# Patient Record
Sex: MALE | Race: WHITE | Employment: OTHER | URBAN - METROPOLITAN AREA
[De-identification: names, ages, dates, MRNs, and addresses within clinical notes are randomized per-mention and may not be internally consistent; named-entity substitution may affect disease eponyms.]

---

## 2019-01-21 ENCOUNTER — HOSPITAL ENCOUNTER (EMERGENCY)
Age: 69
Discharge: HOME | End: 2019-01-21
Payer: MEDICARE

## 2019-01-21 ENCOUNTER — APPOINTMENT (OUTPATIENT)
Dept: GENERAL RADIOLOGY | Age: 69
End: 2019-01-21
Payer: MEDICARE

## 2019-01-21 ENCOUNTER — APPOINTMENT (OUTPATIENT)
Dept: CT IMAGING | Age: 69
End: 2019-01-21
Payer: MEDICARE

## 2019-01-21 VITALS
RESPIRATION RATE: 18 BRPM | TEMPERATURE: 97.8 F | OXYGEN SATURATION: 95 % | BODY MASS INDEX: 40.73 KG/M2 | HEIGHT: 69 IN | SYSTOLIC BLOOD PRESSURE: 135 MMHG | DIASTOLIC BLOOD PRESSURE: 89 MMHG | WEIGHT: 275 LBS | HEART RATE: 90 BPM

## 2019-01-21 DIAGNOSIS — S60.221A CONTUSION OF RIGHT HAND, INITIAL ENCOUNTER: ICD-10-CM

## 2019-01-21 DIAGNOSIS — S50.12XA CONTUSION OF LEFT FOREARM, INITIAL ENCOUNTER: ICD-10-CM

## 2019-01-21 DIAGNOSIS — T07.XXXA MULTIPLE ABRASIONS: ICD-10-CM

## 2019-01-21 DIAGNOSIS — S50.11XA CONTUSION OF RIGHT FOREARM, INITIAL ENCOUNTER: ICD-10-CM

## 2019-01-21 DIAGNOSIS — S09.90XA CLOSED HEAD INJURY, INITIAL ENCOUNTER: Primary | ICD-10-CM

## 2019-01-21 DIAGNOSIS — S16.1XXA STRAIN OF NECK MUSCLE, INITIAL ENCOUNTER: ICD-10-CM

## 2019-01-21 PROCEDURE — 72125 CT NECK SPINE W/O DYE: CPT

## 2019-01-21 PROCEDURE — 6360000002 HC RX W HCPCS: Performed by: NURSE PRACTITIONER

## 2019-01-21 PROCEDURE — 6370000000 HC RX 637 (ALT 250 FOR IP): Performed by: NURSE PRACTITIONER

## 2019-01-21 PROCEDURE — 73090 X-RAY EXAM OF FOREARM: CPT

## 2019-01-21 PROCEDURE — 73130 X-RAY EXAM OF HAND: CPT

## 2019-01-21 PROCEDURE — 90471 IMMUNIZATION ADMIN: CPT | Performed by: NURSE PRACTITIONER

## 2019-01-21 PROCEDURE — 90715 TDAP VACCINE 7 YRS/> IM: CPT | Performed by: NURSE PRACTITIONER

## 2019-01-21 PROCEDURE — 70450 CT HEAD/BRAIN W/O DYE: CPT

## 2019-01-21 PROCEDURE — 99283 EMERGENCY DEPT VISIT LOW MDM: CPT

## 2019-01-21 RX ORDER — OXYCODONE HYDROCHLORIDE AND ACETAMINOPHEN 5; 325 MG/1; MG/1
1-2 TABLET ORAL EVERY 6 HOURS PRN
Qty: 12 TABLET | Refills: 0 | Status: SHIPPED | OUTPATIENT
Start: 2019-01-21 | End: 2019-01-24

## 2019-01-21 RX ORDER — OXYCODONE HYDROCHLORIDE AND ACETAMINOPHEN 5; 325 MG/1; MG/1
1 TABLET ORAL ONCE
Status: COMPLETED | OUTPATIENT
Start: 2019-01-21 | End: 2019-01-21

## 2019-01-21 RX ORDER — PIOGLITAZONEHYDROCHLORIDE 30 MG/1
30 TABLET ORAL DAILY
COMMUNITY
Start: 2018-11-11

## 2019-01-21 RX ORDER — METHOCARBAMOL 750 MG/1
750 TABLET, FILM COATED ORAL 3 TIMES DAILY
Qty: 21 TABLET | Refills: 0 | Status: SHIPPED | OUTPATIENT
Start: 2019-01-21 | End: 2019-01-28

## 2019-01-21 RX ADMIN — OXYCODONE AND ACETAMINOPHEN 1 TABLET: 5; 325 TABLET ORAL at 12:37

## 2019-01-21 RX ADMIN — TETANUS TOXOID, REDUCED DIPHTHERIA TOXOID AND ACELLULAR PERTUSSIS VACCINE, ADSORBED 0.5 ML: 5; 2.5; 8; 8; 2.5 SUSPENSION INTRAMUSCULAR at 13:48

## 2019-01-21 ASSESSMENT — PAIN SCALES - GENERAL
PAINLEVEL_OUTOF10: 6
PAINLEVEL_OUTOF10: 5
PAINLEVEL_OUTOF10: 5

## 2019-01-21 ASSESSMENT — PAIN SCALES - WONG BAKER: WONGBAKER_NUMERICALRESPONSE: 6

## 2019-01-21 ASSESSMENT — ENCOUNTER SYMPTOMS
PHOTOPHOBIA: 0
EYE PAIN: 0
COUGH: 0
VOMITING: 0
DIARRHEA: 0
ABDOMINAL DISTENTION: 0
NAUSEA: 0
BACK PAIN: 0
ABDOMINAL PAIN: 0
SHORTNESS OF BREATH: 0
ALLERGIC/IMMUNOLOGIC NEGATIVE: 1

## 2019-01-21 ASSESSMENT — PAIN DESCRIPTION - FREQUENCY: FREQUENCY: CONTINUOUS

## 2019-01-21 ASSESSMENT — PAIN DESCRIPTION - LOCATION: LOCATION: NECK;WRIST

## 2019-01-21 ASSESSMENT — PAIN DESCRIPTION - DESCRIPTORS: DESCRIPTORS: ACHING

## 2019-01-21 ASSESSMENT — PAIN DESCRIPTION - PAIN TYPE: TYPE: ACUTE PAIN

## 2019-01-21 ASSESSMENT — PAIN DESCRIPTION - ORIENTATION: ORIENTATION: RIGHT

## 2019-01-21 ASSESSMENT — PAIN DESCRIPTION - PROGRESSION: CLINICAL_PROGRESSION: GRADUALLY IMPROVING

## 2019-06-27 ENCOUNTER — ANESTHESIA EVENT (OUTPATIENT)
Dept: OPERATING ROOM | Age: 69
DRG: 661 | End: 2019-06-27
Payer: MEDICARE

## 2019-06-27 ENCOUNTER — HOSPITAL ENCOUNTER (INPATIENT)
Age: 69
LOS: 1 days | Discharge: HOME | DRG: 661 | End: 2019-06-28
Attending: EMERGENCY MEDICINE | Admitting: INTERNAL MEDICINE
Payer: MEDICARE

## 2019-06-27 ENCOUNTER — ANESTHESIA (OUTPATIENT)
Dept: OPERATING ROOM | Age: 69
DRG: 661 | End: 2019-06-27
Payer: MEDICARE

## 2019-06-27 ENCOUNTER — APPOINTMENT (OUTPATIENT)
Dept: CT IMAGING | Age: 69
DRG: 661 | End: 2019-06-27
Payer: MEDICARE

## 2019-06-27 ENCOUNTER — APPOINTMENT (OUTPATIENT)
Dept: GENERAL RADIOLOGY | Age: 69
DRG: 661 | End: 2019-06-27
Payer: MEDICARE

## 2019-06-27 VITALS
OXYGEN SATURATION: 96 % | SYSTOLIC BLOOD PRESSURE: 113 MMHG | RESPIRATION RATE: 3 BRPM | DIASTOLIC BLOOD PRESSURE: 72 MMHG

## 2019-06-27 DIAGNOSIS — N17.9 ACUTE RENAL FAILURE, UNSPECIFIED ACUTE RENAL FAILURE TYPE (HCC): ICD-10-CM

## 2019-06-27 DIAGNOSIS — N20.1 URETEROLITHIASIS: Primary | ICD-10-CM

## 2019-06-27 DIAGNOSIS — N13.30 HYDRONEPHROSIS, UNSPECIFIED HYDRONEPHROSIS TYPE: ICD-10-CM

## 2019-06-27 PROBLEM — N13.2 HYDRONEPHROSIS WITH RENAL AND URETERAL CALCULOUS OBSTRUCTION: Status: ACTIVE | Noted: 2019-06-27

## 2019-06-27 LAB
A/G RATIO: 1.3 (ref 1.1–2.2)
ALBUMIN SERPL-MCNC: 3.9 G/DL (ref 3.4–5)
ALP BLD-CCNC: 65 U/L (ref 40–129)
ALT SERPL-CCNC: 11 U/L (ref 10–40)
ANION GAP SERPL CALCULATED.3IONS-SCNC: 14 MMOL/L (ref 3–16)
AST SERPL-CCNC: 9 U/L (ref 15–37)
BACTERIA: ABNORMAL /HPF
BASOPHILS ABSOLUTE: 0.1 K/UL (ref 0–0.2)
BASOPHILS RELATIVE PERCENT: 0.6 %
BILIRUB SERPL-MCNC: 0.6 MG/DL (ref 0–1)
BILIRUBIN URINE: NEGATIVE
BLOOD, URINE: ABNORMAL
BUN BLDV-MCNC: 26 MG/DL (ref 7–20)
CALCIUM SERPL-MCNC: 9.2 MG/DL (ref 8.3–10.6)
CHLORIDE BLD-SCNC: 97 MMOL/L (ref 99–110)
CLARITY: CLEAR
CO2: 23 MMOL/L (ref 21–32)
COLOR: YELLOW
CREAT SERPL-MCNC: 1.8 MG/DL (ref 0.8–1.3)
EOSINOPHILS ABSOLUTE: 0.1 K/UL (ref 0–0.6)
EOSINOPHILS RELATIVE PERCENT: 0.7 %
EPITHELIAL CELLS, UA: ABNORMAL /HPF
GFR AFRICAN AMERICAN: 45
GFR NON-AFRICAN AMERICAN: 38
GLOBULIN: 2.9 G/DL
GLUCOSE BLD-MCNC: 176 MG/DL (ref 70–99)
GLUCOSE BLD-MCNC: 180 MG/DL (ref 70–99)
GLUCOSE BLD-MCNC: 215 MG/DL (ref 70–99)
GLUCOSE BLD-MCNC: 234 MG/DL (ref 70–99)
GLUCOSE URINE: NEGATIVE MG/DL
HCT VFR BLD CALC: 42.3 % (ref 40.5–52.5)
HEMOGLOBIN: 13.8 G/DL (ref 13.5–17.5)
KETONES, URINE: 15 MG/DL
LEUKOCYTE ESTERASE, URINE: NEGATIVE
LYMPHOCYTES ABSOLUTE: 1.5 K/UL (ref 1–5.1)
LYMPHOCYTES RELATIVE PERCENT: 11.9 %
MCH RBC QN AUTO: 27.4 PG (ref 26–34)
MCHC RBC AUTO-ENTMCNC: 32.6 G/DL (ref 31–36)
MCV RBC AUTO: 83.8 FL (ref 80–100)
MICROSCOPIC EXAMINATION: YES
MONOCYTES ABSOLUTE: 1.1 K/UL (ref 0–1.3)
MONOCYTES RELATIVE PERCENT: 8.8 %
NEUTROPHILS ABSOLUTE: 9.6 K/UL (ref 1.7–7.7)
NEUTROPHILS RELATIVE PERCENT: 78 %
NITRITE, URINE: NEGATIVE
PDW BLD-RTO: 14.3 % (ref 12.4–15.4)
PERFORMED ON: ABNORMAL
PH UA: 5.5 (ref 5–8)
PLATELET # BLD: 186 K/UL (ref 135–450)
PMV BLD AUTO: 8.5 FL (ref 5–10.5)
POTASSIUM SERPL-SCNC: 4.3 MMOL/L (ref 3.5–5.1)
PROTEIN UA: NEGATIVE MG/DL
RBC # BLD: 5.05 M/UL (ref 4.2–5.9)
RBC UA: ABNORMAL /HPF (ref 0–2)
SODIUM BLD-SCNC: 134 MMOL/L (ref 136–145)
SPECIFIC GRAVITY UA: 1.02 (ref 1–1.03)
TOTAL PROTEIN: 6.8 G/DL (ref 6.4–8.2)
URINE TYPE: ABNORMAL
UROBILINOGEN, URINE: 0.2 E.U./DL
WBC # BLD: 12.3 K/UL (ref 4–11)
WBC UA: ABNORMAL /HPF (ref 0–5)

## 2019-06-27 PROCEDURE — 0TF78ZZ FRAGMENTATION IN LEFT URETER, VIA NATURAL OR ARTIFICIAL OPENING ENDOSCOPIC: ICD-10-PCS | Performed by: UROLOGY

## 2019-06-27 PROCEDURE — 0TF48ZZ FRAGMENTATION IN LEFT KIDNEY PELVIS, VIA NATURAL OR ARTIFICIAL OPENING ENDOSCOPIC: ICD-10-PCS | Performed by: UROLOGY

## 2019-06-27 PROCEDURE — 7100000000 HC PACU RECOVERY - FIRST 15 MIN: Performed by: UROLOGY

## 2019-06-27 PROCEDURE — 99285 EMERGENCY DEPT VISIT HI MDM: CPT

## 2019-06-27 PROCEDURE — 6360000002 HC RX W HCPCS: Performed by: NURSE ANESTHETIST, CERTIFIED REGISTERED

## 2019-06-27 PROCEDURE — 2580000003 HC RX 258: Performed by: NURSE ANESTHETIST, CERTIFIED REGISTERED

## 2019-06-27 PROCEDURE — 81001 URINALYSIS AUTO W/SCOPE: CPT

## 2019-06-27 PROCEDURE — 2580000003 HC RX 258: Performed by: EMERGENCY MEDICINE

## 2019-06-27 PROCEDURE — 80053 COMPREHEN METABOLIC PANEL: CPT

## 2019-06-27 PROCEDURE — 2580000003 HC RX 258: Performed by: UROLOGY

## 2019-06-27 PROCEDURE — 0T778DZ DILATION OF LEFT URETER WITH INTRALUMINAL DEVICE, VIA NATURAL OR ARTIFICIAL OPENING ENDOSCOPIC: ICD-10-PCS | Performed by: UROLOGY

## 2019-06-27 PROCEDURE — 2500000003 HC RX 250 WO HCPCS: Performed by: NURSE ANESTHETIST, CERTIFIED REGISTERED

## 2019-06-27 PROCEDURE — 83036 HEMOGLOBIN GLYCOSYLATED A1C: CPT

## 2019-06-27 PROCEDURE — BT1F1ZZ FLUOROSCOPY OF LEFT KIDNEY, URETER AND BLADDER USING LOW OSMOLAR CONTRAST: ICD-10-PCS | Performed by: UROLOGY

## 2019-06-27 PROCEDURE — 3700000001 HC ADD 15 MINUTES (ANESTHESIA): Performed by: UROLOGY

## 2019-06-27 PROCEDURE — 6370000000 HC RX 637 (ALT 250 FOR IP): Performed by: UROLOGY

## 2019-06-27 PROCEDURE — C1758 CATHETER, URETERAL: HCPCS | Performed by: UROLOGY

## 2019-06-27 PROCEDURE — 96375 TX/PRO/DX INJ NEW DRUG ADDON: CPT

## 2019-06-27 PROCEDURE — 36415 COLL VENOUS BLD VENIPUNCTURE: CPT

## 2019-06-27 PROCEDURE — 6360000002 HC RX W HCPCS: Performed by: PHYSICIAN ASSISTANT

## 2019-06-27 PROCEDURE — 6360000002 HC RX W HCPCS: Performed by: EMERGENCY MEDICINE

## 2019-06-27 PROCEDURE — C1769 GUIDE WIRE: HCPCS | Performed by: UROLOGY

## 2019-06-27 PROCEDURE — 96361 HYDRATE IV INFUSION ADD-ON: CPT

## 2019-06-27 PROCEDURE — C2617 STENT, NON-COR, TEM W/O DEL: HCPCS | Performed by: UROLOGY

## 2019-06-27 PROCEDURE — 7100000001 HC PACU RECOVERY - ADDTL 15 MIN: Performed by: UROLOGY

## 2019-06-27 PROCEDURE — 6370000000 HC RX 637 (ALT 250 FOR IP): Performed by: INTERNAL MEDICINE

## 2019-06-27 PROCEDURE — 96374 THER/PROPH/DIAG INJ IV PUSH: CPT

## 2019-06-27 PROCEDURE — 2580000003 HC RX 258: Performed by: INTERNAL MEDICINE

## 2019-06-27 PROCEDURE — 6360000002 HC RX W HCPCS: Performed by: INTERNAL MEDICINE

## 2019-06-27 PROCEDURE — 2709999900 HC NON-CHARGEABLE SUPPLY: Performed by: UROLOGY

## 2019-06-27 PROCEDURE — 3209999900 FLUORO FOR SURGICAL PROCEDURES

## 2019-06-27 PROCEDURE — 3600000014 HC SURGERY LEVEL 4 ADDTL 15MIN: Performed by: UROLOGY

## 2019-06-27 PROCEDURE — 74018 RADEX ABDOMEN 1 VIEW: CPT

## 2019-06-27 PROCEDURE — 3600000004 HC SURGERY LEVEL 4 BASE: Performed by: UROLOGY

## 2019-06-27 PROCEDURE — 85025 COMPLETE CBC W/AUTO DIFF WBC: CPT

## 2019-06-27 PROCEDURE — 3700000000 HC ANESTHESIA ATTENDED CARE: Performed by: UROLOGY

## 2019-06-27 PROCEDURE — 1200000000 HC SEMI PRIVATE

## 2019-06-27 PROCEDURE — 6360000004 HC RX CONTRAST MEDICATION: Performed by: UROLOGY

## 2019-06-27 PROCEDURE — 74176 CT ABD & PELVIS W/O CONTRAST: CPT

## 2019-06-27 DEVICE — URETERAL STENT
Type: IMPLANTABLE DEVICE | Site: URETER | Status: NON-FUNCTIONAL
Brand: CONTOUR™
Removed: 2019-07-11

## 2019-06-27 RX ORDER — SODIUM CHLORIDE 0.9 % (FLUSH) 0.9 %
10 SYRINGE (ML) INJECTION EVERY 12 HOURS SCHEDULED
Status: DISCONTINUED | OUTPATIENT
Start: 2019-06-27 | End: 2019-06-27 | Stop reason: HOSPADM

## 2019-06-27 RX ORDER — PRAVASTATIN SODIUM 80 MG/1
80 TABLET ORAL DAILY
Status: DISCONTINUED | OUTPATIENT
Start: 2019-06-27 | End: 2019-06-28 | Stop reason: HOSPADM

## 2019-06-27 RX ORDER — PROPOFOL 10 MG/ML
INJECTION, EMULSION INTRAVENOUS PRN
Status: DISCONTINUED | OUTPATIENT
Start: 2019-06-27 | End: 2019-06-27 | Stop reason: SDUPTHER

## 2019-06-27 RX ORDER — MORPHINE SULFATE 2 MG/ML
2 INJECTION, SOLUTION INTRAMUSCULAR; INTRAVENOUS EVERY 5 MIN PRN
Status: DISCONTINUED | OUTPATIENT
Start: 2019-06-27 | End: 2019-06-27 | Stop reason: HOSPADM

## 2019-06-27 RX ORDER — MORPHINE SULFATE 4 MG/ML
4 INJECTION, SOLUTION INTRAMUSCULAR; INTRAVENOUS ONCE
Status: COMPLETED | OUTPATIENT
Start: 2019-06-27 | End: 2019-06-27

## 2019-06-27 RX ORDER — HYDRALAZINE HYDROCHLORIDE 20 MG/ML
5 INJECTION INTRAMUSCULAR; INTRAVENOUS EVERY 10 MIN PRN
Status: DISCONTINUED | OUTPATIENT
Start: 2019-06-27 | End: 2019-06-27 | Stop reason: HOSPADM

## 2019-06-27 RX ORDER — DEXTROSE MONOHYDRATE 50 MG/ML
100 INJECTION, SOLUTION INTRAVENOUS PRN
Status: DISCONTINUED | OUTPATIENT
Start: 2019-06-27 | End: 2019-06-28 | Stop reason: HOSPADM

## 2019-06-27 RX ORDER — OXYCODONE HYDROCHLORIDE AND ACETAMINOPHEN 5; 325 MG/1; MG/1
1 TABLET ORAL PRN
Status: DISCONTINUED | OUTPATIENT
Start: 2019-06-27 | End: 2019-06-27 | Stop reason: HOSPADM

## 2019-06-27 RX ORDER — 0.9 % SODIUM CHLORIDE 0.9 %
1000 INTRAVENOUS SOLUTION INTRAVENOUS ONCE
Status: COMPLETED | OUTPATIENT
Start: 2019-06-27 | End: 2019-06-27

## 2019-06-27 RX ORDER — OXYCODONE HYDROCHLORIDE AND ACETAMINOPHEN 5; 325 MG/1; MG/1
2 TABLET ORAL PRN
Status: DISCONTINUED | OUTPATIENT
Start: 2019-06-27 | End: 2019-06-27 | Stop reason: HOSPADM

## 2019-06-27 RX ORDER — MORPHINE SULFATE 2 MG/ML
2 INJECTION, SOLUTION INTRAMUSCULAR; INTRAVENOUS EVERY 4 HOURS PRN
Status: DISCONTINUED | OUTPATIENT
Start: 2019-06-27 | End: 2019-06-27

## 2019-06-27 RX ORDER — MORPHINE SULFATE 2 MG/ML
2 INJECTION, SOLUTION INTRAMUSCULAR; INTRAVENOUS
Status: DISCONTINUED | OUTPATIENT
Start: 2019-06-27 | End: 2019-06-28 | Stop reason: HOSPADM

## 2019-06-27 RX ORDER — PROMETHAZINE HYDROCHLORIDE 25 MG/ML
6.25 INJECTION, SOLUTION INTRAMUSCULAR; INTRAVENOUS
Status: DISCONTINUED | OUTPATIENT
Start: 2019-06-27 | End: 2019-06-27 | Stop reason: HOSPADM

## 2019-06-27 RX ORDER — DEXTROSE MONOHYDRATE 25 G/50ML
12.5 INJECTION, SOLUTION INTRAVENOUS PRN
Status: DISCONTINUED | OUTPATIENT
Start: 2019-06-27 | End: 2019-06-28 | Stop reason: HOSPADM

## 2019-06-27 RX ORDER — LABETALOL HYDROCHLORIDE 5 MG/ML
5 INJECTION, SOLUTION INTRAVENOUS EVERY 10 MIN PRN
Status: DISCONTINUED | OUTPATIENT
Start: 2019-06-27 | End: 2019-06-27 | Stop reason: HOSPADM

## 2019-06-27 RX ORDER — LIDOCAINE HYDROCHLORIDE 20 MG/ML
INJECTION, SOLUTION INFILTRATION; PERINEURAL PRN
Status: DISCONTINUED | OUTPATIENT
Start: 2019-06-27 | End: 2019-06-27 | Stop reason: SDUPTHER

## 2019-06-27 RX ORDER — ROCURONIUM BROMIDE 10 MG/ML
INJECTION, SOLUTION INTRAVENOUS PRN
Status: DISCONTINUED | OUTPATIENT
Start: 2019-06-27 | End: 2019-06-27 | Stop reason: SDUPTHER

## 2019-06-27 RX ORDER — MORPHINE SULFATE 2 MG/ML
1 INJECTION, SOLUTION INTRAMUSCULAR; INTRAVENOUS EVERY 5 MIN PRN
Status: DISCONTINUED | OUTPATIENT
Start: 2019-06-27 | End: 2019-06-27 | Stop reason: HOSPADM

## 2019-06-27 RX ORDER — CARVEDILOL 6.25 MG/1
6.25 TABLET ORAL 2 TIMES DAILY WITH MEALS
Status: DISCONTINUED | OUTPATIENT
Start: 2019-06-27 | End: 2019-06-28 | Stop reason: HOSPADM

## 2019-06-27 RX ORDER — MAGNESIUM HYDROXIDE 1200 MG/15ML
LIQUID ORAL PRN
Status: DISCONTINUED | OUTPATIENT
Start: 2019-06-27 | End: 2019-06-27 | Stop reason: HOSPADM

## 2019-06-27 RX ORDER — SODIUM CHLORIDE, SODIUM LACTATE, POTASSIUM CHLORIDE, CALCIUM CHLORIDE 600; 310; 30; 20 MG/100ML; MG/100ML; MG/100ML; MG/100ML
INJECTION, SOLUTION INTRAVENOUS CONTINUOUS PRN
Status: DISCONTINUED | OUTPATIENT
Start: 2019-06-27 | End: 2019-06-27 | Stop reason: SDUPTHER

## 2019-06-27 RX ORDER — NICOTINE POLACRILEX 4 MG
15 LOZENGE BUCCAL PRN
Status: DISCONTINUED | OUTPATIENT
Start: 2019-06-27 | End: 2019-06-28 | Stop reason: HOSPADM

## 2019-06-27 RX ORDER — DIPHENHYDRAMINE HYDROCHLORIDE 50 MG/ML
12.5 INJECTION INTRAMUSCULAR; INTRAVENOUS
Status: DISCONTINUED | OUTPATIENT
Start: 2019-06-27 | End: 2019-06-27 | Stop reason: HOSPADM

## 2019-06-27 RX ORDER — MEPERIDINE HYDROCHLORIDE 50 MG/ML
12.5 INJECTION INTRAMUSCULAR; INTRAVENOUS; SUBCUTANEOUS EVERY 5 MIN PRN
Status: DISCONTINUED | OUTPATIENT
Start: 2019-06-27 | End: 2019-06-27 | Stop reason: HOSPADM

## 2019-06-27 RX ORDER — SODIUM CHLORIDE 0.9 % (FLUSH) 0.9 %
10 SYRINGE (ML) INJECTION PRN
Status: DISCONTINUED | OUTPATIENT
Start: 2019-06-27 | End: 2019-06-28 | Stop reason: HOSPADM

## 2019-06-27 RX ORDER — ASPIRIN 81 MG/1
81 TABLET ORAL DAILY
Status: DISCONTINUED | OUTPATIENT
Start: 2019-06-27 | End: 2019-06-28 | Stop reason: HOSPADM

## 2019-06-27 RX ORDER — FENTANYL CITRATE 50 UG/ML
INJECTION, SOLUTION INTRAMUSCULAR; INTRAVENOUS PRN
Status: DISCONTINUED | OUTPATIENT
Start: 2019-06-27 | End: 2019-06-27 | Stop reason: SDUPTHER

## 2019-06-27 RX ORDER — SODIUM CHLORIDE 9 MG/ML
1000 INJECTION, SOLUTION INTRAVENOUS CONTINUOUS
Status: DISCONTINUED | OUTPATIENT
Start: 2019-06-27 | End: 2019-06-28 | Stop reason: HOSPADM

## 2019-06-27 RX ORDER — DEXAMETHASONE SODIUM PHOSPHATE 10 MG/ML
INJECTION INTRAMUSCULAR; INTRAVENOUS PRN
Status: DISCONTINUED | OUTPATIENT
Start: 2019-06-27 | End: 2019-06-27 | Stop reason: SDUPTHER

## 2019-06-27 RX ORDER — SODIUM CHLORIDE 0.9 % (FLUSH) 0.9 %
10 SYRINGE (ML) INJECTION PRN
Status: DISCONTINUED | OUTPATIENT
Start: 2019-06-27 | End: 2019-06-27 | Stop reason: HOSPADM

## 2019-06-27 RX ORDER — PIOGLITAZONEHYDROCHLORIDE 15 MG/1
30 TABLET ORAL DAILY
Status: DISCONTINUED | OUTPATIENT
Start: 2019-06-27 | End: 2019-06-28 | Stop reason: HOSPADM

## 2019-06-27 RX ORDER — ONDANSETRON 2 MG/ML
4 INJECTION INTRAMUSCULAR; INTRAVENOUS PRN
Status: DISCONTINUED | OUTPATIENT
Start: 2019-06-27 | End: 2019-06-27 | Stop reason: HOSPADM

## 2019-06-27 RX ORDER — ONDANSETRON 2 MG/ML
4 INJECTION INTRAMUSCULAR; INTRAVENOUS ONCE
Status: COMPLETED | OUTPATIENT
Start: 2019-06-27 | End: 2019-06-27

## 2019-06-27 RX ORDER — ONDANSETRON 2 MG/ML
INJECTION INTRAMUSCULAR; INTRAVENOUS PRN
Status: DISCONTINUED | OUTPATIENT
Start: 2019-06-27 | End: 2019-06-27 | Stop reason: SDUPTHER

## 2019-06-27 RX ORDER — MORPHINE SULFATE 2 MG/ML
INJECTION, SOLUTION INTRAMUSCULAR; INTRAVENOUS
Status: DISPENSED
Start: 2019-06-27 | End: 2019-06-27

## 2019-06-27 RX ORDER — SODIUM CHLORIDE 0.9 % (FLUSH) 0.9 %
10 SYRINGE (ML) INJECTION EVERY 12 HOURS SCHEDULED
Status: DISCONTINUED | OUTPATIENT
Start: 2019-06-27 | End: 2019-06-28 | Stop reason: HOSPADM

## 2019-06-27 RX ORDER — SODIUM CHLORIDE 9 MG/ML
INJECTION, SOLUTION INTRAVENOUS CONTINUOUS
Status: DISCONTINUED | OUTPATIENT
Start: 2019-06-27 | End: 2019-06-28 | Stop reason: HOSPADM

## 2019-06-27 RX ORDER — ONDANSETRON 2 MG/ML
4 INJECTION INTRAMUSCULAR; INTRAVENOUS EVERY 6 HOURS PRN
Status: DISCONTINUED | OUTPATIENT
Start: 2019-06-27 | End: 2019-06-28 | Stop reason: HOSPADM

## 2019-06-27 RX ADMIN — ONDANSETRON 4 MG: 2 INJECTION INTRAMUSCULAR; INTRAVENOUS at 07:40

## 2019-06-27 RX ADMIN — ROCURONIUM BROMIDE 20 MG: 10 SOLUTION INTRAVENOUS at 16:54

## 2019-06-27 RX ADMIN — PHENYLEPHRINE HYDROCHLORIDE 200 MCG: 10 INJECTION INTRAVENOUS at 16:48

## 2019-06-27 RX ADMIN — ONDANSETRON 4 MG: 2 INJECTION INTRAMUSCULAR; INTRAVENOUS at 16:54

## 2019-06-27 RX ADMIN — SODIUM CHLORIDE 1000 ML: 9 INJECTION, SOLUTION INTRAVENOUS at 07:40

## 2019-06-27 RX ADMIN — PRAVASTATIN SODIUM 80 MG: 80 TABLET ORAL at 20:02

## 2019-06-27 RX ADMIN — INSULIN LISPRO 1 UNITS: 100 INJECTION, SOLUTION INTRAVENOUS; SUBCUTANEOUS at 20:04

## 2019-06-27 RX ADMIN — CARVEDILOL 6.25 MG: 6.25 TABLET, FILM COATED ORAL at 20:03

## 2019-06-27 RX ADMIN — Medication 4 MG: at 07:40

## 2019-06-27 RX ADMIN — FENTANYL CITRATE 100 MCG: 50 INJECTION, SOLUTION INTRAMUSCULAR; INTRAVENOUS at 16:30

## 2019-06-27 RX ADMIN — PHENYLEPHRINE HYDROCHLORIDE 200 MCG: 10 INJECTION INTRAVENOUS at 16:45

## 2019-06-27 RX ADMIN — ROCURONIUM BROMIDE 10 MG: 10 SOLUTION INTRAVENOUS at 16:36

## 2019-06-27 RX ADMIN — SUGAMMADEX 100 MG: 100 INJECTION, SOLUTION INTRAVENOUS at 17:26

## 2019-06-27 RX ADMIN — FENTANYL CITRATE 50 MCG: 50 INJECTION, SOLUTION INTRAMUSCULAR; INTRAVENOUS at 16:54

## 2019-06-27 RX ADMIN — HYDROMORPHONE HYDROCHLORIDE 1 MG: 1 INJECTION, SOLUTION INTRAMUSCULAR; INTRAVENOUS; SUBCUTANEOUS at 08:55

## 2019-06-27 RX ADMIN — PROPOFOL 200 MG: 10 INJECTION, EMULSION INTRAVENOUS at 16:36

## 2019-06-27 RX ADMIN — DEXAMETHASONE SODIUM PHOSPHATE 10 MG: 10 INJECTION INTRAMUSCULAR; INTRAVENOUS at 16:54

## 2019-06-27 RX ADMIN — FENTANYL CITRATE 50 MCG: 50 INJECTION, SOLUTION INTRAMUSCULAR; INTRAVENOUS at 16:36

## 2019-06-27 RX ADMIN — PHENYLEPHRINE HYDROCHLORIDE 100 MCG: 10 INJECTION INTRAVENOUS at 16:41

## 2019-06-27 RX ADMIN — Medication 10 ML: at 20:04

## 2019-06-27 RX ADMIN — MORPHINE SULFATE 2 MG: 2 INJECTION, SOLUTION INTRAMUSCULAR; INTRAVENOUS at 13:40

## 2019-06-27 RX ADMIN — CARVEDILOL 6.25 MG: 6.25 TABLET, FILM COATED ORAL at 15:50

## 2019-06-27 RX ADMIN — ASPIRIN 81 MG: 81 TABLET ORAL at 20:04

## 2019-06-27 RX ADMIN — SODIUM CHLORIDE, POTASSIUM CHLORIDE, SODIUM LACTATE AND CALCIUM CHLORIDE: 600; 310; 30; 20 INJECTION, SOLUTION INTRAVENOUS at 16:52

## 2019-06-27 RX ADMIN — SODIUM CHLORIDE 1000 ML: 9 INJECTION, SOLUTION INTRAVENOUS at 08:55

## 2019-06-27 RX ADMIN — LIDOCAINE HYDROCHLORIDE 3 ML: 20 INJECTION, SOLUTION INFILTRATION; PERINEURAL at 16:36

## 2019-06-27 RX ADMIN — PIOGLITAZONE 30 MG: 15 TABLET ORAL at 20:03

## 2019-06-27 RX ADMIN — SODIUM CHLORIDE: 9 INJECTION, SOLUTION INTRAVENOUS at 15:59

## 2019-06-27 RX ADMIN — Medication 2 G: at 16:30

## 2019-06-27 RX ADMIN — MORPHINE SULFATE 2 MG: 2 INJECTION, SOLUTION INTRAMUSCULAR; INTRAVENOUS at 11:45

## 2019-06-27 ASSESSMENT — PAIN DESCRIPTION - LOCATION
LOCATION: ABDOMEN
LOCATION: OTHER (COMMENT)

## 2019-06-27 ASSESSMENT — PULMONARY FUNCTION TESTS
PIF_VALUE: 23
PIF_VALUE: 23
PIF_VALUE: 21
PIF_VALUE: 25
PIF_VALUE: 0
PIF_VALUE: 27
PIF_VALUE: 14
PIF_VALUE: 21
PIF_VALUE: 24
PIF_VALUE: 23
PIF_VALUE: 6
PIF_VALUE: 23
PIF_VALUE: 25
PIF_VALUE: 23
PIF_VALUE: 25
PIF_VALUE: 23
PIF_VALUE: 4
PIF_VALUE: 24
PIF_VALUE: 27
PIF_VALUE: 21
PIF_VALUE: 22
PIF_VALUE: 23
PIF_VALUE: 1
PIF_VALUE: 23
PIF_VALUE: 25
PIF_VALUE: 23
PIF_VALUE: 25
PIF_VALUE: 23
PIF_VALUE: 25
PIF_VALUE: 21
PIF_VALUE: 25
PIF_VALUE: 25
PIF_VALUE: 1
PIF_VALUE: 8
PIF_VALUE: 21
PIF_VALUE: 25
PIF_VALUE: 22
PIF_VALUE: 21
PIF_VALUE: 0
PIF_VALUE: 21
PIF_VALUE: 24
PIF_VALUE: 17
PIF_VALUE: 21
PIF_VALUE: 24
PIF_VALUE: 23
PIF_VALUE: 22
PIF_VALUE: 21
PIF_VALUE: 25
PIF_VALUE: 21
PIF_VALUE: 1
PIF_VALUE: 1
PIF_VALUE: 25
PIF_VALUE: 26

## 2019-06-27 ASSESSMENT — PAIN SCALES - GENERAL
PAINLEVEL_OUTOF10: 7
PAINLEVEL_OUTOF10: 3
PAINLEVEL_OUTOF10: 0
PAINLEVEL_OUTOF10: 0
PAINLEVEL_OUTOF10: 4
PAINLEVEL_OUTOF10: 0
PAINLEVEL_OUTOF10: 8
PAINLEVEL_OUTOF10: 5
PAINLEVEL_OUTOF10: 8

## 2019-06-27 ASSESSMENT — PAIN DESCRIPTION - FREQUENCY
FREQUENCY: INTERMITTENT
FREQUENCY: OTHER (COMMENT)

## 2019-06-27 ASSESSMENT — PAIN DESCRIPTION - PAIN TYPE
TYPE: SURGICAL PAIN
TYPE: ACUTE PAIN
TYPE: SURGICAL PAIN

## 2019-06-27 ASSESSMENT — PAIN DESCRIPTION - ONSET
ONSET: OTHER (COMMENT)
ONSET: ON-GOING

## 2019-06-27 ASSESSMENT — PAIN DESCRIPTION - ORIENTATION
ORIENTATION: LEFT
ORIENTATION: OTHER (COMMENT)

## 2019-06-27 ASSESSMENT — PAIN DESCRIPTION - DESCRIPTORS: DESCRIPTORS: STABBING

## 2019-06-27 ASSESSMENT — PAIN DESCRIPTION - PROGRESSION
CLINICAL_PROGRESSION: NOT CHANGED
CLINICAL_PROGRESSION: GRADUALLY WORSENING

## 2019-06-27 NOTE — ED PROVIDER NOTES
comfortably in full sentences. ABDOMEN: Soft. Diffuse tenderness  . No guarding or rebound. Normal bowel sounds. EXTREMITIES: No peripheral edema. MAEE. No acute deformities. SKIN: Warm and dry. No acute rashes. NEUROLOGICAL: Alert and oriented X 3. CN II-XII intact. No gross facial drooping. Strength 5/5, sensation intact. Normal coordination. No pronator drift. Gait normal.   PSYCHIATRIC: Normal mood and affect. LABS  I have reviewed all labs for this visit.    Results for orders placed or performed during the hospital encounter of 06/27/19   Urinalysis, reflex to microscopic   Result Value Ref Range    Color, UA Yellow Straw/Yellow    Clarity, UA Clear Clear    Glucose, Ur Negative Negative mg/dL    Bilirubin Urine Negative Negative    Ketones, Urine 15 (A) Negative mg/dL    Specific Gravity, UA 1.020 1.005 - 1.030    Blood, Urine SMALL (A) Negative    pH, UA 5.5 5.0 - 8.0    Protein, UA Negative Negative mg/dL    Urobilinogen, Urine 0.2 <2.0 E.U./dL    Nitrite, Urine Negative Negative    Leukocyte Esterase, Urine Negative Negative    Microscopic Examination YES     Urine Type Not Specified    CBC auto differential   Result Value Ref Range    WBC 12.3 (H) 4.0 - 11.0 K/uL    RBC 5.05 4.20 - 5.90 M/uL    Hemoglobin 13.8 13.5 - 17.5 g/dL    Hematocrit 42.3 40.5 - 52.5 %    MCV 83.8 80.0 - 100.0 fL    MCH 27.4 26.0 - 34.0 pg    MCHC 32.6 31.0 - 36.0 g/dL    RDW 14.3 12.4 - 15.4 %    Platelets 058 154 - 191 K/uL    MPV 8.5 5.0 - 10.5 fL    Neutrophils % 78.0 %    Lymphocytes % 11.9 %    Monocytes % 8.8 %    Eosinophils % 0.7 %    Basophils % 0.6 %    Neutrophils # 9.6 (H) 1.7 - 7.7 K/uL    Lymphocytes # 1.5 1.0 - 5.1 K/uL    Monocytes # 1.1 0.0 - 1.3 K/uL    Eosinophils # 0.1 0.0 - 0.6 K/uL    Basophils # 0.1 0.0 - 0.2 K/uL   Comprehensive metabolic panel   Result Value Ref Range    Sodium 134 (L) 136 - 145 mmol/L    Potassium 4.3 3.5 - 5.1 mmol/L    Chloride 97 (L) 99 - 110 mmol/L    CO2 23 21 - 32 mmol/L Anion Gap 14 3 - 16    Glucose 215 (H) 70 - 99 mg/dL    BUN 26 (H) 7 - 20 mg/dL    CREATININE 1.8 (H) 0.8 - 1.3 mg/dL    GFR Non- 38 (A) >60    GFR  45 (A) >60    Calcium 9.2 8.3 - 10.6 mg/dL    Total Protein 6.8 6.4 - 8.2 g/dL    Alb 3.9 3.4 - 5.0 g/dL    Albumin/Globulin Ratio 1.3 1.1 - 2.2    Total Bilirubin 0.6 0.0 - 1.0 mg/dL    Alkaline Phosphatase 65 40 - 129 U/L    ALT 11 10 - 40 U/L    AST 9 (L) 15 - 37 U/L    Globulin 2.9 g/dL   Microscopic Urinalysis   Result Value Ref Range    WBC, UA 3-5 0 - 5 /HPF    RBC, UA 0-2 0 - 2 /HPF    Epi Cells 0-2 /HPF    Bacteria, UA Rare (A) /HPF   POCT Glucose   Result Value Ref Range    POC Glucose 180 (H) 70 - 99 mg/dl    Performed on ACCU-CHEK        RADIOLOGY  X-RAYS:  I have reviewed radiologic plain film image(s). ALL OTHER NON-PLAIN FILM IMAGES SUCH AS CT, ULTRASOUND AND MRI HAVE BEEN READ BY THE RADIOLOGIST. CT ABDOMEN PELVIS WO CONTRAST Additional Contrast? None   Final Result   1. There is 7 mm obstructing calculus in the mid left ureter causing mild to   moderate hydronephrosis and proximal hydroureter. 2. Bilateral nonobstructing renal calculi up developed since 2007. The   largest are on the left measuring 11 mm and 7 mm. 3. A 1.7 cm left renal cyst.         XR ABDOMEN (KUB) (SINGLE AP VIEW)    (Results Pending)              Rechecks: Physical assessment performed. Moderate relief of pain post meds. Consult to urology and hospitalist placed. ED COURSE/MDM  Patient seen and evaluated. Old records reviewed. Labs and imaging reviewed and results discussed with patient. Patient was given multiple pain medications in the ED with good symptomatic relief. Patient was reassessed as noted above . Imaging is concerning for large nephrolithiasis with hydronephrosis. Labs are concerning for OBDULIA. Plan of care discussed with patient and family. Patient and family in agreement with plan.      Patient was given scripts for

## 2019-06-27 NOTE — ANESTHESIA PRE PROCEDURE
Department of Anesthesiology  Preprocedure Note       Name:  Miguel Ángel Rae   Age:  71 y.o.  :  1950                                          MRN:  [id number]         Date:  2019      Surgeon: Randall Moore):  Faheem Teague MD    Procedure: CYSTOSCOPY, LEFT URETEROSCOPY, LEFT STONE MANIPULATION, LASER, POSSIBLE LEFT STENT PLACEMENT (Left Bladder)    Medications prior to admission:   Prior to Admission medications    Medication Sig Start Date End Date Taking? Authorizing Provider   pioglitazone (ACTOS) 30 MG tablet Take 30 mg by mouth daily  18  Yes Historical Provider, MD   carvedilol (COREG) 6.25 MG tablet Take 1 tablet by mouth 2 times daily (with meals) 16 Yes Valentino Pizarro MD   pravastatin (PRAVACHOL) 80 MG tablet Take 80 mg by mouth daily   Yes Historical Provider, MD   losartan (COZAAR) 100 MG tablet Take 100 mg by mouth daily   Yes Historical Provider, MD   aspirin 81 MG tablet Take 81 mg by mouth daily   Yes Historical Provider, MD   sitagliptan-metformin (JANUMET)  MG per tablet Take 1 tablet by mouth 2 times daily (with meals).      Yes Historical Provider, MD   Bismuth Subsalicylate (PEPTO-BISMOL PO) Take 1 tablet by mouth as needed    Yes Historical Provider, MD       Current medications:    Current Facility-Administered Medications   Medication Dose Route Frequency Provider Last Rate Last Dose    0.9 % sodium chloride infusion  1,000 mL Intravenous Continuous Javier Cobian  mL/hr at 19 0855 1,000 mL at 19 0855    carvedilol (COREG) tablet 6.25 mg  6.25 mg Oral BID WC Javier Cobian MD   6.25 mg at 19 1550    pravastatin (PRAVACHOL) tablet 80 mg  80 mg Oral Daily Javier Cobian MD        pioglitazone (ACTOS) tablet 30 mg  30 mg Oral Daily Javier Cobian MD   Stopped at 19 1348    linagliptin (TRADJENTA) tablet 5 mg  5 mg Oral Daily Javier Cobian MD   Stopped at 19 1300    aspirin EC tablet 81 mg  81 mg Oral Daily Jayden K Neo Basilio MD   Stopped at 06/27/19 1230    0.9 % sodium chloride infusion   Intravenous Continuous Rin Colbert MD 75 mL/hr at 06/27/19 1130      sodium chloride flush 0.9 % injection 10 mL  10 mL Intravenous 2 times per day Rin Colbert MD        sodium chloride flush 0.9 % injection 10 mL  10 mL Intravenous PRN Rin Colbert MD        magnesium hydroxide (MILK OF MAGNESIA) 400 MG/5ML suspension 30 mL  30 mL Oral Daily PRN Rin Colbert MD        ondansetron [facility]) injection 4 mg  4 mg Intravenous Q6H PRN Rin Colbert MD       [facility] [START ON 6/28/2019] enoxaparin (LOVENOX) injection 30 mg  30 mg Subcutaneous Daily Rin Colbert MD        glucose (GLUTOSE) 40 % oral gel 15 g  15 g Oral PRN Rin Colbert MD        dextrose 50 % IV solution  12.5 g Intravenous PRN Rin Colbert MD        glucagon (rDNA) injection 1 mg  1 mg Intramuscular PRN Rin Colbert MD        dextrose 5 % solution  100 mL/hr Intravenous PRN Rin Colbert MD        insulin lispro (HUMALOG) injection vial 0-6 Units  0-6 Units Subcutaneous TID WC Rin Colbert MD        insulin lispro (HUMALOG) injection vial 0-3 Units  0-3 Units Subcutaneous Nightly iRn Colbert MD        morphine 2 MG/ML injection             sodium chloride flush 0.9 % injection 10 mL  10 mL Intravenous 2 times per day Mirella Obrien PA-C        sodium chloride flush 0.9 % injection 10 mL  10 mL Intravenous PRN Mirella Obrien PA-C        ceFAZolin (ANCEF) 2 g in sterile water 20 mL IV syringe  2 g Intravenous On Call to [address] [place] [address])CASSANDRA        morphine (PF) injection 2 mg  2 mg Intravenous Q2H PRN Rin Colbert MD   2 mg at 06/27/19 1340       Allergies:     Allergies   Allergen Reactions    Acyclovir And Related     Penicillins Other (See Comments)     Stomach distress    Lipitor Rash    Plavix [Clopidogrel Bisulfate] Rash    Victoza [Liraglutide] Rash       Problem List:    Patient Active Problem List   Diagnosis Code    CVA (cerebral vascular accident) ([facility] I63.9    Bell's palsy G51.0    HTN (hypertension) I10    CAD (coronary artery disease) I25.10    Hyperlipidemia E78.5    DM2 (diabetes mellitus, type 2) (HCC) E11.9    Chest pain R07.9    Obesity E66.9    Hydronephrosis with renal and ureteral calculous obstruction N13.2       Past Medical History:        Diagnosis Date    CAD (coronary artery disease) 46    [facility] 12/23/2011    A1c 15    Hyperlipidemia 1992    Hypertension     Kidney stones 1987       Past Surgical History:        Procedure Laterality Date    APPENDECTOMY  1970    CERVICAL FUSION  1995    COLONOSCOPY  1987&2002    WNL    CORONARY ANGIOPLASTY WITH STENT PLACEMENT  [facility] stents    CORONARY ANGIOPLASTY WITH STENT PLACEMENT  2008    HERNIA REPAIR  1987&1996    bilateral inguinal    UPPER GASTROINTESTINAL ENDOSCOPY  2002    errosive esophagitis    UPPER GASTROINTESTINAL ENDOSCOPY  08/28/2008    GERD       Social History:    Social History     Tobacco Use    Smoking status: Never Smoker    Smokeless tobacco: Never Used   Substance Use Topics    Alcohol use: Yes     Comment: rare                                Counseling given: Not Answered      Vital Signs (Current):   Vitals:    06/27/19 0833 06/27/19 1023 06/27/19 1118 06/27/19 1356   BP: (!) 145/73 129/83 (!) 164/89 (!) 142/84   Pulse: 99 101 102 99   Resp: 15 17 18 18   Temp: 98.1 °F (36.7 °C) 98.3 °F (36.8 °C) 98.8 °F (37.1 °C) 98.2 °F (36.8 °C)   TempSrc:  Oral Oral Oral   SpO2: 96% 95% 91% 96%   Weight:       Height:                                                  BP Readings from Last 3 Encounters:   06/27/19 (!) 142/84   01/21/19 135/89   05/06/16 120/85       NPO Status:                                                                                 BMI:   Wt Readings from Last 3 Encounters:   06/27/19 260 lb (117.9 kg)   01/21/19 275 lb (124.7 kg)   05/06/16 252 lb 12.8 oz (114.7 kg)     Body mass index is Prophylactic antiemetics administered. Anesthetic plan and risks discussed with patient. Plan discussed with CRNA.                   Louann Hannon MD   6/27/2019

## 2019-06-28 VITALS
HEIGHT: 69 IN | HEART RATE: 78 BPM | SYSTOLIC BLOOD PRESSURE: 116 MMHG | DIASTOLIC BLOOD PRESSURE: 89 MMHG | RESPIRATION RATE: 16 BRPM | BODY MASS INDEX: 38.51 KG/M2 | OXYGEN SATURATION: 93 % | TEMPERATURE: 97.8 F | WEIGHT: 260 LBS

## 2019-06-28 LAB
ANION GAP SERPL CALCULATED.3IONS-SCNC: 11 MMOL/L (ref 3–16)
BASOPHILS ABSOLUTE: 0.1 K/UL (ref 0–0.2)
BASOPHILS RELATIVE PERCENT: 0.7 %
BUN BLDV-MCNC: 23 MG/DL (ref 7–20)
CALCIUM SERPL-MCNC: 9.1 MG/DL (ref 8.3–10.6)
CHLORIDE BLD-SCNC: 99 MMOL/L (ref 99–110)
CO2: 27 MMOL/L (ref 21–32)
CREAT SERPL-MCNC: 1.5 MG/DL (ref 0.8–1.3)
EOSINOPHILS ABSOLUTE: 0 K/UL (ref 0–0.6)
EOSINOPHILS RELATIVE PERCENT: 0 %
ESTIMATED AVERAGE GLUCOSE: 197.3 MG/DL
GFR AFRICAN AMERICAN: 56
GFR NON-AFRICAN AMERICAN: 46
GLUCOSE BLD-MCNC: 174 MG/DL (ref 70–99)
GLUCOSE BLD-MCNC: 233 MG/DL (ref 70–99)
HBA1C MFR BLD: 8.5 %
HCT VFR BLD CALC: 40.9 % (ref 40.5–52.5)
HEMOGLOBIN: 13.4 G/DL (ref 13.5–17.5)
LYMPHOCYTES ABSOLUTE: 0.9 K/UL (ref 1–5.1)
LYMPHOCYTES RELATIVE PERCENT: 9.8 %
MCH RBC QN AUTO: 27.9 PG (ref 26–34)
MCHC RBC AUTO-ENTMCNC: 32.7 G/DL (ref 31–36)
MCV RBC AUTO: 85.3 FL (ref 80–100)
MONOCYTES ABSOLUTE: 0.8 K/UL (ref 0–1.3)
MONOCYTES RELATIVE PERCENT: 9 %
NEUTROPHILS ABSOLUTE: 7.3 K/UL (ref 1.7–7.7)
NEUTROPHILS RELATIVE PERCENT: 80.5 %
PDW BLD-RTO: 14.5 % (ref 12.4–15.4)
PERFORMED ON: ABNORMAL
PLATELET # BLD: 187 K/UL (ref 135–450)
PMV BLD AUTO: 8.5 FL (ref 5–10.5)
POTASSIUM REFLEX MAGNESIUM: 4.7 MMOL/L (ref 3.5–5.1)
RBC # BLD: 4.8 M/UL (ref 4.2–5.9)
SODIUM BLD-SCNC: 137 MMOL/L (ref 136–145)
WBC # BLD: 9.1 K/UL (ref 4–11)

## 2019-06-28 PROCEDURE — 85025 COMPLETE CBC W/AUTO DIFF WBC: CPT

## 2019-06-28 PROCEDURE — 6370000000 HC RX 637 (ALT 250 FOR IP): Performed by: UROLOGY

## 2019-06-28 PROCEDURE — 6360000002 HC RX W HCPCS: Performed by: UROLOGY

## 2019-06-28 PROCEDURE — 80048 BASIC METABOLIC PNL TOTAL CA: CPT

## 2019-06-28 RX ADMIN — CARVEDILOL 6.25 MG: 6.25 TABLET, FILM COATED ORAL at 07:43

## 2019-06-28 RX ADMIN — ENOXAPARIN SODIUM 30 MG: 30 INJECTION SUBCUTANEOUS at 07:44

## 2019-06-28 RX ADMIN — LINAGLIPTIN 5 MG: 5 TABLET, FILM COATED ORAL at 07:43

## 2019-06-28 RX ADMIN — INSULIN LISPRO 1 UNITS: 100 INJECTION, SOLUTION INTRAVENOUS; SUBCUTANEOUS at 07:47

## 2019-06-28 RX ADMIN — ASPIRIN 81 MG: 81 TABLET ORAL at 07:43

## 2019-06-29 NOTE — ANESTHESIA POSTPROCEDURE EVALUATION
Department of Anesthesiology  Postprocedure Note    Patient: Jn Malcolm  MRN: [id number]  YOB: 1950  Date of evaluation: 6/29/2019  Time:  12:43 PM     Procedure Summary     Date:  06/27/19 Room / Location:  [facility] OR 03 / [facility] OR    Anesthesia Start:  1630 Anesthesia Stop:  7102    Procedure:  CYSTOSCOPY, LEFT URETEROSCOPY, LEFT STONE MANIPULATION, LASER,  LEFT STENT PLACEMENT (Left Bladder) Diagnosis:       Obstruction of left ureter      (LEFT URETERAL OBSTRUCTION)    Surgeon:  Jaycob Pinzon MD Responsible Provider:  Gloria Rader MD    Anesthesia Type:  general ASA Status:  3          Anesthesia Type: general    Enoc Phase I: Enoc Score: 10    Enoc Phase II:      Last vitals: Reviewed and per EMR flowsheets.        Anesthesia Post Evaluation    Patient location during evaluation: PACU  Patient participation: complete - patient participated  Level of consciousness: awake and alert  Pain score: 0  Airway patency: patent  Nausea & Vomiting: no nausea and no vomiting  Complications: no  Cardiovascular status: blood pressure returned to baseline  Respiratory status: acceptable  Hydration status: stable

## 2019-07-09 RX ORDER — TAMSULOSIN HYDROCHLORIDE 0.4 MG/1
0.4 CAPSULE ORAL
COMMUNITY
Start: 2019-06-29

## 2019-07-09 RX ORDER — HYDROCODONE BITARTRATE AND ACETAMINOPHEN 5; 325 MG/1; MG/1
TABLET ORAL
Status: ON HOLD | COMMUNITY
Start: 2019-06-26 | End: 2020-09-24 | Stop reason: HOSPADM

## 2019-07-09 RX ORDER — NAPROXEN SODIUM 220 MG
220 TABLET ORAL
Status: ON HOLD | COMMUNITY
End: 2020-02-25 | Stop reason: HOSPADM

## 2019-07-10 ENCOUNTER — ANESTHESIA EVENT (OUTPATIENT)
Dept: OPERATING ROOM | Age: 69
End: 2019-07-10
Payer: MEDICARE

## 2019-07-10 NOTE — ANESTHESIA PRE PROCEDURE
Department of Anesthesiology  Preprocedure Note       Name:  Tavares Walker   Age:  71 y.o.  :  1950                                          MRN:  [id number]         Date:  2019      Surgeon: Jadiel Johnson MD    Procedure: Benedict Justice, LEFT STENT REMOVAL      HPI:  Tavares Walker is a 71 y.o. male with a history of CAD, diabetes mellitus, and recurrent kidney stones who presents to the ED on 2019 complaining of suspected kidney stone. Patient reports that he hadhad increasing left flank pain over the last 10 days. He subsequently underwent a left ureteroscopy and nephroscopy with a Holmium laser lithotripsy. Medications prior to admission:    tamsulosin (FLOMAX) 0.4 MG capsule Take 0.4 mg by mouth   naproxen sodium (ALEVE) 220 MG tablet Take 220 mg by mouth   HYDROcodone-acetaminophen (NORCO) 5-325 MG     pioglitazone (ACTOS) 30 MG tablet Take 30 mg by mouth daily    pravastatin (PRAVACHOL) 80 MG tablet Take 80 mg by mouth daily   losartan (COZAAR) 100 MG tablet Take 100 mg by mouth daily   aspirin 81 MG tablet Take 81 mg by mouth daily   sitagliptan-metformin (JANUMET)  MG Take 1 tablet by mouth 2 times daily (with meals).      Bismuth Subsalicylate (PEPTO-BISMOL PO) Take 1 tablet by mouth as needed      Allergies:     Acyclovir And Related     Penicillins Other (See Comments)     Stomach distress    Lipitor Rash    Plavix [Clopidogrel Bisulfate] Rash    Victoza [Liraglutide] Rash     Problem List:     CVA (cerebral vascular accident) ([facility] I63.9    Bell's palsy G51.0    HTN (hypertension) I10    CAD (coronary artery disease) I25.10    Hyperlipidemia E78.5    DM2 (diabetes mellitus, type 2) ([facility] E11.9    Chest pain R07.9    Obesity E66.9    Hydronephrosis with renal and ureteral calculous obstruction N13.2     Past Medical History:     CAD (coronary artery disease) 204    Diabetes mellitus ([facility] 2011    A1c 15    Hyperlipidemia 1992    Hypertension     Kidney

## 2019-07-11 ENCOUNTER — ANESTHESIA (OUTPATIENT)
Dept: OPERATING ROOM | Age: 69
End: 2019-07-11
Payer: MEDICARE

## 2019-07-11 ENCOUNTER — HOSPITAL ENCOUNTER (OUTPATIENT)
Age: 69
Setting detail: OUTPATIENT SURGERY
Discharge: HOME | End: 2019-07-11
Attending: UROLOGY | Admitting: UROLOGY
Payer: MEDICARE

## 2019-07-11 VITALS
OXYGEN SATURATION: 94 % | HEIGHT: 69 IN | SYSTOLIC BLOOD PRESSURE: 116 MMHG | WEIGHT: 274 LBS | RESPIRATION RATE: 12 BRPM | TEMPERATURE: 97.4 F | BODY MASS INDEX: 40.58 KG/M2 | DIASTOLIC BLOOD PRESSURE: 68 MMHG | HEART RATE: 82 BPM

## 2019-07-11 VITALS
OXYGEN SATURATION: 91 % | DIASTOLIC BLOOD PRESSURE: 95 MMHG | SYSTOLIC BLOOD PRESSURE: 143 MMHG | RESPIRATION RATE: 22 BRPM

## 2019-07-11 LAB
GLUCOSE BLD-MCNC: 158 MG/DL (ref 70–99)
PERFORMED ON: ABNORMAL

## 2019-07-11 PROCEDURE — 2500000003 HC RX 250 WO HCPCS: Performed by: NURSE ANESTHETIST, CERTIFIED REGISTERED

## 2019-07-11 PROCEDURE — 2709999900 HC NON-CHARGEABLE SUPPLY: Performed by: UROLOGY

## 2019-07-11 PROCEDURE — 3700000000 HC ANESTHESIA ATTENDED CARE: Performed by: UROLOGY

## 2019-07-11 PROCEDURE — 93005 ELECTROCARDIOGRAM TRACING: CPT | Performed by: ANESTHESIOLOGY

## 2019-07-11 PROCEDURE — 3600000004 HC SURGERY LEVEL 4 BASE: Performed by: UROLOGY

## 2019-07-11 PROCEDURE — 3600000014 HC SURGERY LEVEL 4 ADDTL 15MIN: Performed by: UROLOGY

## 2019-07-11 PROCEDURE — 3700000001 HC ADD 15 MINUTES (ANESTHESIA): Performed by: UROLOGY

## 2019-07-11 PROCEDURE — 6360000002 HC RX W HCPCS: Performed by: NURSE ANESTHETIST, CERTIFIED REGISTERED

## 2019-07-11 PROCEDURE — 7100000011 HC PHASE II RECOVERY - ADDTL 15 MIN: Performed by: UROLOGY

## 2019-07-11 PROCEDURE — 6360000002 HC RX W HCPCS: Performed by: UROLOGY

## 2019-07-11 PROCEDURE — 7100000010 HC PHASE II RECOVERY - FIRST 15 MIN: Performed by: UROLOGY

## 2019-07-11 PROCEDURE — 2580000003 HC RX 258: Performed by: UROLOGY

## 2019-07-11 PROCEDURE — 2580000003 HC RX 258: Performed by: ANESTHESIOLOGY

## 2019-07-11 RX ORDER — SODIUM CHLORIDE 0.9 % (FLUSH) 0.9 %
10 SYRINGE (ML) INJECTION EVERY 12 HOURS SCHEDULED
Status: DISCONTINUED | OUTPATIENT
Start: 2019-07-11 | End: 2019-07-11 | Stop reason: HOSPADM

## 2019-07-11 RX ORDER — LIDOCAINE HYDROCHLORIDE 20 MG/ML
INJECTION, SOLUTION INFILTRATION; PERINEURAL PRN
Status: DISCONTINUED | OUTPATIENT
Start: 2019-07-11 | End: 2019-07-11 | Stop reason: SDUPTHER

## 2019-07-11 RX ORDER — SODIUM CHLORIDE 0.9 % (FLUSH) 0.9 %
10 SYRINGE (ML) INJECTION PRN
Status: DISCONTINUED | OUTPATIENT
Start: 2019-07-11 | End: 2019-07-11 | Stop reason: HOSPADM

## 2019-07-11 RX ORDER — MAGNESIUM HYDROXIDE 1200 MG/15ML
LIQUID ORAL CONTINUOUS PRN
Status: COMPLETED | OUTPATIENT
Start: 2019-07-11 | End: 2019-07-11

## 2019-07-11 RX ORDER — OXYCODONE HYDROCHLORIDE AND ACETAMINOPHEN 5; 325 MG/1; MG/1
2 TABLET ORAL PRN
Status: DISCONTINUED | OUTPATIENT
Start: 2019-07-11 | End: 2019-07-11 | Stop reason: HOSPADM

## 2019-07-11 RX ORDER — LIDOCAINE HYDROCHLORIDE 10 MG/ML
0.3 INJECTION, SOLUTION EPIDURAL; INFILTRATION; INTRACAUDAL; PERINEURAL
Status: DISCONTINUED | OUTPATIENT
Start: 2019-07-11 | End: 2019-07-11 | Stop reason: HOSPADM

## 2019-07-11 RX ORDER — FENTANYL CITRATE 50 UG/ML
25 INJECTION, SOLUTION INTRAMUSCULAR; INTRAVENOUS EVERY 5 MIN PRN
Status: DISCONTINUED | OUTPATIENT
Start: 2019-07-11 | End: 2019-07-11 | Stop reason: HOSPADM

## 2019-07-11 RX ORDER — SODIUM CHLORIDE, SODIUM LACTATE, POTASSIUM CHLORIDE, CALCIUM CHLORIDE 600; 310; 30; 20 MG/100ML; MG/100ML; MG/100ML; MG/100ML
INJECTION, SOLUTION INTRAVENOUS CONTINUOUS
Status: DISCONTINUED | OUTPATIENT
Start: 2019-07-11 | End: 2019-07-11 | Stop reason: HOSPADM

## 2019-07-11 RX ORDER — OXYCODONE HYDROCHLORIDE AND ACETAMINOPHEN 5; 325 MG/1; MG/1
1 TABLET ORAL PRN
Status: DISCONTINUED | OUTPATIENT
Start: 2019-07-11 | End: 2019-07-11 | Stop reason: HOSPADM

## 2019-07-11 RX ORDER — PROPOFOL 10 MG/ML
INJECTION, EMULSION INTRAVENOUS PRN
Status: DISCONTINUED | OUTPATIENT
Start: 2019-07-11 | End: 2019-07-11 | Stop reason: SDUPTHER

## 2019-07-11 RX ORDER — MEPERIDINE HYDROCHLORIDE 50 MG/ML
12.5 INJECTION INTRAMUSCULAR; INTRAVENOUS; SUBCUTANEOUS EVERY 5 MIN PRN
Status: DISCONTINUED | OUTPATIENT
Start: 2019-07-11 | End: 2019-07-11 | Stop reason: HOSPADM

## 2019-07-11 RX ORDER — HYDRALAZINE HYDROCHLORIDE 20 MG/ML
5 INJECTION INTRAMUSCULAR; INTRAVENOUS EVERY 10 MIN PRN
Status: DISCONTINUED | OUTPATIENT
Start: 2019-07-11 | End: 2019-07-11 | Stop reason: HOSPADM

## 2019-07-11 RX ORDER — ONDANSETRON 2 MG/ML
4 INJECTION INTRAMUSCULAR; INTRAVENOUS
Status: DISCONTINUED | OUTPATIENT
Start: 2019-07-11 | End: 2019-07-11 | Stop reason: HOSPADM

## 2019-07-11 RX ORDER — PROMETHAZINE HYDROCHLORIDE 25 MG/ML
6.25 INJECTION, SOLUTION INTRAMUSCULAR; INTRAVENOUS
Status: DISCONTINUED | OUTPATIENT
Start: 2019-07-11 | End: 2019-07-11 | Stop reason: HOSPADM

## 2019-07-11 RX ADMIN — SODIUM CHLORIDE, POTASSIUM CHLORIDE, SODIUM LACTATE AND CALCIUM CHLORIDE: 600; 310; 30; 20 INJECTION, SOLUTION INTRAVENOUS at 13:08

## 2019-07-11 RX ADMIN — LIDOCAINE HYDROCHLORIDE 60 MG: 20 INJECTION, SOLUTION INFILTRATION; PERINEURAL at 13:12

## 2019-07-11 RX ADMIN — PROPOFOL 50 MG: 10 INJECTION, EMULSION INTRAVENOUS at 13:12

## 2019-07-11 RX ADMIN — PROPOFOL 50 MG: 10 INJECTION, EMULSION INTRAVENOUS at 13:18

## 2019-07-11 RX ADMIN — Medication 3 G: at 13:06

## 2019-07-11 ASSESSMENT — PAIN DESCRIPTION - FREQUENCY: FREQUENCY: CONTINUOUS

## 2019-07-11 ASSESSMENT — PULMONARY FUNCTION TESTS
PIF_VALUE: 1
PIF_VALUE: 1
PIF_VALUE: 2
PIF_VALUE: 1
PIF_VALUE: 2
PIF_VALUE: 1

## 2019-07-11 ASSESSMENT — PAIN SCALES - GENERAL
PAINLEVEL_OUTOF10: 1
PAINLEVEL_OUTOF10: 1

## 2019-07-11 ASSESSMENT — PAIN - FUNCTIONAL ASSESSMENT: PAIN_FUNCTIONAL_ASSESSMENT: 0-10

## 2019-07-11 ASSESSMENT — PAIN DESCRIPTION - PAIN TYPE: TYPE: SURGICAL PAIN

## 2019-07-11 ASSESSMENT — PAIN DESCRIPTION - DESCRIPTORS
DESCRIPTORS: ACHING
DESCRIPTORS: NAGGING;OTHER (COMMENT)

## 2019-07-11 ASSESSMENT — LIFESTYLE VARIABLES: SMOKING_STATUS: 0

## 2019-07-11 ASSESSMENT — PAIN DESCRIPTION - ONSET: ONSET: ON-GOING

## 2019-07-11 NOTE — PROGRESS NOTES
Patient discharge instructions given top atient and wife at bedside. Pt ambulated to restroom with steady gait. Urinated without difficulty.

## 2019-07-11 NOTE — PROGRESS NOTES
4 Eyes Skin Assessment     The patient is being assess for   Admission    I agree that 2 RN's have performed a thorough Head to Toe Skin Assessment on the patient. ALL assessment sites listed below have been assessed. Areas assessed by both nurses:   [x]   Head, Face, and Ears   [x]   Shoulders, Back, and Chest, Abdomen  [x]   Arms, Elbows, and Hands   [x]   Coccyx, Sacrum, and Ischium  [x]   Legs, Feet, and Heels        Scratches on lower legs from kitten. **SHARE this note so that the co-signing nurse is able to place an eSignature**    Co-signer eSignature: Electronically signed by Lynda Alicea RN on 7/11/19 at 12:56 PM    Does the Patient have Skin Breakdown?   No          Junaid Prevention initiated:  No   Wound Care Orders initiated:  No      [facility] nurse consulted for Pressure Injury (Stage 3,4, Unstageable, DTI, NWPT, Complex wounds)and New or Established Ostomies:  No      Primary Nurse eSignature: Electronically signed by Katherine Meredith RN on 7/11/19 at 12:58 PM

## 2019-07-11 NOTE — INTERVAL H&P NOTE
H&P Update    Patient's History and Physical from was reviewed. Patient examined. There has been no change. LEFT CYSTO  Stent pull.      Lisa Garrison

## 2019-07-12 LAB
EKG ATRIAL RATE: 77 BPM
EKG DIAGNOSIS: NORMAL
EKG P AXIS: 26 DEGREES
EKG P-R INTERVAL: 178 MS
EKG Q-T INTERVAL: 378 MS
EKG QRS DURATION: 82 MS
EKG QTC CALCULATION (BAZETT): 427 MS
EKG R AXIS: 12 DEGREES
EKG T AXIS: 22 DEGREES
EKG VENTRICULAR RATE: 77 BPM

## 2019-07-12 PROCEDURE — 93010 ELECTROCARDIOGRAM REPORT: CPT | Performed by: INTERNAL MEDICINE

## 2020-02-21 ENCOUNTER — HOSPITAL ENCOUNTER (INPATIENT)
Age: 70
LOS: 4 days | Discharge: HOME | DRG: 247 | End: 2020-02-25
Attending: EMERGENCY MEDICINE | Admitting: INTERNAL MEDICINE
Payer: MEDICARE

## 2020-02-21 ENCOUNTER — APPOINTMENT (OUTPATIENT)
Dept: GENERAL RADIOLOGY | Age: 70
DRG: 247 | End: 2020-02-21
Payer: MEDICARE

## 2020-02-21 LAB
A/G RATIO: 1.9 (ref 1.1–2.2)
ALBUMIN SERPL-MCNC: 4.2 G/DL (ref 3.4–5)
ALP BLD-CCNC: 80 U/L (ref 40–129)
ALT SERPL-CCNC: 18 U/L (ref 10–40)
AMYLASE: 94 U/L (ref 25–115)
ANION GAP SERPL CALCULATED.3IONS-SCNC: 13 MMOL/L (ref 3–16)
AST SERPL-CCNC: 13 U/L (ref 15–37)
BASOPHILS ABSOLUTE: 0.1 K/UL (ref 0–0.2)
BASOPHILS RELATIVE PERCENT: 1.5 %
BILIRUB SERPL-MCNC: <0.2 MG/DL (ref 0–1)
BUN BLDV-MCNC: 22 MG/DL (ref 7–20)
CALCIUM SERPL-MCNC: 8.9 MG/DL (ref 8.3–10.6)
CHLORIDE BLD-SCNC: 99 MMOL/L (ref 99–110)
CO2: 24 MMOL/L (ref 21–32)
CREAT SERPL-MCNC: 1.2 MG/DL (ref 0.8–1.3)
EOSINOPHILS ABSOLUTE: 0.2 K/UL (ref 0–0.6)
EOSINOPHILS RELATIVE PERCENT: 1.8 %
GFR AFRICAN AMERICAN: >60
GFR NON-AFRICAN AMERICAN: 60
GLOBULIN: 2.2 G/DL
GLUCOSE BLD-MCNC: 120 MG/DL (ref 70–99)
GLUCOSE BLD-MCNC: 181 MG/DL (ref 70–99)
HCT VFR BLD CALC: 44.3 % (ref 40.5–52.5)
HEMOGLOBIN: 14.4 G/DL (ref 13.5–17.5)
LIPASE: 52 U/L (ref 13–60)
LYMPHOCYTES ABSOLUTE: 1.6 K/UL (ref 1–5.1)
LYMPHOCYTES RELATIVE PERCENT: 19.7 %
MCH RBC QN AUTO: 27.9 PG (ref 26–34)
MCHC RBC AUTO-ENTMCNC: 32.5 G/DL (ref 31–36)
MCV RBC AUTO: 85.7 FL (ref 80–100)
MONOCYTES ABSOLUTE: 0.8 K/UL (ref 0–1.3)
MONOCYTES RELATIVE PERCENT: 10 %
NEUTROPHILS ABSOLUTE: 5.6 K/UL (ref 1.7–7.7)
NEUTROPHILS RELATIVE PERCENT: 67 %
PDW BLD-RTO: 14.6 % (ref 12.4–15.4)
PERFORMED ON: ABNORMAL
PLATELET # BLD: 202 K/UL (ref 135–450)
PMV BLD AUTO: 8.4 FL (ref 5–10.5)
POTASSIUM SERPL-SCNC: 4.4 MMOL/L (ref 3.5–5.1)
RBC # BLD: 5.17 M/UL (ref 4.2–5.9)
SODIUM BLD-SCNC: 136 MMOL/L (ref 136–145)
TOTAL PROTEIN: 6.4 G/DL (ref 6.4–8.2)
TROPONIN: 0.02 NG/ML
TROPONIN: 0.02 NG/ML
WBC # BLD: 8.3 K/UL (ref 4–11)

## 2020-02-21 PROCEDURE — 99285 EMERGENCY DEPT VISIT HI MDM: CPT

## 2020-02-21 PROCEDURE — 2580000003 HC RX 258: Performed by: NURSE PRACTITIONER

## 2020-02-21 PROCEDURE — 85025 COMPLETE CBC W/AUTO DIFF WBC: CPT

## 2020-02-21 PROCEDURE — 93005 ELECTROCARDIOGRAM TRACING: CPT | Performed by: EMERGENCY MEDICINE

## 2020-02-21 PROCEDURE — 6360000002 HC RX W HCPCS: Performed by: NURSE PRACTITIONER

## 2020-02-21 PROCEDURE — 80053 COMPREHEN METABOLIC PANEL: CPT

## 2020-02-21 PROCEDURE — 83690 ASSAY OF LIPASE: CPT

## 2020-02-21 PROCEDURE — 6370000000 HC RX 637 (ALT 250 FOR IP): Performed by: EMERGENCY MEDICINE

## 2020-02-21 PROCEDURE — 82150 ASSAY OF AMYLASE: CPT

## 2020-02-21 PROCEDURE — 6370000000 HC RX 637 (ALT 250 FOR IP): Performed by: NURSE PRACTITIONER

## 2020-02-21 PROCEDURE — 71046 X-RAY EXAM CHEST 2 VIEWS: CPT

## 2020-02-21 PROCEDURE — 36415 COLL VENOUS BLD VENIPUNCTURE: CPT

## 2020-02-21 PROCEDURE — 1200000000 HC SEMI PRIVATE

## 2020-02-21 PROCEDURE — 84484 ASSAY OF TROPONIN QUANT: CPT

## 2020-02-21 RX ORDER — ONDANSETRON 2 MG/ML
4 INJECTION INTRAMUSCULAR; INTRAVENOUS EVERY 6 HOURS PRN
Status: DISCONTINUED | OUTPATIENT
Start: 2020-02-21 | End: 2020-02-25 | Stop reason: HOSPADM

## 2020-02-21 RX ORDER — NICOTINE POLACRILEX 4 MG
15 LOZENGE BUCCAL PRN
Status: DISCONTINUED | OUTPATIENT
Start: 2020-02-21 | End: 2020-02-25 | Stop reason: HOSPADM

## 2020-02-21 RX ORDER — ASPIRIN 81 MG/1
81 TABLET ORAL DAILY
Status: DISCONTINUED | OUTPATIENT
Start: 2020-02-22 | End: 2020-02-25 | Stop reason: HOSPADM

## 2020-02-21 RX ORDER — DEXTROSE MONOHYDRATE 25 G/50ML
12.5 INJECTION, SOLUTION INTRAVENOUS PRN
Status: DISCONTINUED | OUTPATIENT
Start: 2020-02-21 | End: 2020-02-25 | Stop reason: HOSPADM

## 2020-02-21 RX ORDER — ACETAMINOPHEN 325 MG/1
650 TABLET ORAL EVERY 6 HOURS PRN
Status: DISCONTINUED | OUTPATIENT
Start: 2020-02-21 | End: 2020-02-25 | Stop reason: HOSPADM

## 2020-02-21 RX ORDER — DEXTROSE MONOHYDRATE 50 MG/ML
100 INJECTION, SOLUTION INTRAVENOUS PRN
Status: DISCONTINUED | OUTPATIENT
Start: 2020-02-21 | End: 2020-02-25 | Stop reason: HOSPADM

## 2020-02-21 RX ORDER — PROMETHAZINE HYDROCHLORIDE 25 MG/1
12.5 TABLET ORAL EVERY 6 HOURS PRN
Status: DISCONTINUED | OUTPATIENT
Start: 2020-02-21 | End: 2020-02-25 | Stop reason: HOSPADM

## 2020-02-21 RX ORDER — ASPIRIN 325 MG
325 TABLET ORAL ONCE
Status: COMPLETED | OUTPATIENT
Start: 2020-02-21 | End: 2020-02-21

## 2020-02-21 RX ORDER — ASPIRIN 81 MG/1
81 TABLET, CHEWABLE ORAL DAILY
Status: DISCONTINUED | OUTPATIENT
Start: 2020-02-22 | End: 2020-02-21 | Stop reason: SDUPTHER

## 2020-02-21 RX ORDER — PRAVASTATIN SODIUM 80 MG/1
80 TABLET ORAL DAILY
Status: DISCONTINUED | OUTPATIENT
Start: 2020-02-22 | End: 2020-02-25 | Stop reason: HOSPADM

## 2020-02-21 RX ORDER — SODIUM CHLORIDE 0.9 % (FLUSH) 0.9 %
10 SYRINGE (ML) INJECTION EVERY 12 HOURS SCHEDULED
Status: DISCONTINUED | OUTPATIENT
Start: 2020-02-21 | End: 2020-02-24 | Stop reason: SDUPTHER

## 2020-02-21 RX ORDER — GLIPIZIDE 10 MG/1
10 TABLET ORAL DAILY
COMMUNITY

## 2020-02-21 RX ORDER — TAMSULOSIN HYDROCHLORIDE 0.4 MG/1
0.4 CAPSULE ORAL DAILY
Status: DISCONTINUED | OUTPATIENT
Start: 2020-02-22 | End: 2020-02-25 | Stop reason: HOSPADM

## 2020-02-21 RX ORDER — LOSARTAN POTASSIUM 100 MG/1
100 TABLET ORAL DAILY
Status: DISCONTINUED | OUTPATIENT
Start: 2020-02-22 | End: 2020-02-25 | Stop reason: HOSPADM

## 2020-02-21 RX ORDER — ACETAMINOPHEN 650 MG/1
650 SUPPOSITORY RECTAL EVERY 6 HOURS PRN
Status: DISCONTINUED | OUTPATIENT
Start: 2020-02-21 | End: 2020-02-25 | Stop reason: HOSPADM

## 2020-02-21 RX ORDER — SODIUM CHLORIDE 0.9 % (FLUSH) 0.9 %
10 SYRINGE (ML) INJECTION PRN
Status: DISCONTINUED | OUTPATIENT
Start: 2020-02-21 | End: 2020-02-24 | Stop reason: SDUPTHER

## 2020-02-21 RX ORDER — POLYETHYLENE GLYCOL 3350 17 G/17G
17 POWDER, FOR SOLUTION ORAL DAILY PRN
Status: DISCONTINUED | OUTPATIENT
Start: 2020-02-21 | End: 2020-02-25 | Stop reason: HOSPADM

## 2020-02-21 RX ADMIN — LIDOCAINE HYDROCHLORIDE: 20 SOLUTION ORAL; TOPICAL at 18:39

## 2020-02-21 RX ADMIN — METOPROLOL TARTRATE 25 MG: 25 TABLET ORAL at 19:39

## 2020-02-21 RX ADMIN — NITROGLYCERIN 1 INCH: 20 OINTMENT TOPICAL at 20:04

## 2020-02-21 RX ADMIN — ACETAMINOPHEN 650 MG: 325 TABLET ORAL at 21:57

## 2020-02-21 RX ADMIN — ONDANSETRON 4 MG: 2 INJECTION INTRAMUSCULAR; INTRAVENOUS at 21:57

## 2020-02-21 RX ADMIN — Medication 10 ML: at 21:58

## 2020-02-21 RX ADMIN — ASPIRIN 325 MG ORAL TABLET 325 MG: 325 PILL ORAL at 19:39

## 2020-02-21 ASSESSMENT — PAIN SCALES - GENERAL
PAINLEVEL_OUTOF10: 0
PAINLEVEL_OUTOF10: 5

## 2020-02-21 NOTE — ED PROVIDER NOTES
[facility]  ED  EMERGENCY DEPARTMENT ENCOUNTER        Pt Name: Nelson Saleem  MRN: [id number]  [place] 1950  Date of evaluation: 2/21/2020  Provider: La Flowers MD  PCP: Bryant Turk MD    This patient was seen and evaluated by the attending physician La Flowers MD.      [address] [place] [address]       Chief Complaint   Patient presents with    Chest Pain     states had terrible burning sensation yesterday and took pepto bismol and it helped. then had another episode and pepto bismol helped. then at 5pm had steak with spicy sauce today and then shortly after had 15min of severe chest pain/burning. denies any currently. HISTORY OF PRESENT ILLNESS   (Location/Symptom, Timing/Onset, Context/Setting, Quality, Duration, Modifying Factors, Severity)  Note limiting factors. Nelson Saleem is a 71 y.o. male With history of esophageal reflux as well as coronary artery disease status post PCI and FARHAN x5, as well as diabetes coming in today with a chief complaint of epigastric pain and burning in the stomach up into his chest.  Says it feels like heartburn. That said he has never had anginal symptoms with his prior stents. At baseline he is on naproxen, pioglitazone, pravastatin losartan and metformin as well as sitagliptin. I do not see any beta blockade. Pain began after eating some spicy foods that he can recall; was \"benihana hot steak sauce\". Denies any palpitations or dyspnea. No nausea vomiting diarrhea. No pain to the back. Nursing Notes were all reviewed and agreed with or any disagreements were addressed  in the HPI. REVIEW OF SYSTEMS    (2-9 systems for level 4, 10 or more for level 5)     Review of Systems    Positives and Pertinent negatives as per HPI. Except as noted abovein the ROS, all other systems were reviewed and negative.        PAST MEDICAL HISTORY     Past Medical History:   Diagnosis Date    CAD (coronary artery disease) 46    Diabetes mellitus ([facility] [address] 12/23/2011    A1c 15    Hyperlipidemia 1992    Hypertension     Kidney stones 1987         SURGICAL HISTORY     Past Surgical History:   Procedure Laterality Date    APPENDECTOMY  1970    CERVICAL FUSION  1995    COLONOSCOPY  1987&2002    WNL    CORONARY ANGIOPLASTY WITH STENT PLACEMENT  2004    [facility] w/ stents    CORONARY ANGIOPLASTY WITH STENT PLACEMENT  2008    CYSTOSCOPY Left 6/27/2019    CYSTOSCOPY, LEFT URETEROSCOPY, LEFT STONE MANIPULATION, LASER,  LEFT STENT PLACEMENT performed by Shelby Sibley MD at [facility] / [address] [place] [address] Rd / Linh Martin Left 7/11/2019    CYSTOSCOPY, LEFT STENT REMOVAL  CPT CODE - 97487 performed by Shelby Sibley MD at [facility], COLON, DIAGNOSTIC      HERNIA REPAIR  1987&1996    bilateral inguinal    UPPER GASTROINTESTINAL ENDOSCOPY  2002    errosive esophagitis    UPPER GASTROINTESTINAL ENDOSCOPY  08/28/2008    GERD         CURRENTMEDICATIONS       Previous Medications    ASPIRIN 81 MG TABLET    Take 81 mg by mouth daily    BISMUTH SUBSALICYLATE (PEPTO-BISMOL PO)    Take 1 tablet by mouth as needed     HYDROCODONE-ACETAMINOPHEN (NORCO) 5-325 MG PER TABLET        LOSARTAN (COZAAR) 100 MG TABLET    Take 100 mg by mouth daily    NAPROXEN SODIUM (ALEVE) 220 MG TABLET    Take 220 mg by mouth    PIOGLITAZONE (ACTOS) 30 MG TABLET    Take 30 mg by mouth daily     PRAVASTATIN (PRAVACHOL) 80 MG TABLET    Take 80 mg by mouth daily    SITAGLIPTAN-METFORMIN (JANUMET)  MG PER TABLET    Take 1 tablet by mouth 2 times daily (with meals). TAMSULOSIN (FLOMAX) 0.4 MG CAPSULE    Take 0.4 mg by mouth         ALLERGIES     Penicillins;  Lipitor; Plavix [clopidogrel bisulfate]; and Victoza [liraglutide]    FAMILYHISTORY       Family History   Problem Relation Age of Onset    Breast Cancer Mother     Breast Cancer Sister 61    Heart Disease Brother           SOCIAL HISTORY       Social History     Socioeconomic History    DISCONTINUED MEDICATIONS:  Discontinued Medications    No medications on file              (Please note that portions ofthis note were completed with a voice recognition program.  Efforts were made to edit the dictations but occasionally words are mis-transcribed.)    Karthik Guajardo MD (electronically signed)           Karthik Guajardo MD  02/21/20 5336

## 2020-02-22 LAB
ANION GAP SERPL CALCULATED.3IONS-SCNC: 10 MMOL/L (ref 3–16)
BUN BLDV-MCNC: 23 MG/DL (ref 7–20)
CALCIUM SERPL-MCNC: 8.8 MG/DL (ref 8.3–10.6)
CHLORIDE BLD-SCNC: 103 MMOL/L (ref 99–110)
CHOLESTEROL, TOTAL: 176 MG/DL (ref 0–199)
CO2: 24 MMOL/L (ref 21–32)
CREAT SERPL-MCNC: 1 MG/DL (ref 0.8–1.3)
EKG ATRIAL RATE: 73 BPM
EKG ATRIAL RATE: 84 BPM
EKG DIAGNOSIS: NORMAL
EKG DIAGNOSIS: NORMAL
EKG P AXIS: 46 DEGREES
EKG P AXIS: 50 DEGREES
EKG P-R INTERVAL: 164 MS
EKG P-R INTERVAL: 168 MS
EKG Q-T INTERVAL: 366 MS
EKG Q-T INTERVAL: 396 MS
EKG QRS DURATION: 76 MS
EKG QRS DURATION: 86 MS
EKG QTC CALCULATION (BAZETT): 432 MS
EKG QTC CALCULATION (BAZETT): 436 MS
EKG R AXIS: 26 DEGREES
EKG R AXIS: 27 DEGREES
EKG T AXIS: 41 DEGREES
EKG T AXIS: 44 DEGREES
EKG VENTRICULAR RATE: 73 BPM
EKG VENTRICULAR RATE: 84 BPM
GFR AFRICAN AMERICAN: >60
GFR NON-AFRICAN AMERICAN: >60
GLUCOSE BLD-MCNC: 162 MG/DL (ref 70–99)
GLUCOSE BLD-MCNC: 174 MG/DL (ref 70–99)
GLUCOSE BLD-MCNC: 182 MG/DL (ref 70–99)
GLUCOSE BLD-MCNC: 205 MG/DL (ref 70–99)
GLUCOSE BLD-MCNC: 212 MG/DL (ref 70–99)
HCT VFR BLD CALC: 42 % (ref 40.5–52.5)
HDLC SERPL-MCNC: 38 MG/DL (ref 40–60)
HEMOGLOBIN: 13.7 G/DL (ref 13.5–17.5)
LDL CHOLESTEROL CALCULATED: 111 MG/DL
MCH RBC QN AUTO: 27.8 PG (ref 26–34)
MCHC RBC AUTO-ENTMCNC: 32.6 G/DL (ref 31–36)
MCV RBC AUTO: 85.3 FL (ref 80–100)
PDW BLD-RTO: 14.8 % (ref 12.4–15.4)
PERFORMED ON: ABNORMAL
PLATELET # BLD: 201 K/UL (ref 135–450)
PMV BLD AUTO: 8.4 FL (ref 5–10.5)
POTASSIUM REFLEX MAGNESIUM: 4.4 MMOL/L (ref 3.5–5.1)
RBC # BLD: 4.92 M/UL (ref 4.2–5.9)
SODIUM BLD-SCNC: 137 MMOL/L (ref 136–145)
TRIGL SERPL-MCNC: 135 MG/DL (ref 0–150)
TROPONIN: 0.02 NG/ML
TROPONIN: <0.01 NG/ML
VLDLC SERPL CALC-MCNC: 27 MG/DL
WBC # BLD: 8.9 K/UL (ref 4–11)

## 2020-02-22 PROCEDURE — 80061 LIPID PANEL: CPT

## 2020-02-22 PROCEDURE — 93005 ELECTROCARDIOGRAM TRACING: CPT | Performed by: INTERNAL MEDICINE

## 2020-02-22 PROCEDURE — 1200000000 HC SEMI PRIVATE

## 2020-02-22 PROCEDURE — 93010 ELECTROCARDIOGRAM REPORT: CPT | Performed by: INTERNAL MEDICINE

## 2020-02-22 PROCEDURE — 93308 TTE F-UP OR LMTD: CPT

## 2020-02-22 PROCEDURE — 93005 ELECTROCARDIOGRAM TRACING: CPT | Performed by: NURSE PRACTITIONER

## 2020-02-22 PROCEDURE — 6370000000 HC RX 637 (ALT 250 FOR IP): Performed by: INTERNAL MEDICINE

## 2020-02-22 PROCEDURE — 2580000003 HC RX 258: Performed by: NURSE PRACTITIONER

## 2020-02-22 PROCEDURE — 36415 COLL VENOUS BLD VENIPUNCTURE: CPT

## 2020-02-22 PROCEDURE — 99223 1ST HOSP IP/OBS HIGH 75: CPT | Performed by: INTERNAL MEDICINE

## 2020-02-22 PROCEDURE — 84484 ASSAY OF TROPONIN QUANT: CPT

## 2020-02-22 PROCEDURE — 85027 COMPLETE CBC AUTOMATED: CPT

## 2020-02-22 PROCEDURE — 80048 BASIC METABOLIC PNL TOTAL CA: CPT

## 2020-02-22 PROCEDURE — 6360000002 HC RX W HCPCS: Performed by: INTERNAL MEDICINE

## 2020-02-22 PROCEDURE — 6370000000 HC RX 637 (ALT 250 FOR IP): Performed by: NURSE PRACTITIONER

## 2020-02-22 RX ORDER — NITROGLYCERIN 0.4 MG/1
0.4 TABLET SUBLINGUAL EVERY 5 MIN PRN
Status: DISCONTINUED | OUTPATIENT
Start: 2020-02-22 | End: 2020-02-25 | Stop reason: HOSPADM

## 2020-02-22 RX ORDER — MORPHINE SULFATE 2 MG/ML
2 INJECTION, SOLUTION INTRAMUSCULAR; INTRAVENOUS
Status: DISCONTINUED | OUTPATIENT
Start: 2020-02-22 | End: 2020-02-25 | Stop reason: HOSPADM

## 2020-02-22 RX ORDER — PANTOPRAZOLE SODIUM 40 MG/1
40 TABLET, DELAYED RELEASE ORAL
Status: DISCONTINUED | OUTPATIENT
Start: 2020-02-22 | End: 2020-02-25 | Stop reason: HOSPADM

## 2020-02-22 RX ORDER — PANTOPRAZOLE SODIUM 40 MG/1
40 TABLET, DELAYED RELEASE ORAL ONCE
Status: COMPLETED | OUTPATIENT
Start: 2020-02-22 | End: 2020-02-22

## 2020-02-22 RX ADMIN — PANTOPRAZOLE SODIUM 40 MG: 40 TABLET, DELAYED RELEASE ORAL at 09:40

## 2020-02-22 RX ADMIN — PRAVASTATIN SODIUM 80 MG: 80 TABLET ORAL at 09:26

## 2020-02-22 RX ADMIN — PANTOPRAZOLE SODIUM 40 MG: 40 TABLET, DELAYED RELEASE ORAL at 21:04

## 2020-02-22 RX ADMIN — ENOXAPARIN SODIUM 135 MG: 150 INJECTION SUBCUTANEOUS at 20:26

## 2020-02-22 RX ADMIN — NITROGLYCERIN 1 INCH: 20 OINTMENT TOPICAL at 05:57

## 2020-02-22 RX ADMIN — LOSARTAN POTASSIUM 100 MG: 100 TABLET, FILM COATED ORAL at 09:25

## 2020-02-22 RX ADMIN — LIDOCAINE HYDROCHLORIDE: 20 SOLUTION ORAL; TOPICAL at 18:56

## 2020-02-22 RX ADMIN — ASPIRIN 81 MG: 81 TABLET, COATED ORAL at 09:26

## 2020-02-22 RX ADMIN — INSULIN LISPRO 4 UNITS: 100 INJECTION, SOLUTION INTRAVENOUS; SUBCUTANEOUS at 18:59

## 2020-02-22 RX ADMIN — NITROGLYCERIN 0.5 INCH: 20 OINTMENT TOPICAL at 16:51

## 2020-02-22 RX ADMIN — METOPROLOL TARTRATE 25 MG: 25 TABLET, FILM COATED ORAL at 20:25

## 2020-02-22 RX ADMIN — Medication 10 ML: at 09:27

## 2020-02-22 RX ADMIN — NITROGLYCERIN 0.4 MG: 0.4 TABLET, ORALLY DISINTEGRATING SUBLINGUAL at 18:56

## 2020-02-22 RX ADMIN — Medication 10 ML: at 20:32

## 2020-02-22 ASSESSMENT — PAIN SCALES - GENERAL
PAINLEVEL_OUTOF10: 0
PAINLEVEL_OUTOF10: 1
PAINLEVEL_OUTOF10: 0
PAINLEVEL_OUTOF10: 4

## 2020-02-22 ASSESSMENT — PAIN DESCRIPTION - LOCATION
LOCATION: CHEST

## 2020-02-22 ASSESSMENT — PAIN DESCRIPTION - PAIN TYPE
TYPE: ACUTE PAIN

## 2020-02-22 NOTE — H&P
Hospital Medicine History & Physical      PCP: Lopez Mclaughlin MD    Date of Admission: 2/21/2020    Date of Service: Pt seen/examined on 2/21/2020 and Admitted to Inpatient with expected LOS greater than two midnights due to medical therapy. Chief Complaint: Chest pain    History Of Present Illness:      71 y.o. male, with past medical history of hypertension, CAD, hyperlipidemia and diabetes, who presented to [facility] with chest pain. History obtained for the patient review of EMR. The patient stated he had a horrible burning sensation in the center of his chest yesterday and took some Pepto-Bismol that was effective. He stated he had another episode a couple hours later and the Pepto-Bismol helped again. The patient stated around 5 PM he had some steak with a spicy sauce and within 15 minutes he had severe chest pain/burning. He stated the pain did not radiate and was not associated with any other symptoms. The patient does have a history of GERD as well as coronary artery disease. The patient stated he has had 5 drug-eluting stents placed. He stated he follows with Dr. Jessi Roman from cardiology. In the emergency room the patient was noted to have an elevated troponin at 0.02. However, he did have a nonischemic EKG. The patient stated he was given a GI cocktail in the emergency room that has really helped him. Due to the patient's history of CAD and an elevated troponin he will be admitted for further evaluation. The patient denied any other associated symptoms as well as any aggravating and/or alleviating factors. At the time of this assessment, the patient was resting comfortably in bed. He currently denies any chest pain, back pain, abdominal pain, shortness of breath, numbness, tingling, N/V/C/D, fever and/or chills.      Past Medical History:          Diagnosis Date    CAD (coronary artery disease) 46    Diabetes mellitus ([facility] [address] 12/23/2011    A1c 15    Hyperlipidemia 1992    Hypertension     Kidney stones 1987     Past Surgical History:          Procedure Laterality Date    APPENDECTOMY  1970    CERVICAL FUSION  1995    COLONOSCOPY  1987&2002    WNL    CORONARY ANGIOPLASTY WITH STENT PLACEMENT  2004    [facility] w/ stents    CORONARY ANGIOPLASTY WITH STENT PLACEMENT  2008    CYSTOSCOPY Left 6/27/2019    CYSTOSCOPY, LEFT URETEROSCOPY, LEFT STONE MANIPULATION, LASER,  LEFT STENT PLACEMENT performed by Eldora Paget, MD at [facility] / STONE Left 7/11/2019    CYSTOSCOPY, LEFT STENT REMOVAL  CPT CODE - 74335 performed by Eldora Paget, MD at [facility] [place], DIAGNOSTIC      HERNIA REPAIR  1987&1996    bilateral inguinal    UPPER GASTROINTESTINAL ENDOSCOPY  2002    errosive esophagitis    UPPER GASTROINTESTINAL ENDOSCOPY  08/28/2008    GERD     Medications Prior to Admission:      Prior to Admission medications    Medication Sig Start Date End Date Taking? Authorizing Provider   tamsulosin (FLOMAX) 0.4 MG capsule Take 0.4 mg by mouth 6/29/19   Historical Provider, MD   naproxen sodium (ALEVE) 220 MG tablet Take 220 mg by mouth    Historical Provider, MD   HYDROcodone-acetaminophen ([facility]) 5-325 MG per tablet  6/26/19   Historical Provider, MD   pioglitazone (ACTOS) 30 MG tablet Take 30 mg by mouth daily  11/11/18   Historical Provider, MD   pravastatin (PRAVACHOL) 80 MG tablet Take 80 mg by mouth daily    Historical Provider, MD   losartan (COZAAR) 100 MG tablet Take 100 mg by mouth daily    Historical Provider, MD   aspirin 81 MG tablet Take 81 mg by mouth daily    Historical Provider, MD   sitagliptan-metformin (JANUMET)  MG per tablet Take 1 tablet by mouth 2 times daily (with meals). Historical Provider, MD   Bismuth Subsalicylate (PEPTO-BISMOL PO) Take 1 tablet by mouth as needed     Historical Provider, MD     Allergies:  Penicillins;  Lipitor; Plavix [clopidogrel bisulfate]; and Victoza [liraglutide]    Social History:      The patient currently lives at home    TOBACCO:   reports that he has never smoked. He has never used smokeless tobacco.  ETOH:   reports current alcohol use. E-Cigarettes Vaping or Juuling     Questions Responses    E-Cigarette Use Never User    Start Date     Does device contain nicotine? Quit Date     E-Cigarette Type         Family History:      Reviewed in detail. Positive as follows:        Problem Relation Age of Onset   [place] Breast Cancer Mother     Breast Cancer Sister 61    Heart Disease Brother      REVIEW OF SYSTEMS:   Pertinent positives as noted in the HPI. All other systems reviewed and negative. PHYSICAL EXAM PERFORMED:    BP (!) 138/93   Pulse 80   Temp 98.1 °F (36.7 °C) (Oral)   Resp 15   Ht 5' 9\" (1.753 m)   Wt 170 lb (77.1 kg)   SpO2 95%   BMI 25.10 kg/m²     General appearance: Pleasant, obese male in no apparent distress, appears stated age and cooperative. HEENT:Pupils equal, round, and reactive to light. Extra ocular muscles intact. Conjunctivae/corneas clear. Neck: Supple, with full range of motion. No jugular venous distention. Trachea midline. Respiratory:  Normal respiratory effort. Clear to auscultation, bilaterally without Rales/Wheezes/Rhonchi. Cardiovascular:  Regular rate and rhythm with normal S1/S2 without murmurs, rubs or gallops. Abdomen: Soft, obese round non-tender, non-distended with normal bowel sounds. Musculoskeletal:  No clubbing, cyanosis or edema bilaterally. Full range of motion without deformity. Skin: Skin color, texture, turgor normal.  No significant rashes or lesions. Neurologic:  Neurovascularly intact.  Cranial nerves: II-XII intact, grossly non-focal.  Psychiatric:  Alert and oriented, thought content appropriate, normal insight  Capillary Refill: Brisk,< 3 seconds   Peripheral Pulses: +2 palpable, equal bilaterally     Labs:     Recent Labs     02/21/20  1848   WBC 8.3   HGB 14.4   HCT 44.3        Recent Labs 42.8 kg/m². Complicating assessment and treatment. Placing patient at risk for multiple co-morbidities as well as early death and contributing to the patient's presentation. Counseled on weight loss    DVT Prophylaxis: Lovenox    Diet: No diet orders on file     Code Status: Prior    PT/OT Eval Status: No indication for need at this time    Dispo -1 to 2 days pending clinical improvement     Marques Yeboah, APRN - CNP    Thank you Radha Zamora MD for the opportunity to be involved in this patient's care.  If you have any questions or concerns please feel free to contact me at [phone].  ------------------------Dr. Claude Jacobs------------------------------------

## 2020-02-22 NOTE — PROGRESS NOTES
4 Eyes Skin Assessment     The patient is being assess for  Admission    I agree that 2 RN's have performed a thorough Head to Toe Skin Assessment on the patient. ALL assessment sites listed below have been assessed. Areas assessed by both nurses: Hope and   [x]   Head, Face, and Ears   [x]   Shoulders, Back, and Chest  [x]   Arms, Elbows, and Hands   [x]   Coccyx, Sacrum, and Ischum  [x]   Legs, Feet, and Heels        Does the Patient have Skin Breakdown?   No         Junaid Prevention initiated:  No   Wound Care Orders initiated:  No      [facility] nurse consulted for Pressure Injury (Stage 3,4, Unstageable, DTI, NWPT, and Complex wounds):  No      Nurse 1 eSignature: Electronically signed by Glen Stoner RN on 2/21/20 at 9:27 PM    **SHARE this note so that the co-signing nurse is able to place an eSignature**    Nurse 2 eSignature: Electronically signed by Henrik Monroy RN on 2/22/20 at 12:56 AM

## 2020-02-22 NOTE — PROGRESS NOTES
Full range of motion without deformity. Skin: Skin color, texture, turgor normal.  No rashes or lesions. Neurologic:  Neurovascularly intact without any focal sensory/motor deficits. Cranial nerves: II-XII intact, grossly non-focal.  Psychiatric: Alert and oriented, thought content appropriate, normal insight  Capillary Refill: Brisk,< 3 seconds   Peripheral Pulses: +2 palpable, equal bilaterally       Labs:   Recent Labs     02/21/20 1848 02/22/20  0822   WBC 8.3 8.9   HGB 14.4 13.7   HCT 44.3 42.0    201     Recent Labs     02/21/20 1848 02/22/20  0822    137   K 4.4 4.4   CL 99 103   CO2 24 24   BUN 22* 23*   CREATININE 1.2 1.0   CALCIUM 8.9 8.8     Recent Labs     02/21/20 1848   AST 13*   ALT 18   BILITOT <0.2   ALKPHOS 80     No results for input(s): INR in the last 72 hours. Recent Labs     02/21/20 1848 02/21/20  2149 02/22/20  0013   TROPONINI 0.02* 0.02* 0.02*       Urinalysis:      Lab Results   Component Value Date    NITRU Negative 06/27/2019    WBCUA 3-5 06/27/2019    BACTERIA Rare 06/27/2019    RBCUA 0-2 06/27/2019    BLOODU SMALL 06/27/2019    SPECGRAV 1.020 06/27/2019    GLUCOSEU Negative 06/27/2019       Radiology:  XR CHEST STANDARD (2 VW)   Final Result   No evidence of acute cardiopulmonary disease.                  Assessment/Plan:    Active Hospital Problems    Diagnosis    CAD (coronary artery disease) [I25.10]    DM2 (diabetes mellitus, type 2) (HCC) [E11.9]    HTN (hypertension) [I10]    Hyperlipidemia [E78.5]    Chest pain [R07.9]     Chest pain in setting of known CAD  -atypical  -serial troponin - initial elevated 0.02 on admission  -EKG w/o ischemic changes  -GI cocktail given in ED  -Aspirin given in ED  -Nitropaste placed on patient in ED  -Metoprolol given in ED  -echo in am  -cardiology consulted in ED - appreciate recommendations in advance  -Cardiology's input noted, plan for cardiac catheter on Monday.     Essential hypertension   -continue losartan     Hyperlipidemia  -continue pravastatin     DM2, uncontrolled  -  -hemglobin a1c 0.5 on 6/27/2018, will repeat in a.m.  -hold home medications  -mdssi  -poct ac/hs  -hypoglycemia protocol  -carb control diet     Obesity  With Body mass index is 01.3 kg/m². Complicating assessment and treatment. Placing patient at risk for multiple co-morbidities as well as early death and contributing to the patient's presentation.  Counseled on weight loss    DVT Prophylaxis: Lovenox  Diet: DIET CARDIAC; No Caffeine  Code Status: Full Code    PT/OT Eval Status: Ambulatory    Dispo -pending cardiac cath result    Teddy Caceres MD

## 2020-02-22 NOTE — PROGRESS NOTES
Patient admitted to room 219 from ED. Patient oriented to room, call light, bed rails, phone, lights and bathroom. Patient instructed about the schedule of the day including: vital sign frequency, lab draws, possible tests, frequency of MD and staff rounds, including RN/MD rounding together at bedside, daily weights, and I &O's. Patient instructed about prescribed diet, how to use 8MENU, and television. No bed alarm in place, patient aware of placement and reason. Yes Telemetry box in place, patient aware of placement and reason. Bed locked, in lowest position, side rails up 2/4, call light within reach. Will continue to monitor.

## 2020-02-22 NOTE — CONSULTS
Medications:   aspirin  81 mg Oral Daily    losartan  100 mg Oral Daily    pravastatin  80 mg Oral Daily    tamsulosin  0.4 mg Oral Daily    sodium chloride flush  10 mL Intravenous 2 times per day    enoxaparin  40 mg Subcutaneous Daily    nitroglycerin  1 inch Topical 4 times per day    insulin lispro  0-12 Units Subcutaneous TID WC    insulin lispro  0-6 Units Subcutaneous Nightly         Allergies:  Penicillins; Lipitor; Plavix [clopidogrel bisulfate]; and Victoza [liraglutide]     ? Medications and dosages reviewed. ROS:  ?Full ROS obtained and negative except as mentioned in HPI      Physical Examination:    Vitals:    02/22/20 0438   BP: 119/77   Pulse: 78   Resp: 16   Temp: 97.7 °F (36.5 °C)   SpO2: 95%        · GENERAL: Well developed, well nourished, No acute distress  · NEUROLOGICAL: Alert and oriented  · PSYCH: Calm affect  · SKIN: Warm and dry, No visible rash,   · EYES: Pupils equal and round, Sclera non-icteric,   · HENT:  External ears and nose unremarkable, mucus membranes moist  · MUSCULOSKELETAL: Normal cephalic, neck supple  · CAROTID: Normal upstroke, no bruits  · CARDIAC: JVP normal, Normal PMI, regular rate and rhythm, normal S1S2, no murmur, rub, or gallop  · RESPIRATORY: Normal respiratory effort, clear to auscultation bilaterally  · EXTREMITIES: No edema  · GASTROINTESTINAL: normal bowel sounds, soft, non-tender, No hepatomegaly     All testing and labs listed below were personally reviewed. CARDIAC CATH 5/2016  1. Selective coronary arteriography. 2.  Cardiac catheterization for coronary anatomy. 3.  Left heart catheterization. 4.  Left ventriculogram.  5.  Percutaneous coronary intervention/stent implantation x1 to proximal/mid  right coronary artery using a 3.0 x 34 Resolute drug-eluting stent. 6.  Arterial sheath placement right common femoral artery. INDICATIONS FOR PROCEDURE:  1.  Non-ST elevation myocardial infarction. 2.  Acute coronary syndrome.   3.

## 2020-02-22 NOTE — ED NOTES
[facility] @ [facility]38  RE: Admission Per Dr. Sai Mccoy called back @ 1958     Metyoselin Davenport  02/21/20 2005

## 2020-02-23 LAB
EKG ATRIAL RATE: 74 BPM
EKG ATRIAL RATE: 80 BPM
EKG DIAGNOSIS: NORMAL
EKG DIAGNOSIS: NORMAL
EKG P AXIS: 31 DEGREES
EKG P AXIS: 40 DEGREES
EKG P-R INTERVAL: 172 MS
EKG P-R INTERVAL: 172 MS
EKG Q-T INTERVAL: 368 MS
EKG Q-T INTERVAL: 386 MS
EKG QRS DURATION: 82 MS
EKG QRS DURATION: 86 MS
EKG QTC CALCULATION (BAZETT): 424 MS
EKG QTC CALCULATION (BAZETT): 428 MS
EKG R AXIS: 16 DEGREES
EKG R AXIS: 17 DEGREES
EKG T AXIS: -3 DEGREES
EKG T AXIS: 23 DEGREES
EKG VENTRICULAR RATE: 74 BPM
EKG VENTRICULAR RATE: 80 BPM
GLUCOSE BLD-MCNC: 139 MG/DL (ref 70–99)
GLUCOSE BLD-MCNC: 172 MG/DL (ref 70–99)
GLUCOSE BLD-MCNC: 193 MG/DL (ref 70–99)
GLUCOSE BLD-MCNC: 211 MG/DL (ref 70–99)
PERFORMED ON: ABNORMAL
TROPONIN: 0.02 NG/ML

## 2020-02-23 PROCEDURE — 93005 ELECTROCARDIOGRAM TRACING: CPT | Performed by: INTERNAL MEDICINE

## 2020-02-23 PROCEDURE — 99232 SBSQ HOSP IP/OBS MODERATE 35: CPT | Performed by: INTERNAL MEDICINE

## 2020-02-23 PROCEDURE — 36415 COLL VENOUS BLD VENIPUNCTURE: CPT

## 2020-02-23 PROCEDURE — 84484 ASSAY OF TROPONIN QUANT: CPT

## 2020-02-23 PROCEDURE — 1200000000 HC SEMI PRIVATE

## 2020-02-23 PROCEDURE — 6370000000 HC RX 637 (ALT 250 FOR IP): Performed by: INTERNAL MEDICINE

## 2020-02-23 PROCEDURE — 6360000002 HC RX W HCPCS: Performed by: INTERNAL MEDICINE

## 2020-02-23 PROCEDURE — 2580000003 HC RX 258: Performed by: NURSE PRACTITIONER

## 2020-02-23 PROCEDURE — 6370000000 HC RX 637 (ALT 250 FOR IP): Performed by: NURSE PRACTITIONER

## 2020-02-23 RX ORDER — SODIUM CHLORIDE 9 MG/ML
INJECTION, SOLUTION INTRAVENOUS CONTINUOUS
Status: DISCONTINUED | OUTPATIENT
Start: 2020-02-23 | End: 2020-02-23

## 2020-02-23 RX ORDER — SODIUM CHLORIDE 9 MG/ML
INJECTION, SOLUTION INTRAVENOUS CONTINUOUS
Status: DISPENSED | OUTPATIENT
Start: 2020-02-24 | End: 2020-02-24

## 2020-02-23 RX ADMIN — Medication 10 ML: at 08:42

## 2020-02-23 RX ADMIN — INSULIN LISPRO 2 UNITS: 100 INJECTION, SOLUTION INTRAVENOUS; SUBCUTANEOUS at 14:12

## 2020-02-23 RX ADMIN — METOPROLOL TARTRATE 25 MG: 25 TABLET, FILM COATED ORAL at 21:09

## 2020-02-23 RX ADMIN — INSULIN LISPRO 2 UNITS: 100 INJECTION, SOLUTION INTRAVENOUS; SUBCUTANEOUS at 08:34

## 2020-02-23 RX ADMIN — METOPROLOL TARTRATE 25 MG: 25 TABLET, FILM COATED ORAL at 08:34

## 2020-02-23 RX ADMIN — NITROGLYCERIN 0.5 INCH: 20 OINTMENT TOPICAL at 17:01

## 2020-02-23 RX ADMIN — ENOXAPARIN SODIUM 135 MG: 150 INJECTION SUBCUTANEOUS at 08:43

## 2020-02-23 RX ADMIN — NITROGLYCERIN 0.5 INCH: 20 OINTMENT TOPICAL at 21:09

## 2020-02-23 RX ADMIN — NITROGLYCERIN 0.5 INCH: 20 OINTMENT TOPICAL at 06:02

## 2020-02-23 RX ADMIN — Medication 10 ML: at 21:15

## 2020-02-23 RX ADMIN — PANTOPRAZOLE SODIUM 40 MG: 40 TABLET, DELAYED RELEASE ORAL at 06:02

## 2020-02-23 RX ADMIN — PRAVASTATIN SODIUM 80 MG: 80 TABLET ORAL at 08:34

## 2020-02-23 RX ADMIN — LOSARTAN POTASSIUM 100 MG: 100 TABLET, FILM COATED ORAL at 08:34

## 2020-02-23 RX ADMIN — ASPIRIN 81 MG: 81 TABLET, COATED ORAL at 08:34

## 2020-02-23 RX ADMIN — INSULIN LISPRO 2 UNITS: 100 INJECTION, SOLUTION INTRAVENOUS; SUBCUTANEOUS at 21:09

## 2020-02-23 ASSESSMENT — PAIN SCALES - GENERAL
PAINLEVEL_OUTOF10: 0

## 2020-02-23 NOTE — PROGRESS NOTES
Hospitalist Progress Note      PCP: Lobo Lezama MD    Date of Admission: 2/21/2020    Chief Complaint: Chest pain    Hospital Course: See H&P    Subjective:   Patient is up in bed, comfortable, not in distress. Denies any chest pain or shortness of breath. No new event overnight noted. Medications:  Reviewed    Infusion Medications    sodium chloride      dextrose       Scheduled Medications    enoxaparin  1 mg/kg Subcutaneous BID    pantoprazole  40 mg Oral QAM AC    nitroglycerin  0.5 inch Topical 3 times per day    metoprolol tartrate  25 mg Oral BID    aspirin  81 mg Oral Daily    losartan  100 mg Oral Daily    pravastatin  80 mg Oral Daily    tamsulosin  0.4 mg Oral Daily    sodium chloride flush  10 mL Intravenous 2 times per day    insulin lispro  0-12 Units Subcutaneous TID WC    insulin lispro  0-6 Units Subcutaneous Nightly     PRN Meds: nitroGLYCERIN, morphine, GI cocktail, sodium chloride flush, acetaminophen, acetaminophen, polyethylene glycol, promethazine, ondansetron, perflutren lipid microspheres, glucose, dextrose, glucagon (rDNA), dextrose      Intake/Output Summary (Last 24 hours) at 2/23/2020 1117  Last data filed at 2/23/2020 1015  Gross per 24 hour   Intake 790 ml   Output 2375 ml   Net -1585 ml       Physical Exam Performed:    BP (!) 163/96   Pulse 76   Temp 98.2 °F (36.8 °C) (Oral)   Resp 16   Ht 5' 9\" (1.753 m)   Wt 278 lb (126.1 kg)   SpO2 92%   BMI 41.05 kg/m²     General appearance: No apparent distress, appears stated age and cooperative. Morbidly obese. HEENT: Pupils equal, round, and reactive to light. Conjunctivae/corneas clear. Neck: Supple, with full range of motion. No jugular venous distention. Trachea midline. Respiratory:  Normal respiratory effort. Clear to auscultation, bilaterally without Rales/Wheezes/Rhonchi. Cardiovascular: Regular rate and rhythm with normal S1/S2 without murmurs, rubs or gallops.   Abdomen: Soft, non-tender, input noted, plan for cardiac catheter on Monday.     Essential hypertension   -continue losartan     Hyperlipidemia  -continue pravastatin     DM2, uncontrolled  -  -hemglobin a1c 0.5 on 6/27/2018, will repeat in a.m.  -hold home medications  -mdssi  -poct ac/hs  -hypoglycemia protocol  -carb control diet     Obesity  With Body mass index is 26.8 kg/m². Complicating assessment and treatment. Placing patient at risk for multiple co-morbidities as well as early death and contributing to the patient's presentation.  Counseled on weight loss    DVT Prophylaxis: Lovenox  Diet: DIET CARDIAC; Carb Control: 4 carb choices (60 gms)/meal; No Caffeine  Diet NPO Time Specified  Code Status: Full Code    PT/OT Eval Status: Ambulatory    Dispo -pending cardiac cath result    Teddy Caceres MD

## 2020-02-23 NOTE — PLAN OF CARE
Diabetes education provided today:    Diabetes pathoetiology, difference between type 1 and type 2 diabetes, and progressive nature of Type 2 DM. Different diabetic medications. Managing high and low sugar readings.       Problem: Metabolic:  Goal: Ability to maintain clinical measurements within normal limits will improve  Description  Ability to maintain clinical measurements within normal limits will improve  2/22/2020 2316 by Glen Stoner RN  Outcome: Ongoing

## 2020-02-24 ENCOUNTER — TELEPHONE (OUTPATIENT)
Dept: CARDIOLOGY CLINIC | Age: 70
End: 2020-02-24

## 2020-02-24 ENCOUNTER — APPOINTMENT (OUTPATIENT)
Dept: CARDIAC CATH/INVASIVE PROCEDURES | Age: 70
DRG: 247 | End: 2020-02-24
Payer: MEDICARE

## 2020-02-24 LAB
ANION GAP SERPL CALCULATED.3IONS-SCNC: 12 MMOL/L (ref 3–16)
BUN BLDV-MCNC: 24 MG/DL (ref 7–20)
CALCIUM SERPL-MCNC: 9.3 MG/DL (ref 8.3–10.6)
CHLORIDE BLD-SCNC: 100 MMOL/L (ref 99–110)
CO2: 24 MMOL/L (ref 21–32)
CREAT SERPL-MCNC: 1.2 MG/DL (ref 0.8–1.3)
EKG ATRIAL RATE: 70 BPM
EKG DIAGNOSIS: NORMAL
EKG P AXIS: 34 DEGREES
EKG P-R INTERVAL: 172 MS
EKG Q-T INTERVAL: 400 MS
EKG QRS DURATION: 90 MS
EKG QTC CALCULATION (BAZETT): 432 MS
EKG R AXIS: 11 DEGREES
EKG T AXIS: 19 DEGREES
EKG VENTRICULAR RATE: 70 BPM
GFR AFRICAN AMERICAN: >60
GFR NON-AFRICAN AMERICAN: 60
GLUCOSE BLD-MCNC: 164 MG/DL (ref 70–99)
GLUCOSE BLD-MCNC: 194 MG/DL (ref 70–99)
GLUCOSE BLD-MCNC: 217 MG/DL (ref 70–99)
HCT VFR BLD CALC: 45.5 % (ref 40.5–52.5)
HEMOGLOBIN: 15 G/DL (ref 13.5–17.5)
LEFT VENTRICULAR EJECTION FRACTION HIGH VALUE: 60 %
MCH RBC QN AUTO: 28.1 PG (ref 26–34)
MCHC RBC AUTO-ENTMCNC: 32.9 G/DL (ref 31–36)
MCV RBC AUTO: 85.5 FL (ref 80–100)
PDW BLD-RTO: 14.6 % (ref 12.4–15.4)
PERFORMED ON: ABNORMAL
PERFORMED ON: ABNORMAL
PLATELET # BLD: 211 K/UL (ref 135–450)
PLATELET # BLD: 214 K/UL (ref 135–450)
PMV BLD AUTO: 8.2 FL (ref 5–10.5)
POTASSIUM SERPL-SCNC: 4.3 MMOL/L (ref 3.5–5.1)
RBC # BLD: 5.32 M/UL (ref 4.2–5.9)
SODIUM BLD-SCNC: 136 MMOL/L (ref 136–145)
WBC # BLD: 9.3 K/UL (ref 4–11)

## 2020-02-24 PROCEDURE — 2060000000 HC ICU INTERMEDIATE R&B

## 2020-02-24 PROCEDURE — 6370000000 HC RX 637 (ALT 250 FOR IP)

## 2020-02-24 PROCEDURE — 6370000000 HC RX 637 (ALT 250 FOR IP): Performed by: INTERNAL MEDICINE

## 2020-02-24 PROCEDURE — C1769 GUIDE WIRE: HCPCS

## 2020-02-24 PROCEDURE — 93005 ELECTROCARDIOGRAM TRACING: CPT | Performed by: INTERNAL MEDICINE

## 2020-02-24 PROCEDURE — 027034Z DILATION OF CORONARY ARTERY, ONE ARTERY WITH DRUG-ELUTING INTRALUMINAL DEVICE, PERCUTANEOUS APPROACH: ICD-10-PCS | Performed by: INTERNAL MEDICINE

## 2020-02-24 PROCEDURE — 80048 BASIC METABOLIC PNL TOTAL CA: CPT

## 2020-02-24 PROCEDURE — C1725 CATH, TRANSLUMIN NON-LASER: HCPCS

## 2020-02-24 PROCEDURE — 02703ZZ DILATION OF CORONARY ARTERY, ONE ARTERY, PERCUTANEOUS APPROACH: ICD-10-PCS | Performed by: INTERNAL MEDICINE

## 2020-02-24 PROCEDURE — 99152 MOD SED SAME PHYS/QHP 5/>YRS: CPT | Performed by: INTERNAL MEDICINE

## 2020-02-24 PROCEDURE — B2111ZZ FLUOROSCOPY OF MULTIPLE CORONARY ARTERIES USING LOW OSMOLAR CONTRAST: ICD-10-PCS | Performed by: INTERNAL MEDICINE

## 2020-02-24 PROCEDURE — 93458 L HRT ARTERY/VENTRICLE ANGIO: CPT | Performed by: INTERNAL MEDICINE

## 2020-02-24 PROCEDURE — 2580000003 HC RX 258: Performed by: INTERNAL MEDICINE

## 2020-02-24 PROCEDURE — 6360000002 HC RX W HCPCS

## 2020-02-24 PROCEDURE — C1874 STENT, COATED/COV W/DEL SYS: HCPCS

## 2020-02-24 PROCEDURE — 36415 COLL VENOUS BLD VENIPUNCTURE: CPT

## 2020-02-24 PROCEDURE — C9600 PERC DRUG-EL COR STENT SING: HCPCS

## 2020-02-24 PROCEDURE — C1894 INTRO/SHEATH, NON-LASER: HCPCS

## 2020-02-24 PROCEDURE — 6360000004 HC RX CONTRAST MEDICATION

## 2020-02-24 PROCEDURE — 85049 AUTOMATED PLATELET COUNT: CPT

## 2020-02-24 PROCEDURE — 85347 COAGULATION TIME ACTIVATED: CPT

## 2020-02-24 PROCEDURE — 92920 PRQ TRLUML C ANGIOP 1ART&/BR: CPT

## 2020-02-24 PROCEDURE — 93010 ELECTROCARDIOGRAM REPORT: CPT | Performed by: INTERNAL MEDICINE

## 2020-02-24 PROCEDURE — 2709999900 HC NON-CHARGEABLE SUPPLY

## 2020-02-24 PROCEDURE — 6370000000 HC RX 637 (ALT 250 FOR IP): Performed by: NURSE PRACTITIONER

## 2020-02-24 PROCEDURE — 92928 PRQ TCAT PLMT NTRAC ST 1 LES: CPT | Performed by: INTERNAL MEDICINE

## 2020-02-24 PROCEDURE — C1887 CATHETER, GUIDING: HCPCS

## 2020-02-24 PROCEDURE — 93458 L HRT ARTERY/VENTRICLE ANGIO: CPT

## 2020-02-24 PROCEDURE — 92921 PR PRQ TRLUML CORONARY ANGIOPLASTY ADDL BRANCH: CPT | Performed by: INTERNAL MEDICINE

## 2020-02-24 PROCEDURE — 85027 COMPLETE CBC AUTOMATED: CPT

## 2020-02-24 PROCEDURE — B2151ZZ FLUOROSCOPY OF LEFT HEART USING LOW OSMOLAR CONTRAST: ICD-10-PCS | Performed by: INTERNAL MEDICINE

## 2020-02-24 PROCEDURE — 4A023N7 MEASUREMENT OF CARDIAC SAMPLING AND PRESSURE, LEFT HEART, PERCUTANEOUS APPROACH: ICD-10-PCS | Performed by: INTERNAL MEDICINE

## 2020-02-24 PROCEDURE — 2500000003 HC RX 250 WO HCPCS

## 2020-02-24 RX ORDER — MIDAZOLAM HYDROCHLORIDE 5 MG/ML
INJECTION INTRAMUSCULAR; INTRAVENOUS
Status: COMPLETED | OUTPATIENT
Start: 2020-02-24 | End: 2020-02-24

## 2020-02-24 RX ORDER — SODIUM CHLORIDE 0.9 % (FLUSH) 0.9 %
10 SYRINGE (ML) INJECTION EVERY 12 HOURS SCHEDULED
Status: DISCONTINUED | OUTPATIENT
Start: 2020-02-24 | End: 2020-02-25 | Stop reason: HOSPADM

## 2020-02-24 RX ORDER — FENTANYL CITRATE 50 UG/ML
INJECTION, SOLUTION INTRAMUSCULAR; INTRAVENOUS
Status: COMPLETED | OUTPATIENT
Start: 2020-02-24 | End: 2020-02-24

## 2020-02-24 RX ORDER — EPTIFIBATIDE 0.75 MG/ML
15 INJECTION, SOLUTION INTRAVENOUS CONTINUOUS
Status: DISCONTINUED | OUTPATIENT
Start: 2020-02-24 | End: 2020-02-24

## 2020-02-24 RX ORDER — HEPARIN SODIUM 1000 [USP'U]/ML
INJECTION, SOLUTION INTRAVENOUS; SUBCUTANEOUS
Status: COMPLETED | OUTPATIENT
Start: 2020-02-24 | End: 2020-02-24

## 2020-02-24 RX ORDER — METOPROLOL SUCCINATE 50 MG/1
50 TABLET, EXTENDED RELEASE ORAL DAILY
Status: DISCONTINUED | OUTPATIENT
Start: 2020-02-25 | End: 2020-02-25 | Stop reason: HOSPADM

## 2020-02-24 RX ORDER — EPTIFIBATIDE 0.75 MG/ML
15 INJECTION, SOLUTION INTRAVENOUS CONTINUOUS
Status: ACTIVE | OUTPATIENT
Start: 2020-02-24 | End: 2020-02-24

## 2020-02-24 RX ORDER — SODIUM CHLORIDE 0.9 % (FLUSH) 0.9 %
10 SYRINGE (ML) INJECTION PRN
Status: DISCONTINUED | OUTPATIENT
Start: 2020-02-24 | End: 2020-02-25 | Stop reason: HOSPADM

## 2020-02-24 RX ORDER — LISINOPRIL 10 MG/1
10 TABLET ORAL DAILY
Status: DISCONTINUED | OUTPATIENT
Start: 2020-02-24 | End: 2020-02-25 | Stop reason: HOSPADM

## 2020-02-24 RX ADMIN — FENTANYL CITRATE 25 MCG: 50 INJECTION, SOLUTION INTRAMUSCULAR; INTRAVENOUS at 09:39

## 2020-02-24 RX ADMIN — Medication 10 ML: at 21:22

## 2020-02-24 RX ADMIN — FENTANYL CITRATE 50 MCG: 50 INJECTION, SOLUTION INTRAMUSCULAR; INTRAVENOUS at 09:17

## 2020-02-24 RX ADMIN — MIDAZOLAM HYDROCHLORIDE 1 MG: 5 INJECTION INTRAMUSCULAR; INTRAVENOUS at 09:40

## 2020-02-24 RX ADMIN — SODIUM CHLORIDE: 9 INJECTION, SOLUTION INTRAVENOUS at 06:13

## 2020-02-24 RX ADMIN — MIDAZOLAM HYDROCHLORIDE 2 MG: 5 INJECTION INTRAMUSCULAR; INTRAVENOUS at 09:17

## 2020-02-24 RX ADMIN — LOSARTAN POTASSIUM 100 MG: 100 TABLET, FILM COATED ORAL at 08:29

## 2020-02-24 RX ADMIN — METOPROLOL TARTRATE 25 MG: 25 TABLET, FILM COATED ORAL at 21:21

## 2020-02-24 RX ADMIN — FENTANYL CITRATE 25 MCG: 50 INJECTION, SOLUTION INTRAMUSCULAR; INTRAVENOUS at 09:20

## 2020-02-24 RX ADMIN — HEPARIN SODIUM 2000 UNITS: 1000 INJECTION, SOLUTION INTRAVENOUS; SUBCUTANEOUS at 09:47

## 2020-02-24 RX ADMIN — INSULIN LISPRO 1 UNITS: 100 INJECTION, SOLUTION INTRAVENOUS; SUBCUTANEOUS at 21:21

## 2020-02-24 RX ADMIN — HEPARIN SODIUM 1000 UNITS: 1000 INJECTION, SOLUTION INTRAVENOUS; SUBCUTANEOUS at 09:55

## 2020-02-24 RX ADMIN — MIDAZOLAM HYDROCHLORIDE 1 MG: 5 INJECTION INTRAMUSCULAR; INTRAVENOUS at 09:20

## 2020-02-24 RX ADMIN — METOPROLOL TARTRATE 25 MG: 25 TABLET, FILM COATED ORAL at 08:29

## 2020-02-24 RX ADMIN — PRAVASTATIN SODIUM 80 MG: 80 TABLET ORAL at 08:29

## 2020-02-24 RX ADMIN — EPTIFIBATIDE 15 MG/HR: 0.75 INJECTION, SOLUTION INTRAVENOUS at 10:02

## 2020-02-24 RX ADMIN — TICAGRELOR 90 MG: 90 TABLET ORAL at 21:21

## 2020-02-24 RX ADMIN — EPTIFIBATIDE 15 MG/HR: 0.75 INJECTION, SOLUTION INTRAVENOUS at 09:44

## 2020-02-24 RX ADMIN — ASPIRIN 81 MG: 81 TABLET, COATED ORAL at 08:29

## 2020-02-24 NOTE — PRE SEDATION
Brief Pre-Op Note/Sedation Assessment      [facility]  1950  Cath Pool Rm/NONE      [id number]  9:15 AM    Planned Procedure: Cardiac Catheterization Procedure    Post Procedure Plan: Return to same level of care    Consent: I have discussed with the patient and/or the patient representative the indication, alternatives, and the possible risks and/or complications of the planned procedure and the anesthesia methods. The patient and/or patient representative appear to understand and agree to proceed. Chief Complaint: NSTEMI      Indications for Cath Procedure:  ACS > 24 hrs  Anginal Classification within 2 weeks:  CCS IV - Inability to perform any activity without angina or angina at rest, i.e., severe limitation  NYHA Heart Failure Class within 2 weeks: No symptoms  Is Cath Lab Visit Valve-related?: No  Surgical Risk: N/A  Functional Type: N/A    Anti- Anginal Meds within 2 weeks:   No    Stress or Imaging Studies Performed:  None     Vital Signs:  BP (!) 151/95   Pulse 78   Temp 98.3 °F (36.8 °C) (Oral)   Resp 18   Ht 5' 9\" (1.753 m)   Wt 276 lb 8 oz (125.4 kg)   SpO2 93%   BMI 40.83 kg/m²     Allergies:   Allergies   Allergen Reactions    Penicillins Other (See Comments)     Stomach distress    Lipitor Rash    Plavix [Clopidogrel Bisulfate] Rash    Victoza [Liraglutide] Rash       Past Medical History:  Past Medical History:   Diagnosis Date    CAD (coronary artery disease) 46    [facility] 12[facility]23[facility]2011    A1c 15    Hyperlipidemia 1992    Hypertension     Kidney stones 1987         Surgical History:  Past Surgical History:   Procedure Laterality Date    APPENDECTOMY  1970    CERVICAL FUSION  1995    COLONOSCOPY  1987&2002    WNL    CORONARY ANGIOPLASTY WITH STENT PLACEMENT  2004    PCTA w/ stents    CORONARY ANGIOPLASTY WITH STENT PLACEMENT  2008    CYSTOSCOPY Left 6/27/2019    CYSTOSCOPY, LEFT URETEROSCOPY, LEFT STONE MANIPULATION, LASER,  LEFT STENT PLACEMENT air    Sedation/Anesthesia Plan:  Guard the patient's safety and welfare. Minimize physical discomfort and pain. Minimize negative psychological responses to treatment by providing sedation and analgesia and maximize the potential amnesia. Patient to meet pre-procedure discharge plan.     Medication Planned:  midazolam intravenously    Patient is an appropriate candidate for plan of sedation: yes      Electronically signed by Markos Fan MD on 2/24/2020 at 9:15 AM

## 2020-02-24 NOTE — PROGRESS NOTES
4 Eyes Skin Assessment     The patient is being assess for   Admission    I agree that 2 RN's have performed a thorough Head to Toe Skin Assessment on the patient. ALL assessment sites listed below have been assessed. Areas assessed by both nurses:   [x]   Head, Face, and Ears   [x]   Shoulders, Back, and Chest, Abdomen  [x]   Arms, Elbows, and Hands   [x]   Coccyx, Sacrum, and Ischium  [x]   Legs, Feet, and Heels        SHARE this note so that the co-signing nurse is able to place an eSignature    Co-signer eSignature: Electronically signed by Holly Blackman RN on 2/24/20 at 6:50 PM    Does the Patient have Skin Breakdown?   No          Junaid Prevention initiated:  Yes   Wound Care Orders initiated:  No      WOC nurse consulted for Pressure Injury (Stage 3,4, Unstageable, DTI, NWPT, Complex wounds)and New or Established Ostomies:  No      Primary Nurse eSignature: Electronically signed by Joesph Tejeda RN on 2/24/20 at 6:47 PM

## 2020-02-24 NOTE — TELEPHONE ENCOUNTER
Patient is scheduled with Dr. Justa Lay for PCI RCA on 3/3/20 at [facility], arrival time of 6:30am to the Cath Lab. Please have patient arrive to the main entrance of [facility] at 6:15am and check in with the registration desk. Please call patient regarding medication instructions. Remind patient to be NPO after midnight (8 hours prior). Do not apply lotions/creams on skin the day of procedure.

## 2020-02-24 NOTE — PROGRESS NOTES
Patient NPO for cardiac cath this AM. IV fluids started as ordered. Consent signed and placed in chart. Patient reports no chest pain overnight. VSS.

## 2020-02-24 NOTE — POST SEDATION
Patient:  Vimal Held   :   1950    A pre-sedation re-evaluation was performed immediately prior to beginning of  the procedure. Procedure: cardiac cath  Medications: Procedural sedation with minimal conscious sedation  Complications: None  Estimated Blood Loss: none  Specimens: Were not obtained        Jero Medication and Procedural Reconciliation:  I agree that the documented medications and procedures performed are true. The medications were given under my order. The procedures were performed under my direct supervision.

## 2020-02-25 VITALS
BODY MASS INDEX: 40.95 KG/M2 | SYSTOLIC BLOOD PRESSURE: 108 MMHG | TEMPERATURE: 98.1 F | OXYGEN SATURATION: 91 % | HEART RATE: 76 BPM | DIASTOLIC BLOOD PRESSURE: 71 MMHG | WEIGHT: 276.5 LBS | RESPIRATION RATE: 20 BRPM | HEIGHT: 69 IN

## 2020-02-25 LAB
ANION GAP SERPL CALCULATED.3IONS-SCNC: 12 MMOL/L (ref 3–16)
BUN BLDV-MCNC: 24 MG/DL (ref 7–20)
CALCIUM SERPL-MCNC: 9.2 MG/DL (ref 8.3–10.6)
CHLORIDE BLD-SCNC: 98 MMOL/L (ref 99–110)
CO2: 23 MMOL/L (ref 21–32)
CREAT SERPL-MCNC: 1.2 MG/DL (ref 0.8–1.3)
GFR AFRICAN AMERICAN: >60
GFR NON-AFRICAN AMERICAN: 60
GLUCOSE BLD-MCNC: 174 MG/DL (ref 70–99)
GLUCOSE BLD-MCNC: 202 MG/DL (ref 70–99)
HCT VFR BLD CALC: 44.7 % (ref 40.5–52.5)
HEMOGLOBIN: 14.8 G/DL (ref 13.5–17.5)
MCH RBC QN AUTO: 28.2 PG (ref 26–34)
MCHC RBC AUTO-ENTMCNC: 33.1 G/DL (ref 31–36)
MCV RBC AUTO: 85.4 FL (ref 80–100)
PDW BLD-RTO: 14.4 % (ref 12.4–15.4)
PERFORMED ON: ABNORMAL
PLATELET # BLD: 199 K/UL (ref 135–450)
PLATELET # BLD: 200 K/UL (ref 135–450)
PMV BLD AUTO: 8.3 FL (ref 5–10.5)
POC ACT LR: 207 SEC
POC ACT LR: 208 SEC
POTASSIUM SERPL-SCNC: 4.1 MMOL/L (ref 3.5–5.1)
RBC # BLD: 5.23 M/UL (ref 4.2–5.9)
SODIUM BLD-SCNC: 133 MMOL/L (ref 136–145)
WBC # BLD: 8.7 K/UL (ref 4–11)

## 2020-02-25 PROCEDURE — 36415 COLL VENOUS BLD VENIPUNCTURE: CPT

## 2020-02-25 PROCEDURE — 99232 SBSQ HOSP IP/OBS MODERATE 35: CPT | Performed by: NURSE PRACTITIONER

## 2020-02-25 PROCEDURE — 80048 BASIC METABOLIC PNL TOTAL CA: CPT

## 2020-02-25 PROCEDURE — 6370000000 HC RX 637 (ALT 250 FOR IP): Performed by: INTERNAL MEDICINE

## 2020-02-25 PROCEDURE — 93005 ELECTROCARDIOGRAM TRACING: CPT | Performed by: INTERNAL MEDICINE

## 2020-02-25 PROCEDURE — 2580000003 HC RX 258: Performed by: INTERNAL MEDICINE

## 2020-02-25 PROCEDURE — 85027 COMPLETE CBC AUTOMATED: CPT

## 2020-02-25 RX ORDER — METOPROLOL SUCCINATE 50 MG/1
50 TABLET, EXTENDED RELEASE ORAL DAILY
Qty: 30 TABLET | Refills: 3 | Status: ON HOLD
Start: 2020-02-26 | End: 2020-03-04 | Stop reason: HOSPADM

## 2020-02-25 RX ORDER — CLOPIDOGREL 300 MG/1
300 TABLET, FILM COATED ORAL ONCE
Qty: 1 TABLET | Refills: 0 | Status: ON HOLD | OUTPATIENT
Start: 2020-02-25 | End: 2020-03-03 | Stop reason: SDUPTHER

## 2020-02-25 RX ORDER — CLOPIDOGREL BISULFATE 75 MG/1
75 TABLET ORAL DAILY
Status: DISCONTINUED | OUTPATIENT
Start: 2020-02-26 | End: 2020-02-25 | Stop reason: HOSPADM

## 2020-02-25 RX ORDER — CLOPIDOGREL BISULFATE 75 MG/1
75 TABLET ORAL DAILY
Qty: 30 TABLET | Refills: 3 | Status: ON HOLD
Start: 2020-02-26 | End: 2020-03-04 | Stop reason: HOSPADM

## 2020-02-25 RX ORDER — CLOPIDOGREL BISULFATE 75 MG/1
300 TABLET ORAL ONCE
Status: DISCONTINUED | OUTPATIENT
Start: 2020-02-25 | End: 2020-02-25 | Stop reason: HOSPADM

## 2020-02-25 RX ADMIN — PRAVASTATIN SODIUM 80 MG: 80 TABLET ORAL at 08:59

## 2020-02-25 RX ADMIN — ACETAMINOPHEN 650 MG: 325 TABLET ORAL at 04:31

## 2020-02-25 RX ADMIN — Medication 10 ML: at 09:00

## 2020-02-25 RX ADMIN — ASPIRIN 81 MG: 81 TABLET, COATED ORAL at 08:59

## 2020-02-25 RX ADMIN — PANTOPRAZOLE SODIUM 40 MG: 40 TABLET, DELAYED RELEASE ORAL at 05:47

## 2020-02-25 RX ADMIN — INSULIN LISPRO 2 UNITS: 100 INJECTION, SOLUTION INTRAVENOUS; SUBCUTANEOUS at 09:06

## 2020-02-25 RX ADMIN — LOSARTAN POTASSIUM 100 MG: 100 TABLET, FILM COATED ORAL at 09:00

## 2020-02-25 RX ADMIN — LISINOPRIL 10 MG: 10 TABLET ORAL at 09:00

## 2020-02-25 RX ADMIN — TICAGRELOR 90 MG: 90 TABLET ORAL at 08:59

## 2020-02-25 RX ADMIN — METOPROLOL SUCCINATE 50 MG: 50 TABLET, EXTENDED RELEASE ORAL at 08:59

## 2020-02-25 ASSESSMENT — PAIN SCALES - GENERAL
PAINLEVEL_OUTOF10: 0
PAINLEVEL_OUTOF10: 3
PAINLEVEL_OUTOF10: 0
PAINLEVEL_OUTOF10: 0

## 2020-02-25 ASSESSMENT — ENCOUNTER SYMPTOMS
SHORTNESS OF BREATH: 1
GASTROINTESTINAL NEGATIVE: 1

## 2020-02-25 NOTE — PROGRESS NOTES
Reviewed discharge instructions with patient and his wife, verbalized understanding. No additional questions at this time. IV removed with no complications. Telemetry monitor removed and returned, [facility] notified of discharge. Patient instructed to  new prescriptions from outpatient pharmacy. Belongings gathered and lockbox emptied. Patient refused to be escorted out via wheelchair.

## 2020-02-25 NOTE — PROGRESS NOTES
[address]  Cardiology  Progress Note    Admission date:  2020    Reason for follow up visit: CAD    HPI/CC: Nelson Saleem is a 71 y.o. male who presented to the ED 2020 for chest pain. Echo showed EF 55-60%. [facility] 2020 showed significant LAD in stent restenosis and OM3 disease, LAD treated with cutting balloon and OM3 treated with FARHAN. RCA disease to be staged outpatient procedure. No post procedure complications, rhythm overnight has been sinus. Subjective: No chest pain. He has dyspnea related to brilinta. He had this in the past when taking brilinta. Vitals:  Blood pressure 108/71, pulse 76, temperature 98.1 °F (36.7 °C), resp. rate 20, height 5' 9\" (1.753 m), weight 276 lb 8 oz (125.4 kg), SpO2 91 %.   Temp  Av.1 °F (36.7 °C)  Min: 98 °F (36.7 °C)  Max: 98.2 °F (36.8 °C)  Pulse  Av.2  Min: 68  Max: 86  BP  Min: 108/71  Max: 147/76  SpO2  Av.8 %  Min: 91 %  Max: 96 %    24 hour I/O    Intake/Output Summary (Last 24 hours) at 2020 1027  Last data filed at 2020 0902  Gross per 24 hour   Intake 720 ml   Output 750 ml   Net -30 ml     Current Facility-Administered Medications   Medication Dose Route Frequency Provider Last Rate Last Dose    [START ON 2020] clopidogrel (PLAVIX) tablet 75 mg  75 mg Oral Daily Dewain Cowevan, APRN - CNP        clopidogrel (PLAVIX) tablet 300 mg  300 mg Oral Once Dewain Oliverio, APRN - CNP        sodium chloride flush 0.9 % injection 10 mL  10 mL Intravenous 2 times per day Buck Mcdermott MD   10 mL at 20 0900    sodium chloride flush 0.9 % injection 10 mL  10 mL Intravenous PRN Buck Mcdermott MD        metoprolol succinate (TOPROL XL) extended release tablet 50 mg  50 mg Oral Daily Buck Mcdermott MD   50 mg at 20 0859    lisinopril (PRINIVIL;ZESTRIL) tablet 10 mg  10 mg Oral Daily Buck Mcdermott MD   10 mg at 20 0900    pantoprazole (PROTONIX) tablet 40 mg  40 mg Oral LIVIA Mireles MD Rufus   40 mg at 02/25/20 0547    nitroGLYCERIN (NITROSTAT) SL tablet 0.4 mg  0.4 mg Sublingual Q5 Min PRN Richardson Laura MD   0.4 mg at 02/22/20 1856    morphine (PF) injection 2 mg  2 mg Intravenous Q2H PRN Richardson Laura MD        aluminum & magnesium hydroxide-simethicone (MAALOX) 30 mL, lidocaine viscous hcl (XYLOCAINE) 5 mL (GI COCKTAIL)   Oral 4x Daily PRN Richardson Laura MD        aspirin EC tablet 81 mg  81 mg Oral Daily Richardson Laura MD   81 mg at 02/25/20 2448    losartan (COZAAR) tablet 100 mg  100 mg Oral Daily Richardson Laura MD   100 mg at 02/25/20 0900    pravastatin (PRAVACHOL) tablet 80 mg  80 mg Oral Daily Richardson Laura MD   80 mg at 02/25/20 0859    tamsulosin (FLOMAX) capsule 0.4 mg  0.4 mg Oral Daily Richardson Laura MD        acetaminophen (TYLENOL) tablet 650 mg  650 mg Oral Q6H PRN Richardson Laura MD   650 mg at 02/25/20 0431    acetaminophen (TYLENOL) suppository 650 mg  650 mg Rectal Q6H PRN Richardson Laura MD        polyethylene glycol [facility]) packet 17 g  17 g Oral Daily PRN Richardson Laura MD        promethazine (PHENERGAN) tablet 12.5 mg  12.5 mg Oral Q6H PRN Richardson Laura MD        [facility]ron [facility] injection 4 mg  4 mg Intravenous Q6H PRN Richardson Laura MD   4 mg at 02/21/20 2157    perflutren lipid microspheres (DEFINITY) injection 1.65 mg  1.5 mL Intravenous ONCE PRN Richardson Laura MD        insulin lispro (HUMALOG) injection vial 0-12 Units  0-12 Units Subcutaneous TID WC Richardson Laura MD   2 Units at 02/25/20 5459    insulin lispro (HUMALOG) injection vial 0-6 Units  0-6 Units Subcutaneous Nightly Richardson Laura MD   1 Units at 02/24/20 2121    glucose (GLUTOSE) 40 % oral gel 15 g  15 g Oral PRN Richardson Laura MD        dextrose 50 % IV solution  12.5 g Intravenous PRN Richardson Laura MD        glucagon (rDNA) injection 1 mg  1 mg Intramuscular PRN Richardson Laura, MD        dextrose 5 % solution  100 mL/hr Intravenous PRN Melania Crystal MD         Review of Systems   Constitutional: Negative. Respiratory: Positive for shortness of breath. Cardiovascular: Negative. Gastrointestinal: Negative. Neurological: Negative. Objective:     Telemetry monitor: SR 60s    Physical Exam:  Constitutional:  Comfortable and alert, NAD, appears stated age, obese  Eyes: PERRL, sclera nonicteric  Neck:  Supple, no masses, no thyroidmegaly, no JVD  Skin:  Warm and dry; no rash or lesions  Heart: Regular, normal apex, S1 and S2 normal, no M/G/R  Lungs:  Normal respiratory effort; clear; no wheezing/rhonchi/rales  Abdomen: soft, non tender, + bowel sounds  Extremities:  No edema or cyanosis; no clubbing  Neuro: alert and oriented, moves legs and arms equally, normal mood and affect  Right radial site soft, no hematoma, 2+ pulse    Data Reviewed:    Coronary angiogram 2/24/2020:  LM <20%  LAD 30% prox, 70% mid in stent              D2 50% prox  Cx 40-50%              D3 99% prox  RCA 80% mid in stent  LVEF 60%  PTCA LAD              3.0 x 15 angiosculpt cutting balloon to 3.5  PTCA OM3              2.75 x 15 FARHAN  DAPT, statin, BBlk  Home in am  PTCA RCA next week    Echo 2/22/2020:   Normal left ventricle systolic function with an estimated ejection fraction   of 55-60%. No regional wall motion abnormalities are seen. Normal left ventricular diastolic filling pressure.     Lab Reviewed:     Renal Profile:  Lab Results   Component Value Date    CREATININE 1.2 02/25/2020    BUN 24 02/25/2020     02/25/2020    K 4.1 02/25/2020    K 4.4 02/22/2020    CL 98 02/25/2020    CO2 23 02/25/2020     CBC:    Lab Results   Component Value Date    WBC 8.7 02/25/2020    RBC 5.23 02/25/2020    HGB 14.8 02/25/2020    HCT 44.7 02/25/2020    MCV 85.4 02/25/2020    RDW 14.4 02/25/2020     02/25/2020     BNP:  No results found for: PROBNP  Fasting Lipid Panel:    Lab Results

## 2020-02-25 NOTE — DISCHARGE SUMMARY
Hospital Medicine Discharge Summary    Patient ID: Jenna Bragg      Patient's PCP: Gwen Clemens MD    Admit Date: 2/21/2020     Discharge Date:   2/25/2020    Admitting Physician: Javy Simental MD     Discharge Physician: Javy Simental MD     Discharge Diagnoses: Active Hospital Problems    Diagnosis    [facility] [I21.4]    CAD (coronary artery disease) [I25.10]    DM2 (diabetes mellitus, type 2) ([facility] [E11.9]    HTN (hypertension) [I10]    Hyperlipidemia [E78.5]    Chest pain [R07.9]       The patient was seen and examined on day of discharge and this discharge summary is in conjunction with any daily progress note from day of discharge. Hospital Course:     Chest pain in setting of known CAD  -atypical  -serial troponin - initial elevated 0.02 on admission  -EKG w/o ischemic changes  -GI cocktail given in ED  -Aspirin given in ED  -Nitropaste placed on patient in ED  -Metoprolol given in ED  -echo in am  -cardiology consulted in ED - appreciate recommendations in advance  -Cardiology's input noted, plan for cardiac catheter on Monday. Status post cardiac cath done on 2/24/2020-Procedures: LHC, LVG, CA, PTCA, sedation     LM <20%  LAD 30% prox, 70% mid in stent              D2 50% prox  Cx 40-50%              D3 99% prox  RCA 80% mid in stent  LVEF 60%  PTCA LAD              3.0 x 15 angiosculpt cutting balloon to 3.5  PTCA OM3              2.75 x 15 FARHAN     DAPT, statin, BBlk  Home in am  PTCA RCA next week     Essential hypertension   -continue losartan     Hyperlipidemia  -continue pravastatin     DM2, uncontrolled  -NF 558  -hemglobin a1c 0.5 on 6/27/2018, will repeat in a.m.  -hold home medications  -mdssi  -poct ac/hs  -hypoglycemia protocol  -carb control diet     Obesity  With Body mass index is 42.8 kg/m². Complicating assessment and treatment.  Placing patient at risk for multiple co-morbidities as well as early death and contributing to the patient's presentation. Counseled on weight loss          Physical Exam Performed:     /71   Pulse 76   Temp 98.1 °F (36.7 °C)   Resp 20   Ht 5' 9\" (1.753 m)   Wt 276 lb 8 oz (125.4 kg)   SpO2 91%   BMI 40.83 kg/m²       General appearance:  No apparent distress, appears stated age and cooperative. HEENT:  Normal cephalic, atraumatic without obvious deformity. Pupils equal, round, and reactive to light. Extra ocular muscles intact. Conjunctivae/corneas clear. Neck: Supple, with full range of motion. No jugular venous distention. Trachea midline. Respiratory:  Normal respiratory effort. Clear to auscultation, bilaterally without Rales/Wheezes/Rhonchi. Cardiovascular:  Regular rate and rhythm with normal S1/S2 without murmurs, rubs or gallops. Abdomen: Soft, non-tender, non-distended with normal bowel sounds. Musculoskeletal:  No clubbing, cyanosis or edema bilaterally. Full range of motion without deformity. Skin: Skin color, texture, turgor normal.  No rashes or lesions. Neurologic:  Neurovascularly intact without any focal sensory/motor deficits. Cranial nerves: II-XII intact, grossly non-focal.  Psychiatric:  Alert and oriented, thought content appropriate, normal insight  Capillary Refill: Brisk,< 3 seconds   Peripheral Pulses: +2 palpable, equal bilaterally       Labs: For convenience and continuity at follow-up the following most recent labs are provided:      CBC:    Lab Results   Component Value Date    WBC 8.7 02/25/2020    HGB 14.8 02/25/2020    HCT 44.7 02/25/2020     02/25/2020       Renal:    Lab Results   Component Value Date     02/25/2020    K 4.1 02/25/2020    K 4.4 02/22/2020    CL 98 02/25/2020    CO2 23 02/25/2020    BUN 24 02/25/2020    CREATININE 1.2 02/25/2020    CALCIUM 9.2 02/25/2020    PHOS 2.7 05/05/2016         Significant Diagnostic Studies    Radiology:   XR CHEST STANDARD (2 VW)   Final Result   No evidence of acute cardiopulmonary disease. Consults:     IP CONSULT TO HOSPITALIST  IP CONSULT TO CARDIOLOGY  IP CONSULT TO CARDIAC REHAB    Disposition:  home     Condition at Discharge: Stable    Discharge Instructions/Follow-up:  Cardiology     Code Status:  Full Code     Activity: activity as tolerated    Diet: cardiac diet and diabetic diet      Discharge Medications:     Current Discharge Medication List           Details   metoprolol succinate (TOPROL XL) 50 MG extended release tablet Take 1 tablet by mouth daily  Qty: 30 tablet, Refills: 3      !! clopidogrel (PLAVIX) 75 MG tablet Take 1 tablet by mouth daily  Qty: 30 tablet, Refills: 3      !! clopidogrel (PLAVIX) 300 MG TABS Take 1 tablet by mouth once for 1 dose  Qty: 1 tablet, Refills: 0       !! - Potential duplicate medications found. Please discuss with provider. Details   glipiZIDE (GLUCOTROL) 5 MG tablet Take 5 mg by mouth 2 times daily (before meals)      tamsulosin (FLOMAX) 0.4 MG capsule Take 0.4 mg by mouth      HYDROcodone-acetaminophen (NORCO) 5-325 MG per tablet       pioglitazone (ACTOS) 30 MG tablet Take 30 mg by mouth daily       pravastatin (PRAVACHOL) 80 MG tablet Take 80 mg by mouth daily      losartan (COZAAR) 100 MG tablet Take 100 mg by mouth daily      aspirin 81 MG tablet Take 81 mg by mouth daily      sitagliptan-metformin (JANUMET)  MG per tablet Take 1 tablet by mouth 2 times daily (with meals). Bismuth Subsalicylate (PEPTO-BISMOL PO) Take 1 tablet by mouth as needed              Time Spent on discharge is more than 30 minutes in the examination, evaluation, counseling and review of medications and discharge plan. Signed:    Junior Kapoor MD   2/25/2020      Thank you Venkata Singer MD for the opportunity to be involved in this patient's care. If you have any questions or concerns please feel free to contact me at [phone].

## 2020-02-25 NOTE — PLAN OF CARE
Patient remains free from general pain and pain at right radial cath site.  Will continue to monitor

## 2020-02-25 NOTE — DISCHARGE INSTR - DIET
Diet    · Drink plenty of fluids to help your body flush out the dye. If you have kidney, heart, or liver disease and have to limit fluids, talk with your doctor before you increase the amount of fluids you drink.     · You can eat your normal diet. If your stomach is upset, try bland, low-fat foods like plain rice, broiled chicken, toast, and yogurt.

## 2020-02-26 LAB
EKG ATRIAL RATE: 73 BPM
EKG DIAGNOSIS: NORMAL
EKG P AXIS: 33 DEGREES
EKG P-R INTERVAL: 160 MS
EKG Q-T INTERVAL: 388 MS
EKG QRS DURATION: 82 MS
EKG QTC CALCULATION (BAZETT): 427 MS
EKG R AXIS: 9 DEGREES
EKG T AXIS: 20 DEGREES
EKG VENTRICULAR RATE: 73 BPM

## 2020-02-26 PROCEDURE — 93010 ELECTROCARDIOGRAM REPORT: CPT | Performed by: INTERNAL MEDICINE

## 2020-03-03 ENCOUNTER — HOSPITAL ENCOUNTER (OUTPATIENT)
Dept: CARDIAC CATH/INVASIVE PROCEDURES | Age: 70
Setting detail: OBSERVATION
Discharge: HOME | End: 2020-03-04
Attending: INTERNAL MEDICINE | Admitting: INTERNAL MEDICINE
Payer: MEDICARE

## 2020-03-03 PROBLEM — I25.10 CAD IN NATIVE ARTERY: Status: ACTIVE | Noted: 2020-03-03

## 2020-03-03 LAB
ANION GAP SERPL CALCULATED.3IONS-SCNC: 11 MMOL/L (ref 3–16)
BUN BLDV-MCNC: 24 MG/DL (ref 7–20)
CALCIUM SERPL-MCNC: 9.1 MG/DL (ref 8.3–10.6)
CHLORIDE BLD-SCNC: 101 MMOL/L (ref 99–110)
CHOLESTEROL, TOTAL: 161 MG/DL (ref 0–199)
CO2: 24 MMOL/L (ref 21–32)
CREAT SERPL-MCNC: 1.2 MG/DL (ref 0.8–1.3)
EKG ATRIAL RATE: 79 BPM
EKG DIAGNOSIS: NORMAL
EKG P AXIS: 23 DEGREES
EKG P-R INTERVAL: 168 MS
EKG Q-T INTERVAL: 372 MS
EKG QRS DURATION: 84 MS
EKG QTC CALCULATION (BAZETT): 426 MS
EKG R AXIS: 19 DEGREES
EKG T AXIS: 23 DEGREES
EKG VENTRICULAR RATE: 79 BPM
GFR AFRICAN AMERICAN: >60
GFR NON-AFRICAN AMERICAN: 60
GLUCOSE BLD-MCNC: 124 MG/DL (ref 70–99)
GLUCOSE BLD-MCNC: 156 MG/DL (ref 70–99)
GLUCOSE BLD-MCNC: 187 MG/DL (ref 70–99)
HCT VFR BLD CALC: 42.8 % (ref 40.5–52.5)
HDLC SERPL-MCNC: 31 MG/DL (ref 40–60)
HEMOGLOBIN: 14 G/DL (ref 13.5–17.5)
INR BLD: 1.05 (ref 0.86–1.14)
LDL CHOLESTEROL CALCULATED: 94 MG/DL
MCH RBC QN AUTO: 27.8 PG (ref 26–34)
MCHC RBC AUTO-ENTMCNC: 32.8 G/DL (ref 31–36)
MCV RBC AUTO: 84.9 FL (ref 80–100)
PDW BLD-RTO: 14.3 % (ref 12.4–15.4)
PERFORMED ON: ABNORMAL
PERFORMED ON: ABNORMAL
PLATELET # BLD: 215 K/UL (ref 135–450)
PMV BLD AUTO: 8.3 FL (ref 5–10.5)
POC ACT LR: 348 SEC
POTASSIUM SERPL-SCNC: 4.3 MMOL/L (ref 3.5–5.1)
PROTHROMBIN TIME: 12.2 SEC (ref 10–13.2)
RBC # BLD: 5.04 M/UL (ref 4.2–5.9)
SODIUM BLD-SCNC: 136 MMOL/L (ref 136–145)
TRIGL SERPL-MCNC: 179 MG/DL (ref 0–150)
VLDLC SERPL CALC-MCNC: 36 MG/DL
WBC # BLD: 7 K/UL (ref 4–11)

## 2020-03-03 PROCEDURE — 80061 LIPID PANEL: CPT

## 2020-03-03 PROCEDURE — C1769 GUIDE WIRE: HCPCS

## 2020-03-03 PROCEDURE — 99153 MOD SED SAME PHYS/QHP EA: CPT

## 2020-03-03 PROCEDURE — C1887 CATHETER, GUIDING: HCPCS

## 2020-03-03 PROCEDURE — 99152 MOD SED SAME PHYS/QHP 5/>YRS: CPT | Performed by: INTERNAL MEDICINE

## 2020-03-03 PROCEDURE — 99152 MOD SED SAME PHYS/QHP 5/>YRS: CPT

## 2020-03-03 PROCEDURE — G0378 HOSPITAL OBSERVATION PER HR: HCPCS

## 2020-03-03 PROCEDURE — 2709999900 HC NON-CHARGEABLE SUPPLY

## 2020-03-03 PROCEDURE — 6370000000 HC RX 637 (ALT 250 FOR IP): Performed by: INTERNAL MEDICINE

## 2020-03-03 PROCEDURE — 2060000000 HC ICU INTERMEDIATE R&B

## 2020-03-03 PROCEDURE — 6360000002 HC RX W HCPCS

## 2020-03-03 PROCEDURE — 80048 BASIC METABOLIC PNL TOTAL CA: CPT

## 2020-03-03 PROCEDURE — 85027 COMPLETE CBC AUTOMATED: CPT

## 2020-03-03 PROCEDURE — 92920 PRQ TRLUML C ANGIOP 1ART&/BR: CPT

## 2020-03-03 PROCEDURE — 2500000003 HC RX 250 WO HCPCS

## 2020-03-03 PROCEDURE — 85347 COAGULATION TIME ACTIVATED: CPT

## 2020-03-03 PROCEDURE — 85610 PROTHROMBIN TIME: CPT

## 2020-03-03 PROCEDURE — 93005 ELECTROCARDIOGRAM TRACING: CPT | Performed by: INTERNAL MEDICINE

## 2020-03-03 PROCEDURE — 2580000003 HC RX 258

## 2020-03-03 PROCEDURE — 6370000000 HC RX 637 (ALT 250 FOR IP)

## 2020-03-03 PROCEDURE — 83036 HEMOGLOBIN GLYCOSYLATED A1C: CPT

## 2020-03-03 PROCEDURE — C1725 CATH, TRANSLUMIN NON-LASER: HCPCS

## 2020-03-03 PROCEDURE — C1894 INTRO/SHEATH, NON-LASER: HCPCS

## 2020-03-03 PROCEDURE — 2580000003 HC RX 258: Performed by: INTERNAL MEDICINE

## 2020-03-03 PROCEDURE — 92920 PRQ TRLUML C ANGIOP 1ART&/BR: CPT | Performed by: INTERNAL MEDICINE

## 2020-03-03 RX ORDER — ACETAMINOPHEN 325 MG/1
650 TABLET ORAL EVERY 4 HOURS PRN
Status: DISCONTINUED | OUTPATIENT
Start: 2020-03-03 | End: 2020-03-04 | Stop reason: HOSPADM

## 2020-03-03 RX ORDER — FENTANYL CITRATE 50 UG/ML
INJECTION, SOLUTION INTRAMUSCULAR; INTRAVENOUS
Status: COMPLETED | OUTPATIENT
Start: 2020-03-03 | End: 2020-03-03

## 2020-03-03 RX ORDER — SODIUM CHLORIDE 9 MG/ML
INJECTION, SOLUTION INTRAVENOUS CONTINUOUS
Status: DISCONTINUED | OUTPATIENT
Start: 2020-03-03 | End: 2020-03-03

## 2020-03-03 RX ORDER — SODIUM CHLORIDE 0.9 % (FLUSH) 0.9 %
10 SYRINGE (ML) INJECTION PRN
Status: DISCONTINUED | OUTPATIENT
Start: 2020-03-03 | End: 2020-03-04 | Stop reason: HOSPADM

## 2020-03-03 RX ORDER — CLOPIDOGREL BISULFATE 75 MG/1
75 TABLET ORAL DAILY
Status: DISCONTINUED | OUTPATIENT
Start: 2020-03-04 | End: 2020-03-04

## 2020-03-03 RX ORDER — PIOGLITAZONEHYDROCHLORIDE 15 MG/1
30 TABLET ORAL DAILY
Status: DISCONTINUED | OUTPATIENT
Start: 2020-03-03 | End: 2020-03-04 | Stop reason: HOSPADM

## 2020-03-03 RX ORDER — NICOTINE POLACRILEX 4 MG
15 LOZENGE BUCCAL PRN
Status: DISCONTINUED | OUTPATIENT
Start: 2020-03-03 | End: 2020-03-04 | Stop reason: HOSPADM

## 2020-03-03 RX ORDER — ASPIRIN 81 MG/1
81 TABLET, CHEWABLE ORAL ONCE
Status: COMPLETED | OUTPATIENT
Start: 2020-03-03 | End: 2020-03-03

## 2020-03-03 RX ORDER — DEXTROSE MONOHYDRATE 25 G/50ML
12.5 INJECTION, SOLUTION INTRAVENOUS PRN
Status: DISCONTINUED | OUTPATIENT
Start: 2020-03-03 | End: 2020-03-04 | Stop reason: HOSPADM

## 2020-03-03 RX ORDER — PRAVASTATIN SODIUM 80 MG/1
80 TABLET ORAL DAILY
Status: DISCONTINUED | OUTPATIENT
Start: 2020-03-03 | End: 2020-03-04 | Stop reason: HOSPADM

## 2020-03-03 RX ORDER — GLIPIZIDE 5 MG/1
5 TABLET ORAL
Status: DISCONTINUED | OUTPATIENT
Start: 2020-03-03 | End: 2020-03-04 | Stop reason: HOSPADM

## 2020-03-03 RX ORDER — MIDAZOLAM HYDROCHLORIDE 5 MG/ML
INJECTION INTRAMUSCULAR; INTRAVENOUS
Status: COMPLETED | OUTPATIENT
Start: 2020-03-03 | End: 2020-03-03

## 2020-03-03 RX ORDER — LOSARTAN POTASSIUM 100 MG/1
100 TABLET ORAL DAILY
Status: DISCONTINUED | OUTPATIENT
Start: 2020-03-03 | End: 2020-03-04 | Stop reason: HOSPADM

## 2020-03-03 RX ORDER — DEXTROSE MONOHYDRATE 50 MG/ML
100 INJECTION, SOLUTION INTRAVENOUS PRN
Status: DISCONTINUED | OUTPATIENT
Start: 2020-03-03 | End: 2020-03-04 | Stop reason: HOSPADM

## 2020-03-03 RX ORDER — SODIUM CHLORIDE 0.9 % (FLUSH) 0.9 %
10 SYRINGE (ML) INJECTION EVERY 12 HOURS SCHEDULED
Status: DISCONTINUED | OUTPATIENT
Start: 2020-03-03 | End: 2020-03-04 | Stop reason: HOSPADM

## 2020-03-03 RX ORDER — ONDANSETRON 2 MG/ML
4 INJECTION INTRAMUSCULAR; INTRAVENOUS EVERY 6 HOURS PRN
Status: DISCONTINUED | OUTPATIENT
Start: 2020-03-03 | End: 2020-03-04 | Stop reason: HOSPADM

## 2020-03-03 RX ORDER — CLOPIDOGREL 300 MG/1
TABLET, FILM COATED ORAL
Status: COMPLETED | OUTPATIENT
Start: 2020-03-03 | End: 2020-03-03

## 2020-03-03 RX ORDER — CARVEDILOL 6.25 MG/1
6.25 TABLET ORAL 2 TIMES DAILY WITH MEALS
Status: DISCONTINUED | OUTPATIENT
Start: 2020-03-03 | End: 2020-03-04 | Stop reason: HOSPADM

## 2020-03-03 RX ORDER — SODIUM CHLORIDE 9 MG/ML
INJECTION, SOLUTION INTRAVENOUS CONTINUOUS
Status: DISPENSED | OUTPATIENT
Start: 2020-03-03 | End: 2020-03-04

## 2020-03-03 RX ORDER — TAMSULOSIN HYDROCHLORIDE 0.4 MG/1
0.4 CAPSULE ORAL DAILY
Status: DISCONTINUED | OUTPATIENT
Start: 2020-03-03 | End: 2020-03-04 | Stop reason: HOSPADM

## 2020-03-03 RX ORDER — HYDROCODONE BITARTRATE AND ACETAMINOPHEN 5; 325 MG/1; MG/1
1 TABLET ORAL EVERY 4 HOURS PRN
Status: DISCONTINUED | OUTPATIENT
Start: 2020-03-03 | End: 2020-03-04 | Stop reason: HOSPADM

## 2020-03-03 RX ADMIN — MIDAZOLAM HYDROCHLORIDE 4 MG: 5 INJECTION INTRAMUSCULAR; INTRAVENOUS at 09:06

## 2020-03-03 RX ADMIN — SODIUM CHLORIDE: 9 INJECTION, SOLUTION INTRAVENOUS at 07:26

## 2020-03-03 RX ADMIN — SODIUM CHLORIDE: 9 INJECTION, SOLUTION INTRAVENOUS at 15:51

## 2020-03-03 RX ADMIN — INSULIN LISPRO 1 UNITS: 100 INJECTION, SOLUTION INTRAVENOUS; SUBCUTANEOUS at 22:48

## 2020-03-03 RX ADMIN — CLOPIDOGREL 300 MG: 300 TABLET, FILM COATED ORAL at 09:30

## 2020-03-03 RX ADMIN — FENTANYL CITRATE 150 MCG: 50 INJECTION, SOLUTION INTRAMUSCULAR; INTRAVENOUS at 09:05

## 2020-03-03 RX ADMIN — CARVEDILOL 6.25 MG: 6.25 TABLET, FILM COATED ORAL at 16:36

## 2020-03-03 RX ADMIN — ASPIRIN 81 MG: 81 TABLET, CHEWABLE ORAL at 07:26

## 2020-03-03 RX ADMIN — PIOGLITAZONE 30 MG: 15 TABLET ORAL at 16:36

## 2020-03-03 NOTE — BRIEF OP NOTE
Procedures: Cor angio, PTCA, sedation    Selective tyler RCA for staged PCI    RCA mid in stent 80% to 40%   3.5 x 15 cutting balloon   4.0 x 20 NC balloon to 24 katiuska    Consider laser atherectomy +/- brachytherapy if recurrent angina/restenosis

## 2020-03-03 NOTE — H&P
[address]   Progress Note  Cardiology     CC:  Chest pain     HPI:     Episode of chest burning last pm after dinner. Resolved with milk and protonix. Repeat Troponin < 0.01 last pm, but EKG with some minor T wave changes inferiorly. He does not know if he had another episode. Nurse thinks he didn't. Troponin nor EKG back this am        Medications/Labs all Reviewed           Lab Results   Component Value Date     WBC 8.9 02/22/2020     HGB 13.7 02/22/2020     HCT 42.0 02/22/2020     MCV 85.3 02/22/2020      02/22/2020            Lab Results   Component Value Date     CREATININE 1.0 02/22/2020     BUN 23 (H) 02/22/2020      02/22/2020     K 4.4 02/22/2020      02/22/2020     CO2 24 02/22/2020            Lab Results   Component Value Date     INR 0.99 05/04/2016     PROTIME 11.3 05/04/2016         Physical Examination:    BP (!) 163/96   Pulse 76   Temp 98.2 °F (36.8 °C) (Oral)   Resp 16   Ht 5' 9\" (1.753 m)   Wt 278 lb (126.1 kg)   SpO2 92%   BMI 41.05 kg/m²       Respiratory:  · Resp Assessment: Normal respiratory effort  · Resp Auscultation: Clear to auscultation bilaterally   Cardiovascular:  · Auscultation: regular rate and rhythm, normal S1S2, no murmur, rub or gallop  · Palpation:  Nl PMI  · JVP:  normal  · Extremities: No Edema  Abdomen:  · Soft, non-tender  · Normal bowel sounds  Extremities:  ·  No Cyanosis or Clubbing  Neurological/Psychiatric:  · Oriented to time, place, and person  · Non-anxious  Skin:   · Warm and dry        Assessment:       Chest Pain;  Very difficult to tell if GI or cardiac. Cath planned for tomorrow as long as symptoms stable and enzymes/EKG OK. Continue treatment of GI and cardiac passivities. Hold lovenox this pm  F/u EKG and troponin     CAD:  PCI LAD and RCA 2016  Cath tomorrow     Hyperlipidemia:  On pravastatin 80.  \"allergic\" to lipitor     Risks, including but not limited to, death, MI, CVA, vascular complication, renal impairment, drug or contrast reaction discussed.  The pt understands, and wishes to proceed.     Returns for planned/staged PCI RCA

## 2020-03-03 NOTE — PROGRESS NOTES
Patient to room 437 from cath lab, TR band removed in cath lab, arm board on  Patient. Tele box # [id number], CMU able to visualize. Patient belongings placed in closet.

## 2020-03-04 VITALS
WEIGHT: 281 LBS | HEART RATE: 87 BPM | SYSTOLIC BLOOD PRESSURE: 114 MMHG | TEMPERATURE: 98.1 F | DIASTOLIC BLOOD PRESSURE: 75 MMHG | BODY MASS INDEX: 41.62 KG/M2 | OXYGEN SATURATION: 93 % | RESPIRATION RATE: 14 BRPM | HEIGHT: 69 IN

## 2020-03-04 LAB
ANION GAP SERPL CALCULATED.3IONS-SCNC: 11 MMOL/L (ref 3–16)
BUN BLDV-MCNC: 22 MG/DL (ref 7–20)
CALCIUM SERPL-MCNC: 8.9 MG/DL (ref 8.3–10.6)
CHLORIDE BLD-SCNC: 103 MMOL/L (ref 99–110)
CO2: 25 MMOL/L (ref 21–32)
CREAT SERPL-MCNC: 1.1 MG/DL (ref 0.8–1.3)
EKG ATRIAL RATE: 70 BPM
EKG ATRIAL RATE: 76 BPM
EKG ATRIAL RATE: 81 BPM
EKG DIAGNOSIS: NORMAL
EKG P AXIS: 29 DEGREES
EKG P AXIS: 30 DEGREES
EKG P AXIS: 47 DEGREES
EKG P-R INTERVAL: 164 MS
EKG P-R INTERVAL: 182 MS
EKG P-R INTERVAL: 184 MS
EKG Q-T INTERVAL: 370 MS
EKG Q-T INTERVAL: 380 MS
EKG Q-T INTERVAL: 400 MS
EKG QRS DURATION: 78 MS
EKG QRS DURATION: 84 MS
EKG QRS DURATION: 84 MS
EKG QTC CALCULATION (BAZETT): 416 MS
EKG QTC CALCULATION (BAZETT): 432 MS
EKG QTC CALCULATION (BAZETT): 441 MS
EKG R AXIS: 17 DEGREES
EKG R AXIS: 18 DEGREES
EKG R AXIS: 8 DEGREES
EKG T AXIS: 14 DEGREES
EKG T AXIS: 20 DEGREES
EKG T AXIS: 27 DEGREES
EKG VENTRICULAR RATE: 70 BPM
EKG VENTRICULAR RATE: 76 BPM
EKG VENTRICULAR RATE: 81 BPM
ESTIMATED AVERAGE GLUCOSE: 177.2 MG/DL
GFR AFRICAN AMERICAN: >60
GFR NON-AFRICAN AMERICAN: >60
GLUCOSE BLD-MCNC: 167 MG/DL (ref 70–99)
GLUCOSE BLD-MCNC: 190 MG/DL (ref 70–99)
GLUCOSE BLD-MCNC: 221 MG/DL (ref 70–99)
HBA1C MFR BLD: 7.8 %
PERFORMED ON: ABNORMAL
PERFORMED ON: ABNORMAL
POTASSIUM SERPL-SCNC: 4.1 MMOL/L (ref 3.5–5.1)
SODIUM BLD-SCNC: 139 MMOL/L (ref 136–145)

## 2020-03-04 PROCEDURE — 36415 COLL VENOUS BLD VENIPUNCTURE: CPT

## 2020-03-04 PROCEDURE — 80048 BASIC METABOLIC PNL TOTAL CA: CPT

## 2020-03-04 PROCEDURE — G0378 HOSPITAL OBSERVATION PER HR: HCPCS

## 2020-03-04 PROCEDURE — 6370000000 HC RX 637 (ALT 250 FOR IP): Performed by: NURSE PRACTITIONER

## 2020-03-04 PROCEDURE — 6370000000 HC RX 637 (ALT 250 FOR IP): Performed by: INTERNAL MEDICINE

## 2020-03-04 PROCEDURE — 99239 HOSP IP/OBS DSCHRG MGMT >30: CPT | Performed by: NURSE PRACTITIONER

## 2020-03-04 PROCEDURE — 2580000003 HC RX 258: Performed by: INTERNAL MEDICINE

## 2020-03-04 PROCEDURE — 93005 ELECTROCARDIOGRAM TRACING: CPT | Performed by: INTERNAL MEDICINE

## 2020-03-04 RX ORDER — DIAPER,BRIEF,INFANT-TODD,DISP
EACH MISCELLANEOUS
Qty: 1 TUBE | Refills: 0 | Status: SHIPPED | OUTPATIENT
Start: 2020-03-04 | End: 2020-03-11

## 2020-03-04 RX ORDER — CARVEDILOL 6.25 MG/1
6.25 TABLET ORAL 2 TIMES DAILY WITH MEALS
Qty: 60 TABLET | Refills: 3 | Status: ON HOLD
Start: 2020-03-04 | End: 2020-09-24 | Stop reason: HOSPADM

## 2020-03-04 RX ORDER — PRASUGREL 10 MG/1
10 TABLET, FILM COATED ORAL DAILY
Qty: 30 TABLET | Refills: 11 | Status: SHIPPED | OUTPATIENT
Start: 2020-03-04 | End: 2020-03-24 | Stop reason: SDUPTHER

## 2020-03-04 RX ORDER — DIPHENHYDRAMINE HCL 25 MG
25 TABLET ORAL EVERY 6 HOURS PRN
Qty: 120 TABLET | Refills: 0 | Status: SHIPPED | OUTPATIENT
Start: 2020-03-04 | End: 2020-04-03

## 2020-03-04 RX ORDER — PRASUGREL 10 MG/1
10 TABLET, FILM COATED ORAL DAILY
Status: DISCONTINUED | OUTPATIENT
Start: 2020-03-04 | End: 2020-03-04 | Stop reason: HOSPADM

## 2020-03-04 RX ORDER — ASPIRIN 81 MG/1
81 TABLET ORAL DAILY
Status: DISCONTINUED | OUTPATIENT
Start: 2020-03-04 | End: 2020-03-04 | Stop reason: HOSPADM

## 2020-03-04 RX ORDER — DIPHENHYDRAMINE HCL 25 MG
25 TABLET ORAL EVERY 6 HOURS PRN
Status: DISCONTINUED | OUTPATIENT
Start: 2020-03-04 | End: 2020-03-04 | Stop reason: HOSPADM

## 2020-03-04 RX ORDER — ICOSAPENT ETHYL 1000 MG/1
2 CAPSULE ORAL 2 TIMES DAILY
Qty: 120 CAPSULE | Refills: 3 | Status: SHIPPED | OUTPATIENT
Start: 2020-03-04 | End: 2020-03-24 | Stop reason: ALTCHOICE

## 2020-03-04 RX ORDER — DIPHENHYDRAMINE HCL 25 MG
25 TABLET ORAL EVERY 6 HOURS PRN
Status: DISCONTINUED | OUTPATIENT
Start: 2020-03-04 | End: 2020-03-04 | Stop reason: SDUPTHER

## 2020-03-04 RX ORDER — FENOPROFEN CALCIUM 200 MG
CAPSULE ORAL 2 TIMES DAILY
Status: DISCONTINUED | OUTPATIENT
Start: 2020-03-04 | End: 2020-03-04 | Stop reason: HOSPADM

## 2020-03-04 RX ADMIN — PIOGLITAZONE 30 MG: 15 TABLET ORAL at 09:05

## 2020-03-04 RX ADMIN — PRAVASTATIN SODIUM 80 MG: 80 TABLET ORAL at 09:05

## 2020-03-04 RX ADMIN — HYDROCORTISONE: 10 LOTION TOPICAL at 10:51

## 2020-03-04 RX ADMIN — ASPIRIN 81 MG: 81 TABLET, COATED ORAL at 10:45

## 2020-03-04 RX ADMIN — DIPHENHYDRAMINE HCL 25 MG: 25 TABLET ORAL at 10:50

## 2020-03-04 RX ADMIN — GLIPIZIDE 5 MG: 5 TABLET ORAL at 09:08

## 2020-03-04 RX ADMIN — LOSARTAN POTASSIUM 100 MG: 100 TABLET, FILM COATED ORAL at 09:05

## 2020-03-04 RX ADMIN — CARVEDILOL 6.25 MG: 6.25 TABLET, FILM COATED ORAL at 09:04

## 2020-03-04 RX ADMIN — Medication 10 ML: at 09:05

## 2020-03-04 RX ADMIN — PRASUGREL 10 MG: 10 TABLET, FILM COATED ORAL at 10:45

## 2020-03-04 NOTE — PROGRESS NOTES
Per night shift RN, abdominal rash noted last night. Pt is on Plavix and states they have an allergy to Plavix. Call placed to Cardiology. Karin saavedra. Will hold AM dose of Plavix and await Cardiology to round on pt this morning.

## 2020-03-04 NOTE — PROGRESS NOTES
Cardiac Rehab referral completed and patient provided with a brochure.  Given written information on diet, exercise, medications, risk factors, PCI site precautions, when to call the doctor, etc. Thank you for this referral.

## 2020-03-04 NOTE — DISCHARGE SUMMARY
[address]  Discharge Summary  Patient ID:  Abida Tovar  [id number] 71 y.o. 1950    Admit date: 3/3/2020    Discharge date:  3/4/20   Admitting Provider: Larissa Chandra MD     Discharge Provider: Kate Parr     Admission Diagnoses: CAD in native artery [I25.10]    Discharge Diagnoses:   Patient Active Problem List   Diagnosis    [facility]cere[facility]    Bell's palsy    HTN (hypertension)    CAD (coronary artery disease)    Hyperlipidemia    DM2 (diabetes mellitus, type 2) [facility]    Chest pain    Obesity    Hydronephrosis with renal and ureteral calculous obstruction    NSTEMI (non-ST elevated [facility] infarction) ([facility]    CAD in native artery        Discharged Condition: good  The patient was seen and examined on day of discharge and this discharge summary is in conjunction with any daily progress note from day of discharge. Hospital Course: Abida Tovar was admitted for staged procedure to in stent restenosis of the RCA. Recent hospitalization 2/21/2020 for chest pain. Echo showed EF 55-60%. [address] [place] [address] 2/24/2020 showed significant LAD in stent restenosis and OM3 disease, LAD treated with cutting balloon and OM3 treated with FARHAN. Questionable rash with Plavix more attributable to Lipitor. The Brilinta (dyspnea) was then changed to Plavix at discharge. He underwent cutting balloon and balloon angioplasty to RCA without complications. However, he developed rash overnight felt secondary to Plavix. He will be started on Effient. Also of note, LDL 94 despite pravastatin 80 mg. He had side effects with Lipitor and Crestor in past.  Will add Vascepa and recheck lipids in 6-8 weeks. Of note, if patient without recurrent angina, continue medical treatment. If he develops angina, check stress scan and evaluate for ischemia prior to repeating LHC (per [address]).        Consults:  IP CONSULT TO CARDIAC REHAB  Physical Exam:  BP (!) 145/83   Pulse 86   Temp 97.5 °F (36.4 °C) (Oral)   Resp 16   Ht 5' 9\" (1.753 m)   Wt 281 lb (127.5 kg)   SpO2 93%   BMI 41.50 kg/m²       Intake/Output Summary (Last 24 hours) at 3/4/2020 0857  Last data filed at 3/4/2020 1041  Gross per 24 hour   Intake 360 ml   Output 1325 ml   Net -965 ml     General:  Awake, alert, NAD  Skin:  Warm and dry, + hives to abdomen, flank/ sides, back and upper thighs  Neck:  JVD<8  Chest:  Clear to auscultation, no wheezes/rhonchi/rales  Cardiovascular:  RRR, normal S1S2, no m/g/r  Abdomen:  Soft, nontender, +bowel sounds  Extremities:  No BLE edema    Significant Diagnostic Studies:   CARDIAC CATH/ PCI 3/3/20:   Procedures: Cor angio, PTCA, sedation     Selective tyler RCA for staged PCI     RCA mid in stent 80% to 40%              3.5 x 15 cutting balloon              4.0 x 20 NC balloon to 24 katiuska     Consider laser atherectomy +/- brachytherapy if recurrent angina/restenosis    Coronary angiogram 2/24/2020:  LM <20%  LAD 30% prox, 70% mid in stent              D2 50% prox  Cx 40-50%              D3 99% prox  RCA 80% mid in stent  LVEF 60%  PTCA LAD              3.0 x 15 angiosculpt cutting balloon to 3.5  PTCA OM3              2.75 x 15 FARHAN  DAPT, statin, BBlk  Home in am  PTCA RCA next week     Echo 2/22/2020:   Normal left ventricle systolic function with an estimated ejection fraction   of 55-60%. No regional wall motion abnormalities are seen. Normal left ventricular diastolic filling pressure.     Echocardiography: n/a  Stress test: n/a  Labs:   Lab Results   Component Value Date    CREATININE 1.1 03/04/2020    BUN 22 (H) 03/04/2020     03/04/2020    K 4.1 03/04/2020     03/04/2020    CO2 25 03/04/2020      Lab Results   Component Value Date    WBC 7.0 03/03/2020    HGB 14.0 03/03/2020    HCT 42.8 03/03/2020    MCV 84.9 03/03/2020     03/03/2020      Lab Results   Component Value Date    INR 1.05 03/03/2020    PROTIME 12.2 03/03/2020    No results found for: BNP    Radiology: n/a    Treatments: analgesia: Fentanyl and Versed cardiac meds: carvedilol, losartan, pravastatin and anticoagulation: ASA and Plavix    Disposition: home    Patient Instructions:    Tanisha De León   Home Medication Instructions [facility]:[id number]    Printed on:03/04/20 1001   Medication Information                      aspirin 81 MG tablet  Take 81 mg by mouth daily             Bismuth Subsalicylate (PEPTO-BISMOL PO)  Take 1 tablet by mouth as needed              carvedilol (COREG) 6.25 MG tablet  Take 1 tablet by mouth 2 times daily (with meals)             diphenhydrAMINE (BENADRYL) 25 MG tablet  Take 1 tablet by mouth every 6 hours as needed for Itching             glipiZIDE (GLUCOTROL) 5 MG tablet  Take 5 mg by mouth 2 times daily (before meals)             HYDROcodone-acetaminophen (NORCO) 5-325 MG per tablet               hydrocortisone (ALA-PAUL) 1 % cream  Apply topically 2 times daily. Icosapent Ethyl (VASCEPA) 1 g CAPS capsule  Take 2 capsules by mouth 2 times daily             losartan (COZAAR) 100 MG tablet  Take 100 mg by mouth daily             pioglitazone (ACTOS) 30 MG tablet  Take 30 mg by mouth daily              prasugrel (EFFIENT) 10 MG TABS  Take 1 tablet by mouth daily             pravastatin (PRAVACHOL) 80 MG tablet  Take 80 mg by mouth daily             sitaGLIPtan-metformin (JANUMET)  MG per tablet  Take 1 tablet by mouth 2 times daily (with meals)             tamsulosin (FLOMAX) 0.4 MG capsule  Take 0.4 mg by mouth               Activity: no lifting or strenuous exercise for 1 week, no driving for today   Diet: cardiac diet and diabetic diet  Wound Care: keep wound clean and dry    Follow-up with Dr. Jenny Ventura in 2 weeks.     Signed:  Kate Parr, 3/4/2020, 8:57 AM

## 2020-03-04 NOTE — FLOWSHEET NOTE
03/03/20 2130   Assessment   Charting Type Shift assessment   Neurological   Neuro (WDL) WDL   Level of Consciousness 0   [place] Coma Scale   Eye Opening 4   Best Verbal Response 5   Best Motor Response 6   [place] Coma Scale Score 15   HEENT   HEENT (WDL) WDL   Respiratory   Respiratory (WDL) WDL   Cardiac   Cardiac (WDL) X  (post radial cath 3/3/2020)   Cardiac Regularity Regular   Heart Sounds S1, S2   Cardiac Rhythm NSR   Cardiac Monitor   Telemetry Monitor On Yes   Telemetry Audible Yes   Telemetry Alarms Set Yes   Telemetry Box Number 40   Gastrointestinal   Abdominal (WDL) X   RUQ Bowel Sounds Active   LUQ Bowel Sounds Active   RLQ Bowel Sounds Active   LLQ Bowel Sounds Active   Abdomen Inspection Soft;Rounded   Tenderness Soft   Peripheral Vascular   Peripheral Vascular (WDL) WDL   Edema None   RUE Neurovascular Assessment   Capillary Refill Less than/equal to 3 seconds   Color Appropriate for ethnicity   Temperature Warm   Sensation RUE Full sensation   R Radial Pulse +2   Puncture Site Assessment 1   Location Radial - right   Site Assessment No redness, drainage, swelling or hematoma   Dressing Applied Transparent occlusive dressing   Skin Color/Condition   Skin Color/Condition (WDL) WDL   Skin Integrity   Skin Integrity (WDL) WDL   Musculoskeletal   Musculoskeletal (WDL) WDL   Genitourinary   Genitourinary (WDL) WDL   Flank Tenderness No   Suprapubic Tenderness No   Dysuria No   Psychosocial   Psychosocial (WDL) WDL

## 2020-03-11 ENCOUNTER — TELEPHONE (OUTPATIENT)
Dept: CARDIOLOGY CLINIC | Age: 70
End: 2020-03-11

## 2020-03-23 NOTE — PROGRESS NOTES
[facility]   Cardiac Followup    Referring Provider:  Dara Culp MD     Chief Complaint   Patient presents with    Follow-Up from [facility]    Coronary Artery Disease    Hypertension    Hyperlipidemia        History of Present Illness:    Guillaume Castro is here for hospital follow up S/P left heart cath on 3/3/2020, staged procedure to in stent restonosis of the RCA. He was hospitalized in 2/2020 with chest pain. He underwent a left heart cath on 2/24/2020 which showed significant LAD in stent. He also had PCI of his RCA in 2016. Today he states he has been feeling well overall. He has been having leg swelling that started after taking Vascepa. He stopped the medication and is feeling better. He takes pravastatin with no issues. He had SOB with Brilinta, rash with Plavix. He seems to be tolerating Effeint. He does bleed easier if he scratches his skin. He has been under stress caring for his wife with cancer. He has not been watching his diet due to caring for her. Patient currently denies any weight gain, edema, palpitations, chest pain, shortness of breath, dizziness, and syncope. Past Medical History:   has a past medical history of CAD (coronary artery disease), Diabetes mellitus ([address] Hyperlipidemia, Hypertension, and Kidney stones. Surgical History:   has a past surgical history that includes cervical fusion (1995); Appendectomy (1970); hernia repair (4250&4405); Colonoscopy (1987&2002); Upper gastrointestinal endoscopy (2002); Coronary angioplasty with stent (2004); Coronary angioplasty with stent (2008); Upper gastrointestinal endoscopy (08/28/2008); Cystoscopy (Left, 6/27/2019); Endoscopy, colon, diagnostic; and CYSTOSCOPY INSERTION / REMOVAL STENT / STONE (Left, 7/11/2019). Social History:   reports that he has never smoked. He has never used smokeless tobacco. He reports current alcohol use. He reports that he does not use drugs.      Family History:  family history includes Breast Cancer in his mother; Breast Cancer (age of onset: 61) in his sister; Heart Disease in his brother. Home Medications:  Prior to Admission medications    Medication Sig Start Date End Date Taking? Authorizing Provider   sitaGLIPtan-metformin (JANUMET)  MG per tablet Take 1 tablet by mouth 2 times daily (with meals) 3/5/20  Yes CURTIS Green CNP   diphenhydrAMINE (BENADRYL) 25 MG tablet Take 1 tablet by mouth every 6 hours as needed for Itching 3/4/20 4/3/20 Yes CURTIS Cota CNP   carvedilol (COREG) 6.25 MG tablet Take 1 tablet by mouth 2 times daily (with meals) 3/4/20  Yes CURTIS Green CNP   prasugrel (EFFIENT) 10 MG TABS Take 1 tablet by mouth daily 3/4/20  Yes CURTIS Cota CNP   glipiZIDE (GLUCOTROL) 5 MG tablet Take 5 mg by mouth 2 times daily (before meals)   Yes Historical Provider, MD   tamsulosin (FLOMAX) 0.4 MG capsule Take 0.4 mg by mouth 6/29/19  Yes Historical Provider, MD   HYDROcodone-acetaminophen (Abhishek Bolls) 5-325 MG per tablet  6/26/19  Yes Historical Provider, MD   pioglitazone (ACTOS) 30 MG tablet Take 30 mg by mouth daily  11/11/18  Yes Historical Provider, MD   pravastatin (PRAVACHOL) 80 MG tablet Take 80 mg by mouth daily   Yes Historical Provider, MD   losartan (COZAAR) 100 MG tablet Take 100 mg by mouth daily   Yes Historical Provider, MD   aspirin 81 MG tablet Take 81 mg by mouth daily   Yes Historical Provider, MD   Bismuth Subsalicylate (PEPTO-BISMOL PO) Take 1 tablet by mouth as needed    Yes Historical Provider, MD   Icosapent Ethyl (VASCEPA) 1 g CAPS capsule Take 2 capsules by mouth 2 times daily  Patient not taking: Reported on 3/24/2020 3/4/20   CURTIS Green CNP        Allergies:  Brilinta [ticagrelor]; Penicillins; Lipitor; Plavix [clopidogrel bisulfate]; and Victoza [liraglutide]     Review of Systems:   · Constitutional: there has been no unanticipated weight loss.  There's been no change in energy normal gait balance and coordination  · No abnormalities of mood, affect, memory, mentation, or behavior are noted      Echocardiogram 5/4/16  Summary   Technically limited study   Left ventricular systolic function is normal .   Ejection fraction is visually estimated to be 60-65 %. Mild concentric left ventricular hypertrophy is present. Diastolic filling parameters suggests grade I diastolic dysfunction . The aortic valve appears sclerotic but opens well. Individual aortic valve leaflets are not clearly visualized. The tricuspid valve was not well visualized. IVC not seen    Limited echocardiogram 2/22/2020  Limited echo for LV function. Normal left ventricle systolic function with an estimated ejection fraction   of 55-60%. No regional wall motion abnormalities are seen. Normal left ventricular diastolic filling pressure. Left heart cath 2/24/2020  Procedures: LHC, LVG, CA, PTCA, sedation     LM <20%  LAD 30% prox, 70% mid in stent              D2 50% prox  Cx 40-50%              D3 99% prox  RCA 80% mid in stent  LVEF 60%  PTCA LAD              3.0 x 15 angiosculpt cutting balloon to 3.5  PTCA OM3              2.75 x 15 FARHAN     DAPT, statin, BBlk  Home in am  PTCA RCA next week      Left heart cath 3/3/2020  Procedures: Cor angio, PTCA, sedation     Selective tyler RCA for staged PCI     RCA mid in stent 80% to 40%              3.5 x 15 cutting balloon              4.0 x 20 NC balloon to 24 katiuska     Consider laser atherectomy +/- brachytherapy if recurrent angina/restenosis    Assessment:   Chest pain c/w angina - resolved post PCI  SOB - suspect mostly not cardiac  Coronary artery disease - stable post PCI  Hyperlipidemia - suboptimal. Intolerant to high dose and potency statins.  Intolerant to other antilipids   Hypertension - suboptimal      Plan:  Continue current medications   Check blood pressure at home weekly  Regular exercise and following a healthy diet encouraged   Call if recurrent angina  Follow up with me in 6 months     Scribe's attestation: This note was scribed in the presence of Dr. Katey Pantoja M.D. By Jack Her RN    The scribes documentation has been prepared under my direction and personally reviewed by me in its entirety. I confirm that the note above accurately reflects all work, treatment, procedures, and medical decision making performed by me. Dr. Katey Pantoja MD      Thank you for allowing me to participate in the care of this individual.      Fantasma Vallejo.  Abad Garcia M.D., [facility]

## 2020-03-24 ENCOUNTER — OFFICE VISIT (OUTPATIENT)
Dept: CARDIOLOGY CLINIC | Age: 70
End: 2020-03-24
Payer: MEDICARE

## 2020-03-24 VITALS
DIASTOLIC BLOOD PRESSURE: 68 MMHG | WEIGHT: 284 LBS | OXYGEN SATURATION: 95 % | BODY MASS INDEX: 42.06 KG/M2 | SYSTOLIC BLOOD PRESSURE: 122 MMHG | HEART RATE: 94 BPM | HEIGHT: 69 IN

## 2020-03-24 PROCEDURE — 99214 OFFICE O/P EST MOD 30 MIN: CPT | Performed by: INTERNAL MEDICINE

## 2020-03-24 RX ORDER — PRASUGREL 10 MG/1
10 TABLET, FILM COATED ORAL DAILY
Qty: 90 TABLET | Refills: 3 | Status: SHIPPED | OUTPATIENT
Start: 2020-03-24 | End: 2020-04-13 | Stop reason: SDUPTHER

## 2020-03-24 NOTE — LETTER
[facility]   Cardiac Followup    Referring Provider:  Lobo Lezama MD     Chief Complaint   Patient presents with    [facility] from [facility]    Coronary Artery Disease    Hypertension    Hyperlipidemia        History of Present Illness:    Jazmin Duron is here for hospital follow up S/P left heart cath on 3/3/2020, staged procedure to in stent restonosis of the RCA. He was hospitalized in 2/2020 with chest pain. He underwent a left heart cath on 2/24/2020 which showed significant LAD in stent. He also had PCI of his RCA in 2016. Today he states he has been feeling well overall. He has been having leg swelling that started after taking Vascepa. He stopped the medication and is feeling better. He takes pravastatin with no issues. He had SOB with Brilinta, rash with Plavix. He seems to be tolerating Effeint. He does bleed easier if he scratches his skin. He has been under stress caring for his wife with cancer. He has not been watching his diet due to caring for her. Patient currently denies any weight gain, edema, palpitations, chest pain, shortness of breath, dizziness, and syncope. Past Medical History:   has a past medical history of CAD (coronary artery disease), Diabetes mellitus ([address] Hyperlipidemia, Hypertension, and Kidney stones. Surgical History:   has a past surgical history that includes cervical fusion (1995); Appendectomy (1970); hernia repair (7832&0453); Colonoscopy (1987&2002); Upper gastrointestinal endoscopy (2002); Coronary angioplasty with stent (2004); Coronary angioplasty with stent (2008); Upper gastrointestinal endoscopy (08/28/2008); Cystoscopy (Left, 6/27/2019); Endoscopy, colon, diagnostic; and CYSTOSCOPY INSERTION / REMOVAL STENT / STONE (Left, 7/11/2019). Social History:   reports that he has never smoked. He has never used smokeless tobacco. He reports current alcohol use. He reports that he does not use drugs.      Family History: been no change in energy level, sleep pattern, or activity level. · Eyes: No visual changes or diplopia. No scleral icterus. · ENT: No Headaches, hearing loss or vertigo. No mouth sores or sore throat. · Cardiovascular: Reviewed in HPI  · Respiratory: No cough or wheezing, no sputum production. No hematemesis. · Gastrointestinal: No abdominal pain, appetite loss, blood in stools. No change in bowel or bladder habits. · Genitourinary: No dysuria, trouble voiding, or hematuria. · Musculoskeletal:  No gait disturbance, weakness or joint complaints. · Integumentary: No rash or pruritis. · Neurological: No headache, diplopia, change in muscle strength, numbness or tingling. No change in gait, balance, coordination, mood, affect, memory, mentation, behavior. · Psychiatric: No anxiety, no depression. · Endocrine: No malaise, fatigue or temperature intolerance. No excessive thirst, fluid intake, or urination. No tremor. · Hematologic/Lymphatic: No abnormal bruising or bleeding, blood clots or swollen lymph nodes. · Allergic/Immunologic: No nasal congestion or hives. Physical Examination:    Vitals:    03/24/20 0940   BP: 122/68   Pulse: 94   SpO2: 95%        Constitutional and General Appearance: NAD   Respiratory:  · Normal excursion and expansion without use of accessory muscles  · Resp Auscultation: Normal breath sounds without dullness  Cardiovascular:  · The apical impulses not displaced  · Heart tones are crisp and normal  · Cervical veins are not engorged  · The carotid upstroke is normal in amplitude and contour without delay or bruit  · Normal S1S2, No S3, No Murmur  · Peripheral pulses are symmetrical and full  · There is no clubbing, cyanosis of the extremities.   · No edema  · Femoral Arteries: 2+ and equal  · Pedal Pulses: 2+ and equal   Abdomen:  · No masses or tenderness  · Liver/Spleen: No Abnormalities Noted  Neurological/Psychiatric:  · Alert and oriented in all spheres Regular exercise and following a healthy diet encouraged   Call if recurrent angina  Follow up with me in 6 months     Scribe's attestation: This note was scribed in the presence of Dr. Piero Michel M.D. By Tad Mukherjee RN    The scribes documentation has been prepared under my direction and personally reviewed by me in its entirety. I confirm that the note above accurately reflects all work, treatment, procedures, and medical decision making performed by me. Dr. Piero Michel MD      Thank you for allowing me to participate in the care of this individual.      Senia Blevins.  Royal Kerry M.D., [facility]

## 2020-04-01 ENCOUNTER — TELEPHONE (OUTPATIENT)
Dept: CARDIOLOGY CLINIC | Age: 70
End: 2020-04-01

## 2020-04-01 NOTE — TELEPHONE ENCOUNTER
I tried to call patient back. Left a message. Looks like he has an allergy listed for Brilinta (SOB) and Plavix (rash). Now having GI issues on Effient. Patient just had PCI in 3/2020.

## 2020-04-13 RX ORDER — PRASUGREL 10 MG/1
10 TABLET, FILM COATED ORAL DAILY
Qty: 90 TABLET | Refills: 3 | Status: SHIPPED | OUTPATIENT
Start: 2020-04-13 | End: 2021-04-29

## 2020-04-26 ENCOUNTER — HOSPITAL ENCOUNTER (EMERGENCY)
Age: 70
Discharge: HOME | End: 2020-04-26
Attending: EMERGENCY MEDICINE
Payer: MEDICARE

## 2020-04-26 ENCOUNTER — APPOINTMENT (OUTPATIENT)
Dept: GENERAL RADIOLOGY | Age: 70
End: 2020-04-26
Payer: MEDICARE

## 2020-04-26 VITALS
HEART RATE: 76 BPM | RESPIRATION RATE: 20 BRPM | BODY MASS INDEX: 40.73 KG/M2 | WEIGHT: 275 LBS | HEIGHT: 69 IN | SYSTOLIC BLOOD PRESSURE: 128 MMHG | TEMPERATURE: 98.1 F | DIASTOLIC BLOOD PRESSURE: 78 MMHG | OXYGEN SATURATION: 97 %

## 2020-04-26 LAB
A/G RATIO: 1.5 (ref 1.1–2.2)
ALBUMIN SERPL-MCNC: 4 G/DL (ref 3.4–5)
ALP BLD-CCNC: 69 U/L (ref 40–129)
ALT SERPL-CCNC: 23 U/L (ref 10–40)
ANION GAP SERPL CALCULATED.3IONS-SCNC: 13 MMOL/L (ref 3–16)
AST SERPL-CCNC: 16 U/L (ref 15–37)
BASOPHILS ABSOLUTE: 0.1 K/UL (ref 0–0.2)
BASOPHILS RELATIVE PERCENT: 1.1 %
BILIRUB SERPL-MCNC: 0.4 MG/DL (ref 0–1)
BUN BLDV-MCNC: 22 MG/DL (ref 7–20)
CALCIUM SERPL-MCNC: 9.3 MG/DL (ref 8.3–10.6)
CHLORIDE BLD-SCNC: 103 MMOL/L (ref 99–110)
CO2: 22 MMOL/L (ref 21–32)
CREAT SERPL-MCNC: 1.2 MG/DL (ref 0.8–1.3)
EKG ATRIAL RATE: 85 BPM
EKG DIAGNOSIS: NORMAL
EKG P AXIS: 57 DEGREES
EKG P-R INTERVAL: 162 MS
EKG Q-T INTERVAL: 346 MS
EKG QRS DURATION: 78 MS
EKG QTC CALCULATION (BAZETT): 411 MS
EKG R AXIS: 25 DEGREES
EKG T AXIS: 20 DEGREES
EKG VENTRICULAR RATE: 85 BPM
EOSINOPHILS ABSOLUTE: 0.1 K/UL (ref 0–0.6)
EOSINOPHILS RELATIVE PERCENT: 2 %
GFR AFRICAN AMERICAN: >60
GFR NON-AFRICAN AMERICAN: 60
GLOBULIN: 2.6 G/DL
GLUCOSE BLD-MCNC: 210 MG/DL (ref 70–99)
HCT VFR BLD CALC: 42.6 % (ref 40.5–52.5)
HEMOGLOBIN: 14 G/DL (ref 13.5–17.5)
LYMPHOCYTES ABSOLUTE: 1.4 K/UL (ref 1–5.1)
LYMPHOCYTES RELATIVE PERCENT: 19.1 %
MCH RBC QN AUTO: 28.1 PG (ref 26–34)
MCHC RBC AUTO-ENTMCNC: 33 G/DL (ref 31–36)
MCV RBC AUTO: 85.1 FL (ref 80–100)
MONOCYTES ABSOLUTE: 0.8 K/UL (ref 0–1.3)
MONOCYTES RELATIVE PERCENT: 10.8 %
NEUTROPHILS ABSOLUTE: 5 K/UL (ref 1.7–7.7)
NEUTROPHILS RELATIVE PERCENT: 67 %
PDW BLD-RTO: 14.1 % (ref 12.4–15.4)
PLATELET # BLD: 200 K/UL (ref 135–450)
PMV BLD AUTO: 8.1 FL (ref 5–10.5)
POTASSIUM SERPL-SCNC: 4.3 MMOL/L (ref 3.5–5.1)
RBC # BLD: 5 M/UL (ref 4.2–5.9)
SODIUM BLD-SCNC: 138 MMOL/L (ref 136–145)
TOTAL PROTEIN: 6.6 G/DL (ref 6.4–8.2)
TROPONIN: <0.01 NG/ML
TROPONIN: <0.01 NG/ML
WBC # BLD: 7.5 K/UL (ref 4–11)

## 2020-04-26 PROCEDURE — 84484 ASSAY OF TROPONIN QUANT: CPT

## 2020-04-26 PROCEDURE — 80053 COMPREHEN METABOLIC PANEL: CPT

## 2020-04-26 PROCEDURE — 85025 COMPLETE CBC W/AUTO DIFF WBC: CPT

## 2020-04-26 PROCEDURE — 93010 ELECTROCARDIOGRAM REPORT: CPT | Performed by: INTERNAL MEDICINE

## 2020-04-26 PROCEDURE — 93005 ELECTROCARDIOGRAM TRACING: CPT | Performed by: EMERGENCY MEDICINE

## 2020-04-26 PROCEDURE — 99285 EMERGENCY DEPT VISIT HI MDM: CPT

## 2020-04-26 PROCEDURE — 71045 X-RAY EXAM CHEST 1 VIEW: CPT

## 2020-04-26 RX ORDER — CARVEDILOL 3.12 MG/1
3.12 TABLET ORAL 2 TIMES DAILY WITH MEALS
Status: ON HOLD | COMMUNITY
End: 2020-09-05 | Stop reason: HOSPADM

## 2020-04-26 ASSESSMENT — ENCOUNTER SYMPTOMS
COUGH: 0
BACK PAIN: 1
WHEEZING: 0
CHOKING: 0
NAUSEA: 1
ABDOMINAL PAIN: 0
CHEST TIGHTNESS: 1
DIARRHEA: 0
STRIDOR: 0
SHORTNESS OF BREATH: 1
CONSTIPATION: 0
COLOR CHANGE: 0
VOMITING: 0

## 2020-04-26 ASSESSMENT — PAIN DESCRIPTION - PAIN TYPE: TYPE: ACUTE PAIN

## 2020-04-26 ASSESSMENT — PAIN DESCRIPTION - LOCATION: LOCATION: CHEST

## 2020-04-26 ASSESSMENT — PAIN DESCRIPTION - DESCRIPTORS: DESCRIPTORS: PRESSURE

## 2020-04-26 ASSESSMENT — PAIN SCALES - GENERAL: PAINLEVEL_OUTOF10: 3

## 2020-04-26 NOTE — ED PROVIDER NOTES
not feel that additional CT scan of the chest to assess for blood clot or ultrasound of the gallbladder is necessary since he is asymptomatic and again story not suggestive of pulmonary embolism. However, he was told to return to the emergency department immediately if he develops return of chest pain that is causing significant shortness of breath, development of abdominal pain with vomiting or fever, or any other concerns, but otherwise follow-up with his primary doctor or GI over the next 3 to 5 days to see about outpatient ultrasound and for repeat assessment. He was well-appearing and in no acute distress at time of discharge and felt comfortable with this plan. Patient was ambulated by nurse prior to discharge around the department and denied feeling short of breath or developing any chest pain and was feeling well at this time. The patient tolerated their visit well. The patient and / or the family were informed of the results of any tests, a time was given to answer questions. FINAL IMPRESSION      1. Chest pain, unspecified type    2. Nausea    3. Dyspnea on exertion    4. Hyperglycemia    5.  Chronic kidney disease, unspecified CKD stage          DISPOSITION/PLAN   DISPOSITION  -discharged in improved, stable condition      PATIENT REFERRED TO:  [facility]  ED  [address] [facility] [address] [facility]  Go to   If symptoms worsen    Landy Huntley MD  [address] [facility]  [phone]    Schedule an appointment as soon as possible for a visit in 2 days      Milly Jeffries MD  [address] [place] [address]  [phone]    Call   As needed    Disla Baldomero,   [address] [address] [phone]    Call in 2 days  As needed      DISCHARGEMEDICATIONS:  Discharge Medication List as of 4/26/2020  9:24 AM          DISCONTINUED MEDICATIONS:  Discharge Medication List as of 4/26/2020  9:24 AM (Please note that portions of this note were completed with a voicerecognition program.  Efforts were made to edit the dictations but occasionally words are mis-transcribed.)    Mar Umaña MD (electronically signed)            Mar Umaña MD  04/26/20 5678

## 2020-04-27 ENCOUNTER — CARE COORDINATION (OUTPATIENT)
Dept: CARE COORDINATION | Age: 70
End: 2020-04-27

## 2020-04-27 NOTE — CARE COORDINATION
Patient contacted regarding recent visit for viral symptoms. This Lucho Tam contacted the patient by telephone to perform post discharge call. Verified name and  with patient as identifiers. Provided introduction to self, and reason for call due to viral symptoms of infection and/or exposure to COVID-19. Patient presented to emergency department/flu clinic with complaints of viral symptoms/exposure to COVID. Patient reports symptoms are the same. Due to no new or worsening symptoms the RN CTN/ACM was not notified for escalation. Discussed exposure protocols and quarantine with CDC Guidelines What To Do If You Are Sick    Patient was given an opportunity for questions and concerns. Stay home except to get medical care    Separate yourself from other people and animals in your home    Call ahead before visiting your doctor    Wear a facemask    Cover your coughs and sneezes    Clean your hands often    Avoid sharing personal household items    Clean all high-touch surfaces everyday    Monitor your symptoms  Seek prompt medical attention if your illness is worsening (e.g., difficulty breathing). Before seeking care, call your healthcare provider and tell them that you have, or are being evaluated for, COVID-19. Put on a facemask before you enter the facility. These steps will help the healthcare provider's office to keep other people in the office or waiting room from getting infected or exposed. Ask your healthcare provider to call the local or [facility] health department. Persons who are placed under If you have a medical emergency and need to call 911, notify the dispatch personnel that you have, or are being evaluated for COVID-19. If possible, put on a facemask before emergency medical services arrive. The patient agrees to contact the [facility] exposure line [phone], local health department [place] [facility]: ([phone]) and PCP office for questions related to their healthcare.  Author

## 2020-05-04 ENCOUNTER — TELEPHONE (OUTPATIENT)
Dept: CARDIOLOGY CLINIC | Age: 70
End: 2020-05-04

## 2020-09-04 ENCOUNTER — HOSPITAL ENCOUNTER (OUTPATIENT)
Age: 70
Setting detail: OBSERVATION
Discharge: HOME | End: 2020-09-05
Attending: EMERGENCY MEDICINE | Admitting: INTERNAL MEDICINE
Payer: MEDICARE

## 2020-09-04 ENCOUNTER — APPOINTMENT (OUTPATIENT)
Dept: GENERAL RADIOLOGY | Age: 70
End: 2020-09-04
Payer: MEDICARE

## 2020-09-04 LAB
A/G RATIO: 1.5 (ref 1.1–2.2)
ALBUMIN SERPL-MCNC: 4 G/DL (ref 3.4–5)
ALP BLD-CCNC: 60 U/L (ref 40–129)
ALT SERPL-CCNC: 18 U/L (ref 10–40)
ANION GAP SERPL CALCULATED.3IONS-SCNC: 11 MMOL/L (ref 3–16)
AST SERPL-CCNC: 19 U/L (ref 15–37)
BASOPHILS ABSOLUTE: 0.1 K/UL (ref 0–0.2)
BASOPHILS RELATIVE PERCENT: 1.1 %
BILIRUB SERPL-MCNC: 0.4 MG/DL (ref 0–1)
BUN BLDV-MCNC: 18 MG/DL (ref 7–20)
CALCIUM SERPL-MCNC: 9.4 MG/DL (ref 8.3–10.6)
CHLORIDE BLD-SCNC: 100 MMOL/L (ref 99–110)
CO2: 25 MMOL/L (ref 21–32)
CREAT SERPL-MCNC: 1.2 MG/DL (ref 0.8–1.3)
EOSINOPHILS ABSOLUTE: 0.1 K/UL (ref 0–0.6)
EOSINOPHILS RELATIVE PERCENT: 0.8 %
GFR AFRICAN AMERICAN: >60
GFR NON-AFRICAN AMERICAN: 60
GLOBULIN: 2.7 G/DL
GLUCOSE BLD-MCNC: 179 MG/DL (ref 70–99)
HCT VFR BLD CALC: 42.6 % (ref 40.5–52.5)
HEMOGLOBIN: 13.9 G/DL (ref 13.5–17.5)
LYMPHOCYTES ABSOLUTE: 1.6 K/UL (ref 1–5.1)
LYMPHOCYTES RELATIVE PERCENT: 19.9 %
MCH RBC QN AUTO: 28.1 PG (ref 26–34)
MCHC RBC AUTO-ENTMCNC: 32.7 G/DL (ref 31–36)
MCV RBC AUTO: 86 FL (ref 80–100)
MONOCYTES ABSOLUTE: 0.8 K/UL (ref 0–1.3)
MONOCYTES RELATIVE PERCENT: 9.8 %
NEUTROPHILS ABSOLUTE: 5.4 K/UL (ref 1.7–7.7)
NEUTROPHILS RELATIVE PERCENT: 68.4 %
PDW BLD-RTO: 14.8 % (ref 12.4–15.4)
PLATELET # BLD: 207 K/UL (ref 135–450)
PMV BLD AUTO: 8.1 FL (ref 5–10.5)
POTASSIUM REFLEX MAGNESIUM: 4.5 MMOL/L (ref 3.5–5.1)
RBC # BLD: 4.95 M/UL (ref 4.2–5.9)
SODIUM BLD-SCNC: 136 MMOL/L (ref 136–145)
TOTAL PROTEIN: 6.7 G/DL (ref 6.4–8.2)
TROPONIN: <0.01 NG/ML
WBC # BLD: 7.9 K/UL (ref 4–11)

## 2020-09-04 PROCEDURE — 93005 ELECTROCARDIOGRAM TRACING: CPT | Performed by: EMERGENCY MEDICINE

## 2020-09-04 PROCEDURE — 36415 COLL VENOUS BLD VENIPUNCTURE: CPT

## 2020-09-04 PROCEDURE — 85025 COMPLETE CBC W/AUTO DIFF WBC: CPT

## 2020-09-04 PROCEDURE — 83036 HEMOGLOBIN GLYCOSYLATED A1C: CPT

## 2020-09-04 PROCEDURE — 99285 EMERGENCY DEPT VISIT HI MDM: CPT

## 2020-09-04 PROCEDURE — 84484 ASSAY OF TROPONIN QUANT: CPT

## 2020-09-04 PROCEDURE — 71045 X-RAY EXAM CHEST 1 VIEW: CPT

## 2020-09-04 PROCEDURE — 6370000000 HC RX 637 (ALT 250 FOR IP): Performed by: EMERGENCY MEDICINE

## 2020-09-04 PROCEDURE — 80053 COMPREHEN METABOLIC PANEL: CPT

## 2020-09-04 PROCEDURE — 83735 ASSAY OF MAGNESIUM: CPT

## 2020-09-04 RX ORDER — ASPIRIN 81 MG/1
243 TABLET ORAL ONCE
Status: COMPLETED | OUTPATIENT
Start: 2020-09-04 | End: 2020-09-04

## 2020-09-04 RX ADMIN — ASPIRIN 243 MG: 81 TABLET, COATED ORAL at 22:43

## 2020-09-04 ASSESSMENT — ENCOUNTER SYMPTOMS
ABDOMINAL PAIN: 0
TROUBLE SWALLOWING: 0
BACK PAIN: 0
VOICE CHANGE: 0
STRIDOR: 0
PHOTOPHOBIA: 0
FACIAL SWELLING: 0
VOMITING: 0
NAUSEA: 0
COLOR CHANGE: 0
BLOOD IN STOOL: 0
SHORTNESS OF BREATH: 0
WHEEZING: 0

## 2020-09-04 ASSESSMENT — PAIN SCALES - GENERAL: PAINLEVEL_OUTOF10: 1

## 2020-09-04 ASSESSMENT — HEART SCORE: ECG: 0

## 2020-09-05 VITALS
HEIGHT: 69 IN | WEIGHT: 277.13 LBS | OXYGEN SATURATION: 97 % | HEART RATE: 78 BPM | TEMPERATURE: 98.8 F | RESPIRATION RATE: 18 BRPM | BODY MASS INDEX: 41.04 KG/M2 | DIASTOLIC BLOOD PRESSURE: 90 MMHG | SYSTOLIC BLOOD PRESSURE: 163 MMHG

## 2020-09-05 LAB
CHOLESTEROL, TOTAL: 209 MG/DL (ref 0–199)
EKG ATRIAL RATE: 79 BPM
EKG DIAGNOSIS: NORMAL
EKG P AXIS: 45 DEGREES
EKG P-R INTERVAL: 160 MS
EKG Q-T INTERVAL: 382 MS
EKG QRS DURATION: 92 MS
EKG QTC CALCULATION (BAZETT): 438 MS
EKG R AXIS: 12 DEGREES
EKG T AXIS: 6 DEGREES
EKG VENTRICULAR RATE: 79 BPM
GLUCOSE BLD-MCNC: 146 MG/DL (ref 70–99)
GLUCOSE BLD-MCNC: 148 MG/DL (ref 70–99)
GLUCOSE BLD-MCNC: 174 MG/DL (ref 70–99)
HDLC SERPL-MCNC: 39 MG/DL (ref 40–60)
LDL CHOLESTEROL CALCULATED: 133 MG/DL
MAGNESIUM: 1.9 MG/DL (ref 1.8–2.4)
PERFORMED ON: ABNORMAL
TRIGL SERPL-MCNC: 183 MG/DL (ref 0–150)
TROPONIN: <0.01 NG/ML
TSH REFLEX: 3.14 UIU/ML (ref 0.27–4.2)
VLDLC SERPL CALC-MCNC: 37 MG/DL

## 2020-09-05 PROCEDURE — G0378 HOSPITAL OBSERVATION PER HR: HCPCS

## 2020-09-05 PROCEDURE — 84443 ASSAY THYROID STIM HORMONE: CPT

## 2020-09-05 PROCEDURE — 84484 ASSAY OF TROPONIN QUANT: CPT

## 2020-09-05 PROCEDURE — 6360000002 HC RX W HCPCS: Performed by: INTERNAL MEDICINE

## 2020-09-05 PROCEDURE — 2580000003 HC RX 258: Performed by: INTERNAL MEDICINE

## 2020-09-05 PROCEDURE — 96376 TX/PRO/DX INJ SAME DRUG ADON: CPT

## 2020-09-05 PROCEDURE — 80061 LIPID PANEL: CPT

## 2020-09-05 PROCEDURE — 6370000000 HC RX 637 (ALT 250 FOR IP): Performed by: INTERNAL MEDICINE

## 2020-09-05 PROCEDURE — 93010 ELECTROCARDIOGRAM REPORT: CPT | Performed by: INTERNAL MEDICINE

## 2020-09-05 PROCEDURE — 96374 THER/PROPH/DIAG INJ IV PUSH: CPT

## 2020-09-05 RX ORDER — TAMSULOSIN HYDROCHLORIDE 0.4 MG/1
0.4 CAPSULE ORAL DAILY
Status: DISCONTINUED | OUTPATIENT
Start: 2020-09-05 | End: 2020-09-05 | Stop reason: HOSPADM

## 2020-09-05 RX ORDER — POLYETHYLENE GLYCOL 3350 17 G/17G
17 POWDER, FOR SOLUTION ORAL DAILY PRN
Status: DISCONTINUED | OUTPATIENT
Start: 2020-09-05 | End: 2020-09-05 | Stop reason: HOSPADM

## 2020-09-05 RX ORDER — ONDANSETRON 2 MG/ML
4 INJECTION INTRAMUSCULAR; INTRAVENOUS EVERY 6 HOURS PRN
Status: DISCONTINUED | OUTPATIENT
Start: 2020-09-05 | End: 2020-09-05 | Stop reason: HOSPADM

## 2020-09-05 RX ORDER — SODIUM CHLORIDE 0.9 % (FLUSH) 0.9 %
10 SYRINGE (ML) INJECTION PRN
Status: DISCONTINUED | OUTPATIENT
Start: 2020-09-05 | End: 2020-09-05 | Stop reason: HOSPADM

## 2020-09-05 RX ORDER — CARVEDILOL 6.25 MG/1
6.25 TABLET ORAL 2 TIMES DAILY WITH MEALS
Status: DISCONTINUED | OUTPATIENT
Start: 2020-09-05 | End: 2020-09-05 | Stop reason: HOSPADM

## 2020-09-05 RX ORDER — ACETAMINOPHEN 325 MG/1
650 TABLET ORAL EVERY 6 HOURS PRN
Status: DISCONTINUED | OUTPATIENT
Start: 2020-09-05 | End: 2020-09-05 | Stop reason: HOSPADM

## 2020-09-05 RX ORDER — ACETAMINOPHEN 650 MG/1
650 SUPPOSITORY RECTAL EVERY 6 HOURS PRN
Status: DISCONTINUED | OUTPATIENT
Start: 2020-09-05 | End: 2020-09-05 | Stop reason: HOSPADM

## 2020-09-05 RX ORDER — DEXTROSE MONOHYDRATE 50 MG/ML
100 INJECTION, SOLUTION INTRAVENOUS PRN
Status: DISCONTINUED | OUTPATIENT
Start: 2020-09-05 | End: 2020-09-05 | Stop reason: HOSPADM

## 2020-09-05 RX ORDER — NITROGLYCERIN 0.4 MG/1
0.4 TABLET SUBLINGUAL EVERY 5 MIN PRN
Status: DISCONTINUED | OUTPATIENT
Start: 2020-09-05 | End: 2020-09-05 | Stop reason: HOSPADM

## 2020-09-05 RX ORDER — SODIUM CHLORIDE 0.9 % (FLUSH) 0.9 %
10 SYRINGE (ML) INJECTION EVERY 12 HOURS SCHEDULED
Status: DISCONTINUED | OUTPATIENT
Start: 2020-09-05 | End: 2020-09-05 | Stop reason: HOSPADM

## 2020-09-05 RX ORDER — OMEPRAZOLE 20 MG/1
20 CAPSULE, DELAYED RELEASE ORAL
Qty: 30 CAPSULE | Refills: 0 | Status: ON HOLD
Start: 2020-09-05 | End: 2020-09-24 | Stop reason: HOSPADM

## 2020-09-05 RX ORDER — PRAVASTATIN SODIUM 80 MG/1
80 TABLET ORAL DAILY
Status: DISCONTINUED | OUTPATIENT
Start: 2020-09-05 | End: 2020-09-05 | Stop reason: HOSPADM

## 2020-09-05 RX ORDER — LOSARTAN POTASSIUM 100 MG/1
100 TABLET ORAL DAILY
Status: DISCONTINUED | OUTPATIENT
Start: 2020-09-05 | End: 2020-09-05 | Stop reason: HOSPADM

## 2020-09-05 RX ORDER — ASPIRIN 81 MG/1
81 TABLET ORAL DAILY
Status: DISCONTINUED | OUTPATIENT
Start: 2020-09-05 | End: 2020-09-05 | Stop reason: HOSPADM

## 2020-09-05 RX ORDER — MORPHINE SULFATE 2 MG/ML
2 INJECTION, SOLUTION INTRAMUSCULAR; INTRAVENOUS
Status: DISCONTINUED | OUTPATIENT
Start: 2020-09-05 | End: 2020-09-05 | Stop reason: HOSPADM

## 2020-09-05 RX ORDER — NICOTINE POLACRILEX 4 MG
15 LOZENGE BUCCAL PRN
Status: DISCONTINUED | OUTPATIENT
Start: 2020-09-05 | End: 2020-09-05 | Stop reason: HOSPADM

## 2020-09-05 RX ORDER — PROMETHAZINE HYDROCHLORIDE 25 MG/1
12.5 TABLET ORAL EVERY 6 HOURS PRN
Status: DISCONTINUED | OUTPATIENT
Start: 2020-09-05 | End: 2020-09-05 | Stop reason: HOSPADM

## 2020-09-05 RX ORDER — PANTOPRAZOLE SODIUM 40 MG/10ML
40 INJECTION, POWDER, LYOPHILIZED, FOR SOLUTION INTRAVENOUS DAILY
Status: DISCONTINUED | OUTPATIENT
Start: 2020-09-05 | End: 2020-09-05 | Stop reason: HOSPADM

## 2020-09-05 RX ORDER — PRASUGREL 10 MG/1
10 TABLET, FILM COATED ORAL DAILY
Status: DISCONTINUED | OUTPATIENT
Start: 2020-09-05 | End: 2020-09-05 | Stop reason: HOSPADM

## 2020-09-05 RX ORDER — DEXTROSE MONOHYDRATE 25 G/50ML
12.5 INJECTION, SOLUTION INTRAVENOUS PRN
Status: DISCONTINUED | OUTPATIENT
Start: 2020-09-05 | End: 2020-09-05 | Stop reason: HOSPADM

## 2020-09-05 RX ADMIN — Medication 10 ML: at 02:47

## 2020-09-05 RX ADMIN — PRAVASTATIN SODIUM 80 MG: 80 TABLET ORAL at 09:45

## 2020-09-05 RX ADMIN — PRASUGREL 10 MG: 10 TABLET, FILM COATED ORAL at 09:45

## 2020-09-05 RX ADMIN — Medication 10 ML: at 09:43

## 2020-09-05 RX ADMIN — ASPIRIN 81 MG: 81 TABLET ORAL at 09:45

## 2020-09-05 RX ADMIN — ONDANSETRON 4 MG: 2 INJECTION INTRAMUSCULAR; INTRAVENOUS at 02:47

## 2020-09-05 RX ADMIN — ONDANSETRON 4 MG: 2 INJECTION INTRAMUSCULAR; INTRAVENOUS at 09:43

## 2020-09-05 RX ADMIN — LOSARTAN POTASSIUM 100 MG: 100 TABLET, FILM COATED ORAL at 09:46

## 2020-09-05 ASSESSMENT — PAIN SCALES - GENERAL
PAINLEVEL_OUTOF10: 0

## 2020-09-05 NOTE — ED NOTES
[facility] @ 8747   Repage @ 4211   Repage@ 0000  Re: chest pain  Dr. Ezio Aleman @ [address]   09/05/20 6255

## 2020-09-05 NOTE — PLAN OF CARE
Problem: Falls - Risk of:  Goal: Will remain free from falls  Description: Will remain free from falls  Outcome: Ongoing  Note: Pt medium fall risk. Instructed to use call light before getting out of bed. Call light within reach. Bed in low position. Bed alarm on. Will continue to monitor.

## 2020-09-05 NOTE — PROGRESS NOTES
4 Eyes Skin Assessment     The patient is being assess for  Admission    I agree that 2 RN's have performed a thorough Head to Toe Skin Assessment on the patient. ALL assessment sites listed below have been assessed. Areas assessed by both nurses: Judie/Cecelia  [x]   Head, Face, and Ears   [x]   Shoulders, Back, and Chest  [x]   Arms, Elbows, and Hands   [x]   Coccyx, Sacrum, and Ischum  [x]   Legs, Feet, and Heels        Does the Patient have Skin Breakdown?   No         Junaid Prevention initiated:  No   Wound Care Orders initiated:  No      [facility] nurse consulted for Pressure Injury (Stage 3,4, Unstageable, DTI, NWPT, and Complex wounds):  No      Nurse 1 eSignature: Electronically signed by Marlin Castle RN on 9/5/20 at 3:05 AM EDT    **SHARE this note so that the co-signing nurse is able to place an eSignature**    Nurse 2 eSignature: Electronically signed by Gloria Celaya RN on 9/5/20 at 3:24 AM EDT

## 2020-09-05 NOTE — PROGRESS NOTES
Patient admitted to room 205 from ED. Patient oriented to room, call light, bed rails, phone, lights and bathroom. Patient instructed about the schedule of the day including: vital sign frequency, lab draws, possible tests, frequency of MD and staff rounds, including RN/MD rounding together at bedside, daily weights, and I &O's. Patient instructed about prescribed diet, how to use 8MENU, and television. Remote Telemetry box 9 in place, patient aware of placement and reason. Bed locked, in lowest position, side rails up 2/4, call light within reach. Will continue to monitor.

## 2020-09-05 NOTE — H&P
Hospital Medicine  History & Physical        Date of Admission: 9/4/2020   MRN: [id number]  PCP: Mayank Bird MD  Date of Service: 09/05/20       CC:    Chief Complaint   Patient presents with    Chest Pain     pt reports \"burning sensation\" across chest and in to axilla since last night        History of Present Illness:      Adal Jason is a 79 y.o. male with a PMHx listed below who presents to with c/o         PAST MEDICAL HX:  Past Medical History:   Diagnosis Date    CAD (coronary artery disease) 46    Diabetes mellitus ([address] 12/23/2011    A1c 15    Hyperlipidemia 1992    Hypertension     Kidney stones 1987       SURGICAL HISTORY:  Past Surgical History:   Procedure Laterality Date    APPENDECTOMY  1970    CERVICAL FUSION  1995    COLONOSCOPY  1987&2002    WNL    CORONARY ANGIOPLASTY WITH STENT PLACEMENT  2004    [facility] w/ stents    CORONARY ANGIOPLASTY WITH STENT PLACEMENT  2008    CYSTOSCOPY Left 6/27/2019    CYSTOSCOPY, LEFT URETEROSCOPY, LEFT STONE MANIPULATION, LASER,  LEFT STENT PLACEMENT performed by Lyssa Blue MD at [facility] / REMOVAL STENT / STONE Left 7/11/2019    CYSTOSCOPY, LEFT STENT REMOVAL  CPT CODE - 93647 performed by Lyssa Blue MD at [facility] [place], DIAGNOSTIC     [address] [facility]  1987&1996    bilateral inguinal    UPPER GASTROINTESTINAL ENDOSCOPY  2002    errosive esophagitis    UPPER GASTROINTESTINAL ENDOSCOPY  08/28/2008    GERD       FAMILY HISTORY:  Family History   Problem Relation Age of Onset   [de-identified] Breast Cancer Mother     Breast Cancer Sister 61    Heart Disease Brother        SOCIAL HISTORY:  Social History     Tobacco Use    Smoking status: Never Smoker    Smokeless tobacco: Never Used   Substance Use Topics    Alcohol use: Yes     Comment: rare    Drug use: Never        ALLERGIES:  Allergies   Allergen Reactions    Brilinta [Ticagrelor] Shortness Of Breath    Penicillins Other (See Comments) Stomach distress    Lipitor Rash    Plavix [Clopidogrel Bisulfate] Rash    Victoza [Liraglutide] Rash       MEDICATIONS:     Current Facility-Administered Medications:     morphine (PF) injection 2 mg, 2 mg, Intravenous, Q3H PRN, Gerardo Odom MD    Current Outpatient Medications:     carvedilol (COREG) 3.125 MG tablet, Take 3.125 mg by mouth 2 times daily (with meals), Disp: , Rfl:     prasugrel (EFFIENT) 10 MG TABS, Take 1 tablet by mouth daily, Disp: 90 tablet, Rfl: 3    sitaGLIPtan-metformin (JANUMET)  MG per tablet, Take 1 tablet by mouth 2 times daily (with meals), Disp: 60 tablet, Rfl: 3    carvedilol (COREG) 6.25 MG tablet, Take 1 tablet by mouth 2 times daily (with meals), Disp: 60 tablet, Rfl: 3    glipiZIDE (GLUCOTROL) 5 MG tablet, Take 5 mg by mouth 2 times daily (before meals), Disp: , Rfl:     tamsulosin (FLOMAX) 0.4 MG capsule, Take 0.4 mg by mouth, Disp: , Rfl:     HYDROcodone-acetaminophen (NORCO) 5-325 MG per tablet, , Disp: , Rfl:     pioglitazone (ACTOS) 30 MG tablet, Take 30 mg by mouth daily , Disp: , Rfl:     pravastatin (PRAVACHOL) 80 MG tablet, Take 80 mg by mouth daily, Disp: , Rfl:     losartan (COZAAR) 100 MG tablet, Take 100 mg by mouth daily, Disp: , Rfl:     aspirin 81 MG tablet, Take 81 mg by mouth daily, Disp: , Rfl:     Bismuth Subsalicylate (PEPTO-BISMOL PO), Take 1 tablet by mouth as needed , Disp: , Rfl:     ROS:    Review of Systems    PHYSICAL EXAM:   BP (!) 148/83   Pulse 77   Temp 98.6 °F (37 °C)   Resp 20   Ht 5' 9\" (1.753 m)   Wt 280 lb (127 kg)   SpO2 93%   BMI 41.35 kg/m²   Physical Exam    LABS / IMAGING:     Troponin   Date Value Ref Range Status   09/04/2020 <0.01 <0.01 ng/mL Final     Comment:     Methodology by Troponin T        XR CHEST PORTABLE   Final Result   No acute process.              ASSESSMENT:      Chest pain / ACS rule out      S      PLAN:  Labs/imaging:  - trend troponin  - check Mg, lipids, A1c, and TSH  - ECHO in the AM  Meds:  - loaded with ASA, continue ASA 81mg daily  - lovenox 1mg/kg BID  - morphine PRN for pain  - nitro SL PRN for pain  RN:  - keep O2 sats >94% at all times  - repeat EKG for any chest pain or troponin elevation  Consults:  - cardiology consulted          Dispo: Admit to Observation med/surg. Expected LOS less than 2 midnights.   Prognosis: Good  PPx:   SCDs   PT/OT:  Not indicated   Code status:  Prior      Rupal English MD  Hospitalist    1:28 AM

## 2020-09-05 NOTE — ED NOTES

## 2020-09-05 NOTE — ED PROVIDER NOTES
SEVEN [facility]  eMERGENCY dEPARTMENT eNCOUnter      Pt Name: Halley Browning  MRN: [id number]  [place] 1950  Date of evaluation: 9/4/2020  Provider: Ron Rodriguez MD    [address] [place] [address]       Chief Complaint   Patient presents with    Chest Pain     pt reports \"burning sensation\" across chest and in to axilla since last night          HISTORY OF PRESENT ILLNESS   (Location/Symptom, Timing/Onset, Context/Setting, Quality, Duration, Modifying Factors, Severity)  Note limiting factors. The history is provided by the patient. Chest Pain   Pain location:  Substernal area  Pain quality: burning    Radiates to: bilateral axilla. Pain severity:  Mild  Onset quality:  Sudden  Duration:  1 day  Timing:  Intermittent  Progression:  Resolved  Chronicity:  Recurrent  Context comment:  Hx of CAD  Relieved by: \"cracking neck\" helps chest pain. Worsened by:  Exertion (laying flat makes pain worse)  Associated symptoms: anorexia    Associated symptoms: no abdominal pain, no back pain, no dysphagia, no fever, no nausea, no palpitations, no shortness of breath, no syncope and no vomiting    Risk factors: coronary artery disease        Nursing Notes were reviewed. REVIEW OFSYSTEMS    (2-9 systems for level 4, 10 or more for level 5)     Review of Systems   Constitutional: Negative for appetite change, fever and unexpected weight change. HENT: Negative for facial swelling, trouble swallowing and voice change. Eyes: Negative for photophobia and visual disturbance. Respiratory: Negative for shortness of breath, wheezing and stridor. Cardiovascular: Positive for chest pain. Negative for palpitations and syncope. Gastrointestinal: Positive for anorexia. Negative for abdominal pain, blood in stool, nausea and vomiting. Genitourinary: Negative for difficulty urinating and dysuria. Musculoskeletal: Negative for back pain, gait problem and neck pain. Skin: Negative for color change and wound. Neurological: Negative for seizures, syncope and speech difficulty. Psychiatric/Behavioral: Negative for self-injury and suicidal ideas. Except as noted above the remainder of the review of systems was reviewed and negative.        PAST MEDICAL HISTORY     Past Medical History:   Diagnosis Date    CAD (coronary artery disease) 46    Diabetes mellitus ([facility] [address] 12/23/2011    A1c 15    Hyperlipidemia 1992    Hypertension     Kidney stones 1987         SURGICAL HISTORY       Past Surgical History:   Procedure Laterality Date    APPENDECTOMY  1970    CERVICAL FUSION  1995    COLONOSCOPY  1987&2002    WNL    CORONARY ANGIOPLASTY WITH STENT PLACEMENT  2004    [facility] w/ stents    CORONARY ANGIOPLASTY WITH STENT PLACEMENT  2008    CYSTOSCOPY Left 6/27/2019    CYSTOSCOPY, LEFT URETEROSCOPY, LEFT STONE MANIPULATION, LASER,  LEFT STENT PLACEMENT performed by Fauzia Diana MD at [address] / REMOVAL [address] / STONE Left 7/11/2019    CYSTOSCOPY, LEFT STENT REMOVAL  CPT CODE - 47799 performed by Fauzia Diana MD at [facility], COLON, DIAGNOSTIC     [address] [facility]  1987&1996    bilateral inguinal    UPPER GASTROINTESTINAL ENDOSCOPY  2002    errosive esophagitis    UPPER GASTROINTESTINAL ENDOSCOPY  08/28/2008    GERD         CURRENT MEDICATIONS       Current Discharge Medication List      CONTINUE these medications which have NOT CHANGED    Details   !! carvedilol (COREG) 3.125 MG tablet Take 3.125 mg by mouth 2 times daily (with meals)      prasugrel (EFFIENT) 10 MG TABS Take 1 tablet by mouth daily  Qty: 90 tablet, Refills: 3      sitaGLIPtan-metformin (JANUMET)  MG per tablet Take 1 tablet by mouth 2 times daily (with meals)  Qty: 60 tablet, Refills: 3      !! carvedilol (COREG) 6.25 MG tablet Take 1 tablet by mouth 2 times daily (with meals)  Qty: 60 tablet, Refills: 3      glipiZIDE (GLUCOTROL) 5 MG tablet Take 5 mg by mouth daily       tamsulosin (FLOMAX) 0.4 MG Social History Narrative    None         PHYSICAL EXAM    (up to 7 for level 4, 8 or more for level 5)     ED Triage Vitals [09/04/20 2113]   BP Temp Temp src Pulse Resp SpO2 Height Weight   (!) 162/91 98.6 °F (37 °C) -- 78 18 96 % 5' 9\" (1.753 m) 280 lb (127 kg)       Physical Exam  Vitals signs and nursing note reviewed. Constitutional:       General: He is not in acute distress. Appearance: He is well-developed. Comments: Pleasant and cooperative. Nontoxic-appearing. In no acute distress. HENT:      Head: Normocephalic and atraumatic. Right Ear: External ear normal.      Left Ear: External ear normal.   Eyes:      Conjunctiva/sclera: Conjunctivae normal.   Neck:      Musculoskeletal: Neck supple. Vascular: No JVD. Trachea: No tracheal deviation. Cardiovascular:      Rate and Rhythm: Normal rate. Pulmonary:      Effort: Pulmonary effort is normal. No respiratory distress. Breath sounds: Normal breath sounds. No wheezing. Abdominal:      General: There is no distension. Palpations: Abdomen is soft. Tenderness: There is no abdominal tenderness. There is no guarding or rebound. Musculoskeletal: Normal range of motion. General: No tenderness. Comments: No lower extremity swelling, tenderness, or palpable cord. Negative Edgar test bilaterally. Skin:     General: Skin is warm and dry. Neurological:      General: No focal deficit present. Mental Status: He is alert and oriented to person, place, and time. Cranial Nerves: No cranial nerve deficit. Comments: 5/5 strength in all 4 extremities. Normal gait. DIAGNOSTIC RESULTS     EKG:All EKG's are interpreted by the Emergency Department Physician who either signs or Co-signs this chart in the absence of a cardiologist.    The Ekg interpreted by me shows  normal sinus rhythm with a rate of 79  Axis is   Normal  QTc is  438ms  Intervals and Durations are unremarkable.       ST Segments: normal  No significant change from prior EKG dated 4/26/2020      RADIOLOGY:     Interpretation per the Radiologist below, if available at the time of this note:    XR CHEST PORTABLE   Final Result   No acute process. LABS:  Labs Reviewed   COMPREHENSIVE METABOLIC PANEL W/ REFLEX TO MG FOR LOW K - Abnormal; Notable for the following components:       Result Value    Glucose 179 (*)     GFR Non- 60 (*)     All other components within normal limits    Narrative:     Performed at:  [address], [facility]   Phone [phone]   CBC WITH AUTO DIFFERENTIAL    Narrative:     Performed at:  [address], [facility]   Phone [phone]   TROPONIN    Narrative:     Performed at:  [address], [facility] [employer]   Phone [phone]   MAGNESIUM    Narrative:     Performed at:  [address], [facility]   Phone [phone]   TROPONIN    Narrative:     Performed at:  [address], [facility]   Phone [phone]   TSH WITH REFLEX   LIPID PANEL       All otherlabs were within normal range or not returned as of this dictation.     EMERGENCY DEPARTMENT COURSE and DIFFERENTIAL DIAGNOSIS/MDM:   Vitals:    Vitals:    09/04/20 2211 09/04/20 2254 09/05/20 0033 09/05/20 0227   BP: (!) 145/85 (!) 158/90 (!) 148/83 137/74   Pulse: 85 84 77 73   Resp: 20 23 20 16   Temp:    98.4 °F (36.9 °C)   TempSrc:    Oral   SpO2: 95% 96% 93% 96%   Weight:       Height:             MDM   HEART SCORE:    History: +0 for low suspicion  EKG: +0 for normal EKG   Age: +4 for age >65 years  Risk factors (includes HLD, HTN, DM, tobacco use, obesity, and +FHx): +2 for known CAD or 3+ risk factors  Initial troponin: +0 for negative troponin    Heart score: 4. This falls under the following category: Score of 4-6, which indicates low/moderate risk for major adverse cardiac event and supports observation with repeated troponins and/or non-invasive testing        Patient with heart score 4. He is admitted for further work-up and treatment. Patient expresses understanding and agreement with this plan. CONSULTS:  IP CONSULT TO HOSPITALIST    PROCEDURES:  Unless otherwise noted below, none     Procedures    FINAL IMPRESSION      1. Chest pain, unspecified type          DISPOSITION/PLAN   DISPOSITION  Admit         (Please note that portions of this note were completed with a voice recognition program.  Efforts were made to edit the dictations but occasionally words aremis-transcribed. )    Judy Evangelista MD (electronically signed)  Attending Emergency Physician           Judy Evangelista MD  09/05/20 3007

## 2020-09-06 LAB
ESTIMATED AVERAGE GLUCOSE: 162.8 MG/DL
HBA1C MFR BLD: 7.3 %

## 2020-09-06 NOTE — DISCHARGE SUMMARY
Hospital Medicine Discharge Summary    Patient ID: Santhosh Carrion      Patient's PCP: Yarelis Dong MD    Admit Date: 9/4/2020     Discharge Date: 9/5/2020      Admitting Physician: Claudio Porras MD     Discharge Physician: Samantha Burgess MD     Discharge Diagnoses: Active Hospital Problems    Diagnosis    Chest pain [R07.9]       The patient was seen and examined on day of discharge and this discharge summary is in conjunction with any daily progress note from day of discharge. Hospital Course:   79 y.o. male who [facility]. Patient has been having a burning sensation across his chest that radiates to both his arms. This has happened three times in the last week. The symptoms generally only last minutes but the last episode happened while he was exerting himself. There was no associated diaphoresis and SOB. He did have severe nausea during the episodes that has persisted to some degree. He has known CAD and follows regularly with cardiology. He denies fevers, chills, cough, or diarrhea. Chest pain with known CAD  - EKG, trop negative  - outpatient stress testing and echo ordered due to holiday weekend  - continue home ASA, effient, coreg, pravastatin  - follow up with cardiology as an outpatient     GERD  - started on PPI  - possibly contributing to above chest pain     HTN  - well controlled  - continue home regimen     HLD  - continue home statin     DMII  - well controlled  - resume home regimen on discharge    Physical Exam Performed:     BP (!) 163/90   Pulse 78   Temp 98.8 °F (37.1 °C) (Oral)   Resp 18   Ht 5' 9\" (1.753 m)   Wt 277 lb 2 oz (125.7 kg)   SpO2 97%   BMI 40.92 kg/m²       General appearance:  No apparent distress, appears stated age and cooperative. HEENT:  Normal cephalic, atraumatic without obvious deformity. Pupils equal, round, and reactive to light. Extra ocular muscles intact. Conjunctivae/corneas clear.   Neck: Supple, with full range of motion. No jugular venous distention. Trachea midline. Respiratory:  Normal respiratory effort. Clear to auscultation, bilaterally without Rales/Wheezes/Rhonchi. Cardiovascular:  Regular rate and rhythm with normal S1/S2 without murmurs, rubs or gallops. Abdomen: Soft, non-tender, non-distended with normal bowel sounds. Musculoskeletal:  No clubbing, cyanosis or edema bilaterally. Full range of motion without deformity. Skin: Skin color, texture, turgor normal.  No rashes or lesions. Neurologic:  Neurovascularly intact without any focal sensory/motor deficits. Cranial nerves: II-XII intact, grossly non-focal.  Psychiatric:  Alert and oriented, thought content appropriate, normal insight  Capillary Refill: Brisk,< 3 seconds   Peripheral Pulses: +2 palpable, equal bilaterally     Labs: For convenience and continuity at follow-up the following most recent labs are provided:      CBC:    Lab Results   Component Value Date    WBC 7.9 09/04/2020    HGB 13.9 09/04/2020    HCT 42.6 09/04/2020     09/04/2020       Renal:    Lab Results   Component Value Date     09/04/2020    K 4.5 09/04/2020     09/04/2020    CO2 25 09/04/2020    BUN 18 09/04/2020    CREATININE 1.2 09/04/2020    CALCIUM 9.4 09/04/2020    PHOS 2.7 05/05/2016         Significant Diagnostic Studies    Radiology:   XR CHEST PORTABLE   Final Result   No acute process.          NM Cardiac Stress Test Nuclear Imaging    (Results Pending)   NM MYOCARDIAL SPECT REST EXERCISE OR RX    (Results Pending)          Consults:     IP CONSULT TO HOSPITALIST    Disposition:  home     Condition at Discharge: Stable    Discharge Instructions/Follow-up:  Follow up with PCP, cardiology within 1-2 weeks    Code Status:  Full code    Activity: activity as tolerated    Diet: cardiac diet      Discharge Medications:     Discharge Medication List as of 9/5/2020  5:59 PM           Details   omeprazole (PRILOSEC) 20 MG delayed release capsule Take 1 capsule by mouth every morning (before breakfast), Disp-30 capsule,R-0NO PRINT              Details   prasugrel (EFFIENT) 10 MG TABS Take 1 tablet by mouth daily, Disp-90 tablet, R-3Normal      sitaGLIPtan-metformin (JANUMET)  MG per tablet Take 1 tablet by mouth 2 times daily (with meals), Disp-60 tablet, R-3NO PRINT      carvedilol (COREG) 6.25 MG tablet Take 1 tablet by mouth 2 times daily (with meals), Disp-60 tablet, R-3Normal      glipiZIDE (GLUCOTROL) 5 MG tablet Take 5 mg by mouth daily Historical Med      tamsulosin (FLOMAX) 0.4 MG capsule Take 0.4 mg by mouthHistorical Med      HYDROcodone-acetaminophen (NORCO) 5-325 MG per tablet Historical Med      pioglitazone (ACTOS) 30 MG tablet Take 30 mg by mouth daily Historical Med      pravastatin (PRAVACHOL) 80 MG tablet Take 80 mg by mouth daily      losartan (COZAAR) 100 MG tablet Take 100 mg by mouth daily      aspirin 81 MG tablet Take 81 mg by mouth daily      Bismuth Subsalicylate (PEPTO-BISMOL PO) Take 1 tablet by mouth as needed Historical Med             Time Spent on discharge is more than 20 minutes in the examination, evaluation, counseling and review of medications and discharge plan. Signed:    Manuel Lazo MD   9/6/2020      Thank you Noemi Mckinney MD for the opportunity to be involved in this patient's care. If you have any questions or concerns please feel free to contact me at [phone].

## 2020-09-06 NOTE — H&P
Hospital Medicine History & Physical      PCP: Payton Gutierrez MD    Date of Admission: 9/4/2020    Date of Service: Pt seen/examined on 09/06/20 and Placed in Observation. Chief Complaint:  Chest pain      History Of Present Illness:   79 y.o. male who presented to [facility] with chest pain. Patient has been having a burning sensation across his chest that radiates to both his arms. This has happened three times in the last week. The symptoms generally only last minutes but the last episode happened while he was exerting himself. There was no associated diaphoresis and SOB. He did have severe nausea during the episodes that has persisted to some degree. He has known CAD and follows regularly with cardiology. He denies fevers, chills, cough, or diarrhea. Past Medical History:          Diagnosis Date    CAD (coronary artery disease) 46    Diabetes mellitus ([facility] [address] 12/23/2011    A1c 15    Hyperlipidemia 1992    Hypertension     Kidney stones 1987       Past Surgical History:          Procedure Laterality Date    APPENDECTOMY  1970    CERVICAL FUSION  1995    COLONOSCOPY  1987&2002    WNL    CORONARY ANGIOPLASTY WITH STENT PLACEMENT  2004    [facility] w/ stents    CORONARY ANGIOPLASTY WITH STENT PLACEMENT  2008    CYSTOSCOPY Left 6/27/2019    CYSTOSCOPY, LEFT URETEROSCOPY, LEFT STONE MANIPULATION, LASER,  LEFT STENT PLACEMENT performed by Paul Hirsch MD at [facility] / [address] [place] [address] Rd / STONE Left 7/11/2019    CYSTOSCOPY, LEFT STENT REMOVAL  CPT CODE - 87072 performed by Paul Hirsch MD at  [facility], COLON, DIAGNOSTIC      HERNIA REPAIR  1987&1996    bilateral inguinal    UPPER GASTROINTESTINAL ENDOSCOPY  2002    errosive esophagitis    UPPER GASTROINTESTINAL ENDOSCOPY  08/28/2008    GERD       Medications Prior to Admission:      Prior to Admission medications    Medication Sig Start Date End Date Taking?  Authorizing Provider   omeprazole ([address] [place] [address]) 20 MG delayed release capsule Take 1 capsule by mouth every morning (before breakfast) 9/5/20  Yes Manuel Lazo MD   prasugrel (EFFIENT) 10 MG TABS Take 1 tablet by mouth daily 4/13/20   Luiz Quintana MD   sitaGLIPtan-metformin (JANUMET)  MG per tablet Take 1 tablet by mouth 2 times daily (with meals) 3/5/20   CURTIS Zhu CNP   carvedilol (COREG) 6.25 MG tablet Take 1 tablet by mouth 2 times daily (with meals) 3/4/20   CURTIS Zhu CNP   glipiZIDE (GLUCOTROL) 5 MG tablet Take 5 mg by mouth daily     Historical Provider, MD   tamsulosin (FLOMAX) 0.4 MG capsule Take 0.4 mg by mouth 6/29/19   Historical Provider, MD   HYDROcodone-acetaminophen ([facility]) 5-325 MG per tablet  6/26/19   Historical Provider, MD   pioglitazone (ACTOS) 30 MG tablet Take 30 mg by mouth daily  11/11/18   Historical Provider, MD   pravastatin (PRAVACHOL) 80 MG tablet Take 80 mg by mouth daily    Historical Provider, MD   losartan (COZAAR) 100 MG tablet Take 100 mg by mouth daily    Historical Provider, MD   aspirin 81 MG tablet Take 81 mg by mouth daily    Historical Provider, MD   Bismuth Subsalicylate (PEPTO-BISMOL PO) Take 1 tablet by mouth as needed     Historical Provider, MD       Allergies:  Brilinta [ticagrelor]; Penicillins; Lipitor; Plavix [clopidogrel bisulfate]; and Victoza [liraglutide]    Social History:      The patient currently lives at home    TOBACCO:   reports that he has never smoked. He has never used smokeless tobacco.  ETOH:   reports current alcohol use. E-Cigarettes Vaping or Juuling     Questions Responses    Vaping Use Never User    Start Date     Does device contain nicotine? Quit Date     Vaping Type             Family History:      Reviewed in detail and negative for DM, CVA.  Positive as follows:        Problem Relation Age of Onset   Dameon Barrios Breast Cancer Mother     Breast Cancer Sister 61    Heart Disease Brother        REVIEW OF SYSTEMS:   Pertinent positives as noted in the HPI. All other systems reviewed and negative. PHYSICAL EXAM PERFORMED:    BP (!) 163/90   Pulse 78   Temp 98.8 °F (37.1 °C) (Oral)   Resp 18   Ht 5' 9\" (1.753 m)   Wt 277 lb 2 oz (125.7 kg)   SpO2 97%   BMI 40.92 kg/m²     General appearance:  No apparent distress, appears stated age and cooperative. HEENT:  Normal cephalic, atraumatic without obvious deformity. Pupils equal, round, and reactive to light. Extra ocular muscles intact. Conjunctivae/corneas clear. Neck: Supple, with full range of motion. No jugular venous distention. Trachea midline. Respiratory:  Normal respiratory effort. Clear to auscultation, bilaterally without Rales/Wheezes/Rhonchi. Cardiovascular:  Regular rate and rhythm with normal S1/S2 without murmurs, rubs or gallops. Abdomen: Soft, non-tender, non-distended with normal bowel sounds. Musculoskeletal:  No clubbing, cyanosis or edema bilaterally. Full range of motion without deformity. Skin: Skin color, texture, turgor normal.  No rashes or lesions. Neurologic:  Neurovascularly intact without any focal sensory/motor deficits. Cranial nerves: II-XII intact, grossly non-focal.  Psychiatric:  Alert and oriented, thought content appropriate, normal insight  Capillary Refill: Brisk,< 3 seconds   Peripheral Pulses: +2 palpable, equal bilaterally       Labs:     Recent Labs     09/04/20 2120   WBC 7.9   HGB 13.9   HCT 42.6        Recent Labs     09/04/20 2120      K 4.5      CO2 25   BUN 18   CREATININE 1.2   CALCIUM 9.4     Recent Labs     09/04/20 2120   AST 19   ALT 18   BILITOT 0.4   ALKPHOS 60     No results for input(s): INR in the last 72 hours.   Recent Labs     09/04/20 2120 09/05/20  0236   TROPONINI <0.01 <0.01       Urinalysis:      Lab Results   Component Value Date    NITRU Negative 06/27/2019    WBCUA 3-5 06/27/2019    BACTERIA Rare 06/27/2019    RBCUA 0-2 06/27/2019    BLOODU SMALL 06/27/2019    SPECGRAV 1.020 06/27/2019    GLUCOSEU Negative 06/27/2019       Radiology:     CXR: I have reviewed the CXR with the following interpretation: no acute disease  EKG:  I have reviewed the EKG with the following interpretation: NSR    XR CHEST PORTABLE   Final Result   No acute process. NM Cardiac Stress Test Nuclear Imaging    (Results Pending)   NM MYOCARDIAL SPECT REST EXERCISE OR RX    (Results Pending)       ASSESSMENT:    Active Hospital Problems    Diagnosis Date Noted    Chest pain [R07.9] 05/04/2016         PLAN:  Chest pain with known CAD  - EKG, trop negative  - Echo, stress testing ordered  - continue home ASA, effient, coreg, pravastatin  - follow up with cardiology as an outpatient    GERD  - started on PPI  - possibly contributing to above chest pain    HTN  - well controlled  - continue home regimen    HLD  - continue home statin    DMII  - well controlled  - holding home oral meds  - SSI ordered      DVT Prophylaxis: lovenox  Diet: Cardiac diet  Code Status: Full code    PT/OT Eval Status: not ordered    Dispo - pending stress testing       Manuel Lazo MD    Thank you Noemi Mckinney MD for the opportunity to be involved in this patient's care. If you have any questions or concerns please feel free to contact me at [phone].

## 2020-09-08 ENCOUNTER — OFFICE VISIT (OUTPATIENT)
Dept: CARDIOLOGY CLINIC | Age: 70
End: 2020-09-08
Payer: MEDICARE

## 2020-09-08 ENCOUNTER — TELEPHONE (OUTPATIENT)
Dept: CARDIOLOGY CLINIC | Age: 70
End: 2020-09-08

## 2020-09-08 VITALS
HEIGHT: 69 IN | DIASTOLIC BLOOD PRESSURE: 76 MMHG | OXYGEN SATURATION: 99 % | WEIGHT: 271 LBS | HEART RATE: 97 BPM | SYSTOLIC BLOOD PRESSURE: 128 MMHG | BODY MASS INDEX: 40.14 KG/M2

## 2020-09-08 PROCEDURE — 99214 OFFICE O/P EST MOD 30 MIN: CPT | Performed by: INTERNAL MEDICINE

## 2020-09-08 SDOH — HEALTH STABILITY: MENTAL HEALTH: HOW OFTEN DO YOU HAVE A DRINK CONTAINING ALCOHOL?: NEVER

## 2020-09-08 NOTE — TELEPHONE ENCOUNTER
Pt was seen today in office, but he forgot to mention that he has been having some bruising started about 2-wks ago, come and goes. He is asking if this is something that he should be concerned about? Pt is on Effient. Current Outpatient Medications   Medication Sig Dispense Refill    omeprazole (PRILOSEC) 20 MG delayed release capsule Take 1 capsule by mouth every morning (before breakfast) 30 capsule 0    prasugrel (EFFIENT) 10 MG TABS Take 1 tablet by mouth daily 90 tablet 3    sitaGLIPtan-metformin (JANUMET)  MG per tablet Take 1 tablet by mouth 2 times daily (with meals) 60 tablet 3    carvedilol (COREG) 6.25 MG tablet Take 1 tablet by mouth 2 times daily (with meals) (Patient taking differently: Take 3.125 mg by mouth 2 times daily (with meals) ) 60 tablet 3    glipiZIDE (GLUCOTROL) 5 MG tablet Take 5 mg by mouth daily       tamsulosin (FLOMAX) 0.4 MG capsule Take 0.4 mg by mouth      HYDROcodone-acetaminophen (NORCO) 5-325 MG per tablet       pioglitazone (ACTOS) 30 MG tablet Take 30 mg by mouth daily       pravastatin (PRAVACHOL) 80 MG tablet Take 80 mg by mouth daily      losartan (COZAAR) 100 MG tablet Take 100 mg by mouth daily      aspirin 81 MG tablet Take 81 mg by mouth daily      Bismuth Subsalicylate (PEPTO-BISMOL PO) Take 1 tablet by mouth as needed        No current facility-administered medications for this visit.

## 2020-09-08 NOTE — PATIENT INSTRUCTIONS
Plan:  Echocardiogram   Stress test- GXT Myoview   Start prior auth for Repatha   Continue current medications   Check blood pressure at home weekly  Regular exercise and following a healthy diet encouraged   Follow up with NP in 6 months     Your provider has ordered testing for further evaluation. An order/prescription has been included in your paper work.  To schedule outpatient testing, contact Central Scheduling by calling [employer] ([phone]).

## 2020-09-08 NOTE — LETTER
[facility]   Cardiac Followup    Referring Provider:  Veronica Mcdonald MD     Chief Complaint   Patient presents with    Follow-up    Chest Pain     burning feeling that went into left arm pit, happened twice.  Edema     right hand, has a knot.  Coronary Artery Disease        History of Present Illness:    Juaquin Kinsey is a 79 y.o. male who is here for follow up for coronary artery disease- S/P left heart cath on 3/3/2020, staged procedure to in stent restonosis of the RCA. He was hospitalized in 2/2020 with chest pain. He underwent a left heart cath on 2/24/2020 which showed significant LAD in stent. He also had PCI of his RCA in 2016. Brilinta changes to Plavix due to shortness of breath. He presented to the hospital on 9/4/2020 with complaints of chest pain. EKG and troponin negative. Today he states he started to have chest pain that feels like a burning in the center of his chest. Started one week ago and occurs in the evenings. He has had the pain after he eats and after walking up stairs. He had the pain last night and it occurred after eating. Pain radiates into his left armpit. Last up to 30 mins. Patient currently denies any weight gain, edema, palpitations, dizziness, and syncope. Past Medical History:   has a past medical history of CAD (coronary artery disease), Diabetes mellitus ([address] Hyperlipidemia, Hypertension, and Kidney stones. Surgical History:   has a past surgical history that includes cervical fusion (1995); Appendectomy (1970); hernia repair (5638&9725); Colonoscopy (1987&2002); Upper gastrointestinal endoscopy (2002); Coronary angioplasty with stent (2004); Coronary angioplasty with stent (2008); Upper gastrointestinal endoscopy (08/28/2008); Cystoscopy (Left, 6/27/2019); Endoscopy, colon, diagnostic; and CYSTOSCOPY INSERTION / REMOVAL STENT / STONE (Left, 7/11/2019).      Social History: reports that he has never smoked. He has never used smokeless tobacco. He reports that he does not drink alcohol or use drugs. Family History:  family history includes Breast Cancer in his mother; Breast Cancer (age of onset: 61) in his sister; Heart Disease in his brother. Home Medications:  Prior to Admission medications    Medication Sig Start Date End Date Taking? Authorizing Provider   omeprazole (PRILOSEC) 20 MG delayed release capsule Take 1 capsule by mouth every morning (before breakfast) 9/5/20  Yes Anne Foster MD   prasugrel (EFFIENT) 10 MG TABS Take 1 tablet by mouth daily 4/13/20  Yes Betina Reyna MD   sitaGLIPtan-metformin (JANUMET)  MG per tablet Take 1 tablet by mouth 2 times daily (with meals) 3/5/20  Yes CURTIS Brunner CNP   carvedilol (COREG) 6.25 MG tablet Take 1 tablet by mouth 2 times daily (with meals)  Patient taking differently: Take 3.125 mg by mouth 2 times daily (with meals)  3/4/20  Yes CURTIS Brunner CNP   glipiZIDE (GLUCOTROL) 5 MG tablet Take 5 mg by mouth daily    Yes Historical Provider, MD   tamsulosin (FLOMAX) 0.4 MG capsule Take 0.4 mg by mouth 6/29/19  Yes Historical Provider, MD   HYDROcodone-acetaminophen ([address]) 5-325 MG per tablet  6/26/19  Yes Historical Provider, MD   pioglitazone (ACTOS) 30 MG tablet Take 30 mg by mouth daily  11/11/18  Yes Historical Provider, MD   pravastatin (PRAVACHOL) 80 MG tablet Take 80 mg by mouth daily   Yes Historical Provider, MD   losartan (COZAAR) 100 MG tablet Take 100 mg by mouth daily   Yes Historical Provider, MD   aspirin 81 MG tablet Take 81 mg by mouth daily   Yes Historical Provider, MD   Bismuth Subsalicylate (PEPTO-BISMOL PO) Take 1 tablet by mouth as needed    Yes Historical Provider, MD        Allergies:  Brilinta [ticagrelor]; Penicillins;  Lipitor; Plavix [clopidogrel bisulfate]; and Victoza [liraglutide]     Review of Systems: · Constitutional: there has been no unanticipated weight loss. There's been no change in energy level, sleep pattern, or activity level. · Eyes: No visual changes or diplopia. No scleral icterus. · ENT: No Headaches, hearing loss or vertigo. No mouth sores or sore throat. · Cardiovascular: Reviewed in HPI  · Respiratory: No cough or wheezing, no sputum production. No hematemesis. · Gastrointestinal: No abdominal pain, appetite loss, blood in stools. No change in bowel or bladder habits. · Genitourinary: No dysuria, trouble voiding, or hematuria. · Musculoskeletal:  No gait disturbance, weakness or joint complaints. · Integumentary: No rash or pruritis. · Neurological: No headache, diplopia, change in muscle strength, numbness or tingling. No change in gait, balance, coordination, mood, affect, memory, mentation, behavior. · Psychiatric: No anxiety, no depression. · Endocrine: No malaise, fatigue or temperature intolerance. No excessive thirst, fluid intake, or urination. No tremor. · Hematologic/Lymphatic: No abnormal bruising or bleeding, blood clots or swollen lymph nodes. · Allergic/Immunologic: No nasal congestion or hives. Physical Examination:    Vitals:    09/08/20 1158   BP: 128/76   Pulse: 97   SpO2: 99%        Constitutional and General Appearance: NAD   Respiratory:  · Normal excursion and expansion without use of accessory muscles  · Resp Auscultation: Normal breath sounds without dullness  Cardiovascular:  · The apical impulses not displaced  · Heart tones are crisp and normal  · Cervical veins are not engorged  · The carotid upstroke is normal in amplitude and contour without delay or bruit  · Normal S1S2, No S3, No Murmur  · Peripheral pulses are symmetrical and full  · There is no clubbing, cyanosis of the extremities.   · No edema  · Femoral Arteries: 2+ and equal  · Pedal Pulses: 2+ and equal   Abdomen:  · No masses or tenderness  · Liver/Spleen: No Abnormalities Noted Neurological/Psychiatric:  · Alert and oriented in all spheres  · Moves all extremities well  · Exhibits normal gait balance and coordination  · No abnormalities of mood, affect, memory, mentation, or behavior are noted      Echocardiogram 5/4/16  Summary   Technically limited study   Left ventricular systolic function is normal .   Ejection fraction is visually estimated to be 60-65 %. Mild concentric left ventricular hypertrophy is present. Diastolic filling parameters suggests grade I diastolic dysfunction . The aortic valve appears sclerotic but opens well. Individual aortic valve leaflets are not clearly visualized. The tricuspid valve was not well visualized. IVC not seen    Limited echocardiogram 2/22/2020  Limited echo for LV function. Normal left ventricle systolic function with an estimated ejection fraction   of 55-60%. No regional wall motion abnormalities are seen. Normal left ventricular diastolic filling pressure. Left heart cath 2/24/2020  Procedures: LHC, LVG, CA, PTCA, sedation     LM <20%  LAD 30% prox, 70% mid in stent              D2 50% prox  Cx 40-50%              D3 99% prox  RCA 80% mid in stent  LVEF 60%  PTCA LAD              3.0 x 15 angiosculpt cutting balloon to 3.5  PTCA OM3              2.75 x 15 FARHAN     DAPT, statin, BBlk  Home in am  PTCA RCA next week      Left heart cath 3/3/2020  Procedures: Cor angio, PTCA, sedation     Selective tyler RCA for staged PCI     RCA mid in stent 80% to 40%              3.5 x 15 cutting balloon              4.0 x 20 NC balloon to 24 [address]     Consider laser atherectomy +/- brachytherapy if recurrent angina/restenosis    Assessment:   Chest pain - atypical  SOB - suspect mostly not cardiac  Coronary artery disease - possible progression  Hyperlipidemia - suboptimal. Intolerant to high dose and potency statins.  Intolerant to other antilipids   Hypertension - stable      Plan:  Echocardiogram   Stress test- GXT Myoview Start prior auth for Repatha   Continue current medications   Check blood pressure at home weekly  Regular exercise and following a healthy diet encouraged   Follow up with NP in 6 months     Scribe's attestation: This note was scribed in the presence of Dr. Mariya Clemens M.D. By Lesly Gonzalez RN    The scribes documentation has been prepared under my direction and personally reviewed by me in its entirety. I confirm that the note above accurately reflects all work, treatment, procedures, and medical decision making performed by me. Dr. Mariya Clemens MD      Thank you for allowing me to participate in the care of this individual.      Yumiko Rojas.  He Mills M.D., Emilee Post

## 2020-09-08 NOTE — PROGRESS NOTES
[facility]   Cardiac Followup    Referring Provider:  Ascencion Bagley MD     Chief Complaint   Patient presents with    Follow-up    Chest Pain     burning feeling that went into left arm pit, happened twice.  Edema     right hand, has a knot.  Coronary Artery Disease        History of Present Illness:    Dena Sanchez is a 79 y.o. male who is here for follow up for coronary artery disease- S/P left heart cath on 3/3/2020, staged procedure to in stent restonosis of the RCA. He was hospitalized in 2/2020 with chest pain. He underwent a left heart cath on 2/24/2020 which showed significant LAD in stent. He also had PCI of his RCA in 2016. Brilinta changes to Plavix due to shortness of breath. He presented to the hospital on 9/4/2020 with complaints of chest pain. EKG and troponin negative. Today he states he started to have chest pain that feels like a burning in the center of his chest. Started one week ago and occurs in the evenings. He has had the pain after he eats and after walking up stairs. He had the pain last night and it occurred after eating. Pain radiates into his left armpit. Last up to 30 mins. Patient currently denies any weight gain, edema, palpitations, dizziness, and syncope. Past Medical History:   has a past medical history of CAD (coronary artery disease), Diabetes mellitus ([address] Hyperlipidemia, Hypertension, and Kidney stones. Surgical History:   has a past surgical history that includes cervical fusion (1995); Appendectomy (1970); hernia repair (3687&3502); Colonoscopy (1987&2002); Upper gastrointestinal endoscopy (2002); Coronary angioplasty with stent (2004); Coronary angioplasty with stent (2008); Upper gastrointestinal endoscopy (08/28/2008); Cystoscopy (Left, 6/27/2019); Endoscopy, colon, diagnostic; and CYSTOSCOPY INSERTION / REMOVAL STENT / STONE (Left, 7/11/2019). Social History:   reports that he has never smoked.  He has never used smokeless tobacco. He reports that he does not drink alcohol or use drugs. Family History:  family history includes Breast Cancer in his mother; Breast Cancer (age of onset: 61) in his sister; Heart Disease in his brother. Home Medications:  Prior to Admission medications    Medication Sig Start Date End Date Taking? Authorizing Provider   omeprazole (PRILOSEC) 20 MG delayed release capsule Take 1 capsule by mouth every morning (before breakfast) 9/5/20  Yes Mario Alberto Gonzalez MD   prasugrel (EFFIENT) 10 MG TABS Take 1 tablet by mouth daily 4/13/20  Yes Mallory Ruiz MD   sitaGLIPtan-metformin (JANUMET)  MG per tablet Take 1 tablet by mouth 2 times daily (with meals) 3/5/20  Yes CURTIS Lopez CNP   carvedilol (COREG) 6.25 MG tablet Take 1 tablet by mouth 2 times daily (with meals)  Patient taking differently: Take 3.125 mg by mouth 2 times daily (with meals)  3/4/20  Yes CURTIS Lopez CNP   glipiZIDE (GLUCOTROL) 5 MG tablet Take 5 mg by mouth daily    Yes Historical Provider, MD   tamsulosin (FLOMAX) 0.4 MG capsule Take 0.4 mg by mouth 6/29/19  Yes Historical Provider, MD   HYDROcodone-acetaminophen ([address]) 5-325 MG per tablet  6/26/19  Yes Historical Provider, MD   pioglitazone (ACTOS) 30 MG tablet Take 30 mg by mouth daily  11/11/18  Yes Historical Provider, MD   pravastatin (PRAVACHOL) 80 MG tablet Take 80 mg by mouth daily   Yes Historical Provider, MD   losartan (COZAAR) 100 MG tablet Take 100 mg by mouth daily   Yes Historical Provider, MD   aspirin 81 MG tablet Take 81 mg by mouth daily   Yes Historical Provider, MD   Bismuth Subsalicylate (PEPTO-BISMOL PO) Take 1 tablet by mouth as needed    Yes Historical Provider, MD        Allergies:  Brilinta [ticagrelor]; Penicillins; Lipitor; Plavix [clopidogrel bisulfate]; and Victoza [liraglutide]     Review of Systems:   · Constitutional: there has been no unanticipated weight loss.  There's been no change in energy level, sleep pattern, or activity level. · Eyes: No visual changes or diplopia. No scleral icterus. · ENT: No Headaches, hearing loss or vertigo. No mouth sores or sore throat. · Cardiovascular: Reviewed in HPI  · Respiratory: No cough or wheezing, no sputum production. No hematemesis. · Gastrointestinal: No abdominal pain, appetite loss, blood in stools. No change in bowel or bladder habits. · Genitourinary: No dysuria, trouble voiding, or hematuria. · Musculoskeletal:  No gait disturbance, weakness or joint complaints. · Integumentary: No rash or pruritis. · Neurological: No headache, diplopia, change in muscle strength, numbness or tingling. No change in gait, balance, coordination, mood, affect, memory, mentation, behavior. · Psychiatric: No anxiety, no depression. · Endocrine: No malaise, fatigue or temperature intolerance. No excessive thirst, fluid intake, or urination. No tremor. · Hematologic/Lymphatic: No abnormal bruising or bleeding, blood clots or swollen lymph nodes. · Allergic/Immunologic: No nasal congestion or hives. Physical Examination:    Vitals:    09/08/20 1158   BP: 128/76   Pulse: 97   SpO2: 99%        Constitutional and General Appearance: NAD   Respiratory:  · Normal excursion and expansion without use of accessory muscles  · Resp Auscultation: Normal breath sounds without dullness  Cardiovascular:  · The apical impulses not displaced  · Heart tones are crisp and normal  · Cervical veins are not engorged  · The carotid upstroke is normal in amplitude and contour without delay or bruit  · Normal S1S2, No S3, No Murmur  · Peripheral pulses are symmetrical and full  · There is no clubbing, cyanosis of the extremities.   · No edema  · Femoral Arteries: 2+ and equal  · Pedal Pulses: 2+ and equal   Abdomen:  · No masses or tenderness  · Liver/Spleen: No Abnormalities Noted  Neurological/Psychiatric:  · Alert and oriented in all spheres  · Moves all extremities well  · Exhibits normal gait balance and coordination  · No abnormalities of mood, affect, memory, mentation, or behavior are noted      Echocardiogram 5/4/16  Summary   Technically limited study   Left ventricular systolic function is normal .   Ejection fraction is visually estimated to be 60-65 %. Mild concentric left ventricular hypertrophy is present. Diastolic filling parameters suggests grade I diastolic dysfunction . The aortic valve appears sclerotic but opens well. Individual aortic valve leaflets are not clearly visualized. The tricuspid valve was not well visualized. IVC not seen    Limited echocardiogram 2/22/2020  Limited echo for LV function. Normal left ventricle systolic function with an estimated ejection fraction   of 55-60%. No regional wall motion abnormalities are seen. Normal left ventricular diastolic filling pressure. Left heart cath 2/24/2020  Procedures: LHC, LVG, CA, PTCA, sedation     LM <20%  LAD 30% prox, 70% mid in stent              D2 50% prox  Cx 40-50%              D3 99% prox  RCA 80% mid in stent  LVEF 60%  PTCA LAD              3.0 x 15 angiosculpt cutting balloon to 3.5  PTCA OM3              2.75 x 15 FARHAN     DAPT, statin, BBlk  Home in am  PTCA RCA next week      Left heart cath 3/3/2020  Procedures: Cor angio, PTCA, sedation     Selective tyler RCA for staged PCI     RCA mid in stent 80% to 40%              3.5 x 15 cutting balloon              4.0 x 20 NC balloon to 24 [address]     Consider laser atherectomy +/- brachytherapy if recurrent angina/restenosis    Assessment:   Chest pain - atypical  SOB - suspect mostly not cardiac  Coronary artery disease - possible progression  Hyperlipidemia - suboptimal. Intolerant to high dose and potency statins.  Intolerant to other antilipids   Hypertension - stable      Plan:  Echocardiogram   Stress test- GXT Myoview   Start prior auth for Repatha   Continue current medications   Check blood pressure at home weekly  Regular exercise and following a healthy diet encouraged   Follow up with NP in 6 months     Scribe's attestation: This note was scribed in the presence of Dr. Eri Garay M.D. By Marianela Joy RN    The scribes documentation has been prepared under my direction and personally reviewed by me in its entirety. I confirm that the note above accurately reflects all work, treatment, procedures, and medical decision making performed by me. Dr. Eri Garay MD      Thank you for allowing me to participate in the care of this individual.      Carlos Fuentes.  Shane Darby M.D., Lesvia Sandoval

## 2020-09-13 ENCOUNTER — APPOINTMENT (OUTPATIENT)
Dept: GENERAL RADIOLOGY | Age: 70
DRG: 232 | End: 2020-09-13
Payer: MEDICARE

## 2020-09-13 ENCOUNTER — HOSPITAL ENCOUNTER (INPATIENT)
Age: 70
LOS: 13 days | Discharge: HOME | DRG: 232 | End: 2020-09-26
Attending: EMERGENCY MEDICINE | Admitting: INTERNAL MEDICINE
Payer: MEDICARE

## 2020-09-13 LAB
A/G RATIO: 1.7 (ref 1.1–2.2)
ALBUMIN SERPL-MCNC: 4.3 G/DL (ref 3.4–5)
ALP BLD-CCNC: 70 U/L (ref 40–129)
ALT SERPL-CCNC: 22 U/L (ref 10–40)
ANION GAP SERPL CALCULATED.3IONS-SCNC: 15 MMOL/L (ref 3–16)
APTT: 29.2 SEC (ref 24.2–36.2)
APTT: 47.2 SEC (ref 24.2–36.2)
AST SERPL-CCNC: 14 U/L (ref 15–37)
BASOPHILS ABSOLUTE: 0.1 K/UL (ref 0–0.2)
BASOPHILS RELATIVE PERCENT: 1.2 %
BILIRUB SERPL-MCNC: 0.6 MG/DL (ref 0–1)
BUN BLDV-MCNC: 21 MG/DL (ref 7–20)
CALCIUM SERPL-MCNC: 9.7 MG/DL (ref 8.3–10.6)
CHLORIDE BLD-SCNC: 102 MMOL/L (ref 99–110)
CO2: 20 MMOL/L (ref 21–32)
CREAT SERPL-MCNC: 1.3 MG/DL (ref 0.8–1.3)
D DIMER: <200 NG/ML DDU (ref 0–229)
EKG ATRIAL RATE: 87 BPM
EKG DIAGNOSIS: NORMAL
EKG P AXIS: 49 DEGREES
EKG P-R INTERVAL: 154 MS
EKG Q-T INTERVAL: 368 MS
EKG QRS DURATION: 110 MS
EKG QTC CALCULATION (BAZETT): 442 MS
EKG R AXIS: 33 DEGREES
EKG T AXIS: 12 DEGREES
EKG VENTRICULAR RATE: 87 BPM
EOSINOPHILS ABSOLUTE: 0.1 K/UL (ref 0–0.6)
EOSINOPHILS RELATIVE PERCENT: 1.1 %
GFR AFRICAN AMERICAN: >60
GFR NON-AFRICAN AMERICAN: 54
GLOBULIN: 2.5 G/DL
GLUCOSE BLD-MCNC: 122 MG/DL (ref 70–99)
GLUCOSE BLD-MCNC: 189 MG/DL (ref 70–99)
GLUCOSE BLD-MCNC: 350 MG/DL (ref 70–99)
HCT VFR BLD CALC: 44.6 % (ref 40.5–52.5)
HEMOGLOBIN: 14.8 G/DL (ref 13.5–17.5)
LIPASE: 52 U/L (ref 13–60)
LYMPHOCYTES ABSOLUTE: 1.6 K/UL (ref 1–5.1)
LYMPHOCYTES RELATIVE PERCENT: 20.6 %
MCH RBC QN AUTO: 28.2 PG (ref 26–34)
MCHC RBC AUTO-ENTMCNC: 33.1 G/DL (ref 31–36)
MCV RBC AUTO: 85.3 FL (ref 80–100)
MONOCYTES ABSOLUTE: 0.6 K/UL (ref 0–1.3)
MONOCYTES RELATIVE PERCENT: 8.2 %
NEUTROPHILS ABSOLUTE: 5.3 K/UL (ref 1.7–7.7)
NEUTROPHILS RELATIVE PERCENT: 68.9 %
PDW BLD-RTO: 14.4 % (ref 12.4–15.4)
PERFORMED ON: ABNORMAL
PERFORMED ON: ABNORMAL
PLATELET # BLD: 209 K/UL (ref 135–450)
PMV BLD AUTO: 8.6 FL (ref 5–10.5)
POTASSIUM SERPL-SCNC: 4.1 MMOL/L (ref 3.5–5.1)
PRO-BNP: 453 PG/ML (ref 0–124)
RBC # BLD: 5.23 M/UL (ref 4.2–5.9)
SODIUM BLD-SCNC: 137 MMOL/L (ref 136–145)
TOTAL PROTEIN: 6.8 G/DL (ref 6.4–8.2)
TROPONIN: 0.05 NG/ML
TROPONIN: 0.19 NG/ML
TROPONIN: 0.33 NG/ML
WBC # BLD: 7.7 K/UL (ref 4–11)

## 2020-09-13 PROCEDURE — 71045 X-RAY EXAM CHEST 1 VIEW: CPT

## 2020-09-13 PROCEDURE — 93005 ELECTROCARDIOGRAM TRACING: CPT | Performed by: EMERGENCY MEDICINE

## 2020-09-13 PROCEDURE — 6370000000 HC RX 637 (ALT 250 FOR IP): Performed by: EMERGENCY MEDICINE

## 2020-09-13 PROCEDURE — 2000000000 HC ICU R&B

## 2020-09-13 PROCEDURE — 2500000003 HC RX 250 WO HCPCS: Performed by: INTERNAL MEDICINE

## 2020-09-13 PROCEDURE — 85730 THROMBOPLASTIN TIME PARTIAL: CPT

## 2020-09-13 PROCEDURE — 85025 COMPLETE CBC W/AUTO DIFF WBC: CPT

## 2020-09-13 PROCEDURE — 99285 EMERGENCY DEPT VISIT HI MDM: CPT

## 2020-09-13 PROCEDURE — 6360000002 HC RX W HCPCS: Performed by: INTERNAL MEDICINE

## 2020-09-13 PROCEDURE — 36415 COLL VENOUS BLD VENIPUNCTURE: CPT

## 2020-09-13 PROCEDURE — 6370000000 HC RX 637 (ALT 250 FOR IP): Performed by: INTERNAL MEDICINE

## 2020-09-13 PROCEDURE — 99223 1ST HOSP IP/OBS HIGH 75: CPT | Performed by: INTERNAL MEDICINE

## 2020-09-13 PROCEDURE — 2500000003 HC RX 250 WO HCPCS: Performed by: EMERGENCY MEDICINE

## 2020-09-13 PROCEDURE — 2580000003 HC RX 258: Performed by: INTERNAL MEDICINE

## 2020-09-13 PROCEDURE — 83036 HEMOGLOBIN GLYCOSYLATED A1C: CPT

## 2020-09-13 PROCEDURE — 83880 ASSAY OF NATRIURETIC PEPTIDE: CPT

## 2020-09-13 PROCEDURE — 83690 ASSAY OF LIPASE: CPT

## 2020-09-13 PROCEDURE — 80053 COMPREHEN METABOLIC PANEL: CPT

## 2020-09-13 PROCEDURE — 84484 ASSAY OF TROPONIN QUANT: CPT

## 2020-09-13 PROCEDURE — 96374 THER/PROPH/DIAG INJ IV PUSH: CPT

## 2020-09-13 PROCEDURE — 85379 FIBRIN DEGRADATION QUANT: CPT

## 2020-09-13 PROCEDURE — 6360000002 HC RX W HCPCS: Performed by: EMERGENCY MEDICINE

## 2020-09-13 RX ORDER — ONDANSETRON 2 MG/ML
4 INJECTION INTRAMUSCULAR; INTRAVENOUS EVERY 6 HOURS PRN
Status: DISCONTINUED | OUTPATIENT
Start: 2020-09-13 | End: 2020-09-18

## 2020-09-13 RX ORDER — DEXTROSE MONOHYDRATE 25 G/50ML
12.5 INJECTION, SOLUTION INTRAVENOUS PRN
Status: CANCELLED | OUTPATIENT
Start: 2020-09-13

## 2020-09-13 RX ORDER — NITROGLYCERIN 20 MG/100ML
5 INJECTION INTRAVENOUS CONTINUOUS
Status: DISCONTINUED | OUTPATIENT
Start: 2020-09-13 | End: 2020-09-18

## 2020-09-13 RX ORDER — SODIUM CHLORIDE 0.9 % (FLUSH) 0.9 %
10 SYRINGE (ML) INJECTION EVERY 12 HOURS SCHEDULED
Status: DISCONTINUED | OUTPATIENT
Start: 2020-09-13 | End: 2020-09-15

## 2020-09-13 RX ORDER — HEPARIN SODIUM 1000 [USP'U]/ML
30 INJECTION, SOLUTION INTRAVENOUS; SUBCUTANEOUS PRN
Status: DISCONTINUED | OUTPATIENT
Start: 2020-09-13 | End: 2020-09-13

## 2020-09-13 RX ORDER — TAMSULOSIN HYDROCHLORIDE 0.4 MG/1
0.4 CAPSULE ORAL DAILY
Status: DISCONTINUED | OUTPATIENT
Start: 2020-09-13 | End: 2020-09-26 | Stop reason: HOSPADM

## 2020-09-13 RX ORDER — ACETAMINOPHEN 650 MG/1
650 SUPPOSITORY RECTAL EVERY 6 HOURS PRN
Status: DISCONTINUED | OUTPATIENT
Start: 2020-09-13 | End: 2020-09-18

## 2020-09-13 RX ORDER — PANTOPRAZOLE SODIUM 40 MG/1
40 TABLET, DELAYED RELEASE ORAL
Status: DISCONTINUED | OUTPATIENT
Start: 2020-09-13 | End: 2020-09-18

## 2020-09-13 RX ORDER — HEPARIN SODIUM 1000 [USP'U]/ML
4000 INJECTION, SOLUTION INTRAVENOUS; SUBCUTANEOUS PRN
Status: DISCONTINUED | OUTPATIENT
Start: 2020-09-13 | End: 2020-09-15

## 2020-09-13 RX ORDER — ASPIRIN 81 MG/1
81 TABLET, CHEWABLE ORAL DAILY
Status: DISCONTINUED | OUTPATIENT
Start: 2020-09-13 | End: 2020-09-13 | Stop reason: SDUPTHER

## 2020-09-13 RX ORDER — HEPARIN SODIUM 10000 [USP'U]/100ML
1000 INJECTION, SOLUTION INTRAVENOUS CONTINUOUS
Status: DISCONTINUED | OUTPATIENT
Start: 2020-09-13 | End: 2020-09-13

## 2020-09-13 RX ORDER — PROMETHAZINE HYDROCHLORIDE 25 MG/1
12.5 TABLET ORAL EVERY 6 HOURS PRN
Status: DISCONTINUED | OUTPATIENT
Start: 2020-09-13 | End: 2020-09-18

## 2020-09-13 RX ORDER — POLYETHYLENE GLYCOL 3350 17 G/17G
17 POWDER, FOR SOLUTION ORAL DAILY PRN
Status: DISCONTINUED | OUTPATIENT
Start: 2020-09-13 | End: 2020-09-18

## 2020-09-13 RX ORDER — HEPARIN SODIUM 1000 [USP'U]/ML
60 INJECTION, SOLUTION INTRAVENOUS; SUBCUTANEOUS PRN
Status: DISCONTINUED | OUTPATIENT
Start: 2020-09-13 | End: 2020-09-13

## 2020-09-13 RX ORDER — NICOTINE POLACRILEX 4 MG
15 LOZENGE BUCCAL PRN
Status: CANCELLED | OUTPATIENT
Start: 2020-09-13

## 2020-09-13 RX ORDER — GLIPIZIDE 5 MG/1
5 TABLET ORAL DAILY
Status: DISCONTINUED | OUTPATIENT
Start: 2020-09-13 | End: 2020-09-18

## 2020-09-13 RX ORDER — ACETAMINOPHEN 325 MG/1
650 TABLET ORAL EVERY 6 HOURS PRN
Status: DISCONTINUED | OUTPATIENT
Start: 2020-09-13 | End: 2020-09-18

## 2020-09-13 RX ORDER — ASPIRIN 81 MG/1
81 TABLET ORAL DAILY
Status: DISCONTINUED | OUTPATIENT
Start: 2020-09-13 | End: 2020-09-18

## 2020-09-13 RX ORDER — HEPARIN SODIUM 10000 [USP'U]/100ML
12 INJECTION, SOLUTION INTRAVENOUS CONTINUOUS
Status: DISCONTINUED | OUTPATIENT
Start: 2020-09-13 | End: 2020-09-13

## 2020-09-13 RX ORDER — HEPARIN SODIUM 1000 [USP'U]/ML
2000 INJECTION, SOLUTION INTRAVENOUS; SUBCUTANEOUS ONCE
Status: COMPLETED | OUTPATIENT
Start: 2020-09-13 | End: 2020-09-13

## 2020-09-13 RX ORDER — PRAVASTATIN SODIUM 40 MG
80 TABLET ORAL NIGHTLY
Status: DISCONTINUED | OUTPATIENT
Start: 2020-09-13 | End: 2020-09-18

## 2020-09-13 RX ORDER — HEPARIN SODIUM 10000 [USP'U]/100ML
11.8 INJECTION, SOLUTION INTRAVENOUS CONTINUOUS
Status: DISCONTINUED | OUTPATIENT
Start: 2020-09-13 | End: 2020-09-15

## 2020-09-13 RX ORDER — LOSARTAN POTASSIUM 100 MG/1
100 TABLET ORAL DAILY
Status: DISCONTINUED | OUTPATIENT
Start: 2020-09-13 | End: 2020-09-18

## 2020-09-13 RX ORDER — HEPARIN SODIUM 1000 [USP'U]/ML
60 INJECTION, SOLUTION INTRAVENOUS; SUBCUTANEOUS ONCE
Status: COMPLETED | OUTPATIENT
Start: 2020-09-13 | End: 2020-09-13

## 2020-09-13 RX ORDER — HEPARIN SODIUM 1000 [USP'U]/ML
60 INJECTION, SOLUTION INTRAVENOUS; SUBCUTANEOUS ONCE
Status: DISCONTINUED | OUTPATIENT
Start: 2020-09-13 | End: 2020-09-13

## 2020-09-13 RX ORDER — DEXTROSE MONOHYDRATE 50 MG/ML
100 INJECTION, SOLUTION INTRAVENOUS PRN
Status: CANCELLED | OUTPATIENT
Start: 2020-09-13

## 2020-09-13 RX ORDER — SODIUM CHLORIDE 0.9 % (FLUSH) 0.9 %
10 SYRINGE (ML) INJECTION PRN
Status: DISCONTINUED | OUTPATIENT
Start: 2020-09-13 | End: 2020-09-15

## 2020-09-13 RX ORDER — HEPARIN SODIUM 1000 [USP'U]/ML
2000 INJECTION, SOLUTION INTRAVENOUS; SUBCUTANEOUS PRN
Status: DISCONTINUED | OUTPATIENT
Start: 2020-09-13 | End: 2020-09-15

## 2020-09-13 RX ORDER — CARVEDILOL 3.12 MG/1
3.12 TABLET ORAL 2 TIMES DAILY WITH MEALS
Status: DISCONTINUED | OUTPATIENT
Start: 2020-09-13 | End: 2020-09-18

## 2020-09-13 RX ADMIN — FAMOTIDINE 20 MG: 10 INJECTION, SOLUTION INTRAVENOUS at 12:15

## 2020-09-13 RX ADMIN — CARVEDILOL 3.12 MG: 3.12 TABLET, FILM COATED ORAL at 15:57

## 2020-09-13 RX ADMIN — NITROGLYCERIN 0.5 INCH: 20 OINTMENT TOPICAL at 12:15

## 2020-09-13 RX ADMIN — INSULIN LISPRO 2 UNITS: 100 INJECTION, SOLUTION INTRAVENOUS; SUBCUTANEOUS at 16:16

## 2020-09-13 RX ADMIN — TAMSULOSIN HYDROCHLORIDE 0.4 MG: 0.4 CAPSULE ORAL at 16:16

## 2020-09-13 RX ADMIN — Medication 10 ML: at 21:08

## 2020-09-13 RX ADMIN — NITROGLYCERIN 30 MCG/MIN: 20 INJECTION INTRAVENOUS at 16:33

## 2020-09-13 RX ADMIN — NITROGLYCERIN 5 MCG/MIN: 20 INJECTION INTRAVENOUS at 14:06

## 2020-09-13 RX ADMIN — PANTOPRAZOLE SODIUM 40 MG: 40 TABLET, DELAYED RELEASE ORAL at 15:57

## 2020-09-13 RX ADMIN — LOSARTAN POTASSIUM 100 MG: 100 TABLET, FILM COATED ORAL at 18:47

## 2020-09-13 RX ADMIN — PRAVASTATIN SODIUM 80 MG: 40 TABLET ORAL at 21:08

## 2020-09-13 RX ADMIN — HEPARIN SODIUM 2000 UNITS: 1000 INJECTION INTRAVENOUS; SUBCUTANEOUS at 22:25

## 2020-09-13 RX ADMIN — HEPARIN SODIUM 7350 UNITS: 1000 INJECTION INTRAVENOUS; SUBCUTANEOUS at 14:27

## 2020-09-13 RX ADMIN — HEPARIN SODIUM 1000 UNITS/HR: 10000 INJECTION, SOLUTION INTRAVENOUS at 15:57

## 2020-09-13 RX ADMIN — ASPIRIN 81 MG: 81 TABLET ORAL at 16:06

## 2020-09-13 RX ADMIN — HEPARIN SODIUM 11.8 ML/HR: 10000 INJECTION, SOLUTION INTRAVENOUS at 22:25

## 2020-09-13 RX ADMIN — GLIPIZIDE 5 MG: 5 TABLET ORAL at 16:15

## 2020-09-13 RX ADMIN — LIDOCAINE HYDROCHLORIDE: 20 SOLUTION ORAL; TOPICAL at 12:16

## 2020-09-13 RX ADMIN — NITROGLYCERIN 40 MCG/MIN: 20 INJECTION INTRAVENOUS at 19:27

## 2020-09-13 ASSESSMENT — ENCOUNTER SYMPTOMS
SORE THROAT: 0
CHEST TIGHTNESS: 0
VOICE CHANGE: 0
ANAL BLEEDING: 0
PHOTOPHOBIA: 0
TROUBLE SWALLOWING: 0
VOMITING: 0
BLOOD IN STOOL: 0
EYE PAIN: 0
SINUS PRESSURE: 0
EYE REDNESS: 0
FACIAL SWELLING: 0
WHEEZING: 0
NAUSEA: 0
EYE DISCHARGE: 0
BACK PAIN: 0
RHINORRHEA: 0
SHORTNESS OF BREATH: 1
STRIDOR: 0
COUGH: 0
CONSTIPATION: 0
DIARRHEA: 0
EYE ITCHING: 0
ABDOMINAL PAIN: 0
ABDOMINAL DISTENTION: 0

## 2020-09-13 ASSESSMENT — PAIN DESCRIPTION - DESCRIPTORS
DESCRIPTORS: BURNING

## 2020-09-13 ASSESSMENT — PAIN DESCRIPTION - ORIENTATION
ORIENTATION: LEFT;RIGHT
ORIENTATION: RIGHT;LEFT

## 2020-09-13 ASSESSMENT — PAIN DESCRIPTION - LOCATION
LOCATION: OTHER (COMMENT)
LOCATION: CHEST
LOCATION: OTHER (COMMENT)

## 2020-09-13 ASSESSMENT — PAIN DESCRIPTION - FREQUENCY
FREQUENCY: INTERMITTENT
FREQUENCY: CONTINUOUS
FREQUENCY: CONTINUOUS

## 2020-09-13 ASSESSMENT — PAIN SCALES - GENERAL
PAINLEVEL_OUTOF10: 0
PAINLEVEL_OUTOF10: 1
PAINLEVEL_OUTOF10: 1
PAINLEVEL_OUTOF10: 0
PAINLEVEL_OUTOF10: 1
PAINLEVEL_OUTOF10: 1
PAINLEVEL_OUTOF10: 0
PAINLEVEL_OUTOF10: 2
PAINLEVEL_OUTOF10: 0

## 2020-09-13 ASSESSMENT — PAIN DESCRIPTION - ONSET
ONSET: GRADUAL

## 2020-09-13 ASSESSMENT — PAIN DESCRIPTION - PAIN TYPE
TYPE: ACUTE PAIN

## 2020-09-13 ASSESSMENT — PAIN DESCRIPTION - PROGRESSION
CLINICAL_PROGRESSION: GRADUALLY IMPROVING
CLINICAL_PROGRESSION: GRADUALLY WORSENING
CLINICAL_PROGRESSION: GRADUALLY WORSENING
CLINICAL_PROGRESSION: GRADUALLY IMPROVING
CLINICAL_PROGRESSION: GRADUALLY WORSENING

## 2020-09-13 NOTE — ED NOTES
Martinez [email or web address]  Re: admit.  Chest pain per [email or web address] 2014 [facility]  09/13/20 [id number]

## 2020-09-13 NOTE — CONSULTS
[facility]   Cardiac Evaluation      Patient: Josephine Garza  YOB: 1950         Chief Complaint   Patient presents with    Chest Pain     SATRTED AFTER EATING BREAKFAST AT 1000        Referring provider: Jaden Olson MD    History of Present Illness:   80 yo obese WM admitted through the ER after presenting with pain across his chest and in his armpits similar to what he experienced with unstable angina in February when his wife . At that time he was found to have 3vcad with LAD 70% instent, Lcx 50%, OM3 99% RCA 80% instent. PCI of the LAD nad OM3 was done  and of the RCA on 3/3/20. He saw Dr Luis Stanley last week and was doing well. He had similar pain yesterday which was burning in nature and took Tums with relief. It recurred today after a \"big\" breakfast but did not go away with tums or SL ntg and he is now on a ntg drip with relief. He is compliant with his meds. +DM on multiple meds including Actos. He states he is fairly active at home and has not had angina prior to the burning of yesterday. His EKG is normal but his first trop is recorded as 0.05. Glucose is 350. Past Medical History:   has a past medical history of CAD (coronary artery disease), Diabetes mellitus ([address] Hyperlipidemia, Hypertension, and Kidney stones. Surgical History:   has a past surgical history that includes cervical fusion (); Appendectomy (); hernia repair (&); Colonoscopy (&); Upper gastrointestinal endoscopy (); Coronary angioplasty with stent (); Coronary angioplasty with stent (); Upper gastrointestinal endoscopy (2008); Cystoscopy (Left, 2019); Endoscopy, colon, diagnostic; and CYSTOSCOPY INSERTION / REMOVAL STENT / STONE (Left, 2019).      Current Facility-Administered Medications   Medication Dose Route Frequency Provider Last Rate Last Dose    nitroGLYCERIN 50 mg in dextrose 5% 250 mL infusion  5 mcg/min Intravenous Continuous Pollo Arndt MD 1.5 mL/hr at 09/13/20 1406 5 mcg/min at 09/13/20 1406     Current Outpatient Medications   Medication Sig Dispense Refill    omeprazole (PRILOSEC) 20 MG delayed release capsule Take 1 capsule by mouth every morning (before breakfast) 30 capsule 0    prasugrel (EFFIENT) 10 MG TABS Take 1 tablet by mouth daily 90 tablet 3    sitaGLIPtan-metformin (JANUMET)  MG per tablet Take 1 tablet by mouth 2 times daily (with meals) 60 tablet 3    carvedilol (COREG) 6.25 MG tablet Take 1 tablet by mouth 2 times daily (with meals) (Patient taking differently: Take 3.125 mg by mouth 2 times daily (with meals) ) 60 tablet 3    glipiZIDE (GLUCOTROL) 5 MG tablet Take 5 mg by mouth daily       tamsulosin (FLOMAX) 0.4 MG capsule Take 0.4 mg by mouth      HYDROcodone-acetaminophen (NORCO) 5-325 MG per tablet       pioglitazone (ACTOS) 30 MG tablet Take 30 mg by mouth daily       pravastatin (PRAVACHOL) 80 MG tablet Take 80 mg by mouth daily      losartan (COZAAR) 100 MG tablet Take 100 mg by mouth daily      aspirin 81 MG tablet Take 81 mg by mouth daily      Bismuth Subsalicylate (PEPTO-BISMOL PO) Take 1 tablet by mouth as needed          Allergies:  Brilinta [ticagrelor]; Penicillins;  Lipitor; Plavix [clopidogrel bisulfate]; and Victoza [liraglutide]     Social History:  Social History     Socioeconomic History    Marital status:      Spouse name: Not on file    Number of children: Not on file    Years of education: Not on file    Highest education level: Not on file   Occupational History    retired   Social Needs    Financial resource strain: Not on file    Food insecurity     Worry: Not on file     Inability: Not on file   [employer] needs     Medical: Not on file     Non-medical: Not on file   Tobacco Use    Smoking status: Never Smoker    Smokeless tobacco: Never Used   Substance and Sexual Activity    Alcohol use: Never     Frequency: Never     Comment: rare    Drug use: Never    Sexual activity: Not on file   Lifestyle    Physical activity     Days per week: Not on file     Minutes per session: Not on file    Stress: Not on file   Relationships    Social connections     Talks on phone: Not on file     Gets together: Not on file     Attends Cheondoism service: Not on file     Active member of club or organization: Not on file     Attends meetings of clubs or organizations: Not on file     Relationship status: Not on file    Intimate partner violence     Fear of current or ex partner: Not on file     Emotionally abused: Not on file     Physically abused: Not on file     Forced sexual activity: Not on file   Other Topics Concern    Not on file   Social History Narrative    Not on file       Family History:   Family History   Problem Relation Age of Onset    Breast Cancer Mother     Breast Cancer Sister 61    Heart Disease Brother      Family history has been reviewed and not pertinent except as noted above. Review of Systems:   · Constitutional: there has been no unanticipated weight loss. No change in energy or activity level   · Eyes: No visual changes   · ENT: No Headaches, hearing loss or vertigo. No mouth sores or sore throat. · Cardiovascular: Reviewed in HPI  · Respiratory: No cough or wheezing, no sputum production. · Gastrointestinal: No abdominal pain, appetite loss, blood in stools. No change in bowel or bladder habits. · Genitourinary: No nocturia, dysuria, trouble voiding  · Musculoskeletal:  No gait disturbance, weakness or joint complaints. · Integumentary: No rash or pruritis. · Neurological: No headache, change in muscle strength, numbness or tingling. No change in gait, balance, coordination, mood, affect, memory, mentation, behavior. · Psychiatric: No anxiety or depression  · Endocrine: No malaise or fever  · Hematologic/Lymphatic: No abnormal bruising or bleeding, blood clots or swollen lymph nodes.   · Allergic/Immunologic: No nasal congestion or hives. Physical Examination:    Vitals:    09/13/20 1223 09/13/20 1323 09/13/20 1404 09/13/20 1425   BP: (!) 153/83 (!) 155/95 133/85 (!) 140/79   Pulse: 97 97 96 103   Resp: 18 20 18 20   Temp:    98.5 °F (36.9 °C)   TempSrc:    Oral   SpO2: 97% 95% 95% 96%   Weight:       Height:         Body mass index is 39.87 kg/m². Wt Readings from Last 3 Encounters:   09/13/20 270 lb (122.5 kg)   09/08/20 271 lb (122.9 kg)   09/05/20 277 lb 2 oz (125.7 kg)      BP Readings from Last 3 Encounters:   09/13/20 (!) 140/79   09/08/20 128/76   09/05/20 (!) 163/90        Physical Examination:    · CONSTITUTIONAL: Well developed, well nourished, obese  · EYES: PERRLA. No xanthelasma, sclera non icteric  · EARS,NOSE,MOUTH,THROAT:  Mucous membranes moist, normal hearing  · NECK: Supple, JVP normal, thyroid not enlarged. Carotids 2+ without bruits  · RESPIRATORY: Normal effort, no rales or rhonchi  · CARDIOVASCULAR: Normal PMI, regular rate and rhythm, no murmurs, rub or gallop. No edema. Radial pulses present and equal  · CHEST: No scar or masses  · ABDOMEN: Normal bowel sounds. No masses or tenderness. No bruit  · MUSCULOSKELETAL: No clubbing or cyanosis. Moves all extremities well. Normal gait  · SKIN:  Warm and dry. No rashes  · NEUROLOGIC: Cranial nerves intact. Alert and oriented  · PSYCHIATRIC: Calm affect. Appears to have normal judgement and insight        Assessment/Plan  1. Acute chest pain - possible unstable angina in a pt with known multivessel disease and elevated trop but with a normal EKG. Pain is a bit atypical with burning but similar to what he has had in the past   2. Diabetes with marked hyperglycemia - no A1cs on the chart. Actos may not be the best dm med for this pt with heart disease. Would recommend a GLP1 or a SGLT2 med with metformin. 3. Elevated troponin - will trend. He was to schedule a GXT but now will need repeat cath. 4. Elevated ldl of 133.  Not at goal with pravachol 80 mg.   Start lipitor. Plan; trend the trops. Cath in the am. DC actos. Check A1c. Consider changing diabetes meds to those with a benefit for CV disease pts. Thank you for allowing to me to participate in the care of Iman Ball.

## 2020-09-13 NOTE — ED PROVIDER NOTES
mg by mouth daily    Historical Provider, MD   Bismuth Subsalicylate (PEPTO-BISMOL PO) Take 1 tablet by mouth as needed     Historical Provider, MD       Allergies as of 09/13/2020 - Review Complete 09/13/2020   Allergen Reaction Noted    Brilinta [ticagrelor] Shortness Of Breath 03/04/2020    Penicillins Other (See Comments) 01/10/2012    Lipitor Rash 01/10/2012    Plavix [clopidogrel bisulfate] Rash 03/04/2020    Victoza [liraglutide] Rash 06/27/2019       Past Medical History:   Diagnosis Date    CAD (coronary artery disease) 46    Diabetes mellitus ([facility] [address] 12/23/2011    A1c 15    Hyperlipidemia 1992    Hypertension     Kidney stones 1987        Surgical History:   Past Surgical History:   Procedure Laterality Date    [facility]    COLONOSCOPY  1987&2002    WNL    CORONARY ANGIOPLASTY WITH STENT PLACEMENT  2004    [facility] w/ stents    CORONARY ANGIOPLASTY WITH STENT PLACEMENT  2008    CYSTOSCOPY Left 6/27/2019    CYSTOSCOPY, LEFT URETEROSCOPY, LEFT STONE MANIPULATION, LASER,  LEFT STENT PLACEMENT performed by Deja Graham MD at [address] / REMOVAL [facility] / [facility] Left 7/11/2019    CYSTOSCOPY, LEFT STENT REMOVAL  CPT CODE - 57122 performed by Deja Graham MD at [address] [facility], COLON, DIAGNOSTIC     [address]  1987&1996    bilateral inguinal    UPPER GASTROINTESTINAL ENDOSCOPY  2002    errosive esophagitis    UPPER GASTROINTESTINAL ENDOSCOPY  08/28/2008    GERD        Family History:    Family History   Problem Relation Age of Onset    Breast Cancer Mother     Breast Cancer Sister 61    Heart Disease Brother        Social History     Socioeconomic History    Marital status:      Spouse name: Not on file    Number of children: Not on file    Years of education: Not on file    Highest education level: Not on file   Occupational History    Not on file   Social Needs    Financial resource strain: Not on file   [employer] insecurity     Worry: Not on file     Inability: Not on file    Transportation needs     Medical: Not on file     Non-medical: Not on file   Tobacco Use    Smoking status: Never Smoker    Smokeless tobacco: Never Used   Substance and Sexual Activity    Alcohol use: Never     Frequency: Never     Comment: rare    Drug use: Never    Sexual activity: Not on file   Lifestyle    Physical activity     Days per week: Not on file     Minutes per session: Not on file    Stress: Not on file   Relationships    Social connections     Talks on phone: Not on file     Gets together: Not on file     Attends Restorationist service: Not on file     Active member of club or organization: Not on file     Attends meetings of clubs or organizations: Not on file     Relationship status: Not on file    Intimate partner violence     Fear of current or ex partner: Not on file     Emotionally abused: Not on file     Physically abused: Not on file     Forced sexual activity: Not on file   Other Topics Concern    Not on file   Social History Narrative    Not on file         Review of Systems   Constitutional: Negative for activity change, appetite change, chills, diaphoresis, fatigue and fever. HENT: Negative. Negative for congestion, dental problem, ear pain, facial swelling, rhinorrhea, sinus pressure, sneezing, sore throat, tinnitus, trouble swallowing and voice change. Eyes: Negative for photophobia, pain, discharge, redness, itching and visual disturbance. Respiratory: Positive for shortness of breath. Negative for cough, chest tightness, wheezing and stridor. Cardiovascular: Positive for chest pain. Negative for palpitations and leg swelling. Gastrointestinal: Negative for abdominal distention, abdominal pain, anal bleeding, blood in stool, constipation, diarrhea, nausea and vomiting. Genitourinary: Negative for difficulty urinating, discharge, dysuria, frequency, hematuria, testicular pain and urgency. Musculoskeletal: Negative for back pain, joint swelling, neck pain and neck stiffness. Skin: Negative for rash and wound. Neurological: Negative for dizziness, syncope, facial asymmetry, speech difficulty, weakness, numbness and headaches. Hematological: Does not bruise/bleed easily. Psychiatric/Behavioral: Negative for agitation, confusion, hallucinations, self-injury, sleep disturbance and suicidal ideas. The patient is not nervous/anxious. All other systems reviewed and are negative. Physical Exam  Vitals signs and nursing note reviewed. Constitutional:       General: He is not in acute distress. Appearance: He is well-developed. HENT:      Head: Normocephalic and atraumatic. Right Ear: External ear normal.      Left Ear: External ear normal.      Nose: Nose normal.      Mouth/Throat:      Pharynx: No oropharyngeal exudate. Eyes:      General: No scleral icterus. Right eye: No discharge. Left eye: No discharge. Conjunctiva/sclera: Conjunctivae normal.      Pupils: Pupils are equal, round, and reactive to light. Neck:      Musculoskeletal: Normal range of motion and neck supple. Vascular: No JVD. Trachea: No tracheal deviation. Cardiovascular:      Rate and Rhythm: Normal rate and regular rhythm. Heart sounds: Normal heart sounds. No murmur. No friction rub. No gallop. Pulmonary:      Effort: Pulmonary effort is normal. No respiratory distress. Breath sounds: Normal breath sounds. No wheezing or rales. Abdominal:      General: Bowel sounds are normal. There is no distension. Palpations: Abdomen is soft. There is no mass. Tenderness: There is no abdominal tenderness. There is no guarding or rebound. Musculoskeletal: Normal range of motion. General: No tenderness. Lymphadenopathy:      Cervical: No cervical adenopathy. Skin:     General: Skin is warm and dry. Coloration: Skin is not pale.       Findings: No erythema or rash. Neurological:      Mental Status: He is alert and oriented to person, place, and time. Cranial Nerves: No cranial nerve deficit. Motor: No abnormal muscle tone. Coordination: Coordination normal.      Deep Tendon Reflexes: Reflexes are normal and symmetric. Reflexes normal.   Psychiatric:         Behavior: Behavior normal.         Thought Content:  Thought content normal.         Judgment: Judgment normal.          Procedures     MDM   Results for orders placed or performed during the hospital encounter of 09/13/20   CBC Auto Differential   Result Value Ref Range    WBC 7.7 4.0 - 11.0 K/uL    RBC 5.23 4.20 - 5.90 M/uL    Hemoglobin 14.8 13.5 - 17.5 g/dL    Hematocrit 44.6 40.5 - 52.5 %    MCV 85.3 80.0 - 100.0 fL    MCH 28.2 26.0 - 34.0 pg    MCHC 33.1 31.0 - 36.0 g/dL    RDW 14.4 12.4 - 15.4 %    Platelets 625 536 - 133 K/uL    MPV 8.6 5.0 - 10.5 fL    Neutrophils % 68.9 %    Lymphocytes % 20.6 %    Monocytes % 8.2 %    Eosinophils % 1.1 %    Basophils % 1.2 %    Neutrophils Absolute 5.3 1.7 - 7.7 K/uL    Lymphocytes Absolute 1.6 1.0 - 5.1 K/uL    Monocytes Absolute 0.6 0.0 - 1.3 K/uL    Eosinophils Absolute 0.1 0.0 - 0.6 K/uL    Basophils Absolute 0.1 0.0 - 0.2 K/uL   Comprehensive Metabolic Panel   Result Value Ref Range    Sodium 137 136 - 145 mmol/L    Potassium 4.1 3.5 - 5.1 mmol/L    Chloride 102 99 - 110 mmol/L    CO2 20 (L) 21 - 32 mmol/L    Anion Gap 15 3 - 16    Glucose 350 (H) 70 - 99 mg/dL    BUN 21 (H) 7 - 20 mg/dL    CREATININE 1.3 0.8 - 1.3 mg/dL    GFR Non-African American 54 (A) >60    GFR African American >60 >60    Calcium 9.7 8.3 - 10.6 mg/dL    Total Protein 6.8 6.4 - 8.2 g/dL    Alb 4.3 3.4 - 5.0 g/dL    Albumin/Globulin Ratio 1.7 1.1 - 2.2    Total Bilirubin 0.6 0.0 - 1.0 mg/dL    Alkaline Phosphatase 70 40 - 129 U/L    ALT 22 10 - 40 U/L    AST 14 (L) 15 - 37 U/L    Globulin 2.5 g/dL   Lipase   Result Value Ref Range    Lipase 52.0 13.0 - 60.0 U/L   Troponin Result Value Ref Range    Troponin 0.05 (H) <0.01 ng/mL   Brain Natriuretic Peptide   Result Value Ref Range    Pro- (H) 0 - 124 pg/mL   EKG 12 Lead   Result Value Ref Range    Ventricular Rate 87 BPM    Atrial Rate 87 BPM    P-R Interval 154 ms    QRS Duration 110 ms    Q-T Interval 368 ms    QTc Calculation (Bazett) 442 ms    P Axis 49 degrees    R Axis 33 degrees    T Axis 12 degrees    Diagnosis       Normal sinus rhythmPossible Inferior infarct , age undeterminedAbnormal ECGWhen compared with ECG of 04-SEP-2020 21:10,No significant change was found       I spoke with Dr. Lee Mcgee, hospitalist. We thoroughly discussed the history, physical exam, laboratory and imaging studies, as well as, emergency department course. Based upon that discussion, we've decided to admit Antoine Franco for further observation and evaluation of Titus Hunt's chest pain. As I have deemed necessary from their history, physical, and studies, I have considered and evaluated Antoine Franco for the following diagnoses:  ACUTE CORONARY SYNDROME, PERICARDIAL TAMPONADE, PNEUMOTHORAX, PULMONARY EMBOLISM, and THORACIC DISSECTION. FINAL IMPRESSION  1. Acute chest pain    2. Shortness of breath    3. Elevated troponin        Vitals:  Blood pressure (!) 153/83, pulse 97, temperature 98 °F (36.7 °C), temperature source Oral, resp. rate 18, height 5' 9\" (1.753 m), weight 270 lb (122.5 kg), SpO2 97 %. Radiology  Xr Chest Portable    Result Date: 9/13/2020  No acute cardiopulmonary disease. EKG Interpretation. The Ekg interpreted by me in the absence of a cardiologist shows. normal sinus rhythm with a rate of 87  Axis is   Normal  QTc is  within an acceptable range  Intervals and Durations are unremarkable. No specific ST-T wave changes appreciated. No evidence of acute ischemia. No significant change from prior EKG dated 4 September 2020. Consultation with cardiologist, Dr. Anders Lemos.  He recommends admission to the

## 2020-09-13 NOTE — H&P
Hospital Medicine History & Physical      PCP: Bar Bullock MD    Date of Admission: 9/13/2020    Date of Service: Pt seen/examined on 9/13/2020 and Admitted to Inpatient with expected LOS greater than two midnights due to medical therapy. Chief Complaint:  Chest pain     History Of Present Illness:    79 y.o. male who presented to [facility] with chest pain which started around 9 am. Started as heart burn now burning sensation in the left arm pit and precordium. Better after starting Nitro. He is having similar symptom on and off for 2 weeks. H/o CAD with stents. Taking medications as prescribed. ER consulted Cardiology. Recommended Heparin and Nitro drip. Taking medication as prescribed. DM type 2 on several medications. Not Insulin. Past Medical History:          Diagnosis Date    CAD (coronary artery disease) 46    Diabetes mellitus ([facility] [address] 12/23/2011    A1c 15    Hyperlipidemia 1992    Hypertension     Kidney stones 1987       Past Surgical History:          Procedure Laterality Date    APPENDECTOMY  1970    CERVICAL FUSION  1995    COLONOSCOPY  1987&2002    WNL    CORONARY ANGIOPLASTY WITH STENT PLACEMENT  2004    [facility] w/ stents    CORONARY ANGIOPLASTY WITH STENT PLACEMENT  2008    CYSTOSCOPY Left 6/27/2019    CYSTOSCOPY, LEFT URETEROSCOPY, LEFT STONE MANIPULATION, LASER,  LEFT STENT PLACEMENT performed by Jenna Hughes MD at [address] [place] [address] / [address] [place] [address] Rd / STONE Left 7/11/2019    CYSTOSCOPY, LEFT STENT REMOVAL  CPT CODE - 99163 performed by Jenna Hughes MD at [address] [facility] [place], DIAGNOSTIC      HERNIA REPAIR  1987&1996    bilateral inguinal    UPPER GASTROINTESTINAL ENDOSCOPY  2002    errosive esophagitis    UPPER GASTROINTESTINAL ENDOSCOPY  08/28/2008    GERD       Medications Prior to Admission:      Prior to Admission medications    Medication Sig Start Date End Date Taking?  Authorizing Provider   omeprazole (PRILOSEC) 20 MG delayed release capsule Take 1 capsule by mouth every morning (before breakfast) 9/5/20   Nancy Shanks MD   prasugrel (EFFIENT) 10 MG TABS Take 1 tablet by mouth daily 4/13/20   Nae Jones MD   sitaGLIPtan-metformin (JANUMET)  MG per tablet Take 1 tablet by mouth 2 times daily (with meals) 3/5/20   CURTIS Ahmadi CNP   carvedilol (COREG) 6.25 MG tablet Take 1 tablet by mouth 2 times daily (with meals)  Patient taking differently: Take 3.125 mg by mouth 2 times daily (with meals)  3/4/20   CURTIS Ahmadi CNP   glipiZIDE (GLUCOTROL) 5 MG tablet Take 5 mg by mouth daily     Historical Provider, MD   tamsulosin (FLOMAX) 0.4 MG capsule Take 0.4 mg by mouth 6/29/19   Historical Provider, MD   HYDROcodone-acetaminophen ([facility]) 5-325 MG per tablet  6/26/19   Historical Provider, MD   pioglitazone (ACTOS) 30 MG tablet Take 30 mg by mouth daily  11/11/18   Historical Provider, MD   pravastatin (PRAVACHOL) 80 MG tablet Take 80 mg by mouth daily    Historical Provider, MD   losartan (COZAAR) 100 MG tablet Take 100 mg by mouth daily    Historical Provider, MD   aspirin 81 MG tablet Take 81 mg by mouth daily    Historical Provider, MD   Bismuth Subsalicylate (PEPTO-BISMOL PO) Take 1 tablet by mouth as needed     Historical Provider, MD       Allergies:  Brilinta [ticagrelor]; Penicillins; Lipitor; Plavix [clopidogrel bisulfate]; and Victoza [liraglutide]    Social History:      The patient currently lives at home himself    TOBACCO:   reports that he has never smoked. He has never used smokeless tobacco.  ETOH:   reports no history of alcohol use. E-Cigarettes Vaping or Juuling     Questions Responses    Vaping Use Never User    Start Date     Does device contain nicotine? Quit Date     Vaping Type             Family History:       Reviewed in detail and negative for DM, CAD, Cancer, CVA.  Positive as follows:        Problem Relation Age of Onset   Veronique Kovacs Breast Cancer Mother     Breast Cancer Sister 61    Heart Disease Brother        REVIEW OF SYSTEMS:   Pertinent positives as noted in the HPI. All other systems reviewed and negative. PHYSICAL EXAM PERFORMED:    BP (!) 155/95   Pulse 97   Temp 98 °F (36.7 °C) (Oral)   Resp 20   Ht 5' 9\" (1.753 m)   Wt 270 lb (122.5 kg)   SpO2 95%   BMI 39.87 kg/m²     General appearance:  No apparent distress, appears stated age and cooperative. Obese. Pleasant  HEENT:  Normal cephalic, atraumatic without obvious deformity. Pupils equal, round, and reactive to light. Extra ocular muscles intact. Conjunctivae/corneas clear. Neck: Supple, with full range of motion. No jugular venous distention. Trachea midline. Respiratory:  Normal respiratory effort. Clear to auscultation, bilaterally without Rales/Wheezes/Rhonchi. Cardiovascular:  Regular rate and rhythm with normal S1/S2 without murmurs, rubs or gallops. Abdomen: Soft, non-tender, non-distended with normal bowel sounds. Musculoskeletal:  No clubbing, cyanosis or edema bilaterally. Full range of motion without deformity. Skin: Skin color, texture, turgor normal.  No rashes or lesions. Neurologic:  Neurovascularly intact without any focal sensory/motor deficits. Cranial nerves: II-XII intact, grossly non-focal.  Psychiatric:  Alert and oriented, thought content appropriate, normal insight  Capillary Refill: Brisk,< 3 seconds   Peripheral Pulses: +2 palpable, equal bilaterally       Labs:     Recent Labs     09/13/20  1139   WBC 7.7   HGB 14.8   HCT 44.6        Recent Labs     09/13/20  1139      K 4.1      CO2 20*   BUN 21*   CREATININE 1.3   CALCIUM 9.7     Recent Labs     09/13/20  1139   AST 14*   ALT 22   BILITOT 0.6   ALKPHOS 70     No results for input(s): INR in the last 72 hours.   Recent Labs     09/13/20  1139   TROPONINI 0.05*       Urinalysis:      Lab Results   Component Value Date    NITRU Negative 06/27/2019    WBCUA 3-5 06/27/2019    BACTERIA Rare 06/27/2019    RBCUA 0-2 06/27/2019    BLOODU SMALL 06/27/2019    SPECGRAV 1.020 06/27/2019    GLUCOSEU Negative 06/27/2019       Radiology:     CXR: I have reviewed the CXR with the following interpretation: No pneumonia  EKG:  I have reviewed the EKG with the following interpretation: No acute ST-T changes    XR CHEST PORTABLE   Final Result   No acute cardiopulmonary disease. ASSESSMENT:    Active Hospital Problems    Diagnosis Date Noted    NSTEMI (non-ST elevated myocardial infarction) ([facility] [address] [I21.4]      1. NSTEMI - Continue Heparin and Nitrodrip. Aspirin, Statin and Coreg. Cardiology consulted from ER. Troponin - q3h X 3    2. CAD with h/o Stents. Seeing [facility] Cardiology. 3. DM type 2. Hold Metformin and Actos. Sliding Scale Insulin    4. HTN - on ARB continue. BP is high. On Nitrodrip. Monitor    5. Dyslipidemia - on Statin. Check lipids in am    6. GERD - On PPI. No anemia. DVT Prophylaxis: on Heparin  Diet: No diet orders on file  Code Status: Prior    PT/OT Eval Status: NA    Dispo - Per cardiology plans       Aleja Mendez MD    Thank you Tj Mason MD for the opportunity to be involved in this patient's care. If you have any questions or concerns please feel free to contact me at [phone].

## 2020-09-13 NOTE — PROGRESS NOTES
4 Eyes Skin Assessment     The patient is being assess for  Admission    I agree that 2 RN's have performed a thorough Head to Toe Skin Assessment on the patient. ALL assessment sites listed below have been assessed. Areas assessed by both nurses: Mingo Krabbe  [x]   Head, Face, and Ears   [x]   Shoulders, Back, and Chest  [x]   Arms, Elbows, and Hands   [x]   Coccyx, Sacrum, and Ischum  [x]   Legs, Feet, and Heels        Does the Patient have Skin Breakdown?   No         Junaid Prevention initiated:  No   Wound Care Orders initiated:  No      [facility] nurse consulted for Pressure Injury (Stage 3,4, Unstageable, DTI, NWPT, and Complex wounds):  NA      Primary Nurse eSignature: Electronically signed by Олег Daly RN on 9/13/20 at 7:53 PM EDT    **SHARE this note so that the co-signing nurse is able to place an eSignature**    Co-signer eSignature: Electronically signed by Adam Quintana RN on 9/13/20 at 9:52 PM EDT

## 2020-09-13 NOTE — ED NOTES
[facility] cardiology consult @1018  Re: Chest pain, existing pt of Dr.Hutchins [email or web address] returned [phone]  09/13/20 1044

## 2020-09-13 NOTE — PLAN OF CARE
Having some slight burning pain in bilateral axilla. Controlled by the increase of nitroglycerin IV infusion. Resting in bed watching the [employer] on TV.

## 2020-09-13 NOTE — PROGRESS NOTES
Admit to [address] Awake alert and oriented. Oriented to room, routines, how to access RN, meal ordering, when to call for assistance. Verbalized understanding. Admission assessment started.

## 2020-09-14 ENCOUNTER — APPOINTMENT (OUTPATIENT)
Dept: CARDIAC CATH/INVASIVE PROCEDURES | Age: 70
DRG: 232 | End: 2020-09-14
Payer: MEDICARE

## 2020-09-14 LAB
APTT: 51.8 SEC (ref 24.2–36.2)
APTT: 58.9 SEC (ref 24.2–36.2)
CHOLESTEROL, TOTAL: 150 MG/DL (ref 0–199)
ESTIMATED AVERAGE GLUCOSE: 159.9 MG/DL
GLUCOSE BLD-MCNC: 113 MG/DL (ref 70–99)
GLUCOSE BLD-MCNC: 142 MG/DL (ref 70–99)
GLUCOSE BLD-MCNC: 168 MG/DL (ref 70–99)
HBA1C MFR BLD: 7.2 %
HCT VFR BLD CALC: 39.8 % (ref 40.5–52.5)
HDLC SERPL-MCNC: 33 MG/DL (ref 40–60)
HEMOGLOBIN: 13.1 G/DL (ref 13.5–17.5)
LDL CHOLESTEROL CALCULATED: 79 MG/DL
LV EF: 40 %
LVEF MODALITY: NORMAL
MCH RBC QN AUTO: 28.5 PG (ref 26–34)
MCHC RBC AUTO-ENTMCNC: 33 G/DL (ref 31–36)
MCV RBC AUTO: 86.5 FL (ref 80–100)
PDW BLD-RTO: 14.4 % (ref 12.4–15.4)
PERFORMED ON: ABNORMAL
PLATELET # BLD: 164 K/UL (ref 135–450)
PMV BLD AUTO: 8.5 FL (ref 5–10.5)
RBC # BLD: 4.6 M/UL (ref 4.2–5.9)
TRIGL SERPL-MCNC: 188 MG/DL (ref 0–150)
VLDLC SERPL CALC-MCNC: 38 MG/DL
WBC # BLD: 9.3 K/UL (ref 4–11)

## 2020-09-14 PROCEDURE — B2111ZZ FLUOROSCOPY OF MULTIPLE CORONARY ARTERIES USING LOW OSMOLAR CONTRAST: ICD-10-PCS | Performed by: INTERNAL MEDICINE

## 2020-09-14 PROCEDURE — 85027 COMPLETE CBC AUTOMATED: CPT

## 2020-09-14 PROCEDURE — 36415 COLL VENOUS BLD VENIPUNCTURE: CPT

## 2020-09-14 PROCEDURE — 2720000010 HC SURG SUPPLY STERILE

## 2020-09-14 PROCEDURE — 80061 LIPID PANEL: CPT

## 2020-09-14 PROCEDURE — C1725 CATH, TRANSLUMIN NON-LASER: HCPCS

## 2020-09-14 PROCEDURE — 2580000003 HC RX 258

## 2020-09-14 PROCEDURE — C1753 CATH, INTRAVAS ULTRASOUND: HCPCS

## 2020-09-14 PROCEDURE — 85347 COAGULATION TIME ACTIVATED: CPT

## 2020-09-14 PROCEDURE — 6370000000 HC RX 637 (ALT 250 FOR IP): Performed by: INTERNAL MEDICINE

## 2020-09-14 PROCEDURE — 4A023N7 MEASUREMENT OF CARDIAC SAMPLING AND PRESSURE, LEFT HEART, PERCUTANEOUS APPROACH: ICD-10-PCS | Performed by: INTERNAL MEDICINE

## 2020-09-14 PROCEDURE — B2151ZZ FLUOROSCOPY OF LEFT HEART USING LOW OSMOLAR CONTRAST: ICD-10-PCS | Performed by: INTERNAL MEDICINE

## 2020-09-14 PROCEDURE — C1887 CATHETER, GUIDING: HCPCS

## 2020-09-14 PROCEDURE — 85730 THROMBOPLASTIN TIME PARTIAL: CPT

## 2020-09-14 PROCEDURE — 02C03ZZ EXTIRPATION OF MATTER FROM CORONARY ARTERY, ONE ARTERY, PERCUTANEOUS APPROACH: ICD-10-PCS | Performed by: INTERNAL MEDICINE

## 2020-09-14 PROCEDURE — C1769 GUIDE WIRE: HCPCS

## 2020-09-14 PROCEDURE — 6360000002 HC RX W HCPCS

## 2020-09-14 PROCEDURE — 6360000002 HC RX W HCPCS: Performed by: INTERNAL MEDICINE

## 2020-09-14 PROCEDURE — 92920 PRQ TRLUML C ANGIOP 1ART&/BR: CPT

## 2020-09-14 PROCEDURE — C1894 INTRO/SHEATH, NON-LASER: HCPCS

## 2020-09-14 PROCEDURE — 93005 ELECTROCARDIOGRAM TRACING: CPT | Performed by: INTERNAL MEDICINE

## 2020-09-14 PROCEDURE — 2060000000 HC ICU INTERMEDIATE R&B

## 2020-09-14 PROCEDURE — 92924 PRQ TRLUML C ATHRC 1 ART&/BR: CPT | Performed by: INTERNAL MEDICINE

## 2020-09-14 PROCEDURE — 2580000003 HC RX 258: Performed by: INTERNAL MEDICINE

## 2020-09-14 PROCEDURE — 92978 ENDOLUMINL IVUS OCT C 1ST: CPT | Performed by: INTERNAL MEDICINE

## 2020-09-14 PROCEDURE — 92978 ENDOLUMINL IVUS OCT C 1ST: CPT

## 2020-09-14 PROCEDURE — 2500000003 HC RX 250 WO HCPCS

## 2020-09-14 PROCEDURE — 93458 L HRT ARTERY/VENTRICLE ANGIO: CPT | Performed by: INTERNAL MEDICINE

## 2020-09-14 PROCEDURE — 93458 L HRT ARTERY/VENTRICLE ANGIO: CPT

## 2020-09-14 PROCEDURE — 6360000004 HC RX CONTRAST MEDICATION

## 2020-09-14 PROCEDURE — 2709999900 HC NON-CHARGEABLE SUPPLY

## 2020-09-14 RX ORDER — EPTIFIBATIDE 0.75 MG/ML
2 INJECTION, SOLUTION INTRAVENOUS CONTINUOUS
Status: DISPENSED | OUTPATIENT
Start: 2020-09-14 | End: 2020-09-15

## 2020-09-14 RX ORDER — FENTANYL CITRATE 50 UG/ML
INJECTION, SOLUTION INTRAMUSCULAR; INTRAVENOUS
Status: COMPLETED | OUTPATIENT
Start: 2020-09-14 | End: 2020-09-14

## 2020-09-14 RX ORDER — MIDAZOLAM HYDROCHLORIDE 5 MG/ML
INJECTION INTRAMUSCULAR; INTRAVENOUS
Status: COMPLETED | OUTPATIENT
Start: 2020-09-14 | End: 2020-09-14

## 2020-09-14 RX ORDER — ACETAMINOPHEN 325 MG/1
650 TABLET ORAL EVERY 4 HOURS PRN
Status: DISCONTINUED | OUTPATIENT
Start: 2020-09-14 | End: 2020-09-18

## 2020-09-14 RX ORDER — SODIUM CHLORIDE 0.9 % (FLUSH) 0.9 %
10 SYRINGE (ML) INJECTION PRN
Status: DISCONTINUED | OUTPATIENT
Start: 2020-09-14 | End: 2020-09-18

## 2020-09-14 RX ORDER — SODIUM CHLORIDE 0.9 % (FLUSH) 0.9 %
10 SYRINGE (ML) INJECTION EVERY 12 HOURS SCHEDULED
Status: DISCONTINUED | OUTPATIENT
Start: 2020-09-14 | End: 2020-09-18

## 2020-09-14 RX ADMIN — FENTANYL CITRATE 50 MCG: 50 INJECTION, SOLUTION INTRAMUSCULAR; INTRAVENOUS at 13:27

## 2020-09-14 RX ADMIN — MIDAZOLAM HYDROCHLORIDE 2 MG: 5 INJECTION INTRAMUSCULAR; INTRAVENOUS at 12:19

## 2020-09-14 RX ADMIN — CARVEDILOL 3.12 MG: 3.12 TABLET, FILM COATED ORAL at 16:44

## 2020-09-14 RX ADMIN — ONDANSETRON 4 MG: 2 INJECTION INTRAMUSCULAR; INTRAVENOUS at 08:11

## 2020-09-14 RX ADMIN — FENTANYL CITRATE 50 MCG: 50 INJECTION, SOLUTION INTRAMUSCULAR; INTRAVENOUS at 12:19

## 2020-09-14 RX ADMIN — EPTIFIBATIDE 2 MCG/KG/MIN: 0.75 INJECTION INTRAVENOUS at 19:08

## 2020-09-14 RX ADMIN — EPTIFIBATIDE 2 MCG/KG/MIN: 0.75 INJECTION INTRAVENOUS at 13:11

## 2020-09-14 RX ADMIN — ACETAMINOPHEN 650 MG: 325 TABLET ORAL at 09:42

## 2020-09-14 RX ADMIN — MIDAZOLAM HYDROCHLORIDE 1 MG: 5 INJECTION INTRAMUSCULAR; INTRAVENOUS at 13:28

## 2020-09-14 RX ADMIN — CARVEDILOL 3.12 MG: 3.12 TABLET, FILM COATED ORAL at 08:10

## 2020-09-14 RX ADMIN — INSULIN LISPRO 2 UNITS: 100 INJECTION, SOLUTION INTRAVENOUS; SUBCUTANEOUS at 17:06

## 2020-09-14 RX ADMIN — PANTOPRAZOLE SODIUM 40 MG: 40 TABLET, DELAYED RELEASE ORAL at 16:44

## 2020-09-14 RX ADMIN — Medication 10 ML: at 08:11

## 2020-09-14 RX ADMIN — FENTANYL CITRATE 25 MCG: 50 INJECTION, SOLUTION INTRAMUSCULAR; INTRAVENOUS at 13:11

## 2020-09-14 RX ADMIN — FENTANYL CITRATE 25 MCG: 50 INJECTION, SOLUTION INTRAMUSCULAR; INTRAVENOUS at 13:19

## 2020-09-14 RX ADMIN — ASPIRIN 81 MG: 81 TABLET ORAL at 08:10

## 2020-09-14 RX ADMIN — TAMSULOSIN HYDROCHLORIDE 0.4 MG: 0.4 CAPSULE ORAL at 08:10

## 2020-09-14 RX ADMIN — ACETAMINOPHEN 650 MG: 325 TABLET ORAL at 02:27

## 2020-09-14 RX ADMIN — LOSARTAN POTASSIUM 100 MG: 100 TABLET, FILM COATED ORAL at 08:11

## 2020-09-14 RX ADMIN — PRAVASTATIN SODIUM 80 MG: 40 TABLET ORAL at 20:08

## 2020-09-14 ASSESSMENT — PAIN DESCRIPTION - LOCATION: LOCATION: HEAD

## 2020-09-14 ASSESSMENT — PAIN DESCRIPTION - DESCRIPTORS: DESCRIPTORS: ACHING;CONSTANT

## 2020-09-14 ASSESSMENT — PAIN DESCRIPTION - FREQUENCY: FREQUENCY: CONTINUOUS

## 2020-09-14 ASSESSMENT — PAIN SCALES - GENERAL
PAINLEVEL_OUTOF10: 0
PAINLEVEL_OUTOF10: 2
PAINLEVEL_OUTOF10: 2

## 2020-09-14 ASSESSMENT — PAIN DESCRIPTION - ORIENTATION: ORIENTATION: RIGHT;LEFT;ANTERIOR

## 2020-09-14 ASSESSMENT — PAIN DESCRIPTION - PAIN TYPE: TYPE: ACUTE PAIN

## 2020-09-14 NOTE — FLOWSHEET NOTE
Bedside report given to Tristen Sanderson RN, all questions answered. Pt transferred to  with all belongings including cell phone, clothes and shoes.

## 2020-09-14 NOTE — PROGRESS NOTES
Brief Pre-Op Note/Sedation Assessment      Santhosh Carrion  1950  Cath Pool Rm/NONE      [id number]  11:53 AM    Planned Procedure: Cardiac Catheterization Procedure    Post Procedure Plan: Return to same level of care    Consent: I have discussed with the patient and/or the patient representative the indication, alternatives, and the possible risks and/or complications of the planned procedure and the anesthesia methods. The patient and/or patient representative appear to understand and agree to proceed. DISCUSSION OF CARDIAC CATHETERIZATION PROCEDURES: The procedures, indications, risks and alternatives have been discussed with the patient and, as appropriate, with the patient's guardian . Risks discussed included, but are not limited to, bleeding, development of blood clots/emboli, damage to blood vessels, renal failure, malignant cardiac arrhythmias, stroke, heart attack, emergent coronary bypass surgery, death, dye allergy. The patient (and guardian as appropriate) expressed understanding of the aforementioned and wished to proceed. Chief Complaint: NSTEMI      Indications for Cath Procedure:  ACS > 24 hrs  Anginal Classification within 2 weeks:  CCS IV - Inability to perform any activity without angina or angina at rest, i.e., severe limitation  NYHA Heart Failure Class within 2 weeks: No symptoms  Is Cath Lab Visit Valve-related?: No  Surgical Risk: N/A  Functional Type: N/A    Anti- Anginal Meds within 2 weeks:   Yes: Beta Blockers and Aspirin    Stress or Imaging Studies Performed (within 6 months):  None     Vital Signs:  /78   Pulse 84   Temp 98 °F (36.7 °C) (Oral)   Resp 16   Ht 5' 9\" (1.753 m)   Wt 270 lb (122.5 kg)   SpO2 95%   BMI 39.87 kg/m²         Allergies:   Allergies   Allergen Reactions    Brilinta [Ticagrelor] Shortness Of Breath    Penicillins Other (See Comments)     Stomach distress    Lipitor Rash    Plavix [Clopidogrel Bisulfate] Rash    Victoza [Liraglutide] Rash       Past Medical History:  Past Medical History:   Diagnosis Date    CAD (coronary artery disease) 46    Diabetes mellitus ([facility] [address] 12/23/2011    A1c 15    Hyperlipidemia 1992    Hypertension     Kidney stones 1987         Surgical History:  Past Surgical History:   Procedure Laterality Date    APPENDECTOMY  1970    CERVICAL FUSION  1995    COLONOSCOPY  1987&2002    WNL    CORONARY ANGIOPLAST[facility] WITH STENT PLACEMENT  [facility]    [facility] stents    CORONARY ANGIOPLASTY WITH STENT PLACEMENT  2008    CYSTOSCOPY Left 6/27/2019    CYSTOSCOPY, LEFT URETEROSCOPY, LEFT STONE MANIPULATION, LASER,  LEFT STENT PLACEMENT performed by Garth Hartman MD at [facility] / REMOVAL [address] / STONE Left 7/11/2019    CYSTOSCOPY, LEFT STENT REMOVAL  CPT CODE - 32998 performed by Garth Hartman MD at [facility], COLON, DIAGNOSTIC     [address] [facility]  1987&1996    bilateral inguinal    UPPER GASTROINTESTINAL ENDOSCOPY  2002    errosive esophagitis    UPPER GASTROINTESTINAL ENDOSCOPY  08/28/2008    GERD         Medications:  Current Facility-Administered Medications   Medication Dose Route Frequency Provider Last Rate Last Dose    nitroGLYCERIN 50 mg in dextrose 5% 250 mL infusion  5 mcg/min Intravenous Continuous Ric Hubbard MD 10.5 mL/hr at 09/14/20 0230 35 mcg/min at 09/14/20 0230    heparin (porcine) injection 2,000 Units  2,000 Units Intravenous PRN Ric Hubbard MD        aspirin EC tablet 81 mg  81 mg Oral Daily Ric Hubbard MD   81 mg at 09/14/20 0810    carvedilol (COREG) tablet 3.125 mg  3.125 mg Oral BID  Ric Hubbard MD   3.125 mg at 09/14/20 0810    glipiZIDE (GLUCOTROL) tablet 5 mg  5 mg Oral Daily Ric Hubbard MD   Stopped at 09/14/20 0809    losartan (COZAAR) tablet 100 mg  100 mg Oral Daily Ric Hubbard MD   100 mg at 09/14/20 0811    pantoprazole (PROTONIX) tablet 40 mg  40 mg Oral BID AC [address]. Π[address]λη[address] MD Mack   40 mg at 09/13/20 1557    pravastatin (PRAVACHOL) tablet 80 mg  80 mg Oral Nightly Ric Doe MD   80 mg at 09/13/20 2108    tamsulosin (FLOMAX) capsule 0.4 mg  0.4 mg Oral Daily Ric Doe MD   0.4 mg at 09/14/20 0810    sodium chloride flush 0.9 % injection 10 mL  10 mL Intravenous 2 times per day [address] MD Mack   10 mL at 09/14/20 0811    sodium chloride flush 0.9 % injection 10 mL  10 mL Intravenous PRN Ric Doe MD        acetaminophen (TYLENOL) tablet 650 mg  650 mg Oral Q6H PRN Ric Doe MD   650 mg at 09/14/20 7425    Or    acetaminophen (TYLENOL) suppository 650 mg  650 mg Rectal Q6H PRN Ric Doe MD        polyethylene glycol [facility]) packet 17 g  17 g Oral Daily PRN Ric Doe MD        promethazine (PHENERGAN) tablet 12.5 mg  12.5 mg Oral Q6H PRN Ric Doe MD        Or    ondansetron [facility]) injection 4 mg  4 mg Intravenous Q6H PRN Ric Doe MD   4 mg at 09/14/20 0811    insulin lispro (HUMALOG) injection vial 0-12 Units  0-12 Units Subcutaneous TID WC Rci Doe MD   Stopped at 09/14/20 0809    insulin lispro (HUMALOG) injection vial 0-6 Units  0-6 Units Subcutaneous Nightly Ric Doe MD        heparin (porcine) injection 4,000 Units  4,000 Units Intravenous PRN Vanessa Figueredo MD        heparin 25,000 unit in sodium chloride 0.45% 250 mL infusion  11.8 mL/hr Intravenous Continuous Vanessa Figueredo MD 11.8 mL/hr at 09/13/20 2225 11.8 mL/hr at 09/13/20 2225           Pre-Sedation:    Pre-Sedation Documentation and Exam:  I have assessed the patient and agree with the H&P present on the chart.     Prior History of Anesthesia Complications:   none    Modified Mallampati:  III (soft palate, base of uvula visible)    ASA Classification:  Class 3 - A patient with severe systemic disease that limits activity but is not incapacitating      Enoc Scale: Activity:  2 - Able to move 4 extremities voluntarily on command  Respiration:  2 - Able to breathe deeply and cough freely  Circulation:  2 - BP+/- 20mmHg of normal  Consciousness:  2 - Fully awake  Oxygen Saturation (color):  2 - Able to maintain oxygen saturation >92% on room air    Sedation/Anesthesia Plan:  Guard the patient's safety and welfare. Minimize physical discomfort and pain. Minimize negative psychological responses to treatment by providing sedation and analgesia and maximize the potential amnesia. Patient to meet pre-procedure discharge plan.     Medication Planned:  midazolam intravenously and fentanyl intravenously    Patient is an appropriate candidate for plan of sedation: yes      Electronically signed by Vijaya Fagan MD on 9/14/2020 at 11:53 AM

## 2020-09-14 NOTE — PLAN OF CARE
Problem: Falls - Risk of:  Goal: Will remain free from falls  Description: Will remain free from falls  9/14/2020 1030 by Violette Darling RN  Outcome: Ongoing  9/13/2020 2151 by Madeline Hernandez RN  Outcome: Ongoing  Goal: Absence of physical injury  Description: Absence of physical injury  9/14/2020 1030 by Violette Darling RN  Outcome: Ongoing  9/13/2020 2151 by Madeline Hernandez RN  Outcome: Ongoing     Problem: Pain:  Goal: Pain level will decrease  Description: Pain level will decrease  Outcome: Ongoing  Goal: Control of acute pain  Description: Control of acute pain  9/14/2020 1030 by Violette Darling RN  Outcome: Ongoing  9/13/2020 2151 by Madeline Hernandez RN  Outcome: Ongoing  Goal: Control of chronic pain  Description: Control of chronic pain  Outcome: Ongoing     Problem: Discharge Planning:  Goal: Participates in care planning  Description: Participates in care planning  Outcome: Ongoing  Goal: Discharged to appropriate level of care  Description: Discharged to appropriate level of care  Outcome: Ongoing     Problem: Pain:  Goal: Pain level will decrease  Description: Pain level will decrease  Outcome: Ongoing  Goal: Recognizes and communicates pain  Description: Recognizes and communicates pain  Outcome: Ongoing  Goal: Control of acute pain  Description: Control of acute pain  Outcome: Ongoing  Goal: Control of chronic pain  Description: Control of chronic pain  Outcome: Ongoing     Problem: Serum Glucose Level - Abnormal:  Goal: Ability to maintain appropriate glucose levels will improve to within specified parameters  Description: Ability to maintain appropriate glucose levels will improve to within specified parameters  9/14/2020 1030 by Violette Darling RN  Outcome: Ongoing  9/13/2020 2151 by Madeline Hernandez RN  Outcome: Ongoing

## 2020-09-14 NOTE — FLOWSHEET NOTE
4 Eyes Skin Assessment     The patient is being assess for   Transfer to New Unit    I agree that 2 RN's have performed a thorough Head to Toe Skin Assessment on the patient. ALL assessment sites listed below have been assessed. Areas assessed by both nurses:   [x]   Head, Face, and Ears   [x]   Shoulders, Back, and Chest, Abdomen  [x]   Arms, Elbows, and Hands   [x]   Coccyx, Sacrum, and Ischium  [x]   Legs, Feet, and Heels    **SHARE this note so that the co-signing nurse is able to place an eSignature**    Co-signer eSignature: Electronically signed by Marc Alicea RN on 9/14/20 at 6:11 AM EDT    Does the Patient have Skin Breakdown?   No          Junaid Prevention initiated:  Yes   Wound Care Orders initiated:  NA      [facility] nurse consulted for Pressure Injury (Stage 3,4, Unstageable, DTI, NWPT, Complex wounds)and New or Established Ostomies:  NA      Primary Nurse eSignature: Electronically signed by Tiffanie Childers RN on 9/14/20 at 5:23 AM EDT

## 2020-09-14 NOTE — PROCEDURES
CARDIAC CATHETERIZATION REPORT     Procedure Date:  2020  Patient Name: Meeta Merritt  MRN: [id number] : 1950      INDICATION     Non-STEMI    PROCEDURES PERFORMED     Left heart catheterization  LVgram  Coronary angiogam  Coronary cath  Atherectomy of RCA  IVUS of RCA            PROCEDURE DESCRIPTION   Risks/benefits/alternatives/outcomes were discussed with patient and/or family and informed consent was obtained. Using the [facility] scale, the patient's right radial artery was found to be a level B. Patient was prepped draped in the usual sterile fashion. Local anaesthetic was applied over puncture site. Using a front wall technique, a 4/5 Moroccan Terumo sheath was inserted into right radial artery. Verapamil, nitroglycerin were administered through the sheath. Heparin was administered. Diagnostic 5 Moroccan pigtail, ultra, Hunter catheters were used for diagnostic angiograms. Pigtail was used for LV gram, ultra was taken and was used to cannulate the RCA but left main could not be cannulated with this and ultimately was cannulated with a Hunter catheter. Attention turned towards coronary intervention as noted below. At the conclusion of the procedure, a TR band was placed over the puncture site and hemostasis was obtained. There were no immediate complications. I supervised sedation with versed 3 mg/fentanyl 150 Mcg during the procedure. 300 cc contrast was utilized. <20cc EBL. FINDINGS         LVGRAM    LVEDP  18   GRADIENT ACROSS AORTIC VALVE  none   LV FUNCTION EF 40 %   WALL MOTION  base to mid inferior hypokinesis   MITRAL REGURGITATION  mild       CORONARY ARTERIES    LM Less than 10% [id number] stenosis         LAD Proximal-mid stents are noted with 50% in-stent restenosis. Distal vessel has 50 to 60% stenosis. There are well-developed left-to-right collaterals. D1 is a small to medium sized vessel with ostial/proximal/mid 60 to 70% stenosis.        LCX  Large vessel, proximal and mid 90% stenosis. The distal vessel into OM 2 is stented and the stent is widely patent with less than 10% stenosis. RI Tortuous, small vessel,Less than 10% [id number] stenosis         RCA Dominant, proximal 10 to 20% stenosis, the proximal and mid vessel are extensively stented and there is a mid-distal 100% occlusion with well-developed left to right collaterals. PERCUTANEOUS INTERVENTION DESCRIPTION     Heparin was used for anticoagulation, Integrilin was added during the procedure. The initial 4/5 Moroccan sheath was upsized to aTerumo slender 6/7 sheath for intervention. A 7 Moroccan AL 0.75 guiding catheter was taken and was used to intubate the RCA. A choice floppy wire was then used initially to cross the lesion and angioplasty was then performed with a 3 mm balloon. Additional guide support was felt to be needed and a run-through body wire was placed into the RCA and the choice floppy wire was removed. Then a 7 [place] guide cell extension catheter was advanced into the RCA. Additional angioplasty was performed with 3 and 3.5 mm noncompliant balloons. Cutting Balloon atherectomy was then performed of the distal RCA as well as the proximal and mid RCA with 3 and 3.5 mm cutting balloons. 4 mm noncompliant balloon was used as well. COLE was performed which showed good stent expansion. Minimal luminal diameter was felt to be between 3.5 and 4 mm in the proximal and mid segments and 3 mm in the distal segments. Remaining disease and PDA was felt to be best treated medically and procedure was felt to be completed and it was felt stenting should be deferred as patient has had several stenting procedures already and IVUS did reveal multiple layers of stents. There was 0% residual stenosis. There was MOISÉS 3 flow before and after PCI.            CONCLUSIONS:     Successful atherectomy of RCA  We will consider staged PCI of circumflex  We will also consult with CT

## 2020-09-14 NOTE — PLAN OF CARE
Problem: Falls - Risk of:  Goal: Will remain free from falls  Description: Will remain free from falls  Outcome: Ongoing     Problem: Falls - Risk of:  Goal: Absence of physical injury  Description: Absence of physical injury  Outcome: Ongoing     Problem: Pain:  Goal: Control of acute pain  Description: Control of acute pain  Outcome: Ongoing     Problem: Serum Glucose Level - Abnormal:  Goal: Ability to maintain appropriate glucose levels will improve to within specified parameters  Description: Ability to maintain appropriate glucose levels will improve to within specified parameters  Outcome: Ongoing

## 2020-09-15 ENCOUNTER — APPOINTMENT (OUTPATIENT)
Dept: VASCULAR LAB | Age: 70
DRG: 232 | End: 2020-09-15
Payer: MEDICARE

## 2020-09-15 LAB
ANION GAP SERPL CALCULATED.3IONS-SCNC: 11 MMOL/L (ref 3–16)
APTT: 29.1 SEC (ref 24.2–36.2)
BUN BLDV-MCNC: 19 MG/DL (ref 7–20)
CALCIUM SERPL-MCNC: 9.3 MG/DL (ref 8.3–10.6)
CHLORIDE BLD-SCNC: 104 MMOL/L (ref 99–110)
CO2: 23 MMOL/L (ref 21–32)
CREAT SERPL-MCNC: 1.1 MG/DL (ref 0.8–1.3)
EKG ATRIAL RATE: 80 BPM
EKG ATRIAL RATE: 85 BPM
EKG DIAGNOSIS: NORMAL
EKG DIAGNOSIS: NORMAL
EKG P AXIS: 43 DEGREES
EKG P AXIS: 55 DEGREES
EKG P-R INTERVAL: 150 MS
EKG P-R INTERVAL: 152 MS
EKG Q-T INTERVAL: 380 MS
EKG Q-T INTERVAL: 386 MS
EKG QRS DURATION: 90 MS
EKG QRS DURATION: 92 MS
EKG QTC CALCULATION (BAZETT): 445 MS
EKG QTC CALCULATION (BAZETT): 452 MS
EKG R AXIS: 17 DEGREES
EKG R AXIS: 8 DEGREES
EKG T AXIS: 12 DEGREES
EKG T AXIS: 25 DEGREES
EKG VENTRICULAR RATE: 80 BPM
EKG VENTRICULAR RATE: 85 BPM
GFR AFRICAN AMERICAN: >60
GFR NON-AFRICAN AMERICAN: >60
GLUCOSE BLD-MCNC: 117 MG/DL (ref 70–99)
GLUCOSE BLD-MCNC: 152 MG/DL (ref 70–99)
GLUCOSE BLD-MCNC: 170 MG/DL (ref 70–99)
GLUCOSE BLD-MCNC: 204 MG/DL (ref 70–99)
HCT VFR BLD CALC: 40.1 % (ref 40.5–52.5)
HEMOGLOBIN: 13.3 G/DL (ref 13.5–17.5)
MCH RBC QN AUTO: 28.3 PG (ref 26–34)
MCHC RBC AUTO-ENTMCNC: 33.3 G/DL (ref 31–36)
MCV RBC AUTO: 85 FL (ref 80–100)
PDW BLD-RTO: 14.8 % (ref 12.4–15.4)
PERFORMED ON: ABNORMAL
PLATELET # BLD: 180 K/UL (ref 135–450)
PMV BLD AUTO: 8.5 FL (ref 5–10.5)
POC ACT LR: 143 SEC
POC ACT LR: 231 SEC
POC ACT LR: 276 SEC
POC ACT LR: >400 SEC
POC ACT LR: >400 SEC
POTASSIUM SERPL-SCNC: 4 MMOL/L (ref 3.5–5.1)
RBC # BLD: 4.72 M/UL (ref 4.2–5.9)
SODIUM BLD-SCNC: 138 MMOL/L (ref 136–145)
WBC # BLD: 7.1 K/UL (ref 4–11)

## 2020-09-15 PROCEDURE — 93005 ELECTROCARDIOGRAM TRACING: CPT | Performed by: INTERNAL MEDICINE

## 2020-09-15 PROCEDURE — 2060000000 HC ICU INTERMEDIATE R&B

## 2020-09-15 PROCEDURE — 93971 EXTREMITY STUDY: CPT

## 2020-09-15 PROCEDURE — C9460 INJECTION, CANGRELOR: HCPCS | Performed by: NURSE PRACTITIONER

## 2020-09-15 PROCEDURE — 93010 ELECTROCARDIOGRAM REPORT: CPT | Performed by: INTERNAL MEDICINE

## 2020-09-15 PROCEDURE — 93880 EXTRACRANIAL BILAT STUDY: CPT

## 2020-09-15 PROCEDURE — 80048 BASIC METABOLIC PNL TOTAL CA: CPT

## 2020-09-15 PROCEDURE — 85730 THROMBOPLASTIN TIME PARTIAL: CPT

## 2020-09-15 PROCEDURE — 6370000000 HC RX 637 (ALT 250 FOR IP): Performed by: INTERNAL MEDICINE

## 2020-09-15 PROCEDURE — 2580000003 HC RX 258: Performed by: NURSE PRACTITIONER

## 2020-09-15 PROCEDURE — 99232 SBSQ HOSP IP/OBS MODERATE 35: CPT | Performed by: NURSE PRACTITIONER

## 2020-09-15 PROCEDURE — 36415 COLL VENOUS BLD VENIPUNCTURE: CPT

## 2020-09-15 PROCEDURE — 81001 URINALYSIS AUTO W/SCOPE: CPT

## 2020-09-15 PROCEDURE — 6360000002 HC RX W HCPCS: Performed by: NURSE PRACTITIONER

## 2020-09-15 PROCEDURE — 85027 COMPLETE CBC AUTOMATED: CPT

## 2020-09-15 RX ORDER — EPTIFIBATIDE 0.75 MG/ML
2 INJECTION, SOLUTION INTRAVENOUS CONTINUOUS
Status: DISCONTINUED | OUTPATIENT
Start: 2020-09-15 | End: 2020-09-18

## 2020-09-15 RX ADMIN — INSULIN LISPRO 4 UNITS: 100 INJECTION, SOLUTION INTRAVENOUS; SUBCUTANEOUS at 18:04

## 2020-09-15 RX ADMIN — CARVEDILOL 3.12 MG: 3.12 TABLET, FILM COATED ORAL at 18:02

## 2020-09-15 RX ADMIN — TAMSULOSIN HYDROCHLORIDE 0.4 MG: 0.4 CAPSULE ORAL at 09:42

## 2020-09-15 RX ADMIN — PRAVASTATIN SODIUM 80 MG: 40 TABLET ORAL at 21:20

## 2020-09-15 RX ADMIN — CANGRELOR 0.75 MCG/KG/MIN: 50 INJECTION, POWDER, LYOPHILIZED, FOR SOLUTION INTRAVENOUS at 10:28

## 2020-09-15 RX ADMIN — PANTOPRAZOLE SODIUM 40 MG: 40 TABLET, DELAYED RELEASE ORAL at 05:13

## 2020-09-15 RX ADMIN — GLIPIZIDE 5 MG: 5 TABLET ORAL at 09:43

## 2020-09-15 RX ADMIN — INSULIN LISPRO 2 UNITS: 100 INJECTION, SOLUTION INTRAVENOUS; SUBCUTANEOUS at 11:39

## 2020-09-15 RX ADMIN — EPTIFIBATIDE 2 MCG/KG/MIN: 0.75 INJECTION INTRAVENOUS at 21:20

## 2020-09-15 RX ADMIN — EPTIFIBATIDE 2 MCG/KG/MIN: 0.75 INJECTION INTRAVENOUS at 16:04

## 2020-09-15 RX ADMIN — LOSARTAN POTASSIUM 100 MG: 100 TABLET, FILM COATED ORAL at 09:43

## 2020-09-15 RX ADMIN — CARVEDILOL 3.12 MG: 3.12 TABLET, FILM COATED ORAL at 09:43

## 2020-09-15 RX ADMIN — EPTIFIBATIDE 2 MCG/KG/MIN: 0.75 INJECTION INTRAVENOUS at 11:34

## 2020-09-15 RX ADMIN — ASPIRIN 81 MG: 81 TABLET ORAL at 09:43

## 2020-09-15 NOTE — PROGRESS NOTES
P[address] 81   Daily Progress Note    Admit Date:  9/13/2020  HPI:    Chief Complaint   Patient presents with    Chest Pain     SATRTED AFTER EATING BREAKFAST AT 1000    Presented with chest pain not relieved with antacids or sl nitro. Troponin elevated. ECG without ischemic changes. Hx of CAD with PCI to LAD, OM3 and RCA (ISR) earlier this year as well as HTN, HLD, DM. [facility] with recurrent disease in RCA treated with atherectomy, noting additional disease in LCx. Consulted CT surgery for recurrent MV CAD with in-stent stenosis. CABG now planned for Friday, 9/18/20. Subjective:  Mr. Mauricio Maldonado lying in bed talking on the phone. No chest pain today. Very talkative. Wife passed in April. He developed chest pain/ tightness at that time, was seen in ED but since troponin's negative discharged to spend last moments with wife. Presented to ED 2 weeks ago, again troponin's negative, discharged to follow up in office. He saw Dr. Roney Patton who ordered echo and stress test.      Objective:   /85   Pulse 111   Temp 97.4 °F (36.3 °C) (Axillary)   Resp 16   Ht 5' 9\" (1.753 m)   Wt 270 lb (122.5 kg)   SpO2 95%   BMI 39.87 kg/m²       Intake/Output Summary (Last 24 hours) at 9/15/2020 1623  Last data filed at 9/15/2020 0837  Gross per 24 hour   Intake 480 ml   Output 1950 ml   Net -1470 ml     Wt Readings from Last 3 Encounters:   09/13/20 270 lb (122.5 kg)   09/08/20 271 lb (122.9 kg)   09/05/20 277 lb 2 oz (125.7 kg)         ASSESSMENT:   1. NSTEMI - + troponin's, no further chest pain. 2. CAD - multivessel disease with recurrent stenosis, s/p PCI/ atherectomy to RCA, on ASA, statin, BB, IV Integrelin (developed shortness of breath with cangrelor)  3. HTN - overall stable  4. HLD - on pravastatin, historically has not been tolerant of high  Dose/ potency statins, PA was started for PCSK9i (prior )  5. DM      PLAN:  1.  Plan for CABG X4 on Friday, 9/18/20, pre-op testing underway  2. Echo   3. Will follow     CURTIS Andrade CNP, 9/15/2020, 4:23 PM  [employer]   [phone]       Telemetry: SR 70-90's  NYHA: III    Physical Exam:  General:  Awake, alert, NAD  Skin:  Warm and dry  Neck:  JVP 8  Chest:  Clear to auscultation   Cardiovascular:  RRR, normal S1S2, no m/g/r  Abdomen:  Soft, nontender, +bowel sounds  Extremities:  1+ non-pitting edema  right radial site without ooze, bruise or hematoma, dressing C,D,I, 2+ pulse      Medications:    sodium chloride flush  10 mL Intravenous 2 times per day    aspirin  81 mg Oral Daily    carvedilol  3.125 mg Oral BID WC    glipiZIDE  5 mg Oral Daily    losartan  100 mg Oral Daily    pantoprazole  40 mg Oral BID AC    pravastatin  80 mg Oral Nightly    tamsulosin  0.4 mg Oral Daily    insulin lispro  0-12 Units Subcutaneous TID WC    insulin lispro  0-6 Units Subcutaneous Nightly      eptifibatide 2 mcg/kg/min (09/15/20 1604)    nitroGLYCERIN 35 mcg/min (09/14/20 0230)       Lab Data: Lab results independently reviewed by myself 9/15/20   CBC:   Recent Labs     09/13/20  1139 09/14/20  0412 09/15/20  0544   WBC 7.7 9.3 7.1   HGB 14.8 13.1* 13.3*    164 180     BMP:    Recent Labs     09/13/20  1139 09/15/20  0544    138   K 4.1 4.0   CO2 20* 23   BUN 21* 19   CREATININE 1.3 1.1     INR:  No results for input(s): INR in the last 72 hours.   BNP:    Recent Labs     09/13/20  1139   PROBNP 453*     Cardiac Enzymes:   Recent Labs     09/13/20  1139 09/13/20  1554 09/13/20  1817   TROPONINI 0.05* 0.19* 0.33*     Lipids:   Lab Results   Component Value Date    TRIG 188 09/14/2020    TRIG 183 09/05/2020    HDL 33 09/14/2020    HDL 39 09/05/2020    HDL 35 09/26/2011    HDL 34 03/23/2011    LDLCALC 79 09/14/2020    LDLCALC 133 09/05/2020       Cardiac Imaging:   CARDIAC CATH 9/14/20:   FINDINGS     LVGRAM   LVEDP  18   GRADIENT ACROSS AORTIC VALVE  none   LV FUNCTION EF 40 %   WALL MOTION  base to mid inferior hypokinesis   MITRAL REGURGITATION  mild       CORONARY ARTERIES   LM Less than 10% lvvfrywo-ihl-iwpupp stenosis            LAD Proximal-mid stents are noted with 50% in-stent restenosis. Distal vessel has 50 to 60% stenosis. There are well-developed left-to-right collaterals.     D1 is a small to medium sized vessel with ostial/proximal/mid 60 to 70% stenosis.       LCX  Large vessel, proximal and mid 90% stenosis. The distal vessel into OM 2 is stented and the stent is widely patent with less than 10% stenosis.       RI Tortuous, small vessel,Less than 10% bvqgpauu-whd-iqphgq stenosis            RCA Dominant, proximal 10 to 20% stenosis, the proximal and mid vessel are extensively stented and there is a mid-distal 100% occlusion with well-developed left to right collaterals.       PERCUTANEOUS INTERVENTION DESCRIPTION   Heparin was used for anticoagulation, Integrilin was added during the procedure. The initial 4/5 Lao sheath was upsized to aTerumo slender 6/7 sheath for intervention. A 7 Lao AL 0.75 guiding catheter was taken and was used to intubate the RCA. A choice floppy wire was then used initially to cross the lesion and angioplasty was then performed with a 3 mm balloon. Additional guide support was felt to be needed and a run-through body wire was placed into the RCA and the choice floppy wire was removed. Then a 7 [place] guide cell extension catheter was advanced into the RCA. Additional angioplasty was performed with 3 and 3.5 mm noncompliant balloons. Cutting Balloon atherectomy was then performed of the distal RCA as well as the proximal and mid RCA with 3 and 3.5 mm cutting balloons. 4 mm noncompliant balloon was used as well. COLE was performed which showed good stent expansion. Minimal luminal diameter was felt to be between 3.5 and 4 mm in the proximal and mid segments and 3 mm in the distal segments.   Remaining disease and PDA was felt to be best treated medically and procedure was felt to be completed and it was felt stenting should be deferred as patient has had several stenting procedures already and IVUS did reveal multiple layers of stents. There was 0% residual stenosis. There was MOISÉS 3 flow before and after PCI.        CONCLUSIONS:   Successful atherectomy of RCA  We will consider staged PCI of circumflex  We will also consult with CT surgery as patient has had several PCI procedures and continues to have refractory CAD with ASHD progression.          CARDIAC CATH/ PCI 3/3/20:   Procedures: Cor angio, PTCA, sedation     Selective tyler RCA for staged PCI     RCA mid in stent 80% to 40%              3.5 x 15 cutting balloon              4.0 x 20 NC balloon to 24 katiuska     Consider laser atherectomy +/- brachytherapy if recurrent angina/restenosis     Coronary angiogram 2/24/2020:  LM <20%  LAD 30% prox, 70% mid in stent              D2 50% prox  Cx 40-50%              D3 99% prox  RCA 80% mid in stent  LVEF 60%  PTCA LAD              3.0 x 15 angiosculpt cutting balloon to 3.5  PTCA OM3              2.75 x 15 FARHAN  DAPT, statin, BBlk  Home in am  PTCA RCA next week     Echo 2/22/2020:   Normal left ventricle systolic function with an estimated ejection fraction   of 55-60%. No regional wall motion abnormalities are seen. Normal left ventricular diastolic filling pressure.

## 2020-09-15 NOTE — CONSULTS
Department of Cardiovascular & Thoracic Surgery  History and Physical          DIAGNOSIS: Multivessel CAD/NSTEMI    CHIEF COMPLAINT:    Chief Complaint   Patient presents with    Chest Pain     SATRTED AFTER EATING BREAKFAST AT 1000       History Obtained From:  patient, electronic medical record    HISTORY OF PRESENT ILLNESS:      The patient is a 79 y.o. male with significant past medical history of CAD (2 stents placed in 02/2020, with 3rd one placed in March), T2DM, HTN, and HLD who presented to [facility] ED on 9/13 with c/o chest pain. He describes it as a burning sensation that radiates to bilateral armpits. He reports this pain as similar to the CP he had months ago when his wife succumbed to pancreatic cancer and that he He had multiple episodes of it since that time. Cardiology was consulted and performed a cardiac catheterization that revealed multivessel CAD not amenable to PCI. We have been consulted for surgical revascularization.       Past Medical History:        Diagnosis Date    CAD (coronary artery disease) 46    Diabetes mellitus ([facility] [address] 12/23/2011    A1c 15    Hyperlipidemia 1992    Hypertension     Kidney stones 1987       Past Surgical History:        Procedure Laterality Date    APPENDECTOMY  1970    CERVICAL FUSION  1995    COLONOSCOPY  1987&2002    WNL    CORONARY ANGIOPLASTY WITH STENT PLACEMENT  2004    [facility] w/ stents    CORONARY ANGIOPLASTY WITH STENT PLACEMENT  2008    CYSTOSCOPY Left 6/27/2019    CYSTOSCOPY, LEFT URETEROSCOPY, LEFT STONE MANIPULATION, LASER,  LEFT STENT PLACEMENT performed by Yesi Castellon MD at [address] / [address] / STONE Left 7/11/2019    CYSTOSCOPY, LEFT STENT REMOVAL  CPT CODE - 26048 performed by Yesi Castellon MD at [facility], COLON, DIAGNOSTIC     [address]  1987&1996    bilateral inguinal    UPPER GASTROINTESTINAL ENDOSCOPY  2002    errosive esophagitis    UPPER GASTROINTESTINAL ENDOSCOPY  08/28/2008 arthritis, gout. Respiratory:  No SOB, emphysema, asthma. Integumentary:  No dermatitis, itching, rash. Neurological:  No stroke, TIAs, seizures. Psychiatric:  No depression, anxiety. Endocrine: No thyroid issues. +diabetes  Hematologic:  No bleeding, easy bruising. Immunologic:  No known cancer, steroid therapies. PHYSICAL EXAM:    VITALS:  /72   Pulse 80   Temp 98 °F (36.7 °C) (Oral)   Resp 16   Ht 5' 9\" (1.753 m)   Wt 270 lb (122.5 kg)   SpO2 96%   BMI 39.87 kg/m²     Constitutional:   Well developed and nourished male. No acute distress. +severe obesity. Eyes:  lids and lashes normal, pupils equal, round and reactive to light, extra ocular muscles intact, sclera clear, conjunctiva normal    Head/ENT:   normal teeth, gums, & palate. Moist mucus membranes. No cyanosis or pallor. Neck:  supple, symmetrical, trachea midline, no lymphadenopathy, no jugular venous distension, no carotid bruits and MASSES:  no masses. No thyromegaly. Lungs:  no increased work of breathing, good air exchange, no retractions and clear to auscultation, no crackles or wheezing. No tactile fremitus. Cardiovascular:  regular rate and rhythm, S1, S2 normal, no murmur, click, rub or gallop. Apical impulse in 5th intercostal space. Pulses:  Right dorsalis pedis 1, Left dorsalis pedis 1, Right posterior tibial 1, Left Posterior tibial 1, Right radial 2, and Left radial 2. Abdomen:  hypoactive bowel sounds, non-tender, aorta KATELYN 2/2 body habitus. No hepatosplenomegaly or masses. Musculoskeletal:  Back is straight and non-tender, full ROM of upper and lower extremities. No kyphosis or scoliosis. Extremities:  Warm, pink, no clubbing, cyanosis, petechiae, ischemia, or deformities. no peripheral edema.     Skin: no rashes, no ecchymoses, no petechiae, no nodules, no jaundice    Neurological/Psychiatric: oriented, normal mood, CN II-XII intact    DATA:  EK/15/20 Normal sinus rhythm Results   Component Value Date    TROPONINI 0.33 09/13/2020    TROPONINI 0.19 09/13/2020    TROPONINI 0.05 09/13/2020     CXR: 9/13/20 no acute cardiopulmonary disease     ECHO: pending     [facility]: 9/14/20 with Dr. Rashi Lee  LVEDP  18   GRADIENT ACROSS AORTIC VALVE  none   LV FUNCTION EF 40 %   WALL MOTION  base to mid inferior hypokinesis   MITRAL REGURGITATION  mild         CORONARY ARTERIES  LM Less than 10% logyaoqp-asx-xdmtql stenosis            LAD Proximal-mid stents are noted with 50% in-stent restenosis. Distal vessel has 50 to 60% stenosis. There are well-developed left-to-right collaterals.     D1 is a small to medium sized vessel with ostial/proximal/mid 60 to 70% stenosis.       LCX  Large vessel, proximal and mid 90% stenosis. The distal vessel into OM 2 is stented and the stent is widely patent with less than 10% stenosis.       RI Tortuous, small vessel,Less than 10% wxacgvbm-dry-zuymia stenosis            RCA Dominant, proximal 10 to 20% stenosis, the proximal and mid vessel are extensively stented and there is a mid-distal 100% occlusion with well-developed left to right collaterals.        [id number] Score for Atrial Fibrillation Stroke Risk   Risk   Factors  Component Value   C CHF No 0   H HTN Yes 1   A2 Age >= 76 No,  (69 y.o.) 0   D DM Yes 1   S2 Prior Stroke/TIA No 0   V Vascular Disease Yes 1   A Age 74-69 Yes,  (69 y.o.) 1   Sc Sex male 0    [id number]  Score  4   Score last updated 9/15/20 4:90 PM EDT    Click here for a link to the UpToDate guideline \"Atrial Fibrillation: Anticoagulation therapy to prevent embolization    Disclaimer: Risk Score calculation is dependent on accuracy of patient problem list and past encounter diagnosis.       ASSESSMENT AND PLAN:  STS Cardiac Surgery Risk profile: CABG (prior to echo results)    Mortality:  1.13%  Renal Failure:  1.50%  Permanent Stroke:  0.88%  Prolonged Ventilation:  6.33%  Deep Sternal Infection: 0.27%  Reoperation:  1.92%  Morbidity and Mortality:  9.66%  Short LOS:  48.16%  Long LOS:  2.93%    Pt is a 79 severely obese male with multivessel CAD not amenable to PCI. Continue with pre-op work up and risk stratification. His last dose of Effient was on 9/5. Cangrelor was started but he c/o SOB. It was changed back to Integrilin and pt appears to be tolerating it well. Will plan for OR on Friday with Dr. Ventura Leyden. Carotid duplex. Vein mapping. PFTs. Echo. Urine. Aj Galdamez CNP   9/15/2020  6:45 AM  Note reviewed, events of last 24 hours reviewed along with vital signs and I/Os and patient examined. Assessment and plans discussed and are as outlined above.      Eric Osman MD, FACS, [facility], [place], [place]

## 2020-09-15 NOTE — CONSULTS
Pharmacy to order Cangrelor per Ms. Fox/RENNY    - last dose of Effient  on 9/5/20  - Integrelin stopped ~1 am this morning   - PLT= 180  - Wt: 122kg  - will start cangrelor at 0.75mcg/kg/min until OR on 9/18am.  Perla Amato/Barbara. 9/15/20 9:12 AM EDT

## 2020-09-15 NOTE — CARE COORDINATION
CASE MANAGEMENT INITIAL ASSESSMENT      Reviewed chart and completed assessment with: patient   Explained Case Management role/services. Primary contact information: Bernardo Garrido Decision Maker: sister   Who do you trust or have selected to make healthcare decisions for you? Name:   Marzena Schmid   Phone  Number: [phone]  Can this person be reached and be able to respond quickly, such as within a few minutes or hours? yes    Admit date/status: 9/13/20 Inpatient   Diagnosis: NSTEMI   Is this a Readmission?: yes    Insurance: [employer]   Precert required for SNF - Y        3 night stay required -  N    Living arrangements, Adls, care needs, prior to admission: lives home alone, wife just recently passed away     Transportation: patient     Durable Medical Equipment at home: none      Services in the home and/or outpatient, prior to admission: none    Dialysis Facility (if applicable)   · Name:  · Address:  · Dialysis Schedule:  · Phone:  · Fax:    PT/OT recs: none    Hospital Exemption Notification (HEN): not initiated     Barriers to discharge: none    Plan/comments: Patient lives home alone and is independent at home. He is scheduled for CABG on Friday and states his sister is going to come stay with him for about a week after he gets home. His wife recently passed away and he is still dealing with a great amount of grief. He refused a referral to spiritual services for prayer. He states he has a good support system between his sister and friends. Informed him that CM will continue to follow during hospital stay and plan discharge accordingly. He verbalized understanding.      ECOC on chart for MD signature

## 2020-09-15 NOTE — PROGRESS NOTES
Hospitalist Progress Note      PCP: Shelby Perez MD    Date of Admission: 9/13/2020    Chief Complaint: Chest pain      Subjective: Intermittent episodes of mild chest pressure. Overall much improved. Denies dyspnea. Otherwise no new acute complaints. Medications:  Reviewed    Infusion Medications    eptifibatide 2 mcg/kg/min (09/15/20 1604)    nitroGLYCERIN 35 mcg/min (09/14/20 0230)     Scheduled Medications    sodium chloride flush  10 mL Intravenous 2 times per day    aspirin  81 mg Oral Daily    carvedilol  3.125 mg Oral BID WC    glipiZIDE  5 mg Oral Daily    losartan  100 mg Oral Daily    pantoprazole  40 mg Oral BID AC    pravastatin  80 mg Oral Nightly    tamsulosin  0.4 mg Oral Daily    insulin lispro  0-12 Units Subcutaneous TID WC    insulin lispro  0-6 Units Subcutaneous Nightly     PRN Meds: perflutren lipid microspheres, sodium chloride flush, acetaminophen, acetaminophen **OR** acetaminophen, polyethylene glycol, promethazine **OR** ondansetron      Intake/Output Summary (Last 24 hours) at 9/15/2020 1609  Last data filed at 9/15/2020 0837  Gross per 24 hour   Intake 480 ml   Output 1950 ml   Net -1470 ml       Exam:    /85   Pulse 111   Temp 97.4 °F (36.3 °C) (Axillary)   Resp 16   Ht 5' 9\" (1.753 m)   Wt 270 lb (122.5 kg)   SpO2 95%   BMI 39.87 kg/m²     Gen/overall appearance: Not in acute distress. Alert. Head: Normocephalic, atraumatic  Eyes: EOMI, no scleral icterus  CVS: regular rate and rhythm, Normal S1S2  Pulm: Clear to auscultation bilaterally. No crackles/wheezes  Gastrointestinal: Soft, nontender, obese, no guarding or rebound  Extremities: No edema.  No erythema or warmth  Neuro: No gross focal deficits noted  Skin: Warm, dry    Labs:   Recent Labs     09/13/20  1139 09/14/20  0412 09/15/20  0544   WBC 7.7 9.3 7.1   HGB 14.8 13.1* 13.3*   HCT 44.6 39.8* 40.1*    164 180     Recent Labs     09/13/20  1139 09/15/20  0544    138   K 4.1 4.0    104   CO2 20* 23   BUN 21* 19   CREATININE 1.3 1.1   CALCIUM 9.7 9.3     Recent Labs     09/13/20  1139   AST 14*   ALT 22   BILITOT 0.6   ALKPHOS 70     No results for input(s): INR in the last 72 hours. Recent Labs     09/13/20  1139 09/13/20  1554 09/13/20  1817   TROPONINI 0.05* 0.19* 0.33*       Assessment/Plan:    Active Hospital Problems    Diagnosis Date Noted    NSTEMI (non-ST elevated myocardial infarction) ([facility] [address] [I21.4]        NSTEMI s/p arthrectomy to RCA. Cath remarkable for MV disease  CAD with h/o multiple PCIs in the past  ASA and statin  CTS eval for possible CABG  PRN nitro     DM type 2   Hold home Metformin and Actos.    Sliding Scale Insulin for now  Hgb a1c 7.2  Titrate off actos on d/c     PMH of htn, hld, GERD, morbid obesity      Diet: DIET CARDIAC; No Caffeine  Code Status: Full Code    Dispo - Ernesto Romberg, MD

## 2020-09-15 NOTE — PLAN OF CARE
Problem: Falls - Risk of:  Goal: Will remain free from falls  Description: Will remain free from falls  Outcome: Ongoing     Problem: Discharge Planning:  Goal: Discharged to appropriate level of care  Description: Discharged to appropriate level of care  Outcome: Ongoing     Problem: Serum Glucose Level - Abnormal:  Goal: Ability to maintain appropriate glucose levels will improve to within specified parameters  Description: Ability to maintain appropriate glucose levels will improve to within specified parameters  Outcome: Ongoing

## 2020-09-16 ENCOUNTER — ANESTHESIA EVENT (OUTPATIENT)
Dept: OPERATING ROOM | Age: 70
DRG: 232 | End: 2020-09-16
Payer: MEDICARE

## 2020-09-16 LAB
BACTERIA: ABNORMAL /HPF
BILIRUBIN URINE: NEGATIVE
BLOOD, URINE: ABNORMAL
CLARITY: CLEAR
COLOR: YELLOW
EPITHELIAL CELLS, UA: ABNORMAL /HPF (ref 0–5)
GLUCOSE BLD-MCNC: 170 MG/DL (ref 70–99)
GLUCOSE BLD-MCNC: 174 MG/DL (ref 70–99)
GLUCOSE BLD-MCNC: 179 MG/DL (ref 70–99)
GLUCOSE BLD-MCNC: 202 MG/DL (ref 70–99)
GLUCOSE URINE: NEGATIVE MG/DL
KETONES, URINE: NEGATIVE MG/DL
LEUKOCYTE ESTERASE, URINE: NEGATIVE
MICROSCOPIC EXAMINATION: YES
NITRITE, URINE: NEGATIVE
OCCULT BLOOD SCREENING: NORMAL
PERFORMED ON: ABNORMAL
PH UA: 6.5 (ref 5–8)
PROTEIN UA: NEGATIVE MG/DL
RBC UA: ABNORMAL /HPF (ref 0–4)
SPECIFIC GRAVITY UA: 1.01 (ref 1–1.03)
URINE REFLEX TO CULTURE: ABNORMAL
URINE TYPE: ABNORMAL
UROBILINOGEN, URINE: 0.2 E.U./DL
WBC UA: ABNORMAL /HPF (ref 0–5)

## 2020-09-16 PROCEDURE — 2580000003 HC RX 258: Performed by: INTERNAL MEDICINE

## 2020-09-16 PROCEDURE — 6370000000 HC RX 637 (ALT 250 FOR IP): Performed by: INTERNAL MEDICINE

## 2020-09-16 PROCEDURE — 94060 EVALUATION OF WHEEZING: CPT

## 2020-09-16 PROCEDURE — 6370000000 HC RX 637 (ALT 250 FOR IP): Performed by: THORACIC SURGERY (CARDIOTHORACIC VASCULAR SURGERY)

## 2020-09-16 PROCEDURE — 6370000000 HC RX 637 (ALT 250 FOR IP): Performed by: NURSE PRACTITIONER

## 2020-09-16 PROCEDURE — 94760 N-INVAS EAR/PLS OXIMETRY 1: CPT

## 2020-09-16 PROCEDURE — 94726 PLETHYSMOGRAPHY LUNG VOLUMES: CPT

## 2020-09-16 PROCEDURE — 94729 DIFFUSING CAPACITY: CPT

## 2020-09-16 PROCEDURE — 99232 SBSQ HOSP IP/OBS MODERATE 35: CPT | Performed by: NURSE PRACTITIONER

## 2020-09-16 PROCEDURE — 2060000000 HC ICU INTERMEDIATE R&B

## 2020-09-16 PROCEDURE — 6360000002 HC RX W HCPCS: Performed by: INTERNAL MEDICINE

## 2020-09-16 PROCEDURE — G0328 FECAL BLOOD SCRN IMMUNOASSAY: HCPCS

## 2020-09-16 PROCEDURE — 6360000002 HC RX W HCPCS: Performed by: NURSE PRACTITIONER

## 2020-09-16 RX ORDER — FLUTICASONE PROPIONATE 50 MCG
1 SPRAY, SUSPENSION (ML) NASAL DAILY
Status: DISCONTINUED | OUTPATIENT
Start: 2020-09-16 | End: 2020-09-26 | Stop reason: HOSPADM

## 2020-09-16 RX ORDER — ALBUTEROL SULFATE 90 UG/1
4 AEROSOL, METERED RESPIRATORY (INHALATION) ONCE
Status: COMPLETED | OUTPATIENT
Start: 2020-09-16 | End: 2020-09-16

## 2020-09-16 RX ORDER — CHOLECALCIFEROL (VITAMIN D3) 125 MCG
5 CAPSULE ORAL NIGHTLY PRN
Status: DISCONTINUED | OUTPATIENT
Start: 2020-09-16 | End: 2020-09-26 | Stop reason: HOSPADM

## 2020-09-16 RX ORDER — DEXTROSE MONOHYDRATE 25 G/50ML
12.5 INJECTION, SOLUTION INTRAVENOUS PRN
Status: DISCONTINUED | OUTPATIENT
Start: 2020-09-16 | End: 2020-09-18

## 2020-09-16 RX ORDER — ALPRAZOLAM 0.25 MG/1
0.25 TABLET ORAL 3 TIMES DAILY PRN
Status: DISCONTINUED | OUTPATIENT
Start: 2020-09-16 | End: 2020-09-18

## 2020-09-16 RX ORDER — NICOTINE POLACRILEX 4 MG
15 LOZENGE BUCCAL PRN
Status: DISCONTINUED | OUTPATIENT
Start: 2020-09-16 | End: 2020-09-18

## 2020-09-16 RX ORDER — DEXTROSE MONOHYDRATE 50 MG/ML
100 INJECTION, SOLUTION INTRAVENOUS PRN
Status: DISCONTINUED | OUTPATIENT
Start: 2020-09-16 | End: 2020-09-18

## 2020-09-16 RX ADMIN — GLIPIZIDE 5 MG: 5 TABLET ORAL at 08:22

## 2020-09-16 RX ADMIN — PRAVASTATIN SODIUM 80 MG: 40 TABLET ORAL at 20:09

## 2020-09-16 RX ADMIN — ACETAMINOPHEN 650 MG: 325 TABLET ORAL at 05:55

## 2020-09-16 RX ADMIN — PANTOPRAZOLE SODIUM 40 MG: 40 TABLET, DELAYED RELEASE ORAL at 05:54

## 2020-09-16 RX ADMIN — Medication 4 PUFF: at 09:11

## 2020-09-16 RX ADMIN — Medication 10 ML: at 05:19

## 2020-09-16 RX ADMIN — CARVEDILOL 3.12 MG: 3.12 TABLET, FILM COATED ORAL at 17:39

## 2020-09-16 RX ADMIN — LOSARTAN POTASSIUM 100 MG: 100 TABLET, FILM COATED ORAL at 08:22

## 2020-09-16 RX ADMIN — ASPIRIN 81 MG: 81 TABLET ORAL at 08:22

## 2020-09-16 RX ADMIN — ONDANSETRON 4 MG: 2 INJECTION INTRAMUSCULAR; INTRAVENOUS at 05:19

## 2020-09-16 RX ADMIN — ALPRAZOLAM 0.25 MG: 0.25 TABLET ORAL at 20:09

## 2020-09-16 RX ADMIN — INSULIN LISPRO 4 UNITS: 100 INJECTION, SOLUTION INTRAVENOUS; SUBCUTANEOUS at 11:51

## 2020-09-16 RX ADMIN — EPTIFIBATIDE 2 MCG/KG/MIN: 0.75 INJECTION INTRAVENOUS at 13:57

## 2020-09-16 RX ADMIN — EPTIFIBATIDE 2 MCG/KG/MIN: 0.75 INJECTION INTRAVENOUS at 19:59

## 2020-09-16 RX ADMIN — EPTIFIBATIDE 2 MCG/KG/MIN: 0.75 INJECTION INTRAVENOUS at 02:56

## 2020-09-16 RX ADMIN — CARVEDILOL 3.12 MG: 3.12 TABLET, FILM COATED ORAL at 08:22

## 2020-09-16 RX ADMIN — PANTOPRAZOLE SODIUM 40 MG: 40 TABLET, DELAYED RELEASE ORAL at 14:37

## 2020-09-16 RX ADMIN — MELATONIN TAB 5 MG 5 MG: 5 TAB at 20:34

## 2020-09-16 RX ADMIN — EPTIFIBATIDE 2 MCG/KG/MIN: 0.75 INJECTION INTRAVENOUS at 08:22

## 2020-09-16 RX ADMIN — INSULIN LISPRO 1 UNITS: 100 INJECTION, SOLUTION INTRAVENOUS; SUBCUTANEOUS at 20:09

## 2020-09-16 RX ADMIN — TAMSULOSIN HYDROCHLORIDE 0.4 MG: 0.4 CAPSULE ORAL at 08:22

## 2020-09-16 RX ADMIN — FLUTICASONE PROPIONATE 1 SPRAY: 50 SPRAY, METERED NASAL at 14:37

## 2020-09-16 ASSESSMENT — PAIN SCALES - GENERAL
PAINLEVEL_OUTOF10: 0
PAINLEVEL_OUTOF10: 4

## 2020-09-16 NOTE — FLOWSHEET NOTE
09/15/20 2116   Assessment   Charting Type Shift assessment   Neurological   Neuro (WDL) WDL   Level of Consciousness 0   Orientation Level Oriented X4;Oriented to place;Oriented to time;Oriented to situation;Oriented to person   HEENT   HEENT (WDL) WDL   Respiratory   Respiratory (WDL) WDL   Cardiac   Cardiac (WDL) WDL   Cardiac Regularity Regular   Heart Sounds S1, S2   Cardiac Rhythm NSR   Rhythm Interpretation   Pulse 84   Cardiac Monitor   Telemetry Monitor On Yes   Telemetry Audible Yes   Telemetry Alarms Set Yes   Gastrointestinal   Abdominal (WDL) WDL   RUQ Bowel Sounds Active   LUQ Bowel Sounds Active   RLQ Bowel Sounds Active   LLQ Bowel Sounds Active   Peripheral Vascular   Peripheral Vascular (WDL) WDL   RUE Neurovascular Assessment   R Radial Pulse +2   Puncture Site Assessment 1   Location Radial - right   Site Assessment No redness, drainage, swelling or hematoma   Dressing Applied Transparent occlusive dressing   Skin Color/Condition   Skin Color/Condition (WDL) WDL   Skin Integrity   Skin Integrity (WDL) X   Skin Integrity Bruising   Location Scattered   Musculoskeletal   Musculoskeletal (WDL) WDL   Genitourinary   Genitourinary (WDL) WDL   Anus/Rectum   Anus/Rectum (WDL) WDL   Psychosocial   Psychosocial (WDL) WDL

## 2020-09-16 NOTE — PROGRESS NOTES
CVTS Thoracic Progress Note:          CC: multivessel CAD    Subj: awake, sitting up in bed, somewhat tearful - says it's his depression. Obj:    Blood pressure 125/81, pulse 86, temperature 97.6 °F (36.4 °C), temperature source Oral, resp. rate 20, height 5' 9\" (1.753 m), weight 266 lb 6.4 oz (120.8 kg), SpO2 96 %. Lungs CTAB   Abdomen rotund, soft, non-tender   -525-750= 1775 ml in 24 hrs; Cr 1.1    Diagnostics:   Recent Labs     09/13/20  1139 09/14/20  0412 09/15/20  0544   WBC 7.7 9.3 7.1   HGB 14.8 13.1* 13.3*   HCT 44.6 39.8* 40.1*    164 180                                                                  Recent Labs     09/13/20  1139 09/15/20  0544    138   K 4.1 4.0    104   CO2 20* 23   BUN 21* 19   CREATININE 1.3 1.1   GLUCOSE 350* 152*          No results for input(s): MG in the last 72 hours. Recent Labs     09/13/20  1139 09/13/20  1554 09/13/20  1817   TROPONINI 0.05* 0.19* 0.33*       CXR: 9/13/20 no acute cardiopulmonary disease    Urine: 9/15/20 negative    PFT's: 9/16/20   Results for Manuel Gutiérrez (MRN [id number]) as of 9/16/2020 11:48   Ref. Range 9/16/2020 00:00   DLCO %Pred Latest Units: % 97   FEV1 %Pred-Post Latest Units: % 97   FEV1 %Pred-Pre Latest Units: % 97   FEV1/FVC-Post Latest Units: % 84   FEV1/FVC-Pre Latest Units: % 80   FVC %Pred-Post Latest Units: L 85   FVC %Pred-Pre Latest Units: % 90   TLC %Pred Latest Units: % 66      Carotid dopplers: 9/15/20   Tech Comments    Right    The right internal carotid artery reveals a <50% diameter reducing stenosis    based on velocity criteria. The right vertebral artery demonstrates normal antegrade flow. The right subclavian artery is visualized and demonstrates multiphasic flow. Left    The left internal carotid artery reveals a <50% diameter reducing stenosis    based on velocity criteria. The internal carotid artery was tortuous in nature.     The left vertebral artery demonstrates normal

## 2020-09-16 NOTE — PROGRESS NOTES
Patient has not slept tonight and is extremely anxious about up coming CABG. Patient c/o nausea PRN Zofran given at this time. Will continue to monitor.

## 2020-09-16 NOTE — PROGRESS NOTES
anger, anxiety  4. Will follow peripherally until surgery    Shyann Shcwartz, APRN - CNP, 9/16/2020, 1:18 PM  [employer]   [phone]       Telemetry: SR   NYHA: III    Physical Exam:  General:  Awake, alert, NAD  Skin:  Warm and dry  Neck:  JVP 8  Chest:  Clear to auscultation   Cardiovascular:  RRR, normal S1S2, no m/g/r  Abdomen:  Soft, nontender, +bowel sounds  Extremities:  1+ non-pitting edema  right radial site without ooze, bruise or hematoma, dressing C,D,I, 2+ pulse      Medications:    fluticasone  1 spray Each Nostril Daily    sodium chloride flush  10 mL Intravenous 2 times per day    aspirin  81 mg Oral Daily    carvedilol  3.125 mg Oral BID WC    glipiZIDE  5 mg Oral Daily    losartan  100 mg Oral Daily    pantoprazole  40 mg Oral BID AC    pravastatin  80 mg Oral Nightly    tamsulosin  0.4 mg Oral Daily    insulin lispro  0-12 Units Subcutaneous TID WC    insulin lispro  0-6 Units Subcutaneous Nightly      dextrose      eptifibatide 2 mcg/kg/min (09/16/20 0822)    nitroGLYCERIN 35 mcg/min (09/14/20 0230)       Lab Data: Lab results independently reviewed by myself 9/15/20   CBC:   Recent Labs     09/14/20  0412 09/15/20  0544   WBC 9.3 7.1   HGB 13.1* 13.3*    180     BMP:    Recent Labs     09/15/20  0544      K 4.0   CO2 23   BUN 19   CREATININE 1.1     INR:  No results for input(s): INR in the last 72 hours. BNP:    No results for input(s): PROBNP in the last 72 hours.   Cardiac Enzymes:   Recent Labs     09/13/20  1554 09/13/20  1817   TROPONINI 0.19* 0.33*     Lipids:   Lab Results   Component Value Date    TRIG 188 09/14/2020    TRIG 183 09/05/2020    HDL 33 09/14/2020    HDL 39 09/05/2020    HDL 35 09/26/2011    HDL 34 03/23/2011    LDLCALC 79 09/14/2020    LDLCALC 133 09/05/2020       Cardiac Imaging:   CARDIAC CATH 9/14/20:   FINDINGS     LVGRAM   LVEDP  18   GRADIENT ACROSS AORTIC VALVE  none   LV FUNCTION EF 40 %   WALL MOTION  base to mid and procedure was felt to be completed and it was felt stenting should be deferred as patient has had several stenting procedures already and IVUS did reveal multiple layers of stents. There was 0% residual stenosis. There was MOISÉS 3 flow before and after PCI.        CONCLUSIONS:   Successful atherectomy of RCA  We will consider staged PCI of circumflex  We will also consult with CT surgery as patient has had several PCI procedures and continues to have refractory CAD with ASHD progression.          CARDIAC CATH/ PCI 3/3/20:   Procedures: Cor angio, PTCA, sedation     Selective tyler RCA for staged PCI     RCA mid in stent 80% to 40%              3.5 x 15 cutting balloon              4.0 x 20 NC balloon to 24 katiuska     Consider laser atherectomy +/- brachytherapy if recurrent angina/restenosis     Coronary angiogram 2/24/2020:  LM <20%  LAD 30% prox, 70% mid in stent              D2 50% prox  Cx 40-50%              D3 99% prox  RCA 80% mid in stent  LVEF 60%  PTCA LAD              3.0 x 15 angiosculpt cutting balloon to 3.5  PTCA OM3              2.75 x 15 FARHAN  DAPT, statin, BBlk  Home in am  PTCA RCA next week     Echo 2/22/2020:   Normal left ventricle systolic function with an estimated ejection fraction   of 55-60%. No regional wall motion abnormalities are seen. Normal left ventricular diastolic filling pressure.

## 2020-09-16 NOTE — PROGRESS NOTES
Hospitalist Progress Note      PCP: Tj Mason MD    Date of Admission: 9/13/2020    Chief Complaint: Chest pain      Subjective:   Chest pressure improved. Denies dyspnea. Otherwise no new acute complaints  Planned for CABG friday    Medications:  Reviewed    Infusion Medications    dextrose      eptifibatide 2 mcg/kg/min (09/16/20 1357)    nitroGLYCERIN 35 mcg/min (09/14/20 0230)     Scheduled Medications    fluticasone  1 spray Each Nostril Daily    sodium chloride flush  10 mL Intravenous 2 times per day    aspirin  81 mg Oral Daily    carvedilol  3.125 mg Oral BID WC    glipiZIDE  5 mg Oral Daily    losartan  100 mg Oral Daily    pantoprazole  40 mg Oral BID AC    pravastatin  80 mg Oral Nightly    tamsulosin  0.4 mg Oral Daily    insulin lispro  0-12 Units Subcutaneous TID WC    insulin lispro  0-6 Units Subcutaneous Nightly     PRN Meds: glucose, dextrose, glucagon (rDNA), dextrose, melatonin, ALPRAZolam, perflutren lipid microspheres, sodium chloride flush, acetaminophen, acetaminophen **OR** acetaminophen, polyethylene glycol, promethazine **OR** ondansetron      Intake/Output Summary (Last 24 hours) at 9/16/2020 1609  Last data filed at 9/16/2020 1359  Gross per 24 hour   Intake 494.68 ml   Output 1875 ml   Net -1380.32 ml       Exam:    /70   Pulse 98   Temp 97.6 °F (36.4 °C) (Oral)   Resp 18   Ht 5' 9\" (1.753 m)   Wt 266 lb 6.4 oz (120.8 kg)   SpO2 92%   BMI 39.34 kg/m²     Gen/overall appearance: Not in acute distress. Alert. Head: Normocephalic, atraumatic  Eyes: EOMI, no scleral icterus  CVS: regular rate and rhythm, Normal S1S2  Pulm: Clear to auscultation bilaterally. No crackles/wheezes  Gastrointestinal: Soft, nontender, obese, no guarding or rebound  Extremities: No edema.  No erythema or warmth  Neuro: No gross focal deficits noted  Skin: Warm, dry    Labs:   Recent Labs     09/14/20  0412 09/15/20  0544   WBC 9.3 7.1   HGB 13.1* 13.3*   HCT 39.8* 40.1*  180     Recent Labs     09/15/20  0544      K 4.0      CO2 23   BUN 19   CREATININE 1.1   CALCIUM 9.3     No results for input(s): AST, ALT, BILIDIR, BILITOT, ALKPHOS in the last 72 hours. No results for input(s): INR in the last 72 hours. Recent Labs     09/13/20  1817   TROPONINI 0.33*       Assessment/Plan:    Active Hospital Problems    Diagnosis Date Noted    Anxiety and depression [F41.9, F32.9]     NSTEMI (non-ST elevated myocardial infarction) ([facility] [address] [I21.4]     Acute chest pain [R07.9] 05/04/2016       NSTEMI s/p arthrectomy to RCA. Cath remarkable for MV disease  CAD with h/o multiple PCIs in the past  ASA and statin  On integrilin  PRN nitro  CTS following; planned for CABG Friday     DM type 2   Hold home Metformin and Actos.    Sliding Scale Insulin for now  Hgb a1c 7.2  Titrate off actos on d/c     PMH of htn, hld, GERD, morbid obesity      Diet: DIET CARDIAC; No Caffeine  Code Status: Full Code    Dispo - Ailyn Grayson MD

## 2020-09-17 LAB
ABO/RH: NORMAL
ANION GAP SERPL CALCULATED.3IONS-SCNC: 11 MMOL/L (ref 3–16)
ANTIBODY SCREEN: NORMAL
BUN BLDV-MCNC: 26 MG/DL (ref 7–20)
CALCIUM SERPL-MCNC: 9.6 MG/DL (ref 8.3–10.6)
CHLORIDE BLD-SCNC: 102 MMOL/L (ref 99–110)
CO2: 24 MMOL/L (ref 21–32)
CREAT SERPL-MCNC: 1.4 MG/DL (ref 0.8–1.3)
GFR AFRICAN AMERICAN: >60
GFR NON-AFRICAN AMERICAN: 50
GLUCOSE BLD-MCNC: 174 MG/DL (ref 70–99)
GLUCOSE BLD-MCNC: 175 MG/DL (ref 70–99)
GLUCOSE BLD-MCNC: 190 MG/DL (ref 70–99)
GLUCOSE BLD-MCNC: 202 MG/DL (ref 70–99)
GLUCOSE BLD-MCNC: 248 MG/DL (ref 70–99)
HCT VFR BLD CALC: 40.4 % (ref 40.5–52.5)
HEMOGLOBIN: 13.4 G/DL (ref 13.5–17.5)
LV EF: 58 %
LVEF MODALITY: NORMAL
MCH RBC QN AUTO: 28.3 PG (ref 26–34)
MCHC RBC AUTO-ENTMCNC: 33.1 G/DL (ref 31–36)
MCV RBC AUTO: 85.6 FL (ref 80–100)
PDW BLD-RTO: 14.6 % (ref 12.4–15.4)
PERFORMED ON: ABNORMAL
PLATELET # BLD: 179 K/UL (ref 135–450)
PMV BLD AUTO: 8.8 FL (ref 5–10.5)
POTASSIUM SERPL-SCNC: 4.2 MMOL/L (ref 3.5–5.1)
RBC # BLD: 4.72 M/UL (ref 4.2–5.9)
SODIUM BLD-SCNC: 137 MMOL/L (ref 136–145)
WBC # BLD: 8.1 K/UL (ref 4–11)

## 2020-09-17 PROCEDURE — 86900 BLOOD TYPING SEROLOGIC ABO: CPT

## 2020-09-17 PROCEDURE — 86923 COMPATIBILITY TEST ELECTRIC: CPT

## 2020-09-17 PROCEDURE — 86901 BLOOD TYPING SEROLOGIC RH(D): CPT

## 2020-09-17 PROCEDURE — C8929 TTE W OR WO FOL WCON,DOPPLER: HCPCS

## 2020-09-17 PROCEDURE — 85027 COMPLETE CBC AUTOMATED: CPT

## 2020-09-17 PROCEDURE — 86850 RBC ANTIBODY SCREEN: CPT

## 2020-09-17 PROCEDURE — 6360000002 HC RX W HCPCS: Performed by: NURSE PRACTITIONER

## 2020-09-17 PROCEDURE — 2060000000 HC ICU INTERMEDIATE R&B

## 2020-09-17 PROCEDURE — P9012 CRYOPRECIPITATE EACH UNIT: HCPCS

## 2020-09-17 PROCEDURE — P9016 RBC LEUKOCYTES REDUCED: HCPCS

## 2020-09-17 PROCEDURE — P9017 PLASMA 1 DONOR FRZ W/IN 8 HR: HCPCS

## 2020-09-17 PROCEDURE — 6360000004 HC RX CONTRAST MEDICATION: Performed by: NURSE PRACTITIONER

## 2020-09-17 PROCEDURE — 99232 SBSQ HOSP IP/OBS MODERATE 35: CPT | Performed by: NURSE PRACTITIONER

## 2020-09-17 PROCEDURE — 80048 BASIC METABOLIC PNL TOTAL CA: CPT

## 2020-09-17 PROCEDURE — P9035 PLATELET PHERES LEUKOREDUCED: HCPCS

## 2020-09-17 PROCEDURE — 6370000000 HC RX 637 (ALT 250 FOR IP): Performed by: NURSE PRACTITIONER

## 2020-09-17 PROCEDURE — 6370000000 HC RX 637 (ALT 250 FOR IP): Performed by: INTERNAL MEDICINE

## 2020-09-17 RX ORDER — ASPIRIN 81 MG/1
81 TABLET ORAL ONCE
Status: COMPLETED | OUTPATIENT
Start: 2020-09-18 | End: 2020-09-18

## 2020-09-17 RX ORDER — MIDAZOLAM HYDROCHLORIDE 1 MG/ML
2 INJECTION INTRAMUSCULAR; INTRAVENOUS ONCE
Status: COMPLETED | OUTPATIENT
Start: 2020-09-18 | End: 2020-09-18

## 2020-09-17 RX ORDER — CARVEDILOL 3.12 MG/1
3.12 TABLET ORAL ONCE
Status: COMPLETED | OUTPATIENT
Start: 2020-09-18 | End: 2020-09-18

## 2020-09-17 RX ORDER — CHLORHEXIDINE GLUCONATE 4 G/100ML
SOLUTION TOPICAL SEE ADMIN INSTRUCTIONS
Status: DISCONTINUED | OUTPATIENT
Start: 2020-09-17 | End: 2020-09-18

## 2020-09-17 RX ORDER — CHLORHEXIDINE GLUCONATE 0.12 MG/ML
15 RINSE ORAL ONCE
Status: COMPLETED | OUTPATIENT
Start: 2020-09-18 | End: 2020-09-18

## 2020-09-17 RX ORDER — SODIUM CHLORIDE 9 MG/ML
INJECTION, SOLUTION INTRAVENOUS CONTINUOUS
Status: DISCONTINUED | OUTPATIENT
Start: 2020-09-18 | End: 2020-09-18

## 2020-09-17 RX ORDER — BISACODYL 10 MG
10 SUPPOSITORY, RECTAL RECTAL ONCE
Status: COMPLETED | OUTPATIENT
Start: 2020-09-17 | End: 2020-09-17

## 2020-09-17 RX ORDER — CHLORHEXIDINE GLUCONATE 0.12 MG/ML
15 RINSE ORAL ONCE
Status: DISCONTINUED | OUTPATIENT
Start: 2020-09-18 | End: 2020-09-17

## 2020-09-17 RX ADMIN — CARVEDILOL 3.12 MG: 3.12 TABLET, FILM COATED ORAL at 16:45

## 2020-09-17 RX ADMIN — EPTIFIBATIDE 2 MCG/KG/MIN: 0.75 INJECTION INTRAVENOUS at 17:59

## 2020-09-17 RX ADMIN — INSULIN LISPRO 2 UNITS: 100 INJECTION, SOLUTION INTRAVENOUS; SUBCUTANEOUS at 20:51

## 2020-09-17 RX ADMIN — LOSARTAN POTASSIUM 100 MG: 100 TABLET, FILM COATED ORAL at 07:55

## 2020-09-17 RX ADMIN — CARVEDILOL 3.12 MG: 3.12 TABLET, FILM COATED ORAL at 07:55

## 2020-09-17 RX ADMIN — EPTIFIBATIDE 2 MCG/KG/MIN: 0.75 INJECTION INTRAVENOUS at 01:50

## 2020-09-17 RX ADMIN — INSULIN LISPRO 4 UNITS: 100 INJECTION, SOLUTION INTRAVENOUS; SUBCUTANEOUS at 17:59

## 2020-09-17 RX ADMIN — PERFLUTREN 1.65 MG: 6.52 INJECTION, SUSPENSION INTRAVENOUS at 06:54

## 2020-09-17 RX ADMIN — ANTISEPTIC SURGICAL SCRUB: 0.04 SOLUTION TOPICAL at 20:50

## 2020-09-17 RX ADMIN — PANTOPRAZOLE SODIUM 40 MG: 40 TABLET, DELAYED RELEASE ORAL at 05:16

## 2020-09-17 RX ADMIN — INSULIN LISPRO 2 UNITS: 100 INJECTION, SOLUTION INTRAVENOUS; SUBCUTANEOUS at 12:27

## 2020-09-17 RX ADMIN — INSULIN LISPRO 2 UNITS: 100 INJECTION, SOLUTION INTRAVENOUS; SUBCUTANEOUS at 07:56

## 2020-09-17 RX ADMIN — TAMSULOSIN HYDROCHLORIDE 0.4 MG: 0.4 CAPSULE ORAL at 07:55

## 2020-09-17 RX ADMIN — ASPIRIN 81 MG: 81 TABLET ORAL at 07:56

## 2020-09-17 RX ADMIN — BISACODYL 10 MG: 10 SUPPOSITORY RECTAL at 20:49

## 2020-09-17 RX ADMIN — PANTOPRAZOLE SODIUM 40 MG: 40 TABLET, DELAYED RELEASE ORAL at 16:45

## 2020-09-17 RX ADMIN — MUPIROCIN: 20 OINTMENT TOPICAL at 20:50

## 2020-09-17 RX ADMIN — EPTIFIBATIDE 2 MCG/KG/MIN: 0.75 INJECTION INTRAVENOUS at 12:27

## 2020-09-17 RX ADMIN — GLIPIZIDE 5 MG: 5 TABLET ORAL at 07:55

## 2020-09-17 RX ADMIN — PRAVASTATIN SODIUM 80 MG: 40 TABLET ORAL at 20:49

## 2020-09-17 RX ADMIN — EPTIFIBATIDE 2 MCG/KG/MIN: 0.75 INJECTION INTRAVENOUS at 07:21

## 2020-09-17 ASSESSMENT — PAIN SCALES - GENERAL
PAINLEVEL_OUTOF10: 0

## 2020-09-17 NOTE — PLAN OF CARE
Problem: Falls - Risk of:  Goal: Will remain free from falls  Description: Will remain free from falls  Outcome: Ongoing     Problem: Serum Glucose Level - Abnormal:  Goal: Ability to maintain appropriate glucose levels will improve to within specified parameters  Description: Ability to maintain appropriate glucose levels will improve to within specified parameters  Outcome: Ongoing

## 2020-09-17 NOTE — PROGRESS NOTES
4.2    102   CO2 23 24   BUN 19 26*   CREATININE 1.1 1.4*   CALCIUM 9.3 9.6     No results for input(s): AST, ALT, BILIDIR, BILITOT, ALKPHOS in the last 72 hours. No results for input(s): INR in the last 72 hours. No results for input(s): Adamaris Sox in the last 72 hours. Assessment/Plan:    Active Hospital Problems    Diagnosis Date Noted    Dyslipidemia [E78.5]     Anxiety and depression [F41.9, F32.9]     NSTEMI (non-ST elevated myocardial infarction) ([facility] [address] [I21.4]     Acute chest pain [R07.9] 05/04/2016       NSTEMI s/p arthrectomy to RCA. Cath remarkable for MV disease  CAD with h/o multiple PCIs in the past  ASA and statin  On integrilin  PRN nitro  CTS following; planned for CABG Friday     DM type 2   Hold home Metformin and Actos.    Sliding Scale Insulin for now  Hgb a1c 7.2  Titrate off actos on d/c     PMH of htn, hld, GERD, morbid obesity      Diet: DIET CARDIAC; No Caffeine  Code Status: Full Code    Dispo - Chris Alexis MD

## 2020-09-17 NOTE — FLOWSHEET NOTE
09/17/20 1515   Encounter Summary   Services provided to: Patient   Referral/Consult From: Nurse   Support System Family members   Continue Visiting   (9/17: family visiting at time of  visit, f/u)   Volunteer Visit Yes   Routine   Type Initial   Assessment   (Check in, follow up)

## 2020-09-17 NOTE — PROGRESS NOTES
CVTS Thoracic Progress Note:          CC: multivessel CAD    Subj: awake, sitting up in bed, somewhat tearful and anxious about surgery tomorrow. Obj:    Blood pressure 106/74, pulse 72, temperature 97.5 °F (36.4 °C), temperature source Oral, resp. rate 18, height 5' 9\" (1.753 m), weight 266 lb 6.4 oz (120.8 kg), SpO2 95 %. Lungs CTAB   Abdomen rotund, soft, non-tender   UOP satisfactory in 24 hrs; Cr 1.4    Diagnostics:   Recent Labs     09/15/20  0544 09/17/20  0539   WBC 7.1 8.1   HGB 13.3* 13.4*   HCT 40.1* 40.4*    179                                                                  Recent Labs     09/15/20  0544 09/17/20  0539    137   K 4.0 4.2    102   CO2 23 24   BUN 19 26*   CREATININE 1.1 1.4*   GLUCOSE 152* 190*          No results for input(s): MG in the last 72 hours. No results for input(s): TROPONINI in the last 72 hours. CXR: 9/13/20 no acute cardiopulmonary disease    Urine: 9/15/20 negative    PFT's: 9/16/20   Results for Chris Dykes (MRN [id number]) as of 9/16/2020 11:48   Ref. Range 9/16/2020 00:00   DLCO %Pred Latest Units: % 97   FEV1 %Pred-Post Latest Units: % 97   FEV1 %Pred-Pre Latest Units: % 97   FEV1/FVC-Post Latest Units: % 84   FEV1/FVC-Pre Latest Units: % 80   FVC %Pred-Post Latest Units: L 85   FVC %Pred-Pre Latest Units: % 90   TLC %Pred Latest Units: % 66      Carotid dopplers: 9/15/20   Tech Comments    Right    The right internal carotid artery reveals a <50% diameter reducing stenosis    based on velocity criteria. The right vertebral artery demonstrates normal antegrade flow. The right subclavian artery is visualized and demonstrates multiphasic flow. Left    The left internal carotid artery reveals a <50% diameter reducing stenosis    based on velocity criteria. The internal carotid artery was tortuous in nature. The left vertebral artery demonstrates normal antegrade flow.     The left subclavian artery is visualized and demonstrates multiphasic flow. There are no prior studies for comparison. Vein mappin/15/20   Tech Comments    Right    There is no evidence of superficial venous thrombosis involving the greater    saphenous vein. Please refer to diameter measurements. Left    There is no evidence of superficial venous thrombosis involving the greater    saphenous vein. 20 ECHO: Summary   Normal left ventricular systolic function with ejection fraction of 55-60%. No regional wall motion abnormalites are seen. Mild concentric left ventricular hypertrophy. Grade I diastolic dysfunction with normal filling pressure. Compared to previous study from 2020 no changes noted in left   ventricular function. No significant valvular heart disease. Assess/Plan:   Care per primary team and cardiology     Multivessel CAD:   Continue with pre-op testing  Planning for CABG     Difficulty sleeping:  Adding PRN melatonin. Pt appears very anxious - will discuss adding a low dose anxiolytic with team.     Sinus congestion: pt reports \"stuffiness\"   Ordering Flonase     CABG surgery tomorrow am.  Surgery has been recommended and discussed with the patient. I have discussed the risks, benefits and alternatives with patient, including risks of infection, bleeding, MI, stroke, arrhythmias and an operative mortality risk of 1-2 % as outlined in the STS Cardiac Surgery Risk profile above. I also discussed the the alternative of not doing surgery and the consequences of that action. Questions have been answered and the patient is willing to proceed. I also had a discussion regarding secondary risk modification with him, addressing the need for weight loss through dietary modification and exercise, and will continue to have those conversations with him post op.      Esperanza Del Cid MD  [facility]  2020  10:04 AM

## 2020-09-17 NOTE — PROGRESS NOTES
Exam:  General:  Awake, alert, NAD  Skin:  Warm and dry  Neck:  JVP 8  Chest:  Clear to auscultation   Cardiovascular:  RRR, normal S1S2, no m/g/r  Abdomen:  Soft, nontender, +bowel sounds  Extremities:  Trace BLE edema      Medications:    fluticasone  1 spray Each Nostril Daily    sodium chloride flush  10 mL Intravenous 2 times per day    aspirin  81 mg Oral Daily    carvedilol  3.125 mg Oral BID WC    glipiZIDE  5 mg Oral Daily    losartan  100 mg Oral Daily    pantoprazole  40 mg Oral BID AC    pravastatin  80 mg Oral Nightly    tamsulosin  0.4 mg Oral Daily    insulin lispro  0-12 Units Subcutaneous TID WC    insulin lispro  0-6 Units Subcutaneous Nightly      dextrose      eptifibatide 2 mcg/kg/min (09/17/20 0721)    nitroGLYCERIN 35 mcg/min (09/14/20 0230)       Lab Data: Lab results independently reviewed by myself 9/15/20   CBC:   Recent Labs     09/15/20  0544 09/17/20  0539   WBC 7.1 8.1   HGB 13.3* 13.4*    179     BMP:    Recent Labs     09/15/20  0544 09/17/20  0539    137   K 4.0 4.2   CO2 23 24   BUN 19 26*   CREATININE 1.1 1.4*     INR:  No results for input(s): INR in the last 72 hours. BNP:    No results for input(s): PROBNP in the last 72 hours. Cardiac Enzymes:   No results for input(s): TROPONINI in the last 72 hours. Lipids:   Lab Results   Component Value Date    TRIG 188 09/14/2020    TRIG 183 09/05/2020    HDL 33 09/14/2020    HDL 39 09/05/2020    HDL 35 09/26/2011    HDL 34 03/23/2011    LDLCALC 79 09/14/2020    LDLCALC 133 09/05/2020       Cardiac Imaging:   CARDIAC CATH 9/14/20:   FINDINGS     LVGRAM   LVEDP  18   GRADIENT ACROSS AORTIC VALVE  none   LV FUNCTION EF 40 %   WALL MOTION  base to mid inferior hypokinesis   MITRAL REGURGITATION  mild       CORONARY ARTERIES   LM Less than 10% [id number] stenosis            LAD Proximal-mid stents are noted with 50% in-stent restenosis. Distal vessel has 50 to 60% stenosis.   There are well-developed left-to-right collaterals.     D1 is a small to medium sized vessel with ostial/proximal/mid 60 to 70% stenosis.       LCX  Large vessel, proximal and mid 90% stenosis. The distal vessel into OM 2 is stented and the stent is widely patent with less than 10% stenosis.       RI Tortuous, small vessel,Less than 10% [id number] stenosis            RCA Dominant, proximal 10 to 20% stenosis, the proximal and mid vessel are extensively stented and there is a mid-distal 100% occlusion with well-developed left to right collaterals.       PERCUTANEOUS INTERVENTION DESCRIPTION   Heparin was used for anticoagulation, Integrilin was added during the procedure. The initial 4/5 Taiwanese sheath was upsized to aTerumo slender 6/7 sheath for intervention. A 7 [place]ese AL 0.75 guiding catheter was taken and was used to intubate the RCA. A choice floppy wire was then used initially to cross the lesion and angioplasty was then performed with a 3 mm balloon. Additional guide support was felt to be needed and a run-through body wire was placed into the RCA and the choice floppy wire was removed. Then a 7 [place] guide cell extension catheter was advanced into the RCA. Additional angioplasty was performed with 3 and 3.5 mm noncompliant balloons. Cutting Balloon atherectomy was then performed of the distal RCA as well as the proximal and mid RCA with 3 and 3.5 mm cutting balloons. 4 mm noncompliant balloon was used as well. COLE was performed which showed good stent expansion. Minimal luminal diameter was felt to be between 3.5 and 4 mm in the proximal and mid segments and 3 mm in the distal segments. Remaining disease and PDA was felt to be best treated medically and procedure was felt to be completed and it was felt stenting should be deferred as patient has had several stenting procedures already and IVUS did reveal multiple layers of stents. There was 0% residual stenosis. There was MOISÉS 3 flow before and after PCI.

## 2020-09-17 NOTE — FLOWSHEET NOTE
09/16/20 2002   Assessment   Charting Type Shift assessment   Neurological   Neuro (WDL) WDL   Level of Consciousness 0   Orientation Level Oriented X4;Oriented to place;Oriented to time;Oriented to situation;Oriented to person   Cognition Appropriate judgement   Language Clear; Appropriate for developmental age   [de-identified] Coma Scale   Eye Opening 4   Best Verbal Response 5   Best Motor Response 6   Lynn Coma Scale Score 15   HEENT   HEENT (WDL) WDL   Respiratory   Respiratory (WDL) WDL   Cardiac   Cardiac (WDL) WDL   Cardiac Regularity Regular   Heart Sounds S1, S2   Cardiac Rhythm NSR   Rhythm Interpretation   Pulse 95   Cardiac Monitor   Telemetry Monitor On Yes   Telemetry Audible Yes   Telemetry Alarms Set Yes   Gastrointestinal   Abdominal (WDL) X   RUQ Bowel Sounds Active   LUQ Bowel Sounds Active   RLQ Bowel Sounds Active   LLQ Bowel Sounds Active   Abdomen Inspection Rounded   Peripheral Vascular   Peripheral Vascular (WDL) X   RLE Edema Trace   LLE Edema Trace   RUE Neurovascular Assessment   R Radial Pulse +2   Puncture Site Assessment 1   Location Radial - right   Skin Color/Condition   Skin Color/Condition (WDL) WDL   Skin Integrity   Skin Integrity (WDL) X   Skin Integrity Bruising   Location scattered   Musculoskeletal   Musculoskeletal (WDL) WDL   Genitourinary   Genitourinary (WDL) WDL   Anus/Rectum   Anus/Rectum (WDL) WDL   Psychosocial   Psychosocial (WDL) WDL

## 2020-09-17 NOTE — ANESTHESIA PRE PROCEDURE
Department of Anesthesiology  Preprocedure Note       Name:  Yony Vargas   Age:  79 y.o.  :  1950                                          MRN:  [id number]         Date:  2020      Surgeon: Yordy Ramey):  Reji Redding MD    Procedure: Procedure(s):  CORONARY ARTERY BYPASS GRAFTING X4, INTERNAL MAMMARY ARTERY, SAPHENOUS VEIN GRAFT, ON PUMP    Medications prior to admission:   Prior to Admission medications    Medication Sig Start Date End Date Taking?  Authorizing Provider   omeprazole (PRILOSEC) 20 MG delayed release capsule Take 1 capsule by mouth every morning (before breakfast) 20   Monique Anthony MD   prasugrel (EFFIENT) 10 MG TABS Take 1 tablet by mouth daily 20   Virginia Dial MD   sitaGLIPtan-metformin (JANUMET)  MG per tablet Take 1 tablet by mouth 2 times daily (with meals) 3/5/20   CURTIS Clark - CNP   carvedilol (COREG) 6.25 MG tablet Take 1 tablet by mouth 2 times daily (with meals)  Patient taking differently: Take 3.125 mg by mouth 2 times daily (with meals)  3/4/20   CURTIS Clark - CNP   glipiZIDE (GLUCOTROL) 5 MG tablet Take 5 mg by mouth daily     Historical Provider, MD   tamsulosin (FLOMAX) 0.4 MG capsule Take 0.4 mg by mouth 19   Historical Provider, MD   HYDROcodone-acetaminophen (Patria Rad) 5-325 MG per tablet  19   Historical Provider, MD   pioglitazone (ACTOS) 30 MG tablet Take 30 mg by mouth daily  18   Historical Provider, MD   pravastatin (PRAVACHOL) 80 MG tablet Take 80 mg by mouth daily    Historical Provider, MD   losartan (COZAAR) 100 MG tablet Take 100 mg by mouth daily    Historical Provider, MD   aspirin 81 MG tablet Take 81 mg by mouth daily    Historical Provider, MD   Bismuth Subsalicylate (PEPTO-BISMOL PO) Take 1 tablet by mouth as needed     Historical Provider, MD       Current medications:    Current Facility-Administered Medications   Medication Dose Route Frequency Provider Last Rate Last Dose    glucose (GLUTOSE) 40 % oral gel 15 g  15 g Oral PRN Idalia Collins MD        dextrose 50 % IV solution  12.5 g Intravenous PRN Idalia Collins MD        glucagon (rDNA) injection 1 mg  1 mg Intramuscular PRN Idalia Collins MD        dextrose 5 % solution  100 mL/hr Intravenous PRN Idalia Collins MD        melatonin tablet 5 mg  5 mg Oral Nightly PRN Kenia Zacarias APRN - CNP   5 mg at 09/16/20 2034    fluticasone (FLONASE) 50 MCG/ACT nasal spray 1 spray  1 spray Each Nostril Daily Alfgeoffreye Rell APRN - CNP   1 spray at 09/16/20 1437    ALPRAZolam (XANAX) tablet 0.25 mg  0.25 mg Oral TID PRN Sharlene Glover MD   0.25 mg at 09/16/20 2009    eptifibatide (INTEGRILIN) 0.75 mg/mL infusion  2 mcg/kg/min Intravenous Continuous CURTIS Lou - CNP 19.6 mL/hr at 09/16/20 1959 2 mcg/kg/min at 09/16/20 1959    perflutren lipid microspheres (DEFINITY) injection 1.65 mg  1.5 mL Intravenous ONCE PRN Kenia Zacarias APRN - CNP        sodium chloride flush 0.9 % injection 10 mL  10 mL Intravenous 2 times per day Shawnee Riddle MD        sodium chloride flush 0.9 % injection 10 mL  10 mL Intravenous PRN Shawnee Riddle MD   10 mL at 09/16/20 0519    acetaminophen (TYLENOL) tablet 650 mg  650 mg Oral Q4H PRN Shawnee Riddle MD        nitroGLYCERIN 50 mg in dextrose 5% 250 mL infusion  5 mcg/min Intravenous Continuous Ric Noel MD 10.5 mL/hr at 09/14/20 0230 35 mcg/min at 09/14/20 0230    aspirin EC tablet 81 mg  81 mg Oral Daily Ric Noel MD   81 mg at 09/16/20 7995    carvedilol (COREG) tablet 3.125 mg  3.125 mg Oral BID WC Ric Noel MD   3.125 mg at 09/16/20 1739    glipiZIDE (GLUCOTROL) tablet 5 mg  5 mg Oral Daily Ric Noel MD   5 mg at 09/16/20 3727    losartan (COZAAR) tablet 100 mg  100 mg Oral Daily Ric Noel MD   100 mg at 09/16/20 4904    pantoprazole (PROTONIX) tablet 40 mg  40 mg Oral BID AC Ric Noel MD 40 mg at 09/16/20 1437    pravastatin (PRAVACHOL) tablet 80 mg  80 mg Oral Nightly Ric Noel MD   80 mg at 09/16/20 2009    tamsulosin (FLOMAX) capsule 0.4 mg  0.4 mg Oral Daily Ric Noel MD   0.4 mg at 09/16/20 8265    acetaminophen (TYLENOL) tablet 650 mg  650 mg Oral Q6H PRN Ric Noel MD   650 mg at 09/16/20 0555    Or    acetaminophen (TYLENOL) suppository 650 mg  650 mg Rectal Q6H PRN Ric Noel MD        polyethylene glycol [facility]) packet 17 g  17 g Oral Daily PRN Ric Noel MD        promethazine (PHENERGAN) tablet 12.5 mg  12.5 mg Oral Q6H PRN Ric Noel MD        Or    ondansetron [facility]) injection 4 mg  4 mg Intravenous Q6H PRN Ric Noel MD   4 mg at 09/16/20 0519    insulin lispro (HUMALOG) injection vial 0-12 Units  0-12 Units Subcutaneous TID WC Ric Noel MD   4 Units at 09/16/20 1151    insulin lispro (HUMALOG) injection vial 0-6 Units  0-6 Units Subcutaneous Nightly Ric Noel MD   1 Units at 09/16/20 2009       Allergies:     Allergies   Allergen Reactions    Brilinta [Ticagrelor] Shortness Of Breath    Penicillins Other (See Comments)     Stomach distress    Lipitor Rash    Plavix [Clopidogrel Bisulfate] Rash    Victoza [Liraglutide] Rash       Problem List:    Patient Active Problem List   Diagnosis Code    CVA (cerebral vascular accident) ([facility] [address] I63.9    Bell's palsy G51.0    HTN (hypertension) I10    CAD (coronary artery disease) I25.10    Hyperlipidemia E78.5    DM2 (diabetes mellitus, type 2) ([facility] [address] E11.9    Acute chest pain R07.9    Obesity E66.9    Hydronephrosis with renal and ureteral calculous obstruction N13.2    NSTEMI (non-ST elevated myocardial infarction) ([facility] [address] I21.4    CAD in native artery I25.10    Anxiety and depression F41.9, F32.9       Past Medical History:        Diagnosis Date    CAD (coronary artery disease) 204    Diabetes mellitus ([facility] [address] 12/23/2011    A1c 15    Hyperlipidemia 1992    Hypertension     Kidney stones 1987       Past Surgical History:        Procedure Laterality Date    APPENDECTOMY  1970    CERVICAL FUSION  1995    COLONOSCOPY  1987&2002    WNL    [facility]    CORONARY ANGIOPLASTY WITH STENT PLACEMENT  2008    CYSTOSCOPY Left 6/27/2019    CYSTOSCOPY, LEFT URETEROSCOPY, LEFT STONE MANIPULATION, LASER,  LEFT STENT PLACEMENT performed by Jt Mcdonald MD at [facility] / [address] [place] [address] Rd / Krystin Vallejo Left 7/11/2019    CYSTOSCOPY, LEFT STENT REMOVAL  CPT CODE - 29626 performed by Jt Mcdonald MD at [facility], [place], DIAGNOSTIC      HERNIA REPAIR  1987&1996    bilateral inguinal    UPPER GASTROINTESTINAL ENDOSCOPY  2002    errosive esophagitis    UPPER GASTROINTESTINAL ENDOSCOPY  08/28/2008    GERD       Social History:    Social History     Tobacco Use    Smoking status: Never Smoker    Smokeless tobacco: Never Used   Substance Use Topics    Alcohol use: Never     Frequency: Never     Comment: rare                                Counseling given: Not Answered      Vital Signs (Current):   Vitals:    09/16/20 1027 09/16/20 1151 09/16/20 1626 09/16/20 2002   BP:  103/70 111/77    Pulse:  98 105 95   Resp:  18 18    Temp:  97.6 °F (36.4 °C) 98.5 °F (36.9 °C)    TempSrc:  Oral Oral    SpO2: 96% 92% 94%    Weight:       Height:                                                  BP Readings from Last 3 Encounters:   09/16/20 111/77   09/08/20 128/76   09/05/20 (!) 163/90       NPO Status:                                                                                 BMI:   Wt Readings from Last 3 Encounters:   09/16/20 266 lb 6.4 oz (120.8 kg)   09/08/20 271 lb (122.9 kg)   09/05/20 277 lb 2 oz (125.7 kg)     Body mass index is 39.34 kg/m².     CBC:   Lab Results   Component Value Date    WBC 7.1 09/15/2020    RBC 4.72 09/15/2020 HGB 13.3 09/15/2020    HCT 40.1 09/15/2020    MCV 85.0 09/15/2020    RDW 14.8 09/15/2020     09/15/2020       CMP:   Lab Results   Component Value Date     09/15/2020    K 4.0 09/15/2020    K 4.5 09/04/2020     09/15/2020    CO2 23 09/15/2020    BUN 19 09/15/2020    CREATININE 1.1 09/15/2020    GFRAA >60 09/15/2020    AGRATIO 1.7 09/13/2020    LABGLOM >60 09/15/2020    GLUCOSE 152 09/15/2020    PROT 6.8 09/13/2020    PROT 6.8 09/26/2011    CALCIUM 9.3 09/15/2020    BILITOT 0.6 09/13/2020    ALKPHOS 70 09/13/2020    AST 14 09/13/2020    ALT 22 09/13/2020       POC Tests:   Recent Labs     09/16/20  1938   POCGLU 170*       Coags:   Lab Results   Component Value Date    PROTIME 12.2 03/03/2020    INR 1.05 03/03/2020    APTT 29.1 09/15/2020       HCG (If Applicable): No results found for: PREGTESTUR, PREGSERUM, HCG, HCGQUANT     ABGs: No results found for: PHART, PO2ART, FYR8GZO, TNS5KFV, BEART, G1XYGKTV     Type & Screen (If Applicable):  No results found for: LABABO, LABRH    Drug/Infectious Status (If Applicable):  No results found for: HIV, HEPCAB    COVID-19 Screening (If Applicable): No results found for: COVID19      Anesthesia Evaluation    Airway: Mallampati: III  TM distance: <3 FB   Neck ROM: limited  Mouth opening: > = 3 FB Dental:          Pulmonary:                              Cardiovascular:    (+) hypertension:, past MI:, CAD: obstructive, CABG/stent:, hyperlipidemia                  Neuro/Psych:   (+) CVA:, neuromuscular disease:, psychiatric history:            GI/Hepatic/Renal:   (+) renal disease: CRI,           Endo/Other:    (+) DiabetesType II DM, poorly controlled, , .                 Abdominal:           Vascular:                                      Anesthesia Plan      general     ASA 4     Lorretta Gails  PRE-ANESTHESIA EVALUATION FORM       Name:  Wilberto Arora                                         Age:  79 y.o.   MRN:  [id number]           I discussed intravenous with inhalational endotracheal anesthesia with pulmonary artery catheter, radial ( or other artery if required) catheter, possible transesophageal echocardiography and post-operative ventilation including risks and alternatives with the patient . The patient has no further questions. Michael Gregory MD  September 16, 2020  11:02 PM)  Induction: inhalational and intravenous. arterial line, BIS, central line, PA catheter and DIONI    Anesthetic plan and risks discussed with patient.                       Michael Gregory MD   9/16/2020

## 2020-09-18 ENCOUNTER — ANESTHESIA (OUTPATIENT)
Dept: OPERATING ROOM | Age: 70
DRG: 232 | End: 2020-09-18
Payer: MEDICARE

## 2020-09-18 ENCOUNTER — APPOINTMENT (OUTPATIENT)
Dept: GENERAL RADIOLOGY | Age: 70
DRG: 232 | End: 2020-09-18
Payer: MEDICARE

## 2020-09-18 VITALS
SYSTOLIC BLOOD PRESSURE: 114 MMHG | OXYGEN SATURATION: 100 % | DIASTOLIC BLOOD PRESSURE: 79 MMHG | RESPIRATION RATE: 12 BRPM | TEMPERATURE: 99.1 F

## 2020-09-18 LAB
ACTIVATED CLOTTING TIME: 124 SEC (ref 99–130)
ACTIVATED CLOTTING TIME: 375 SEC (ref 99–130)
ACTIVATED CLOTTING TIME: 441 SEC (ref 99–130)
ACTIVATED CLOTTING TIME: 513 SEC (ref 99–130)
ACTIVATED CLOTTING TIME: 521 SEC (ref 99–130)
ACTIVATED CLOTTING TIME: 540 SEC (ref 99–130)
ACTIVATED CLOTTING TIME: 557 SEC (ref 99–130)
ACTIVATED CLOTTING TIME: 602 SEC (ref 99–130)
ACTIVATED CLOTTING TIME: 95 SEC (ref 99–130)
ALBUMIN SERPL-MCNC: 3.5 G/DL (ref 3.4–5)
ALP BLD-CCNC: 65 U/L (ref 40–129)
ALT SERPL-CCNC: 37 U/L (ref 10–40)
ANION GAP SERPL CALCULATED.3IONS-SCNC: 10 MMOL/L (ref 3–16)
ANION GAP SERPL CALCULATED.3IONS-SCNC: 12 MMOL/L (ref 3–16)
APTT: 29.2 SEC (ref 24.2–36.2)
AST SERPL-CCNC: 21 U/L (ref 15–37)
BASE EXCESS ARTERIAL: -1 (ref -3–3)
BASE EXCESS ARTERIAL: -2 (ref -3–3)
BASE EXCESS ARTERIAL: -3 (ref -3–3)
BASE EXCESS ARTERIAL: -3 (ref -3–3)
BASE EXCESS ARTERIAL: -4 (ref -3–3)
BASE EXCESS ARTERIAL: -5 (ref -3–3)
BASE EXCESS ARTERIAL: -5 (ref -3–3)
BASE EXCESS ARTERIAL: 0 (ref -3–3)
BASE EXCESS ARTERIAL: 0 (ref -3–3)
BASE EXCESS ARTERIAL: 1 (ref -3–3)
BASE EXCESS ARTERIAL: 2 (ref -3–3)
BASE EXCESS ARTERIAL: 4 (ref -3–3)
BASOPHILS ABSOLUTE: 0.1 K/UL (ref 0–0.2)
BASOPHILS RELATIVE PERCENT: 0.7 %
BILIRUB SERPL-MCNC: 1 MG/DL (ref 0–1)
BILIRUBIN DIRECT: 0.3 MG/DL (ref 0–0.3)
BILIRUBIN, INDIRECT: 0.7 MG/DL (ref 0–1)
BLOOD BANK DISPENSE STATUS: NORMAL
BLOOD BANK PRODUCT CODE: NORMAL
BPU ID: NORMAL
BUN BLDV-MCNC: 24 MG/DL (ref 7–20)
BUN BLDV-MCNC: 31 MG/DL (ref 7–20)
CALCIUM IONIZED: 1.02 MMOL/L (ref 1.12–1.32)
CALCIUM IONIZED: 1.04 MMOL/L (ref 1.12–1.32)
CALCIUM IONIZED: 1.04 MMOL/L (ref 1.12–1.32)
CALCIUM IONIZED: 1.05 MMOL/L (ref 1.12–1.32)
CALCIUM IONIZED: 1.06 MMOL/L (ref 1.12–1.32)
CALCIUM IONIZED: 1.15 MMOL/L (ref 1.12–1.32)
CALCIUM IONIZED: 1.16 MMOL/L (ref 1.12–1.32)
CALCIUM IONIZED: 1.17 MMOL/L (ref 1.12–1.32)
CALCIUM IONIZED: 1.18 MMOL/L (ref 1.12–1.32)
CALCIUM IONIZED: 1.19 MMOL/L (ref 1.12–1.32)
CALCIUM IONIZED: 1.22 MMOL/L (ref 1.12–1.32)
CALCIUM IONIZED: 1.24 MMOL/L (ref 1.12–1.32)
CALCIUM IONIZED: 1.29 MMOL/L (ref 1.12–1.32)
CALCIUM SERPL-MCNC: 7.7 MG/DL (ref 8.3–10.6)
CALCIUM SERPL-MCNC: 8.9 MG/DL (ref 8.3–10.6)
CHLORIDE BLD-SCNC: 100 MMOL/L (ref 99–110)
CHLORIDE BLD-SCNC: 103 MMOL/L (ref 99–110)
CHOLESTEROL, TOTAL: 132 MG/DL (ref 0–199)
CO2: 23 MMOL/L (ref 21–32)
CO2: 23 MMOL/L (ref 21–32)
CREAT SERPL-MCNC: 1.3 MG/DL (ref 0.8–1.3)
CREAT SERPL-MCNC: 1.5 MG/DL (ref 0.8–1.3)
DESCRIPTION BLOOD BANK: NORMAL
EOSINOPHILS ABSOLUTE: 0.1 K/UL (ref 0–0.6)
EOSINOPHILS RELATIVE PERCENT: 1.3 %
ESTIMATED AVERAGE GLUCOSE: 157.1 MG/DL
GFR AFRICAN AMERICAN: 56
GFR AFRICAN AMERICAN: >60
GFR NON-AFRICAN AMERICAN: 46
GFR NON-AFRICAN AMERICAN: 54
GLUCOSE BLD-MCNC: 172 MG/DL (ref 70–99)
GLUCOSE BLD-MCNC: 174 MG/DL (ref 70–99)
GLUCOSE BLD-MCNC: 181 MG/DL (ref 70–99)
GLUCOSE BLD-MCNC: 183 MG/DL (ref 70–99)
GLUCOSE BLD-MCNC: 190 MG/DL (ref 70–99)
GLUCOSE BLD-MCNC: 191 MG/DL (ref 70–99)
GLUCOSE BLD-MCNC: 194 MG/DL (ref 70–99)
GLUCOSE BLD-MCNC: 211 MG/DL (ref 70–99)
GLUCOSE BLD-MCNC: 214 MG/DL (ref 70–99)
GLUCOSE BLD-MCNC: 216 MG/DL (ref 70–99)
GLUCOSE BLD-MCNC: 221 MG/DL (ref 70–99)
GLUCOSE BLD-MCNC: 228 MG/DL (ref 70–99)
GLUCOSE BLD-MCNC: 229 MG/DL (ref 70–99)
GLUCOSE BLD-MCNC: 233 MG/DL (ref 70–99)
GLUCOSE BLD-MCNC: 236 MG/DL (ref 70–99)
GLUCOSE BLD-MCNC: 256 MG/DL (ref 70–99)
GLUCOSE BLD-MCNC: 263 MG/DL (ref 70–99)
GLUCOSE BLD-MCNC: 270 MG/DL (ref 70–99)
HBA1C MFR BLD: 7.1 %
HCO3 ARTERIAL: 20.2 MMOL/L (ref 21–29)
HCO3 ARTERIAL: 21.2 MMOL/L (ref 21–29)
HCO3 ARTERIAL: 21.3 MMOL/L (ref 21–29)
HCO3 ARTERIAL: 21.9 MMOL/L (ref 21–29)
HCO3 ARTERIAL: 22.6 MMOL/L (ref 21–29)
HCO3 ARTERIAL: 22.6 MMOL/L (ref 21–29)
HCO3 ARTERIAL: 22.8 MMOL/L (ref 21–29)
HCO3 ARTERIAL: 23 MMOL/L (ref 21–29)
HCO3 ARTERIAL: 23.4 MMOL/L (ref 21–29)
HCO3 ARTERIAL: 23.5 MMOL/L (ref 21–29)
HCO3 ARTERIAL: 23.5 MMOL/L (ref 21–29)
HCO3 ARTERIAL: 24.4 MMOL/L (ref 21–29)
HCO3 ARTERIAL: 24.5 MMOL/L (ref 21–29)
HCO3 ARTERIAL: 25.1 MMOL/L (ref 21–29)
HCO3 ARTERIAL: 26.9 MMOL/L (ref 21–29)
HCO3 ARTERIAL: 28.4 MMOL/L (ref 21–29)
HCT VFR BLD CALC: 22.3 % (ref 40.5–52.5)
HCT VFR BLD CALC: 27.6 % (ref 40.5–52.5)
HCT VFR BLD CALC: 41.3 % (ref 40.5–52.5)
HDLC SERPL-MCNC: 32 MG/DL (ref 40–60)
HEMOGLOBIN: 13.1 GM/DL (ref 13.5–17.5)
HEMOGLOBIN: 13.3 G/DL (ref 13.5–17.5)
HEMOGLOBIN: 6.5 GM/DL (ref 13.5–17.5)
HEMOGLOBIN: 6.6 GM/DL (ref 13.5–17.5)
HEMOGLOBIN: 7 GM/DL (ref 13.5–17.5)
HEMOGLOBIN: 7.2 GM/DL (ref 13.5–17.5)
HEMOGLOBIN: 7.3 G/DL (ref 13.5–17.5)
HEMOGLOBIN: 7.4 GM/DL (ref 13.5–17.5)
HEMOGLOBIN: 7.5 GM/DL (ref 13.5–17.5)
HEMOGLOBIN: 7.6 GM/DL (ref 13.5–17.5)
HEMOGLOBIN: 8.5 GM/DL (ref 13.5–17.5)
HEMOGLOBIN: 9 GM/DL (ref 13.5–17.5)
HEMOGLOBIN: 9.1 GM/DL (ref 13.5–17.5)
HEMOGLOBIN: 9.2 G/DL (ref 13.5–17.5)
HEMOGLOBIN: 9.3 GM/DL (ref 13.5–17.5)
HEMOGLOBIN: 9.5 GM/DL (ref 13.5–17.5)
INR BLD: 1.09 (ref 0.86–1.14)
INR BLD: 1.38 (ref 0.86–1.14)
LACTATE: 1.05 MMOL/L (ref 0.4–2)
LACTATE: 2.15 MMOL/L (ref 0.4–2)
LACTATE: 2.28 MMOL/L (ref 0.4–2)
LACTATE: 2.95 MMOL/L (ref 0.4–2)
LACTATE: 2.98 MMOL/L (ref 0.4–2)
LACTATE: 3.23 MMOL/L (ref 0.4–2)
LACTATE: 3.65 MMOL/L (ref 0.4–2)
LDL CHOLESTEROL CALCULATED: 69 MG/DL
LYMPHOCYTES ABSOLUTE: 1.7 K/UL (ref 1–5.1)
LYMPHOCYTES RELATIVE PERCENT: 17.2 %
MAGNESIUM: 3.3 MG/DL (ref 1.8–2.4)
MCH RBC QN AUTO: 27.9 PG (ref 26–34)
MCH RBC QN AUTO: 28.5 PG (ref 26–34)
MCHC RBC AUTO-ENTMCNC: 32.3 G/DL (ref 31–36)
MCHC RBC AUTO-ENTMCNC: 32.9 G/DL (ref 31–36)
MCV RBC AUTO: 86.4 FL (ref 80–100)
MCV RBC AUTO: 86.6 FL (ref 80–100)
MONOCYTES ABSOLUTE: 1.2 K/UL (ref 0–1.3)
MONOCYTES RELATIVE PERCENT: 11.9 %
NEUTROPHILS ABSOLUTE: 6.7 K/UL (ref 1.7–7.7)
NEUTROPHILS RELATIVE PERCENT: 68.9 %
O2 SAT, ARTERIAL: 100 % (ref 93–100)
O2 SAT, ARTERIAL: 92 % (ref 93–100)
O2 SAT, ARTERIAL: 93 % (ref 93–100)
O2 SAT, ARTERIAL: 94 % (ref 93–100)
O2 SAT, ARTERIAL: 94 % (ref 93–100)
O2 SAT, ARTERIAL: 95 % (ref 93–100)
O2 SAT, ARTERIAL: 97 % (ref 93–100)
O2 SAT, ARTERIAL: 99 % (ref 93–100)
O2 SAT, ARTERIAL: 99 % (ref 93–100)
PCO2 ARTERIAL: 35.1 MM HG (ref 35–45)
PCO2 ARTERIAL: 36.5 MM HG (ref 35–45)
PCO2 ARTERIAL: 37 MM HG (ref 35–45)
PCO2 ARTERIAL: 37.3 MM HG (ref 35–45)
PCO2 ARTERIAL: 37.5 MM HG (ref 35–45)
PCO2 ARTERIAL: 37.6 MM HG (ref 35–45)
PCO2 ARTERIAL: 38.4 MM HG (ref 35–45)
PCO2 ARTERIAL: 39.4 MM HG (ref 35–45)
PCO2 ARTERIAL: 40.6 MM HG (ref 35–45)
PCO2 ARTERIAL: 40.6 MM HG (ref 35–45)
PCO2 ARTERIAL: 41.1 MM HG (ref 35–45)
PCO2 ARTERIAL: 42 MM HG (ref 35–45)
PCO2 ARTERIAL: 42.2 MM HG (ref 35–45)
PCO2 ARTERIAL: 43.3 MM HG (ref 35–45)
PCO2 ARTERIAL: 47.9 MM HG (ref 35–45)
PCO2 ARTERIAL: 59.4 MM HG (ref 35–45)
PDW BLD-RTO: 14.2 % (ref 12.4–15.4)
PDW BLD-RTO: 14.3 % (ref 12.4–15.4)
PERFORMED ON: ABNORMAL
PH ARTERIAL: 7.19 (ref 7.35–7.45)
PH ARTERIAL: 7.34 (ref 7.35–7.45)
PH ARTERIAL: 7.35 (ref 7.35–7.45)
PH ARTERIAL: 7.36 (ref 7.35–7.45)
PH ARTERIAL: 7.36 (ref 7.35–7.45)
PH ARTERIAL: 7.37 (ref 7.35–7.45)
PH ARTERIAL: 7.38 (ref 7.35–7.45)
PH ARTERIAL: 7.39 (ref 7.35–7.45)
PH ARTERIAL: 7.39 (ref 7.35–7.45)
PH ARTERIAL: 7.42 (ref 7.35–7.45)
PH ARTERIAL: 7.45 (ref 7.35–7.45)
PH ARTERIAL: 7.45 (ref 7.35–7.45)
PLATELET # BLD: 159 K/UL (ref 135–450)
PLATELET # BLD: 169 K/UL (ref 135–450)
PMV BLD AUTO: 7.8 FL (ref 5–10.5)
PMV BLD AUTO: 8.5 FL (ref 5–10.5)
PO2 ARTERIAL: 115.8 MM HG (ref 75–108)
PO2 ARTERIAL: 135 MM HG (ref 75–108)
PO2 ARTERIAL: 258.9 MM HG (ref 75–108)
PO2 ARTERIAL: 324.5 MM HG (ref 75–108)
PO2 ARTERIAL: 326.8 MM HG (ref 75–108)
PO2 ARTERIAL: 483.5 MM HG (ref 75–108)
PO2 ARTERIAL: 495.2 MM HG (ref 75–108)
PO2 ARTERIAL: 556 MM HG (ref 75–108)
PO2 ARTERIAL: 68.2 MM HG (ref 75–108)
PO2 ARTERIAL: 70.4 MM HG (ref 75–108)
PO2 ARTERIAL: 72.8 MM HG (ref 75–108)
PO2 ARTERIAL: 74.6 MM HG (ref 75–108)
PO2 ARTERIAL: 76.5 MM HG (ref 75–108)
PO2 ARTERIAL: 93.7 MM HG (ref 75–108)
PO2 ARTERIAL: 95.9 MM HG (ref 75–108)
PO2 ARTERIAL: 97.2 MM HG (ref 75–108)
POC HEMATOCRIT: 19 % (ref 40.5–52.5)
POC HEMATOCRIT: 20 % (ref 40.5–52.5)
POC HEMATOCRIT: 20 % (ref 40.5–52.5)
POC HEMATOCRIT: 21 % (ref 40.5–52.5)
POC HEMATOCRIT: 22 % (ref 40.5–52.5)
POC HEMATOCRIT: 25 % (ref 40.5–52.5)
POC HEMATOCRIT: 26 % (ref 40.5–52.5)
POC HEMATOCRIT: 27 % (ref 40.5–52.5)
POC HEMATOCRIT: 27 % (ref 40.5–52.5)
POC HEMATOCRIT: 28 % (ref 40.5–52.5)
POC HEMATOCRIT: 39 % (ref 40.5–52.5)
POC POTASSIUM: 3.7 MMOL/L (ref 3.5–5.1)
POC POTASSIUM: 3.9 MMOL/L (ref 3.5–5.1)
POC POTASSIUM: 4 MMOL/L (ref 3.5–5.1)
POC POTASSIUM: 4.1 MMOL/L (ref 3.5–5.1)
POC POTASSIUM: 4.1 MMOL/L (ref 3.5–5.1)
POC POTASSIUM: 4.2 MMOL/L (ref 3.5–5.1)
POC POTASSIUM: 4.3 MMOL/L (ref 3.5–5.1)
POC POTASSIUM: 5.5 MMOL/L (ref 3.5–5.1)
POC POTASSIUM: 6.1 MMOL/L (ref 3.5–5.1)
POC POTASSIUM: 6.2 MMOL/L (ref 3.5–5.1)
POC POTASSIUM: 6.4 MMOL/L (ref 3.5–5.1)
POC POTASSIUM: 6.5 MMOL/L (ref 3.5–5.1)
POC POTASSIUM: 6.5 MMOL/L (ref 3.5–5.1)
POC SAMPLE TYPE: ABNORMAL
POC SODIUM: 128 MMOL/L (ref 136–145)
POC SODIUM: 131 MMOL/L (ref 136–145)
POC SODIUM: 132 MMOL/L (ref 136–145)
POC SODIUM: 133 MMOL/L (ref 136–145)
POC SODIUM: 134 MMOL/L (ref 136–145)
POC SODIUM: 136 MMOL/L (ref 136–145)
POC SODIUM: 138 MMOL/L (ref 136–145)
POC SODIUM: 140 MMOL/L (ref 136–145)
POC SODIUM: 141 MMOL/L (ref 136–145)
POC SODIUM: 141 MMOL/L (ref 136–145)
POC SODIUM: 142 MMOL/L (ref 136–145)
POTASSIUM SERPL-SCNC: 3.6 MMOL/L (ref 3.5–5.1)
POTASSIUM SERPL-SCNC: 4.5 MMOL/L (ref 3.5–5.1)
PROTHROMBIN TIME: 12.6 SEC (ref 10–13.2)
PROTHROMBIN TIME: 16.1 SEC (ref 10–13.2)
RBC # BLD: 2.57 M/UL (ref 4.2–5.9)
RBC # BLD: 4.78 M/UL (ref 4.2–5.9)
SODIUM BLD-SCNC: 135 MMOL/L (ref 136–145)
SODIUM BLD-SCNC: 136 MMOL/L (ref 136–145)
TCO2 ARTERIAL: 21 MMOL/L
TCO2 ARTERIAL: 22 MMOL/L
TCO2 ARTERIAL: 23 MMOL/L
TCO2 ARTERIAL: 23 MMOL/L
TCO2 ARTERIAL: 24 MMOL/L
TCO2 ARTERIAL: 25 MMOL/L
TCO2 ARTERIAL: 26 MMOL/L
TCO2 ARTERIAL: 28 MMOL/L
TCO2 ARTERIAL: 30 MMOL/L
TOTAL PROTEIN: 6 G/DL (ref 6.4–8.2)
TRIGL SERPL-MCNC: 154 MG/DL (ref 0–150)
VLDLC SERPL CALC-MCNC: 31 MG/DL
WBC # BLD: 15.6 K/UL (ref 4–11)
WBC # BLD: 9.7 K/UL (ref 4–11)

## 2020-09-18 PROCEDURE — 6370000000 HC RX 637 (ALT 250 FOR IP): Performed by: ANESTHESIOLOGY

## 2020-09-18 PROCEDURE — 82947 ASSAY GLUCOSE BLOOD QUANT: CPT

## 2020-09-18 PROCEDURE — 6360000002 HC RX W HCPCS: Performed by: ANESTHESIOLOGY

## 2020-09-18 PROCEDURE — 5A1221Z PERFORMANCE OF CARDIAC OUTPUT, CONTINUOUS: ICD-10-PCS | Performed by: THORACIC SURGERY (CARDIOTHORACIC VASCULAR SURGERY)

## 2020-09-18 PROCEDURE — 3700000000 HC ANESTHESIA ATTENDED CARE: Performed by: THORACIC SURGERY (CARDIOTHORACIC VASCULAR SURGERY)

## 2020-09-18 PROCEDURE — 33533 CABG ARTERIAL SINGLE: CPT | Performed by: THORACIC SURGERY (CARDIOTHORACIC VASCULAR SURGERY)

## 2020-09-18 PROCEDURE — 33508 ENDOSCOPIC VEIN HARVEST: CPT | Performed by: THORACIC SURGERY (CARDIOTHORACIC VASCULAR SURGERY)

## 2020-09-18 PROCEDURE — 2780000010 HC IMPLANT OTHER: Performed by: THORACIC SURGERY (CARDIOTHORACIC VASCULAR SURGERY)

## 2020-09-18 PROCEDURE — 85730 THROMBOPLASTIN TIME PARTIAL: CPT

## 2020-09-18 PROCEDURE — 6360000002 HC RX W HCPCS: Performed by: NURSE PRACTITIONER

## 2020-09-18 PROCEDURE — 82803 BLOOD GASES ANY COMBINATION: CPT

## 2020-09-18 PROCEDURE — 6360000002 HC RX W HCPCS: Performed by: THORACIC SURGERY (CARDIOTHORACIC VASCULAR SURGERY)

## 2020-09-18 PROCEDURE — 84295 ASSAY OF SERUM SODIUM: CPT

## 2020-09-18 PROCEDURE — 7100000011 HC PHASE II RECOVERY - ADDTL 15 MIN

## 2020-09-18 PROCEDURE — 80048 BASIC METABOLIC PNL TOTAL CA: CPT

## 2020-09-18 PROCEDURE — 021209W BYPASS CORONARY ARTERY, THREE ARTERIES FROM AORTA WITH AUTOLOGOUS VENOUS TISSUE, OPEN APPROACH: ICD-10-PCS | Performed by: THORACIC SURGERY (CARDIOTHORACIC VASCULAR SURGERY)

## 2020-09-18 PROCEDURE — 6370000000 HC RX 637 (ALT 250 FOR IP): Performed by: INTERNAL MEDICINE

## 2020-09-18 PROCEDURE — C1786 PMKR, SINGLE, RATE-RESP: HCPCS | Performed by: THORACIC SURGERY (CARDIOTHORACIC VASCULAR SURGERY)

## 2020-09-18 PROCEDURE — 4A133B3 MONITORING OF ARTERIAL PRESSURE, PULMONARY, PERCUTANEOUS APPROACH: ICD-10-PCS | Performed by: THORACIC SURGERY (CARDIOTHORACIC VASCULAR SURGERY)

## 2020-09-18 PROCEDURE — 71045 X-RAY EXAM CHEST 1 VIEW: CPT

## 2020-09-18 PROCEDURE — 85610 PROTHROMBIN TIME: CPT

## 2020-09-18 PROCEDURE — 2580000003 HC RX 258: Performed by: NURSE PRACTITIONER

## 2020-09-18 PROCEDURE — 06BP3ZZ EXCISION OF RIGHT SAPHENOUS VEIN, PERCUTANEOUS APPROACH: ICD-10-PCS | Performed by: THORACIC SURGERY (CARDIOTHORACIC VASCULAR SURGERY)

## 2020-09-18 PROCEDURE — B24BZZ4 ULTRASONOGRAPHY OF HEART WITH AORTA, TRANSESOPHAGEAL: ICD-10-PCS | Performed by: THORACIC SURGERY (CARDIOTHORACIC VASCULAR SURGERY)

## 2020-09-18 PROCEDURE — 2500000003 HC RX 250 WO HCPCS: Performed by: THORACIC SURGERY (CARDIOTHORACIC VASCULAR SURGERY)

## 2020-09-18 PROCEDURE — P9045 ALBUMIN (HUMAN), 5%, 250 ML: HCPCS | Performed by: THORACIC SURGERY (CARDIOTHORACIC VASCULAR SURGERY)

## 2020-09-18 PROCEDURE — 2580000003 HC RX 258: Performed by: ANESTHESIOLOGY

## 2020-09-18 PROCEDURE — 85027 COMPLETE CBC AUTOMATED: CPT

## 2020-09-18 PROCEDURE — 83605 ASSAY OF LACTIC ACID: CPT

## 2020-09-18 PROCEDURE — 6370000000 HC RX 637 (ALT 250 FOR IP): Performed by: NURSE PRACTITIONER

## 2020-09-18 PROCEDURE — 02100Z9 BYPASS CORONARY ARTERY, ONE ARTERY FROM LEFT INTERNAL MAMMARY, OPEN APPROACH: ICD-10-PCS | Performed by: THORACIC SURGERY (CARDIOTHORACIC VASCULAR SURGERY)

## 2020-09-18 PROCEDURE — 85025 COMPLETE CBC W/AUTO DIFF WBC: CPT

## 2020-09-18 PROCEDURE — C9290 INJ, BUPIVACAINE LIPOSOME: HCPCS | Performed by: THORACIC SURGERY (CARDIOTHORACIC VASCULAR SURGERY)

## 2020-09-18 PROCEDURE — 3700000001 HC ADD 15 MINUTES (ANESTHESIA): Performed by: THORACIC SURGERY (CARDIOTHORACIC VASCULAR SURGERY)

## 2020-09-18 PROCEDURE — 2500000003 HC RX 250 WO HCPCS: Performed by: ANESTHESIOLOGY

## 2020-09-18 PROCEDURE — 2100000000 HC CCU R&B

## 2020-09-18 PROCEDURE — 2720000010 HC SURG SUPPLY STERILE: Performed by: THORACIC SURGERY (CARDIOTHORACIC VASCULAR SURGERY)

## 2020-09-18 PROCEDURE — 3600000018 HC SURGERY OHS ADDTL 15MIN: Performed by: THORACIC SURGERY (CARDIOTHORACIC VASCULAR SURGERY)

## 2020-09-18 PROCEDURE — 2709999900 HC NON-CHARGEABLE SUPPLY: Performed by: THORACIC SURGERY (CARDIOTHORACIC VASCULAR SURGERY)

## 2020-09-18 PROCEDURE — 85347 COAGULATION TIME ACTIVATED: CPT

## 2020-09-18 PROCEDURE — 80076 HEPATIC FUNCTION PANEL: CPT

## 2020-09-18 PROCEDURE — 85014 HEMATOCRIT: CPT

## 2020-09-18 PROCEDURE — 4A1239Z MONITORING OF CARDIAC OUTPUT, PERCUTANEOUS APPROACH: ICD-10-PCS | Performed by: THORACIC SURGERY (CARDIOTHORACIC VASCULAR SURGERY)

## 2020-09-18 PROCEDURE — 2580000003 HC RX 258: Performed by: THORACIC SURGERY (CARDIOTHORACIC VASCULAR SURGERY)

## 2020-09-18 PROCEDURE — 3600000008 HC SURGERY OHS BASE: Performed by: THORACIC SURGERY (CARDIOTHORACIC VASCULAR SURGERY)

## 2020-09-18 PROCEDURE — 84132 ASSAY OF SERUM POTASSIUM: CPT

## 2020-09-18 PROCEDURE — 7100000010 HC PHASE II RECOVERY - FIRST 15 MIN

## 2020-09-18 PROCEDURE — 94750 HC PULMONARY COMPLIANCE STUDY: CPT

## 2020-09-18 PROCEDURE — 82330 ASSAY OF CALCIUM: CPT

## 2020-09-18 PROCEDURE — 83036 HEMOGLOBIN GLYCOSYLATED A1C: CPT

## 2020-09-18 PROCEDURE — 6370000000 HC RX 637 (ALT 250 FOR IP): Performed by: THORACIC SURGERY (CARDIOTHORACIC VASCULAR SURGERY)

## 2020-09-18 PROCEDURE — 80061 LIPID PANEL: CPT

## 2020-09-18 PROCEDURE — 85018 HEMOGLOBIN: CPT

## 2020-09-18 PROCEDURE — 36430 TRANSFUSION BLD/BLD COMPNT: CPT

## 2020-09-18 PROCEDURE — 2700000000 HC OXYGEN THERAPY PER DAY

## 2020-09-18 PROCEDURE — 94002 VENT MGMT INPAT INIT DAY: CPT

## 2020-09-18 PROCEDURE — 83735 ASSAY OF MAGNESIUM: CPT

## 2020-09-18 PROCEDURE — C1713 ANCHOR/SCREW BN/BN,TIS/BN: HCPCS | Performed by: THORACIC SURGERY (CARDIOTHORACIC VASCULAR SURGERY)

## 2020-09-18 PROCEDURE — 33519 CABG ARTERY-VEIN THREE: CPT | Performed by: THORACIC SURGERY (CARDIOTHORACIC VASCULAR SURGERY)

## 2020-09-18 DEVICE — IMPLANTABLE DEVICE: Type: IMPLANTABLE DEVICE | Site: STERNUM | Status: FUNCTIONAL

## 2020-09-18 DEVICE — PLATE BNE 8 H X SHP STERNALOCK BLU PRI CLSR SYS: Type: IMPLANTABLE DEVICE | Site: STERNUM | Status: FUNCTIONAL

## 2020-09-18 DEVICE — PLATE BNE 100DEG 4 H L SHP STERNALOCK BLU PRI CLSR SYS: Type: IMPLANTABLE DEVICE | Site: STERNUM | Status: FUNCTIONAL

## 2020-09-18 DEVICE — PLATE BNE 8 H JL SHP STERNALOCK BLU PRI CLSR SYS: Type: IMPLANTABLE DEVICE | Site: STERNUM | Status: FUNCTIONAL

## 2020-09-18 RX ORDER — NOREPINEPHRINE BITARTRATE 1 MG/ML
INJECTION, SOLUTION INTRAVENOUS PRN
Status: DISCONTINUED | OUTPATIENT
Start: 2020-09-18 | End: 2020-09-18 | Stop reason: SDUPTHER

## 2020-09-18 RX ORDER — HEPARIN SODIUM 1000 [USP'U]/ML
INJECTION, SOLUTION INTRAVENOUS; SUBCUTANEOUS PRN
Status: DISCONTINUED | OUTPATIENT
Start: 2020-09-18 | End: 2020-09-18 | Stop reason: SDUPTHER

## 2020-09-18 RX ORDER — ASPIRIN 81 MG/1
81 TABLET ORAL DAILY
Status: DISCONTINUED | OUTPATIENT
Start: 2020-09-19 | End: 2020-09-26 | Stop reason: HOSPADM

## 2020-09-18 RX ORDER — FENTANYL CITRATE 0.05 MG/ML
INJECTION, SOLUTION INTRAMUSCULAR; INTRAVENOUS PRN
Status: DISCONTINUED | OUTPATIENT
Start: 2020-09-18 | End: 2020-09-18 | Stop reason: SDUPTHER

## 2020-09-18 RX ORDER — NITROGLYCERIN 40 MG/1
1 PATCH TRANSDERMAL DAILY
Status: DISPENSED | OUTPATIENT
Start: 2020-09-19 | End: 2020-09-22

## 2020-09-18 RX ORDER — DEXTROSE MONOHYDRATE 50 MG/ML
100 INJECTION, SOLUTION INTRAVENOUS PRN
Status: DISCONTINUED | OUTPATIENT
Start: 2020-09-18 | End: 2020-09-26 | Stop reason: HOSPADM

## 2020-09-18 RX ORDER — SODIUM CHLORIDE, SODIUM LACTATE, POTASSIUM CHLORIDE, CALCIUM CHLORIDE 600; 310; 30; 20 MG/100ML; MG/100ML; MG/100ML; MG/100ML
INJECTION, SOLUTION INTRAVENOUS CONTINUOUS PRN
Status: DISCONTINUED | OUTPATIENT
Start: 2020-09-18 | End: 2020-09-18 | Stop reason: SDUPTHER

## 2020-09-18 RX ORDER — PROTAMINE SULFATE 10 MG/ML
50 INJECTION, SOLUTION INTRAVENOUS
Status: ACTIVE | OUTPATIENT
Start: 2020-09-18 | End: 2020-09-18

## 2020-09-18 RX ORDER — DIGOXIN 125 MCG
125 TABLET ORAL DAILY
Status: DISCONTINUED | OUTPATIENT
Start: 2020-09-19 | End: 2020-09-21

## 2020-09-18 RX ORDER — SODIUM CHLORIDE 9 MG/ML
INJECTION, SOLUTION INTRAVENOUS CONTINUOUS PRN
Status: DISCONTINUED | OUTPATIENT
Start: 2020-09-18 | End: 2020-09-18 | Stop reason: SDUPTHER

## 2020-09-18 RX ORDER — CISATRACURIUM BESYLATE 2 MG/ML
INJECTION, SOLUTION INTRAVENOUS PRN
Status: DISCONTINUED | OUTPATIENT
Start: 2020-09-18 | End: 2020-09-18 | Stop reason: SDUPTHER

## 2020-09-18 RX ORDER — ACETAMINOPHEN 650 MG/1
SUPPOSITORY RECTAL PRN
Status: DISCONTINUED | OUTPATIENT
Start: 2020-09-18 | End: 2020-09-18 | Stop reason: ALTCHOICE

## 2020-09-18 RX ORDER — KETOROLAC TROMETHAMINE 30 MG/ML
15 INJECTION, SOLUTION INTRAMUSCULAR; INTRAVENOUS EVERY 6 HOURS
Status: COMPLETED | OUTPATIENT
Start: 2020-09-18 | End: 2020-09-20

## 2020-09-18 RX ORDER — CHLORHEXIDINE GLUCONATE 0.12 MG/ML
15 RINSE ORAL 2 TIMES DAILY
Status: DISCONTINUED | OUTPATIENT
Start: 2020-09-18 | End: 2020-09-26 | Stop reason: HOSPADM

## 2020-09-18 RX ORDER — MILRINONE LACTATE 0.2 MG/ML
INJECTION, SOLUTION INTRAVENOUS CONTINUOUS PRN
Status: DISCONTINUED | OUTPATIENT
Start: 2020-09-18 | End: 2020-09-18 | Stop reason: SDUPTHER

## 2020-09-18 RX ORDER — MIDAZOLAM HYDROCHLORIDE 1 MG/ML
INJECTION INTRAMUSCULAR; INTRAVENOUS PRN
Status: DISCONTINUED | OUTPATIENT
Start: 2020-09-18 | End: 2020-09-18 | Stop reason: SDUPTHER

## 2020-09-18 RX ORDER — SODIUM CHLORIDE 0.9 % (FLUSH) 0.9 %
10 SYRINGE (ML) INJECTION PRN
Status: DISCONTINUED | OUTPATIENT
Start: 2020-09-18 | End: 2020-09-26 | Stop reason: HOSPADM

## 2020-09-18 RX ORDER — EPHEDRINE SULFATE 50 MG/ML
INJECTION INTRAVENOUS PRN
Status: DISCONTINUED | OUTPATIENT
Start: 2020-09-18 | End: 2020-09-18 | Stop reason: SDUPTHER

## 2020-09-18 RX ORDER — DEXMEDETOMIDINE HYDROCHLORIDE 4 UG/ML
0.2 INJECTION, SOLUTION INTRAVENOUS
Status: DISCONTINUED | OUTPATIENT
Start: 2020-09-18 | End: 2020-09-20

## 2020-09-18 RX ORDER — FUROSEMIDE 10 MG/ML
40 INJECTION INTRAMUSCULAR; INTRAVENOUS
Status: ACTIVE | OUTPATIENT
Start: 2020-09-18 | End: 2020-09-18

## 2020-09-18 RX ORDER — AMINOCAPROIC ACID 250 MG/ML
INJECTION, SOLUTION INTRAVENOUS PRN
Status: DISCONTINUED | OUTPATIENT
Start: 2020-09-18 | End: 2020-09-18 | Stop reason: SDUPTHER

## 2020-09-18 RX ORDER — SODIUM CHLORIDE 0.9 % (FLUSH) 0.9 %
10 SYRINGE (ML) INJECTION EVERY 12 HOURS SCHEDULED
Status: DISCONTINUED | OUTPATIENT
Start: 2020-09-18 | End: 2020-09-26 | Stop reason: HOSPADM

## 2020-09-18 RX ORDER — NITROGLYCERIN 20 MG/100ML
10 INJECTION INTRAVENOUS CONTINUOUS PRN
Status: DISCONTINUED | OUTPATIENT
Start: 2020-09-18 | End: 2020-09-20

## 2020-09-18 RX ORDER — SODIUM CHLORIDE, SODIUM LACTATE, POTASSIUM CHLORIDE, AND CALCIUM CHLORIDE .6; .31; .03; .02 G/100ML; G/100ML; G/100ML; G/100ML
250 INJECTION, SOLUTION INTRAVENOUS CONTINUOUS PRN
Status: DISCONTINUED | OUTPATIENT
Start: 2020-09-18 | End: 2020-09-20

## 2020-09-18 RX ORDER — ACETAMINOPHEN 325 MG/1
650 TABLET ORAL EVERY 6 HOURS
Status: COMPLETED | OUTPATIENT
Start: 2020-09-19 | End: 2020-09-21

## 2020-09-18 RX ORDER — LOSARTAN POTASSIUM 25 MG/1
25 TABLET ORAL DAILY
Status: DISCONTINUED | OUTPATIENT
Start: 2020-09-19 | End: 2020-09-26 | Stop reason: HOSPADM

## 2020-09-18 RX ORDER — ALBUMIN, HUMAN INJ 5% 5 %
25 SOLUTION INTRAVENOUS PRN
Status: DISCONTINUED | OUTPATIENT
Start: 2020-09-18 | End: 2020-09-23

## 2020-09-18 RX ORDER — FUROSEMIDE 10 MG/ML
40 INJECTION INTRAMUSCULAR; INTRAVENOUS 2 TIMES DAILY
Status: DISCONTINUED | OUTPATIENT
Start: 2020-09-19 | End: 2020-09-23

## 2020-09-18 RX ORDER — FAMOTIDINE 20 MG/1
20 TABLET, FILM COATED ORAL 2 TIMES DAILY
Status: DISCONTINUED | OUTPATIENT
Start: 2020-09-19 | End: 2020-09-26 | Stop reason: HOSPADM

## 2020-09-18 RX ORDER — FONDAPARINUX SODIUM 2.5 MG/.5ML
2.5 INJECTION SUBCUTANEOUS DAILY
Status: DISCONTINUED | OUTPATIENT
Start: 2020-09-19 | End: 2020-09-21

## 2020-09-18 RX ORDER — BISACODYL 10 MG
10 SUPPOSITORY, RECTAL RECTAL DAILY PRN
Status: DISCONTINUED | OUTPATIENT
Start: 2020-09-18 | End: 2020-09-26 | Stop reason: HOSPADM

## 2020-09-18 RX ORDER — DEXTROSE MONOHYDRATE 25 G/50ML
12.5 INJECTION, SOLUTION INTRAVENOUS PRN
Status: DISCONTINUED | OUTPATIENT
Start: 2020-09-18 | End: 2020-09-26 | Stop reason: HOSPADM

## 2020-09-18 RX ORDER — ASPIRIN 300 MG/1
300 SUPPOSITORY RECTAL ONCE
Status: DISCONTINUED | OUTPATIENT
Start: 2020-09-18 | End: 2020-09-20

## 2020-09-18 RX ORDER — MIDAZOLAM HYDROCHLORIDE 1 MG/ML
1 INJECTION INTRAMUSCULAR; INTRAVENOUS
Status: DISCONTINUED | OUTPATIENT
Start: 2020-09-18 | End: 2020-09-23

## 2020-09-18 RX ORDER — POTASSIUM CHLORIDE 29.8 MG/ML
20 INJECTION INTRAVENOUS PRN
Status: DISCONTINUED | OUTPATIENT
Start: 2020-09-18 | End: 2020-09-26 | Stop reason: HOSPADM

## 2020-09-18 RX ORDER — MORPHINE SULFATE 2 MG/ML
2 INJECTION, SOLUTION INTRAMUSCULAR; INTRAVENOUS
Status: DISCONTINUED | OUTPATIENT
Start: 2020-09-18 | End: 2020-09-26 | Stop reason: HOSPADM

## 2020-09-18 RX ORDER — POLYETHYLENE GLYCOL 3350 17 G/17G
17 POWDER, FOR SOLUTION ORAL DAILY
Status: DISCONTINUED | OUTPATIENT
Start: 2020-09-19 | End: 2020-09-26 | Stop reason: HOSPADM

## 2020-09-18 RX ORDER — OXYCODONE HYDROCHLORIDE 5 MG/1
5 TABLET ORAL EVERY 4 HOURS PRN
Status: DISCONTINUED | OUTPATIENT
Start: 2020-09-18 | End: 2020-09-26 | Stop reason: HOSPADM

## 2020-09-18 RX ORDER — PROTAMINE SULFATE 10 MG/ML
INJECTION, SOLUTION INTRAVENOUS PRN
Status: DISCONTINUED | OUTPATIENT
Start: 2020-09-18 | End: 2020-09-18 | Stop reason: SDUPTHER

## 2020-09-18 RX ORDER — DOBUTAMINE HYDROCHLORIDE 200 MG/100ML
2 INJECTION INTRAVENOUS CONTINUOUS PRN
Status: DISCONTINUED | OUTPATIENT
Start: 2020-09-18 | End: 2020-09-20

## 2020-09-18 RX ORDER — CALCIUM GLUCONATE 20 MG/ML
1 INJECTION, SOLUTION INTRAVENOUS ONCE
Status: DISCONTINUED | OUTPATIENT
Start: 2020-09-18 | End: 2020-09-18 | Stop reason: SDUPTHER

## 2020-09-18 RX ORDER — MILRINONE LACTATE 0.2 MG/ML
0.38 INJECTION, SOLUTION INTRAVENOUS CONTINUOUS
Status: DISCONTINUED | OUTPATIENT
Start: 2020-09-18 | End: 2020-09-20

## 2020-09-18 RX ORDER — INSULIN GLARGINE 100 [IU]/ML
0.15 INJECTION, SOLUTION SUBCUTANEOUS NIGHTLY
Status: DISCONTINUED | OUTPATIENT
Start: 2020-09-19 | End: 2020-09-26 | Stop reason: HOSPADM

## 2020-09-18 RX ORDER — KETAMINE HYDROCHLORIDE 100 MG/ML
INJECTION, SOLUTION INTRAMUSCULAR; INTRAVENOUS PRN
Status: DISCONTINUED | OUTPATIENT
Start: 2020-09-18 | End: 2020-09-18 | Stop reason: SDUPTHER

## 2020-09-18 RX ORDER — OXYCODONE HYDROCHLORIDE 5 MG/1
10 TABLET ORAL EVERY 4 HOURS PRN
Status: DISCONTINUED | OUTPATIENT
Start: 2020-09-18 | End: 2020-09-26 | Stop reason: HOSPADM

## 2020-09-18 RX ORDER — 0.9 % SODIUM CHLORIDE 0.9 %
20 INTRAVENOUS SOLUTION INTRAVENOUS ONCE
Status: COMPLETED | OUTPATIENT
Start: 2020-09-18 | End: 2020-09-18

## 2020-09-18 RX ORDER — POTASSIUM CHLORIDE 750 MG/1
10 TABLET, EXTENDED RELEASE ORAL
Status: DISCONTINUED | OUTPATIENT
Start: 2020-09-19 | End: 2020-09-26 | Stop reason: HOSPADM

## 2020-09-18 RX ORDER — MORPHINE SULFATE 4 MG/ML
4 INJECTION, SOLUTION INTRAMUSCULAR; INTRAVENOUS
Status: DISCONTINUED | OUTPATIENT
Start: 2020-09-18 | End: 2020-09-26 | Stop reason: HOSPADM

## 2020-09-18 RX ORDER — DIPHENHYDRAMINE HCL 25 MG
25 TABLET ORAL NIGHTLY PRN
Status: DISCONTINUED | OUTPATIENT
Start: 2020-09-19 | End: 2020-09-23

## 2020-09-18 RX ORDER — ONDANSETRON 2 MG/ML
4 INJECTION INTRAMUSCULAR; INTRAVENOUS EVERY 8 HOURS PRN
Status: DISCONTINUED | OUTPATIENT
Start: 2020-09-18 | End: 2020-09-26 | Stop reason: HOSPADM

## 2020-09-18 RX ORDER — DOBUTAMINE HYDROCHLORIDE 200 MG/100ML
INJECTION INTRAVENOUS CONTINUOUS PRN
Status: DISCONTINUED | OUTPATIENT
Start: 2020-09-18 | End: 2020-09-18 | Stop reason: SDUPTHER

## 2020-09-18 RX ORDER — ALBUTEROL SULFATE 90 UG/1
2 AEROSOL, METERED RESPIRATORY (INHALATION) EVERY 6 HOURS PRN
Status: DISCONTINUED | OUTPATIENT
Start: 2020-09-18 | End: 2020-09-26 | Stop reason: HOSPADM

## 2020-09-18 RX ORDER — NICOTINE POLACRILEX 4 MG
15 LOZENGE BUCCAL PRN
Status: DISCONTINUED | OUTPATIENT
Start: 2020-09-18 | End: 2020-09-26 | Stop reason: HOSPADM

## 2020-09-18 RX ORDER — SODIUM CHLORIDE, SODIUM LACTATE, POTASSIUM CHLORIDE, CALCIUM CHLORIDE 600; 310; 30; 20 MG/100ML; MG/100ML; MG/100ML; MG/100ML
INJECTION, SOLUTION INTRAVENOUS CONTINUOUS
Status: DISCONTINUED | OUTPATIENT
Start: 2020-09-18 | End: 2020-09-20

## 2020-09-18 RX ORDER — MAGNESIUM SULFATE IN WATER 40 MG/ML
2 INJECTION, SOLUTION INTRAVENOUS PRN
Status: DISCONTINUED | OUTPATIENT
Start: 2020-09-18 | End: 2020-09-20

## 2020-09-18 RX ORDER — CALCIUM CHLORIDE 100 MG/ML
INJECTION INTRAVENOUS; INTRAVENTRICULAR PRN
Status: DISCONTINUED | OUTPATIENT
Start: 2020-09-18 | End: 2020-09-18 | Stop reason: SDUPTHER

## 2020-09-18 RX ORDER — EPINEPHRINE 1 MG/ML
INJECTION, SOLUTION, CONCENTRATE INTRAVENOUS PRN
Status: DISCONTINUED | OUTPATIENT
Start: 2020-09-18 | End: 2020-09-18 | Stop reason: SDUPTHER

## 2020-09-18 RX ADMIN — VANCOMYCIN HYDROCHLORIDE 2 G: 5 INJECTION, POWDER, LYOPHILIZED, FOR SOLUTION INTRAVENOUS at 07:30

## 2020-09-18 RX ADMIN — ALPRAZOLAM 0.25 MG: 0.25 TABLET ORAL at 00:02

## 2020-09-18 RX ADMIN — CARVEDILOL 3.12 MG: 3.12 TABLET, FILM COATED ORAL at 04:57

## 2020-09-18 RX ADMIN — NOREPINEPHRINE BITARTRATE 8 MCG: 1 INJECTION INTRAVENOUS at 07:15

## 2020-09-18 RX ADMIN — CISATRACURIUM BESYLATE 5 MG: 2 INJECTION INTRAVENOUS at 13:00

## 2020-09-18 RX ADMIN — AMINOCAPROIC ACID 5000 MG: 250 INJECTION, SOLUTION INTRAVENOUS at 08:56

## 2020-09-18 RX ADMIN — POTASSIUM CHLORIDE 20 MEQ: 400 INJECTION, SOLUTION INTRAVENOUS at 20:17

## 2020-09-18 RX ADMIN — CISATRACURIUM BESYLATE 10 MG: 2 INJECTION INTRAVENOUS at 08:00

## 2020-09-18 RX ADMIN — PROTAMINE SULFATE 25 MG: 10 INJECTION, SOLUTION INTRAVENOUS at 12:40

## 2020-09-18 RX ADMIN — EPHEDRINE SULFATE 10 MG: 50 INJECTION INTRAVENOUS at 07:58

## 2020-09-18 RX ADMIN — CALCIUM CHLORIDE 1 G: 100 INJECTION, SOLUTION INTRAVENOUS at 07:58

## 2020-09-18 RX ADMIN — FENTANYL CITRATE 250 MCG: 50 INJECTION, SOLUTION INTRAMUSCULAR; INTRAVENOUS at 07:00

## 2020-09-18 RX ADMIN — NOREPINEPHRINE BITARTRATE 4 MCG: 1 INJECTION INTRAVENOUS at 07:58

## 2020-09-18 RX ADMIN — SODIUM CHLORIDE, PRESERVATIVE FREE 10 ML: 5 INJECTION INTRAVENOUS at 22:08

## 2020-09-18 RX ADMIN — Medication 1 G: at 11:30

## 2020-09-18 RX ADMIN — FAMOTIDINE 20 MG: 10 INJECTION INTRAVENOUS at 22:08

## 2020-09-18 RX ADMIN — SODIUM BICARBONATE 50 MEQ: 84 INJECTION, SOLUTION INTRAVENOUS at 18:45

## 2020-09-18 RX ADMIN — SODIUM CHLORIDE 2.62 UNITS/HR: 9 INJECTION, SOLUTION INTRAVENOUS at 15:05

## 2020-09-18 RX ADMIN — SODIUM CHLORIDE, SODIUM LACTATE, POTASSIUM CHLORIDE, AND CALCIUM CHLORIDE: .6; .31; .03; .02 INJECTION, SOLUTION INTRAVENOUS at 12:42

## 2020-09-18 RX ADMIN — NOREPINEPHRINE BITARTRATE 4 MCG: 1 INJECTION INTRAVENOUS at 07:53

## 2020-09-18 RX ADMIN — SODIUM CHLORIDE 20 ML: 9 INJECTION, SOLUTION INTRAVENOUS at 16:16

## 2020-09-18 RX ADMIN — KETOROLAC TROMETHAMINE 15 MG: 30 INJECTION, SOLUTION INTRAMUSCULAR at 16:20

## 2020-09-18 RX ADMIN — EPHEDRINE SULFATE 5 MG: 50 INJECTION INTRAVENOUS at 12:35

## 2020-09-18 RX ADMIN — Medication 3 G: at 19:51

## 2020-09-18 RX ADMIN — SODIUM CHLORIDE, SODIUM LACTATE, POTASSIUM CHLORIDE, AND CALCIUM CHLORIDE: .6; .31; .03; .02 INJECTION, SOLUTION INTRAVENOUS at 11:39

## 2020-09-18 RX ADMIN — EPINEPHRINE 20 MCG: 1 INJECTION, SOLUTION INTRAMUSCULAR; SUBCUTANEOUS at 13:45

## 2020-09-18 RX ADMIN — ASPIRIN 81 MG: 81 TABLET, COATED ORAL at 04:57

## 2020-09-18 RX ADMIN — EPHEDRINE SULFATE 10 MG: 50 INJECTION INTRAVENOUS at 07:53

## 2020-09-18 RX ADMIN — KETAMINE HYDROCHLORIDE 100 MG: 100 INJECTION, SOLUTION INTRAMUSCULAR; INTRAVENOUS at 07:00

## 2020-09-18 RX ADMIN — SODIUM CHLORIDE: 9 INJECTION, SOLUTION INTRAVENOUS at 04:58

## 2020-09-18 RX ADMIN — MILRINONE LACTATE IN DEXTROSE 0.2 MCG/KG/MIN: 200 INJECTION, SOLUTION INTRAVENOUS at 14:30

## 2020-09-18 RX ADMIN — POTASSIUM CHLORIDE 20 MEQ: 400 INJECTION, SOLUTION INTRAVENOUS at 23:18

## 2020-09-18 RX ADMIN — MUPIROCIN: 20 OINTMENT TOPICAL at 22:07

## 2020-09-18 RX ADMIN — VASOPRESSIN 0.02 UNITS/MIN: 20 INJECTION INTRAVENOUS at 14:58

## 2020-09-18 RX ADMIN — SODIUM CHLORIDE 2.62 UNITS/HR: 9 INJECTION, SOLUTION INTRAVENOUS at 15:00

## 2020-09-18 RX ADMIN — Medication 0.5 MCG/KG/HR: at 19:48

## 2020-09-18 RX ADMIN — EPINEPHRINE 10 MCG: 1 INJECTION, SOLUTION INTRAMUSCULAR; SUBCUTANEOUS at 12:58

## 2020-09-18 RX ADMIN — MIDAZOLAM 2 MG: 1 INJECTION INTRAMUSCULAR; INTRAVENOUS at 06:15

## 2020-09-18 RX ADMIN — CALCIUM CHLORIDE 1 G: 100 INJECTION, SOLUTION INTRAVENOUS at 18:10

## 2020-09-18 RX ADMIN — SODIUM CHLORIDE: 9 INJECTION, SOLUTION INTRAVENOUS at 08:00

## 2020-09-18 RX ADMIN — KETOROLAC TROMETHAMINE 15 MG: 30 INJECTION, SOLUTION INTRAMUSCULAR at 22:07

## 2020-09-18 RX ADMIN — EPTIFIBATIDE 2 MCG/KG/MIN: 0.75 INJECTION INTRAVENOUS at 01:12

## 2020-09-18 RX ADMIN — MILRINONE LACTATE IN DEXTROSE 0.2 MCG/KG/MIN: 200 INJECTION, SOLUTION INTRAVENOUS at 23:21

## 2020-09-18 RX ADMIN — CALCIUM CHLORIDE 0.5 G: 100 INJECTION, SOLUTION INTRAVENOUS at 12:32

## 2020-09-18 RX ADMIN — Medication 15 ML: at 22:11

## 2020-09-18 RX ADMIN — CISATRACURIUM BESYLATE 10 MG: 2 INJECTION INTRAVENOUS at 10:00

## 2020-09-18 RX ADMIN — EPINEPHRINE 10 MCG: 1 INJECTION, SOLUTION INTRAMUSCULAR; SUBCUTANEOUS at 13:03

## 2020-09-18 RX ADMIN — NOREPINEPHRINE BITARTRATE 8 MCG: 1 INJECTION INTRAVENOUS at 07:19

## 2020-09-18 RX ADMIN — EPINEPHRINE 20 MCG: 1 INJECTION, SOLUTION INTRAMUSCULAR; SUBCUTANEOUS at 13:38

## 2020-09-18 RX ADMIN — MIDAZOLAM HYDROCHLORIDE 2 MG: 2 INJECTION, SOLUTION INTRAMUSCULAR; INTRAVENOUS at 07:00

## 2020-09-18 RX ADMIN — FENTANYL CITRATE 750 MCG: 50 INJECTION, SOLUTION INTRAMUSCULAR; INTRAVENOUS at 07:49

## 2020-09-18 RX ADMIN — EPINEPHRINE 0.05 MCG/KG/MIN: 1 INJECTION PARENTERAL at 13:50

## 2020-09-18 RX ADMIN — DEXTROSE MONOHYDRATE 4 MCG/MIN: 50 INJECTION, SOLUTION INTRAVENOUS at 12:22

## 2020-09-18 RX ADMIN — CISATRACURIUM BESYLATE 20 MG: 2 INJECTION INTRAVENOUS at 07:08

## 2020-09-18 RX ADMIN — VANCOMYCIN HYDROCHLORIDE 2000 MG: 1 INJECTION, POWDER, LYOPHILIZED, FOR SOLUTION INTRAVENOUS at 20:17

## 2020-09-18 RX ADMIN — DEXTROSE MONOHYDRATE 16 MCG/MIN: 50 INJECTION, SOLUTION INTRAVENOUS at 14:30

## 2020-09-18 RX ADMIN — CALCIUM CHLORIDE 0.5 G: 100 INJECTION, SOLUTION INTRAVENOUS at 12:22

## 2020-09-18 RX ADMIN — Medication 0.6 MCG/KG/HR: at 14:30

## 2020-09-18 RX ADMIN — DOBUTAMINE HYDROCHLORIDE 2 MCG/KG/MIN: 200 INJECTION INTRAVENOUS at 11:28

## 2020-09-18 RX ADMIN — Medication 15 ML: at 04:58

## 2020-09-18 RX ADMIN — EPINEPHRINE 6 MCG/MIN: 1 INJECTION PARENTERAL at 14:30

## 2020-09-18 RX ADMIN — SODIUM CHLORIDE 10.72 UNITS/HR: 9 INJECTION, SOLUTION INTRAVENOUS at 22:39

## 2020-09-18 RX ADMIN — CISATRACURIUM BESYLATE 20 MG: 2 INJECTION INTRAVENOUS at 14:00

## 2020-09-18 RX ADMIN — SODIUM CHLORIDE, SODIUM LACTATE, POTASSIUM CHLORIDE, AND CALCIUM CHLORIDE: .6; .31; .03; .02 INJECTION, SOLUTION INTRAVENOUS at 07:00

## 2020-09-18 RX ADMIN — HEPARIN SODIUM 45000 UNITS: 1000 INJECTION, SOLUTION INTRAVENOUS; SUBCUTANEOUS at 08:51

## 2020-09-18 RX ADMIN — CISATRACURIUM BESYLATE 10 MG: 2 INJECTION INTRAVENOUS at 09:00

## 2020-09-18 RX ADMIN — MELATONIN TAB 5 MG 5 MG: 5 TAB at 00:02

## 2020-09-18 RX ADMIN — CISATRACURIUM BESYLATE 10 MG: 2 INJECTION INTRAVENOUS at 11:00

## 2020-09-18 RX ADMIN — DEXMEDETOMIDINE 0.6 MCG/KG/HR: 100 INJECTION, SOLUTION, CONCENTRATE INTRAVENOUS at 14:30

## 2020-09-18 RX ADMIN — EPHEDRINE SULFATE 5 MG: 50 INJECTION INTRAVENOUS at 07:12

## 2020-09-18 RX ADMIN — PROTAMINE SULFATE 500 MG: 10 INJECTION, SOLUTION INTRAVENOUS at 12:25

## 2020-09-18 RX ADMIN — MILRINONE LACTATE 0.38 MCG/KG/MIN: 0.2 INJECTION, SOLUTION INTRAVENOUS at 13:35

## 2020-09-18 RX ADMIN — SODIUM CHLORIDE, POTASSIUM CHLORIDE, SODIUM LACTATE AND CALCIUM CHLORIDE: 600; 310; 30; 20 INJECTION, SOLUTION INTRAVENOUS at 14:30

## 2020-09-18 RX ADMIN — Medication 3 G: at 07:30

## 2020-09-18 RX ADMIN — ALBUMIN (HUMAN) 25 G: 12.5 INJECTION, SOLUTION INTRAVENOUS at 20:30

## 2020-09-18 RX ADMIN — NOREPINEPHRINE BITARTRATE 8 MCG: 1 INJECTION INTRAVENOUS at 07:26

## 2020-09-18 RX ADMIN — SODIUM CHLORIDE 5.28 UNITS/HR: 9 INJECTION, SOLUTION INTRAVENOUS at 08:00

## 2020-09-18 RX ADMIN — EPINEPHRINE 20 MCG: 1 INJECTION, SOLUTION INTRAMUSCULAR; SUBCUTANEOUS at 13:30

## 2020-09-18 RX ADMIN — NOREPINEPHRINE BITARTRATE 8 MCG: 1 INJECTION INTRAVENOUS at 07:12

## 2020-09-18 ASSESSMENT — PULMONARY FUNCTION TESTS
PIF_VALUE: 33
PIF_VALUE: 23
PIF_VALUE: 24
PIF_VALUE: 35
PIF_VALUE: 27
PIF_VALUE: 32
PIF_VALUE: 31
PIF_VALUE: 31
PIF_VALUE: 32
PIF_VALUE: 23
PIF_VALUE: 32
PIF_VALUE: 0
PIF_VALUE: 33
PIF_VALUE: 25
PIF_VALUE: 31
PIF_VALUE: 1
PIF_VALUE: 24
PIF_VALUE: 1
PIF_VALUE: 22
PIF_VALUE: 32
PIF_VALUE: 6
PIF_VALUE: 23
PIF_VALUE: 29
PIF_VALUE: 1
PIF_VALUE: 1
PIF_VALUE: 24
PIF_VALUE: 31
PIF_VALUE: 28
PIF_VALUE: 33
PIF_VALUE: 1
PIF_VALUE: 31
PIF_VALUE: 37
PIF_VALUE: 36
PIF_VALUE: 0
PIF_VALUE: 1
PIF_VALUE: 23
PIF_VALUE: 2
PIF_VALUE: 32
PIF_VALUE: 1
PIF_VALUE: 1
PIF_VALUE: 23
PIF_VALUE: 34
PIF_VALUE: 1
PIF_VALUE: 7
PIF_VALUE: 0
PIF_VALUE: 29
PIF_VALUE: 1
PIF_VALUE: 32
PIF_VALUE: 32
PIF_VALUE: 31
PIF_VALUE: 1
PIF_VALUE: 31
PIF_VALUE: 33
PIF_VALUE: 1
PIF_VALUE: 5
PIF_VALUE: 32
PIF_VALUE: 23
PIF_VALUE: 32
PIF_VALUE: 2
PIF_VALUE: 1
PIF_VALUE: 33
PIF_VALUE: 22
PIF_VALUE: 32
PIF_VALUE: 1
PIF_VALUE: 23
PIF_VALUE: 24
PIF_VALUE: 1
PIF_VALUE: 2
PIF_VALUE: 26
PIF_VALUE: 23
PIF_VALUE: 22
PIF_VALUE: 2
PIF_VALUE: 1
PIF_VALUE: 31
PIF_VALUE: 29
PIF_VALUE: 32
PIF_VALUE: 31
PIF_VALUE: 30
PIF_VALUE: 31
PIF_VALUE: 25
PIF_VALUE: 1
PIF_VALUE: 32
PIF_VALUE: 23
PIF_VALUE: 28
PIF_VALUE: 25
PIF_VALUE: 0
PIF_VALUE: 1
PIF_VALUE: 0
PIF_VALUE: 1
PIF_VALUE: 31
PIF_VALUE: 24
PIF_VALUE: 1
PIF_VALUE: 1
PIF_VALUE: 32
PIF_VALUE: 22
PIF_VALUE: 36
PIF_VALUE: 28
PIF_VALUE: 36
PIF_VALUE: 32
PIF_VALUE: 31
PIF_VALUE: 33
PIF_VALUE: 35
PIF_VALUE: 34
PIF_VALUE: 32
PIF_VALUE: 0
PIF_VALUE: 23
PIF_VALUE: 28
PIF_VALUE: 1
PIF_VALUE: 36
PIF_VALUE: 1
PIF_VALUE: 25
PIF_VALUE: 1
PIF_VALUE: 31
PIF_VALUE: 1
PIF_VALUE: 0
PIF_VALUE: 32
PIF_VALUE: 34
PIF_VALUE: 31
PIF_VALUE: 32
PIF_VALUE: 23
PIF_VALUE: 24
PIF_VALUE: 31
PIF_VALUE: 23
PIF_VALUE: 1
PIF_VALUE: 1
PIF_VALUE: 28
PIF_VALUE: 28
PIF_VALUE: 24
PIF_VALUE: 31
PIF_VALUE: 31
PIF_VALUE: 25
PIF_VALUE: 1
PIF_VALUE: 23
PIF_VALUE: 28
PIF_VALUE: 23
PIF_VALUE: 32
PIF_VALUE: 1
PIF_VALUE: 36
PIF_VALUE: 31
PIF_VALUE: 1
PIF_VALUE: 33
PIF_VALUE: 23
PIF_VALUE: 0
PIF_VALUE: 31
PIF_VALUE: 1
PIF_VALUE: 32
PIF_VALUE: 22
PIF_VALUE: 28
PIF_VALUE: 1
PIF_VALUE: 22
PIF_VALUE: 1
PIF_VALUE: 24
PIF_VALUE: 0
PIF_VALUE: 1
PIF_VALUE: 0
PIF_VALUE: 32
PIF_VALUE: 22
PIF_VALUE: 28
PIF_VALUE: 31
PIF_VALUE: 28
PIF_VALUE: 40
PIF_VALUE: 8
PIF_VALUE: 1
PIF_VALUE: 32
PIF_VALUE: 22
PIF_VALUE: 1
PIF_VALUE: 23
PIF_VALUE: 31
PIF_VALUE: 1
PIF_VALUE: 24
PIF_VALUE: 31
PIF_VALUE: 1
PIF_VALUE: 5
PIF_VALUE: 28
PIF_VALUE: 0
PIF_VALUE: 23
PIF_VALUE: 24
PIF_VALUE: 24
PIF_VALUE: 28
PIF_VALUE: 21
PIF_VALUE: 1
PIF_VALUE: 0
PIF_VALUE: 1
PIF_VALUE: 29
PIF_VALUE: 29
PIF_VALUE: 23
PIF_VALUE: 31
PIF_VALUE: 32
PIF_VALUE: 32
PIF_VALUE: 33
PIF_VALUE: 35
PIF_VALUE: 1
PIF_VALUE: 1
PIF_VALUE: 32
PIF_VALUE: 1
PIF_VALUE: 31
PIF_VALUE: 24
PIF_VALUE: 32
PIF_VALUE: 31
PIF_VALUE: 23
PIF_VALUE: 23
PIF_VALUE: 35
PIF_VALUE: 1
PIF_VALUE: 28
PIF_VALUE: 32
PIF_VALUE: 1
PIF_VALUE: 1
PIF_VALUE: 25
PIF_VALUE: 35
PIF_VALUE: 24
PIF_VALUE: 23
PIF_VALUE: 33
PIF_VALUE: 1
PIF_VALUE: 28
PIF_VALUE: 22
PIF_VALUE: 29
PIF_VALUE: 1
PIF_VALUE: 31
PIF_VALUE: 1
PIF_VALUE: 23
PIF_VALUE: 33
PIF_VALUE: 0
PIF_VALUE: 31
PIF_VALUE: 32
PIF_VALUE: 35
PIF_VALUE: 1
PIF_VALUE: 28
PIF_VALUE: 31
PIF_VALUE: 24
PIF_VALUE: 22
PIF_VALUE: 31
PIF_VALUE: 1
PIF_VALUE: 34
PIF_VALUE: 25
PIF_VALUE: 23
PIF_VALUE: 24
PIF_VALUE: 31
PIF_VALUE: 22
PIF_VALUE: 1
PIF_VALUE: 32
PIF_VALUE: 1
PIF_VALUE: 1
PIF_VALUE: 23
PIF_VALUE: 22
PIF_VALUE: 36
PIF_VALUE: 1
PIF_VALUE: 1
PIF_VALUE: 31
PIF_VALUE: 30
PIF_VALUE: 36
PIF_VALUE: 31
PIF_VALUE: 28
PIF_VALUE: 1
PIF_VALUE: 0
PIF_VALUE: 1
PIF_VALUE: 32
PIF_VALUE: 32
PIF_VALUE: 31
PIF_VALUE: 1
PIF_VALUE: 2
PIF_VALUE: 32
PIF_VALUE: 1
PIF_VALUE: 23
PIF_VALUE: 1
PIF_VALUE: 31
PIF_VALUE: 28
PIF_VALUE: 32
PIF_VALUE: 1
PIF_VALUE: 23
PIF_VALUE: 24
PIF_VALUE: 0
PIF_VALUE: 1
PIF_VALUE: 35
PIF_VALUE: 1
PIF_VALUE: 28
PIF_VALUE: 36
PIF_VALUE: 32
PIF_VALUE: 0
PIF_VALUE: 1
PIF_VALUE: 0
PIF_VALUE: 28
PIF_VALUE: 22
PIF_VALUE: 1
PIF_VALUE: 32
PIF_VALUE: 31
PIF_VALUE: 24
PIF_VALUE: 21
PIF_VALUE: 28
PIF_VALUE: 0
PIF_VALUE: 31
PIF_VALUE: 23
PIF_VALUE: 25
PIF_VALUE: 23
PIF_VALUE: 32
PIF_VALUE: 28
PIF_VALUE: 1
PIF_VALUE: 23
PIF_VALUE: 0
PIF_VALUE: 32
PIF_VALUE: 1
PIF_VALUE: 28
PIF_VALUE: 4
PIF_VALUE: 25
PIF_VALUE: 32
PIF_VALUE: 33
PIF_VALUE: 23
PIF_VALUE: 1
PIF_VALUE: 1
PIF_VALUE: 28
PIF_VALUE: 1
PIF_VALUE: 32
PIF_VALUE: 35
PIF_VALUE: 24
PIF_VALUE: 1
PIF_VALUE: 22
PIF_VALUE: 35
PIF_VALUE: 34
PIF_VALUE: 2
PIF_VALUE: 1
PIF_VALUE: 1
PIF_VALUE: 23
PIF_VALUE: 1
PIF_VALUE: 28
PIF_VALUE: 1
PIF_VALUE: 0
PIF_VALUE: 31
PIF_VALUE: 23
PIF_VALUE: 35
PIF_VALUE: 31
PIF_VALUE: 31
PIF_VALUE: 1
PIF_VALUE: 1
PIF_VALUE: 32
PIF_VALUE: 1
PIF_VALUE: 24
PIF_VALUE: 31
PIF_VALUE: 1
PIF_VALUE: 34
PIF_VALUE: 32
PIF_VALUE: 31
PIF_VALUE: 23
PIF_VALUE: 31
PIF_VALUE: 28
PIF_VALUE: 28
PIF_VALUE: 1
PIF_VALUE: 28
PIF_VALUE: 24
PIF_VALUE: 24
PIF_VALUE: 1
PIF_VALUE: 9
PIF_VALUE: 32
PIF_VALUE: 22
PIF_VALUE: 1
PIF_VALUE: 0
PIF_VALUE: 22
PIF_VALUE: 28
PIF_VALUE: 23
PIF_VALUE: 34
PIF_VALUE: 2
PIF_VALUE: 0
PIF_VALUE: 1
PIF_VALUE: 35
PIF_VALUE: 31
PIF_VALUE: 32
PIF_VALUE: 36
PIF_VALUE: 31
PIF_VALUE: 29
PIF_VALUE: 0
PIF_VALUE: 31
PIF_VALUE: 31
PIF_VALUE: 1
PIF_VALUE: 25
PIF_VALUE: 1
PIF_VALUE: 31
PIF_VALUE: 28
PIF_VALUE: 31
PIF_VALUE: 1
PIF_VALUE: 37
PIF_VALUE: 28
PIF_VALUE: 1
PIF_VALUE: 1
PIF_VALUE: 23
PIF_VALUE: 32
PIF_VALUE: 30
PIF_VALUE: 21
PIF_VALUE: 23
PIF_VALUE: 24
PIF_VALUE: 34
PIF_VALUE: 1
PIF_VALUE: 1
PIF_VALUE: 31
PIF_VALUE: 1
PIF_VALUE: 32
PIF_VALUE: 32
PIF_VALUE: 31
PIF_VALUE: 25
PIF_VALUE: 28
PIF_VALUE: 29
PIF_VALUE: 1
PIF_VALUE: 29
PIF_VALUE: 32
PIF_VALUE: 31
PIF_VALUE: 23
PIF_VALUE: 22
PIF_VALUE: 1
PIF_VALUE: 28
PIF_VALUE: 1
PIF_VALUE: 24
PIF_VALUE: 1
PIF_VALUE: 22
PIF_VALUE: 1
PIF_VALUE: 28
PIF_VALUE: 25
PIF_VALUE: 1
PIF_VALUE: 31
PIF_VALUE: 1

## 2020-09-18 ASSESSMENT — PAIN SCALES - GENERAL
PAINLEVEL_OUTOF10: 0
PAINLEVEL_OUTOF10: 5
PAINLEVEL_OUTOF10: 0

## 2020-09-18 ASSESSMENT — PAIN DESCRIPTION - PAIN TYPE: TYPE: SURGICAL PAIN

## 2020-09-18 NOTE — PLAN OF CARE
Nutrition Problem #1: Inadequate oral intake  Intervention: Food and/or Nutrient Delivery: Continue NPO, Start Oral Diet(when medically appropriate)  Nutritional Goals:  Tolerate oral diet progression post op and conume greater than 50% of meals this admission

## 2020-09-18 NOTE — BRIEF OP NOTE
Brief Postoperative Note      Patient: Christa Lowe  YOB: 1950  MRN: [id number]  Date of Procedure: 9/18/2020    Pre-Op Diagnosis: [address], NSTEMI. HTN, T2DM, Dyslipidemia. Hx PCI with FARHAN. Effient anticoagulation    Post-Op Diagnosis: same        Procedure:  Urgent CABG X 4, with pedicled LIMA to LAD, sequential Greater Saphenous VG to OM 1 then on to OM 2, separate single Greater SVG to PDA of RCA, SGC, CPB, EVH Right Greater Saphenous vein, DIONI, Epiaortic ultrasound, Doppler verification of grafts, Bilateral 5 level intercostal nerve block(Exparel), Platelet gel application. Sternal plating. Surgeon(s):  Jeimy Morel MD    Assistants: Marie Vazquez MD(Resident), SA Sejal, Dipesh, SA    Anesthesia: General ET/ MD Zoe    Estimated Blood Loss (mL): 200 ml    Replacements:  1 U PRBCs, 2 pools plts, 1 U FFP, 5 U cryoppt    Pump time:  153 min    Cross-clamp time:  122 min    Findings: normal ascending aorta. Trivial MR. No AI    Complications: None    Specimens:   * No specimens in log *    Implants:  Implant Name Type Inv. Item Serial No.  Lot No. LRB No. Used Action   PLATE X STERNALOCK 8 HL Screw/Plate/Nail/Len PLATE X STERNALOCK 8 HL  [employer]  N/A 2 Implanted   PLATE JL 8 HL Screw/Plate/Nail/Len PLATE JL 8 HL  [employer]  N/A 1 Implanted   PLATE L STERNAL LK 4 HL Screw/Plate/Nail/Len PLATE L STERNAL LK 4 HL  [employer]  N/A 1 Implanted   SCREW STERNAL LOCK 2.4 X 12MM Screw/Plate/Nail/Len SCREW STERNAL LOCK 2.4 X 12MM  [employer]  N/A 12 Implanted   SCREW LK STERNALOCK 2.4X14MM Screw/Plate/Nail/Len SCREW LK STERNALOCK 2.4X14MM  [employer]  N/A 8 Implanted   SCREW STERNALOCK 2. [id number] Screw/Plate/Nail/Len SCREW STERNALOCK 2. [id number]  [employer]  N/A 4 Implanted   SCREW STERNALOCK 2.4X18MM Screw/Plate/Nail/Len SCREW STERNALOCK 2.4X18MM  [employer]  N/A 4 Implanted       Drains:   Chest Tube 1 Mediastinal 32 [place] (Active)       Chest Tube 2 Left Pleural 32 [facility] (Active)       Closed/Suction Drain Right Leg Bulb (Active)       Urethral Catheter Double-lumen; Latex;Straight-tip; Temperature probe 16 fr (Active)     Néstor Figueroa MD  FACS   Date: 9/18/2020  Time: 1:58 PM   [id number]

## 2020-09-18 NOTE — PROGRESS NOTES
Patient admitted to CVU from [place] [address] and attached to ventilator and monitors. Report received from anesthesiologist, Dr. Sara Jane. Chest x-ray ordered and read by Dr. Sara Jane and Dr. Mango Porter. Labs drawn and sent. Assessment complete. Current infusions on admission are:    1. Levophed 16 mcg/min  2. dexmedetomidine 0.6 mcg/kg/hr  3. Insulin 2.62 units/hr  4. Epinephrine 6 mcg/min  5. Lactated Ringers 25 ml/hr (Added after arrival)  6. Milrinone 0.2 mcg/kg/min  7. Vasopressin 0.02 units/min (Added after arrival)        Primary RN LIZANDRO Ceasr.     RECOVERY PERIOD BEGINS  1430

## 2020-09-18 NOTE — PROGRESS NOTES
09/18/20 1443   Vent Information   $Ventilation $Initial Day   Vent Type 980   Vent Mode AC/VC   Vt Ordered 700 mL   Rate Set 14 bmp   Peak Flow 40 L/min   Pressure Support 5 cmH20   FiO2  50 %   SpO2 94 %   SpO2/FiO2 ratio 188   Sensitivity 3   PEEP/CPAP 7   Humidification Source HME   Vent Patient Data   Peak Inspiratory Pressure 21 cmH2O   Vt Exhaled 348 mL   Minute Volume 6.13 Liters   I:E Ratio 1:2.0   Plateau Pressure 13 XNB64   Static Compliance 58 mL/cmH2O   Dynamic Compliance 25 mL/cmH2O   Cough/Sputum   Sputum How Obtained Endotracheal   Cough Non-productive;None   Sputum Amount None   Sputum Color None   Tenacity None   Breath Sounds   Right Upper Lobe Diminished   Right Middle Lobe Diminished   Right Lower Lobe Diminished   Left Upper Lobe Diminished   Left Lower Lobe Diminished   Additional Respiratory  Assessments   Cuff Pressure (cm H2O) 30 cm H2O   Alarm Settings   High Pressure Alarm 45 cmH2O   High Respiratory Rate 40 br/min   Low Exhaled Vt  200 mL   Patient Observation   Observations   (ambu @ bedside)   ETT (adult)   Placement Date/Time: 09/18/20 0709   Preoxygenation: Yes  Mask Ventilation: Ventilated by mask with oral airway (2)  Technique: Video laryngoscopy;Stylet  Type: Cuffed  Tube Size: 7.5 mm  Laryngoscope: GlideScope  Blade Size: 3  Location: Oral  Grade . ..    Secured at 25 cm   Measured From Lips   ET Placement Right   Secured By Commercial tube del angel   Site Condition Dry   Cuff Pressure 30 cm H2O

## 2020-09-18 NOTE — ANESTHESIA POSTPROCEDURE EVALUATION
Department of Anesthesiology  Postprocedure Note    Patient: Naldo Doshi  MRN: [id number]  YOB: 1950  Date of evaluation: 9/18/2020  Time:  2:47 PM     Procedure Summary     Date:  09/18/20 Room / Location:  [facility]    Anesthesia Start:  9911 Anesthesia Stop:      Procedure:  CORONARY ARTERY BYPASS GRAFTING X4, INTERNAL MAMMARY ARTERY, SAPHENOUS VEIN GRAFT, ON PUMP, 5 LEVEL BILATERAL INTERCOSTAL NERVE BLOCK, PLATLET GEL APPLICATION, STERNAL PLATING (N/A Chest) Diagnosis:  (-)    Surgeon:  Chaya Portillo MD Responsible Provider:  Ag Coleman MD    Anesthesia Type:  general ASA Status:  4          Anesthesia Type: general    Enoc Phase I:      Enoc Phase II:      Last vitals: Reviewed and per EMR flowsheets. Anesthesia Post Evaluation    Patient location during evaluation: ICU  Patient participation: waiting for patient participation  Level of consciousness: sedated and ventilated  Pain scale: N/A. Airway patency: patent  Nausea: N/A.   Complications: no  Cardiovascular status: hemodynamically stable and vasoactive/inotropes  Respiratory status: acceptable, intubated and ventilator  Hydration status: stable

## 2020-09-18 NOTE — PROGRESS NOTES
Comprehensive Nutrition Assessment    Type and Reason for Visit:  Initial, RD Nutrition Re-Screen/LOS    Nutrition Recommendations/Plan:   Oral diet progression per MD when medically appropriate post-op  Cardiac diet education recommended on f/u  Monitor nutrition adequacy, pertinent labs, bowel habits, wt changes, and clinical progress      Nutrition Assessment:  LOS assessment: Pt is s/p CABG today. Remains intubated post-op. NPO at this time. Monitor diet education needs when medically appropriate. Malnutrition Assessment:  Malnutrition Status: At risk for malnutrition (Comment)      Estimated Daily Nutrient Needs:  Energy (kcal):  4985-9913; Weight Used for Energy Requirements:  Ideal(73 kg)     Protein (g):   g; Weight Used for Protein Requirements:  Ideal(1.2-2)            Nutrition Related Findings:  chest tubes, surgical incisions. Last BM unknown- no BM documented this admission. PO intakes adequate pre-op        Current Nutrition Therapies:    Diet NPO Effective Now    Anthropometric Measures:  · Height: 5' 9\" (175.3 cm)  · Current Body Weight: 265 lb (120.2 kg)   · Ideal Body Weight: 160 lbs  · BMI: 39.1  · BMI Categories: Obese Class 2 (BMI 35.0 -39.9)       Nutrition Diagnosis:   · Inadequate oral intake related to acute injury/trauma as evidenced by NPO or clear liquid status due to medical condition      Nutrition Interventions:   Food and/or Nutrient Delivery:  Continue NPO, Start Oral Diet(when medically appropriate)  Nutrition Education/Counseling:  Education needed   Coordination of Nutrition Care:  Continued Inpatient Monitoring    Goals: Tolerate oral diet progression post op and conume greater than 50% of meals this admission       Nutrition Monitoring and Evaluation:   Food/Nutrient Intake Outcomes:  Food and Nutrient Intake, Diet Advancement/Tolerance  Physical Signs/Symptoms Outcomes:  Biochemical Data, Weight     Discharge Planning:     Too soon to determine     Electronically

## 2020-09-18 NOTE — PROGRESS NOTES
09/18/20 1443   Vent Information   $Ventilation $Initial Day   Vent Type 980   Vent Mode AC/VC   Vt Ordered 700 mL   Rate Set 14 bmp   Peak Flow 40 L/min   Pressure Support 5 cmH20   FiO2  50 %   SpO2 94 %   SpO2/FiO2 ratio 188   Sensitivity 3   PEEP/CPAP 7   Humidification Source HME   Vent Patient Data   Peak Inspiratory Pressure 21 cmH2O   Vt Exhaled 348 mL   Minute Volume 6.13 Liters   I:E Ratio 1:2.0   Cough/Sputum   Sputum How Obtained Endotracheal   Cough Non-productive;None   Sputum Amount None   Sputum Color None   Tenacity None   Breath Sounds   Right Upper Lobe Diminished   Right Middle Lobe Diminished   Right Lower Lobe Diminished   Left Upper Lobe Diminished   Left Lower Lobe Diminished   Additional Respiratory  Assessments   Cuff Pressure (cm H2O) 30 cm H2O   Alarm Settings   High Pressure Alarm 45 cmH2O   High Respiratory Rate 40 br/min   Low Exhaled Vt  200 mL   Patient Observation   Observations   (ambu @ bedside)   ETT (adult)   Placement Date/Time: 09/18/20 0709   Preoxygenation: Yes  Mask Ventilation: Ventilated by mask with oral airway (2)  Technique: Video laryngoscopy;Stylet  Type: Cuffed  Tube Size: 7.5 mm  Laryngoscope: GlideScope  Blade Size: 3  Location: Oral  Grade . ..    Secured at 25 cm   Measured From Lips   ET Placement Right   Secured By Commercial tube del angel   Site Condition Dry   Cuff Pressure 30 cm H2O

## 2020-09-19 LAB
ANION GAP SERPL CALCULATED.3IONS-SCNC: 10 MMOL/L (ref 3–16)
ANION GAP SERPL CALCULATED.3IONS-SCNC: 33 MMOL/L (ref 3–16)
BASE EXCESS ARTERIAL: -3 (ref -3–3)
BASE EXCESS ARTERIAL: -4 (ref -3–3)
BLOOD BANK DISPENSE STATUS: NORMAL
BLOOD BANK DISPENSE STATUS: NORMAL
BLOOD BANK PRODUCT CODE: NORMAL
BLOOD BANK PRODUCT CODE: NORMAL
BPU ID: NORMAL
BPU ID: NORMAL
BUN BLDV-MCNC: 21 MG/DL (ref 7–20)
BUN BLDV-MCNC: 22 MG/DL (ref 7–20)
CALCIUM IONIZED: 1.15 MMOL/L (ref 1.12–1.32)
CALCIUM IONIZED: 1.15 MMOL/L (ref 1.12–1.32)
CALCIUM IONIZED: 1.18 MMOL/L (ref 1.12–1.32)
CALCIUM IONIZED: 1.21 MMOL/L (ref 1.12–1.32)
CALCIUM SERPL-MCNC: 7.9 MG/DL (ref 8.3–10.6)
CALCIUM SERPL-MCNC: 8.1 MG/DL (ref 8.3–10.6)
CHLORIDE BLD-SCNC: 104 MMOL/L (ref 99–110)
CHLORIDE BLD-SCNC: 98 MMOL/L (ref 99–110)
CO2: 20 MMOL/L (ref 21–32)
CO2: 23 MMOL/L (ref 21–32)
CREAT SERPL-MCNC: 1.4 MG/DL (ref 0.8–1.3)
CREAT SERPL-MCNC: 1.6 MG/DL (ref 0.8–1.3)
DESCRIPTION BLOOD BANK: NORMAL
DESCRIPTION BLOOD BANK: NORMAL
GFR AFRICAN AMERICAN: 52
GFR AFRICAN AMERICAN: >60
GFR NON-AFRICAN AMERICAN: 43
GFR NON-AFRICAN AMERICAN: 50
GLUCOSE BLD-MCNC: 105 MG/DL (ref 70–99)
GLUCOSE BLD-MCNC: 107 MG/DL (ref 70–99)
GLUCOSE BLD-MCNC: 108 MG/DL (ref 70–99)
GLUCOSE BLD-MCNC: 113 MG/DL (ref 70–99)
GLUCOSE BLD-MCNC: 120 MG/DL (ref 70–99)
GLUCOSE BLD-MCNC: 123 MG/DL (ref 70–99)
GLUCOSE BLD-MCNC: 123 MG/DL (ref 70–99)
GLUCOSE BLD-MCNC: 127 MG/DL (ref 70–99)
GLUCOSE BLD-MCNC: 136 MG/DL (ref 70–99)
GLUCOSE BLD-MCNC: 151 MG/DL (ref 70–99)
GLUCOSE BLD-MCNC: 158 MG/DL (ref 70–99)
GLUCOSE BLD-MCNC: 197 MG/DL (ref 70–99)
GLUCOSE BLD-MCNC: 236 MG/DL (ref 70–99)
HCO3 ARTERIAL: 21.3 MMOL/L (ref 21–29)
HCO3 ARTERIAL: 22.1 MMOL/L (ref 21–29)
HCT VFR BLD CALC: 22.5 % (ref 40.5–52.5)
HCT VFR BLD CALC: 23.6 % (ref 40.5–52.5)
HEMOGLOBIN: 7.6 G/DL (ref 13.5–17.5)
HEMOGLOBIN: 7.9 G/DL (ref 13.5–17.5)
HEMOGLOBIN: 8.4 GM/DL (ref 13.5–17.5)
HEMOGLOBIN: 8.5 GM/DL (ref 13.5–17.5)
INR BLD: 1.29 (ref 0.86–1.14)
LACTATE: 2.38 MMOL/L (ref 0.4–2)
LACTATE: 3.2 MMOL/L (ref 0.4–2)
MCH RBC QN AUTO: 29.1 PG (ref 26–34)
MCHC RBC AUTO-ENTMCNC: 33.7 G/DL (ref 31–36)
MCV RBC AUTO: 86.2 FL (ref 80–100)
O2 SAT, ARTERIAL: 94 % (ref 93–100)
O2 SAT, ARTERIAL: 95 % (ref 93–100)
PCO2 ARTERIAL: 36.3 MM HG (ref 35–45)
PCO2 ARTERIAL: 37.4 MM HG (ref 35–45)
PDW BLD-RTO: 14.5 % (ref 12.4–15.4)
PERFORMED ON: ABNORMAL
PH ARTERIAL: 7.38 (ref 7.35–7.45)
PH ARTERIAL: 7.38 (ref 7.35–7.45)
PLATELET # BLD: 132 K/UL (ref 135–450)
PMV BLD AUTO: 8.5 FL (ref 5–10.5)
PO2 ARTERIAL: 71.3 MM HG (ref 75–108)
PO2 ARTERIAL: 76.5 MM HG (ref 75–108)
POC HEMATOCRIT: 25 % (ref 40.5–52.5)
POC HEMATOCRIT: 25 % (ref 40.5–52.5)
POC POTASSIUM: 4 MMOL/L (ref 3.5–5.1)
POC POTASSIUM: 4.2 MMOL/L (ref 3.5–5.1)
POC POTASSIUM: 4.3 MMOL/L (ref 3.5–5.1)
POC POTASSIUM: 4.5 MMOL/L (ref 3.5–5.1)
POC SAMPLE TYPE: ABNORMAL
POC SODIUM: 138 MMOL/L (ref 136–145)
POC SODIUM: 140 MMOL/L (ref 136–145)
POTASSIUM SERPL-SCNC: 4.2 MMOL/L (ref 3.5–5.1)
POTASSIUM SERPL-SCNC: 5.1 MMOL/L (ref 3.5–5.1)
PROTHROMBIN TIME: 15 SEC (ref 10–13.2)
RBC # BLD: 2.62 M/UL (ref 4.2–5.9)
SODIUM BLD-SCNC: 134 MMOL/L (ref 136–145)
SODIUM BLD-SCNC: 154 MMOL/L (ref 136–145)
TCO2 ARTERIAL: 22 MMOL/L
TCO2 ARTERIAL: 23 MMOL/L
WBC # BLD: 11.3 K/UL (ref 4–11)

## 2020-09-19 PROCEDURE — 6370000000 HC RX 637 (ALT 250 FOR IP): Performed by: THORACIC SURGERY (CARDIOTHORACIC VASCULAR SURGERY)

## 2020-09-19 PROCEDURE — P9045 ALBUMIN (HUMAN), 5%, 250 ML: HCPCS | Performed by: THORACIC SURGERY (CARDIOTHORACIC VASCULAR SURGERY)

## 2020-09-19 PROCEDURE — 83036 HEMOGLOBIN GLYCOSYLATED A1C: CPT

## 2020-09-19 PROCEDURE — 85018 HEMOGLOBIN: CPT

## 2020-09-19 PROCEDURE — 2580000003 HC RX 258: Performed by: THORACIC SURGERY (CARDIOTHORACIC VASCULAR SURGERY)

## 2020-09-19 PROCEDURE — 82330 ASSAY OF CALCIUM: CPT

## 2020-09-19 PROCEDURE — 2500000003 HC RX 250 WO HCPCS: Performed by: THORACIC SURGERY (CARDIOTHORACIC VASCULAR SURGERY)

## 2020-09-19 PROCEDURE — 99291 CRITICAL CARE FIRST HOUR: CPT | Performed by: INTERNAL MEDICINE

## 2020-09-19 PROCEDURE — 97162 PT EVAL MOD COMPLEX 30 MIN: CPT

## 2020-09-19 PROCEDURE — 97116 GAIT TRAINING THERAPY: CPT

## 2020-09-19 PROCEDURE — 83605 ASSAY OF LACTIC ACID: CPT

## 2020-09-19 PROCEDURE — 94761 N-INVAS EAR/PLS OXIMETRY MLT: CPT

## 2020-09-19 PROCEDURE — 82947 ASSAY GLUCOSE BLOOD QUANT: CPT

## 2020-09-19 PROCEDURE — 85014 HEMATOCRIT: CPT

## 2020-09-19 PROCEDURE — 6370000000 HC RX 637 (ALT 250 FOR IP): Performed by: STUDENT IN AN ORGANIZED HEALTH CARE EDUCATION/TRAINING PROGRAM

## 2020-09-19 PROCEDURE — 85027 COMPLETE CBC AUTOMATED: CPT

## 2020-09-19 PROCEDURE — 6360000002 HC RX W HCPCS: Performed by: THORACIC SURGERY (CARDIOTHORACIC VASCULAR SURGERY)

## 2020-09-19 PROCEDURE — 85610 PROTHROMBIN TIME: CPT

## 2020-09-19 PROCEDURE — 2700000000 HC OXYGEN THERAPY PER DAY

## 2020-09-19 PROCEDURE — 2580000003 HC RX 258: Performed by: ANESTHESIOLOGY

## 2020-09-19 PROCEDURE — 2500000003 HC RX 250 WO HCPCS: Performed by: ANESTHESIOLOGY

## 2020-09-19 PROCEDURE — 82803 BLOOD GASES ANY COMBINATION: CPT

## 2020-09-19 PROCEDURE — 84132 ASSAY OF SERUM POTASSIUM: CPT

## 2020-09-19 PROCEDURE — 80048 BASIC METABOLIC PNL TOTAL CA: CPT

## 2020-09-19 PROCEDURE — 2000000000 HC ICU R&B

## 2020-09-19 PROCEDURE — 97530 THERAPEUTIC ACTIVITIES: CPT

## 2020-09-19 PROCEDURE — 99024 POSTOP FOLLOW-UP VISIT: CPT | Performed by: THORACIC SURGERY (CARDIOTHORACIC VASCULAR SURGERY)

## 2020-09-19 PROCEDURE — 84295 ASSAY OF SERUM SODIUM: CPT

## 2020-09-19 PROCEDURE — 97166 OT EVAL MOD COMPLEX 45 MIN: CPT

## 2020-09-19 PROCEDURE — 36430 TRANSFUSION BLD/BLD COMPNT: CPT

## 2020-09-19 RX ORDER — 0.9 % SODIUM CHLORIDE 0.9 %
20 INTRAVENOUS SOLUTION INTRAVENOUS ONCE
Status: DISCONTINUED | OUTPATIENT
Start: 2020-09-19 | End: 2020-09-20

## 2020-09-19 RX ORDER — PRAVASTATIN SODIUM 40 MG
80 TABLET ORAL NIGHTLY
Status: DISCONTINUED | OUTPATIENT
Start: 2020-09-19 | End: 2020-09-26 | Stop reason: HOSPADM

## 2020-09-19 RX ADMIN — MUPIROCIN: 20 OINTMENT TOPICAL at 21:48

## 2020-09-19 RX ADMIN — Medication 3 G: at 11:00

## 2020-09-19 RX ADMIN — FONDAPARINUX SODIUM 2.5 MG: 2.5 INJECTION, SOLUTION SUBCUTANEOUS at 08:47

## 2020-09-19 RX ADMIN — FAMOTIDINE 20 MG: 20 TABLET, FILM COATED ORAL at 07:45

## 2020-09-19 RX ADMIN — FUROSEMIDE 40 MG: 10 INJECTION, SOLUTION INTRAMUSCULAR; INTRAVENOUS at 07:46

## 2020-09-19 RX ADMIN — KETOROLAC TROMETHAMINE 15 MG: 30 INJECTION, SOLUTION INTRAMUSCULAR at 21:30

## 2020-09-19 RX ADMIN — ACETAMINOPHEN 650 MG: 325 TABLET, FILM COATED ORAL at 06:07

## 2020-09-19 RX ADMIN — PRAVASTATIN SODIUM 80 MG: 40 TABLET ORAL at 21:32

## 2020-09-19 RX ADMIN — Medication 3 G: at 19:14

## 2020-09-19 RX ADMIN — MORPHINE SULFATE 2 MG: 2 INJECTION, SOLUTION INTRAMUSCULAR; INTRAVENOUS at 04:42

## 2020-09-19 RX ADMIN — POLYETHYLENE GLYCOL 3350 17 G: 17 POWDER, FOR SOLUTION ORAL at 07:42

## 2020-09-19 RX ADMIN — OXYCODONE 5 MG: 5 TABLET ORAL at 14:37

## 2020-09-19 RX ADMIN — SODIUM CHLORIDE 7.2 UNITS/HR: 9 INJECTION, SOLUTION INTRAVENOUS at 04:36

## 2020-09-19 RX ADMIN — MUPIROCIN: 20 OINTMENT TOPICAL at 07:42

## 2020-09-19 RX ADMIN — KETOROLAC TROMETHAMINE 15 MG: 30 INJECTION, SOLUTION INTRAMUSCULAR at 07:47

## 2020-09-19 RX ADMIN — OXYCODONE 5 MG: 5 TABLET ORAL at 07:46

## 2020-09-19 RX ADMIN — VASOPRESSIN 0.02 UNITS/MIN: 20 INJECTION INTRAVENOUS at 02:43

## 2020-09-19 RX ADMIN — POTASSIUM CHLORIDE 20 MEQ: 400 INJECTION, SOLUTION INTRAVENOUS at 02:08

## 2020-09-19 RX ADMIN — SODIUM CHLORIDE 6.8 UNITS/HR: 9 INJECTION, SOLUTION INTRAVENOUS at 18:16

## 2020-09-19 RX ADMIN — VANCOMYCIN HYDROCHLORIDE 2000 MG: 1 INJECTION, POWDER, LYOPHILIZED, FOR SOLUTION INTRAVENOUS at 07:32

## 2020-09-19 RX ADMIN — DIGOXIN 125 MCG: 125 TABLET ORAL at 07:45

## 2020-09-19 RX ADMIN — FAMOTIDINE 20 MG: 20 TABLET, FILM COATED ORAL at 21:32

## 2020-09-19 RX ADMIN — DEXTROSE MONOHYDRATE 8 MCG/MIN: 50 INJECTION, SOLUTION INTRAVENOUS at 04:57

## 2020-09-19 RX ADMIN — VANCOMYCIN HYDROCHLORIDE 2000 MG: 1 INJECTION, POWDER, LYOPHILIZED, FOR SOLUTION INTRAVENOUS at 21:32

## 2020-09-19 RX ADMIN — KETOROLAC TROMETHAMINE 15 MG: 30 INJECTION, SOLUTION INTRAMUSCULAR at 14:37

## 2020-09-19 RX ADMIN — BISACODYL 5 MG: 5 TABLET, COATED ORAL at 07:47

## 2020-09-19 RX ADMIN — ALBUMIN (HUMAN) 25 G: 12.5 INJECTION, SOLUTION INTRAVENOUS at 01:00

## 2020-09-19 RX ADMIN — KETOROLAC TROMETHAMINE 15 MG: 30 INJECTION, SOLUTION INTRAMUSCULAR at 03:11

## 2020-09-19 RX ADMIN — Medication 3 G: at 03:08

## 2020-09-19 RX ADMIN — ASPIRIN 81 MG: 81 TABLET ORAL at 07:46

## 2020-09-19 RX ADMIN — MAGNESIUM GLUCONATE 500 MG ORAL TABLET 400 MG: 500 TABLET ORAL at 07:47

## 2020-09-19 RX ADMIN — INSULIN GLARGINE 18 UNITS: 100 INJECTION, SOLUTION SUBCUTANEOUS at 21:34

## 2020-09-19 RX ADMIN — MORPHINE SULFATE 2 MG: 2 INJECTION, SOLUTION INTRAMUSCULAR; INTRAVENOUS at 16:47

## 2020-09-19 RX ADMIN — Medication 15 ML: at 07:42

## 2020-09-19 RX ADMIN — POTASSIUM CHLORIDE 10 MEQ: 750 TABLET, EXTENDED RELEASE ORAL at 07:46

## 2020-09-19 RX ADMIN — TAMSULOSIN HYDROCHLORIDE 0.4 MG: 0.4 CAPSULE ORAL at 07:45

## 2020-09-19 RX ADMIN — Medication 15 ML: at 21:48

## 2020-09-19 RX ADMIN — ACETAMINOPHEN 650 MG: 325 TABLET, FILM COATED ORAL at 14:37

## 2020-09-19 RX ADMIN — SODIUM CHLORIDE, PRESERVATIVE FREE 10 ML: 5 INJECTION INTRAVENOUS at 21:32

## 2020-09-19 RX ADMIN — ACETAMINOPHEN 650 MG: 325 TABLET, FILM COATED ORAL at 21:33

## 2020-09-19 ASSESSMENT — PAIN DESCRIPTION - PAIN TYPE
TYPE: SURGICAL PAIN

## 2020-09-19 ASSESSMENT — PAIN DESCRIPTION - FREQUENCY
FREQUENCY: CONTINUOUS
FREQUENCY: CONTINUOUS

## 2020-09-19 ASSESSMENT — PAIN DESCRIPTION - ONSET
ONSET: ON-GOING
ONSET: ON-GOING

## 2020-09-19 ASSESSMENT — PAIN DESCRIPTION - DESCRIPTORS
DESCRIPTORS: ACHING;CONSTANT;DISCOMFORT
DESCRIPTORS: ACHING;CONSTANT;DISCOMFORT

## 2020-09-19 ASSESSMENT — PAIN SCALES - GENERAL
PAINLEVEL_OUTOF10: 2
PAINLEVEL_OUTOF10: 0
PAINLEVEL_OUTOF10: 0
PAINLEVEL_OUTOF10: 4
PAINLEVEL_OUTOF10: 5
PAINLEVEL_OUTOF10: 5
PAINLEVEL_OUTOF10: 6

## 2020-09-19 ASSESSMENT — PAIN DESCRIPTION - LOCATION: LOCATION: MEDIASTINUM

## 2020-09-19 NOTE — PROGRESS NOTES
Attempting to speak with and do some education with pt at the bedside with bundled care this am and stated to the pt that losing weight can be beneficial post surgery after the CV surgeon suggested it was also a good idea. I suggested some programs that have patterns of success ie: weight watchers and also meeting with a dietician at the hospital. Then went on to remind the patient to take him am medications with his pain med that I had placed within reach on his bedside table. I stated \"dont forget your meds in the cup bud. \" Pt stated he felt that was \"really rude and wanted a nurse not a drill vidhi. \" I had a good discussion with pt to work on a care plan he is comfortable with that will get us the maximum results and trend his discharge to home. Pt apologized and was cooperative with transferring from bed to chair. Call light in reach.

## 2020-09-19 NOTE — PROGRESS NOTES
4 Eyes Skin Assessment     The patient is being assess for   Shift Handoff    I agree that 2 RN's have performed a thorough Head to Toe Skin Assessment on the patient. ALL assessment sites listed below have been assessed. Areas assessed by both nurses:   [x]   Head, Face, and Ears   [x]   Shoulders, Back, and Chest, Abdomen  [x]   Arms, Elbows, and Hands   [x]   Coccyx, Sacrum, and Ischium  [x]   Legs, Feet, and Heels        Sacral heart in place. **SHARE this note so that the co-signing nurse is able to place an eSignature**    Co-signer eSignature: {Esignature:[id number]}    Does the Patient have Skin Breakdown?   No          Junaid Prevention initiated:  Yes   Wound Care Orders initiated:  NA      [facility] nurse consulted for Pressure Injury (Stage 3,4, Unstageable, DTI, NWPT, Complex wounds)and New or Established Ostomies:  NA      Primary Nurse eSignature: Electronically signed by Beth Morgan RN on 9/19/20 at 6:19 PM EDT

## 2020-09-19 NOTE — PROGRESS NOTES
Physical/Occupational Therapy    Physical/Occupational therapy attempted to evaluate patient. However per patient's RN Viridiana Early, the patient is currently not medically stable and has not transitioned yet. PT/OT will re-attempt to evaluate this patient when he is medically stable. Thank you.      Reginaldo Flowers PT  Robert Davis

## 2020-09-19 NOTE — PROGRESS NOTES
Dr. Jose Oliveros updated with am lab results and progress made during the night.  Order for 1 unit of blood given

## 2020-09-19 NOTE — PLAN OF CARE
Problem: Falls - Risk of:  Goal: Will remain free from falls  Description: Will remain free from falls  Outcome: Met This Shift  Goal: Absence of physical injury  Description: Absence of physical injury  Outcome: Met This Shift     Problem: Pain:  Goal: Pain level will decrease  Description: Pain level will decrease  Outcome: Ongoing  Goal: Control of acute pain  Description: Control of acute pain  Outcome: Ongoing  Goal: Control of chronic pain  Description: Control of chronic pain  Outcome: Ongoing     Problem: Discharge Planning:  Goal: Participates in care planning  Description: Participates in care planning  Outcome: Ongoing  Goal: Discharged to appropriate level of care  Description: Discharged to appropriate level of care  Outcome: Met This Shift     Problem: Pain:  Goal: Pain level will decrease  Description: Pain level will decrease  Outcome: Ongoing     Problem: Serum Glucose Level - Abnormal:  Goal: Ability to maintain appropriate glucose levels will improve to within specified parameters  Description: Ability to maintain appropriate glucose levels will improve to within specified parameters  Outcome: Ongoing     Problem: Nutrition  Goal: Optimal nutrition therapy  Description: Nutrition Problem #1: Inadequate oral intake  Intervention: Food and/or Nutrient Delivery: Continue NPO, Start Oral Diet(when medically appropriate)  Nutritional Goals:  Tolerate oral diet progression post op and conume greater than 50% of meals this admission      Outcome: Ongoing

## 2020-09-19 NOTE — PROGRESS NOTES
Physical Therapy    Facility/Department: [facility] B2 - ICU  Initial Assessment and Treatment    NAME: Julio César Gutierrez  : 1950  MRN: [id number]    Date of Service: 2020    Discharge Recommendations:  24 hour supervision or assist(anticipate safe for home. patient still needs to clear safe ambulation/stair trial prior to safe discharge home)   PT Equipment Recommendations  Other: currently do not anticipate that patient will need a walker upon discharge but will continue to assess for walker need. If pt is unable to be seen after this session, please let this note serve as discharge summary. Please see case management note for discharge disposition. Thank you. Assessment   Body structures, Functions, Activity limitations: Decreased functional mobility ; Decreased ADL status; Decreased balance;Decreased strength;Decreased endurance  Assessment: Patient is a 79year old male who was admitted to [facility] on 20. Patient received CABG x 4 on 20. Patient grossly required CGA for transfers and to ambulate a short distance during today's PT evaluation. Patient's total ambulation limited by length of patient's arterial line/other lines/tubes. Anticipate that patient's mobility will significantly improve when patient's lines/tubes are removed. Patient presented with therapy deficits listed above. Patient will benefit from continued acute therapy while patient is in the hospital to address these deficits. Patient will need to clear safe ambulation and stair trial prior to safe discharge home. PT to continue to follow. Treatment Diagnosis: decreased indepenence with functional mobility. Specific instructions for Next Treatment: progress mobility as tolerated. Prognosis: Excellent  Decision Making: Medium Complexity  PT Education: Goals; General Safety;Gait Training;PT Role;Disease Specific Education;Precautions;Transfer Training;Plan of Care;Home Exercise Program;Pressure Relief; Injury Prevention  Patient Education: Patient educated on his sternal precautions. Patient verbalized understanding but he will benefit from reinforcement. Barriers to Learning: none  REQUIRES PT FOLLOW UP: Yes  Activity Tolerance  Activity Tolerance: Patient Tolerated treatment well  Activity Tolerance: Vitals: 107/59 93 BPM 94% room air. Post activity 101/60 BPM 95% 90 BPM.       Patient Diagnosis(es): The primary encounter diagnosis was Acute chest pain. Diagnoses of Shortness of breath and Elevated troponin were also pertinent to this visit. has a past medical history of CAD (coronary artery disease), Diabetes mellitus ([address] Hyperlipidemia, Hypertension, and Kidney stones. has a past surgical history that includes cervical fusion (1995); Appendectomy (1970); hernia repair (3772&1669); Colonoscopy (1987&2002); Upper gastrointestinal endoscopy (2002); Coronary angioplasty with stent (2004); Coronary angioplasty with stent (2008); Upper gastrointestinal endoscopy (08/28/2008); Cystoscopy (Left, 6/27/2019); Endoscopy, colon, diagnostic; and CYSTOSCOPY INSERTION / REMOVAL STENT / STONE (Left, 7/11/2019). Restrictions  Restrictions/Precautions  Restrictions/Precautions: General Precautions, Fall Risk  Position Activity Restriction  Sternal Precautions: No Pushing, No Pulling, 5# Lifting Restrictions  Sternal Precautions: No lifting greater than 5 lbs. Other position/activity restrictions: Progressive ambulation.  , A line, IJ, pacing wires, 2 chest tubes with suction, NEGRITO drain, houser  Vision/Hearing  Vision: Within Functional Limits  Hearing: Within functional limits     Subjective  General  Chart Reviewed: Yes  Patient assessed for rehabilitation services?: Yes  Additional Pertinent Hx: CABG x 4 on 9/18  Response To Previous Treatment: Not applicable  Referring Practitioner: Alex Hess MD  Referral Date : 09/18/20  Follows Commands: Within Functional Limits  General Comment  Comments: Patient seated in chair upon entry of therapy staff. Subjective  Subjective: Patient agreed to participate. Pain Screening  Patient Currently in Pain: No  Vital Signs  Patient Currently in Pain: No  Pre Treatment Pain Screening  Intervention List: Patient able to continue with treatment    Orientation  Orientation  Overall Orientation Status: Within Normal Limits  Social/Functional History  Social/Functional History  Lives With: Alone(sister will be able to fly in from [facility] on Monday to provide 24 hour assist)  Type of Home: House  Home Layout: Multi-level, 1/2 bath on main level, Bed/Bath upstairs(go into basement from garage with 1 flight with rail on r side, flight to bed/bathroom with R side rail)  Home Access: Level entry  Bathroom Shower/Tub: Walk-in shower, Tub/Shower unit  Bathroom Toilet: Standard  Bathroom Equipment: Grab bars in shower, Shower chair  ADL Assistance: Independent  Homemaking Assistance: Independent  Homemaking Responsibilities: Yes  Meal Prep Responsibility: Primary  Laundry Responsibility: Primary  Cleaning Responsibility: Primary  Shopping Responsibility: Primary  Ambulation Assistance: Independent  Transfer Assistance: Independent  Active : Yes  Occupation: Retired  Type of occupation: teacher  Leisure & Hobbies: traveling  [address] [place] [address]  Overall Cognitive Status: WFL    Objective     Observation/Palpation  Posture: Good    PROM RLE (degrees)  RLE PROM: WFL  AROM RLE (degrees)  RLE AROM: WFL  PROM LLE (degrees)  LLE PROM: WFL  AROM LLE (degrees)  LLE AROM : WFL  Strength RLE  Strength RLE: WFL  Strength LLE  Strength LLE: WFL     Sensation  Overall Sensation Status: WFL  Bed mobility  Rolling to Left: Unable to assess  Rolling to Right: Unable to assess  Supine to Sit: Unable to assess  Sit to Supine: Unable to assess  Comment: Patient seated in chair at the beginning and end of the PT evaluation.   Transfers  Sit to Stand: Contact guard assistance  Stand to sit: Contact guard assistance  Bed to Chair: Contact guard assistance  Comment: cueing for patient to adhere to sternal precautions. Ambulation  Ambulation?: Yes  More Ambulation?: No  Ambulation 1  Surface: level tile  Device: Rollator  Assistance: Contact guard assistance  Quality of Gait: increased sway to left and right but no overt loss of balance. decreased bilateral step height and step length. Gait Deviations: Decreased step height;Decreased step length  Distance: 1 foot forward/backward repeated x 5. 1 foot forward/backward then again repeated x 5. Total ambulation distance limited by length of patient's arterial line/patient's other lines/tubes. Comments: No complaints of shortness of breath, chest pain. Patient complained of initial mild dizziness upon standing but his dizziness quickly improved. Vitals stable throughout session. Stairs/Curb  Stairs?: No(not safe to attempt currently due to lines/tubes)     Balance  Posture: Fair  Sitting - Static: Good  Sitting - Dynamic: Good  Standing - Static: Fair  Standing - Dynamic: Fair  Comments: with rollator  Exercises  Hip Flexion: 1 x 10  Knee Long Arc Quad: 1 x 10  Ankle Pumps: 1 x 10  Comments: cueing for sequencing and technique. Other exercises  Other exercises?: No     Plan   Plan  Times per week: 5-7 times/week  Plan weeks: 1 week 9/26/20  Specific instructions for Next Treatment: progress mobility as tolerated.   Current Treatment Recommendations: Strengthening, Home Exercise Program, Safety Education & Training, Balance Training, Endurance Training, Patient/Caregiver Education & Training, Functional Mobility Training, Equipment Evaluation, Education, & procurement, Transfer Training, Gait Training, ADL/Self-care Training, Stair training  Safety Devices  Type of devices: Left in chair, All fall risk precautions in place, Nurse notified, Gait belt, Patient at risk for falls, Call light within reach    AM-PAC Score     AM-PAC Inpatient Mobility without Stair Climbing Raw Score : 15 (09/19/20 1136)  AM-PAC Inpatient without Stair Climbing T-Scale Score : 43.03 (09/19/20 1136)  Mobility Inpatient CMS 0-100% Score: 47.43 (09/19/20 1136)  Mobility Inpatient without Stair CMS G-Code Modifier : CK (09/19/20 1136)       Goals  Short term goals  Time Frame for Short term goals: 1 week 9/26/20  Short term goal 1: Supine <> sit with mod I. Short term goal 2: Sit <> stand with mod I. Short term goal 3: Bed <> chair with LRAD and mod I. Short term goal 4: Ambulate 150 feet with LRAD and mod I. Short term goal 5: Patient to ascend 4 steps with rail and supervision. Patient Goals   Patient goals : To feel better. To walk better and go home.        Therapy Time   Individual Concurrent Group Co-treatment   Time In 4889         Time Out 1112         Minutes 28         Timed Code Treatment Minutes: 18 Minutes(10 minute evaluation)       Diana Hancock PT

## 2020-09-19 NOTE — PROGRESS NOTES
pressors  - Keep pacing wires  Pulm: extubated overnight, now on 2L NC  - IS, OOB, cough/deep breath  FEN/GI: advance diet as tolerated, cardiac 1500 ml fluid restriction  Renal/: Cr 1.6 today, near baseline, adequate UOP 2.1L  - Hold diuresis today while on pressors, remains 14kg up from preop weight  - Keep houser  ID: Periop antibiotics  Heme: 2 units PRBC yesterday evening, 1 unit PRBC this morning for Hgb 7.6  - Keep chest tubes 350 cc output  Endo: DM2  - Wean insulin drip off today  - Lantus, SSI  - Hold home glipizide, pioglitazone, and janumet  PPX: Arixtra, NIGHAT hose  Dispo:  ICU level care, transition once off pressors      Leslie Carrion MD  9/19/2020  7:52 AM   Note reviewed, events of last 24 hours reviewed along with vital signs and I/Os and patient examined. Assessment and plans discussed and are as outlined above.      Fatimah Frank MD, FACS, [facility], FACCP, [place]

## 2020-09-19 NOTE — PROGRESS NOTES
Occupational Therapy   Occupational Therapy Initial Assessment/Treatment   Date: 2020   Patient Name: Halley Browning  MRN: [id number]     : 1950    Date of Service: 2020    Discharge Recommendations:  24 hour supervision or assist, Home with Home health OT       Assessment   Performance deficits / Impairments: Decreased functional mobility ; Decreased endurance;Decreased ADL status; Decreased balance;Decreased high-level IADLs    Assessment: Pt 78 yo male functioning with deficits in the areas listed above following CABG x4. Pt reports IND PLOF and lives at home alone. Pt has two flights of stairs to get to bedroom/bathroom but has a 1/2 bath that he can use. Pt educated on sternal precautions with min cues for enforcement. Pt demo'd fair mobility and balance with CGA. Pt will likely progress with therapy and be able d/c home with 24 hour assist. Pt reports sister will be staying to provide 24 hour assist.    Prognosis: Good  Decision Making: Medium Complexity  OT Education: OT Role;Plan of Care;Precautions; Home Exercise Program;Transfer Training  Patient Education: disease specific: sternal precuations  REQUIRES OT FOLLOW UP: Yes  Activity Tolerance  Activity Tolerance: Patient Tolerated treatment well  Activity Tolerance: /60 after standing O2 standing  Safety Devices  Safety Devices in place: Yes  Type of devices: Call light within reach;Gait belt;Left in chair;Nurse notified(no alarm upon arrival)           Patient Diagnosis(es): The primary encounter diagnosis was Acute chest pain. Diagnoses of Shortness of breath and Elevated troponin were also pertinent to this visit. has a past medical history of CAD (coronary artery disease), Diabetes mellitus ([address] Hyperlipidemia, Hypertension, and Kidney stones. has a past surgical history that includes cervical fusion (); Appendectomy (); hernia repair (6913&18); Colonoscopy (&);  Upper gastrointestinal endoscopy (); Coronary angioplasty with stent (2004); Coronary angioplasty with stent (2008); Upper gastrointestinal endoscopy (08/28/2008); Cystoscopy (Left, 6/27/2019); Endoscopy, colon, diagnostic; and CYSTOSCOPY INSERTION / REMOVAL STENT / STONE (Left, 7/11/2019). Restrictions  Restrictions/Precautions  Restrictions/Precautions: General Precautions, Fall Risk  Position Activity Restriction  Sternal Precautions: No Pushing, No Pulling, 5# Lifting Restrictions  Sternal Precautions: No lifting greater than 5 lbs. Other position/activity restrictions: Progressive ambulation.  , A line, IJ, pacing wires, 2 chest tubes with suction, NEGRITO drain, houser    Subjective   General  Chart Reviewed: Yes  Patient assessed for rehabilitation services?: Yes  Family / Caregiver Present: No  Referring Practitioner: Rosana Veliz MD  Diagnosis: CABG x4  Subjective  Subjective: Pt agreeable to therapy  General Comment  Comments: RN approved therap  Patient Currently in Pain: No  Vital Signs  Pulse: 96  BP: (!) 101/56  MAP (mmHg): 67  Patient Currently in Pain: No  Oxygen Therapy  SpO2: 94 %     Social/Functional History  Social/Functional History  Lives With: Alone(sister will be able to fly in from [facility] on Monday to provide 24 hour assist)  Type of Home: House  Home Layout: Multi-level, 1/2 bath on main level, Bed/Bath upstairs(go into basement from garage with 1 flight with rail on r side, flight to bed/bathroom with R side rail)  Home Access: Level entry  Bathroom Shower/Tub: Walk-in shower, Tub/Shower unit  Bathroom Toilet: Standard  Bathroom Equipment: Grab bars in shower, Shower chair  ADL Assistance: Independent  Homemaking Assistance: Independent  Homemaking Responsibilities: Yes  Meal Prep Responsibility: Primary  Laundry Responsibility: Primary  Cleaning Responsibility: Primary  Shopping Responsibility: Primary  Ambulation Assistance: Independent  Transfer Assistance: Independent  Active : Yes  Occupation:

## 2020-09-19 NOTE — PROGRESS NOTES
release capsule Take 1 capsule by mouth every morning (before breakfast) 9/5/20   Monique Anthony MD   prasugrel (EFFIENT) 10 MG TABS Take 1 tablet by mouth daily 4/13/20   Virginia Dial MD   sitaGLIPtan-metformin (JANUMET)  MG per tablet Take 1 tablet by mouth 2 times daily (with meals) 3/5/20   CURTIS Clark CNP   carvedilol (COREG) 6.25 MG tablet Take 1 tablet by mouth 2 times daily (with meals)  Patient taking differently: Take 3.125 mg by mouth 2 times daily (with meals)  3/4/20   CURTIS Clark CNP   glipiZIDE (GLUCOTROL) 5 MG tablet Take 5 mg by mouth daily     Historical Provider, MD   tamsulosin (FLOMAX) 0.4 MG capsule Take 0.4 mg by mouth 6/29/19   Historical Provider, MD   HYDROcodone-acetaminophen (Patria Miller) 5-325 MG per tablet  6/26/19   Historical Provider, MD   pioglitazone (ACTOS) 30 MG tablet Take 30 mg by mouth daily  11/11/18   Historical Provider, MD   pravastatin (PRAVACHOL) 80 MG tablet Take 80 mg by mouth daily    Historical Provider, MD   losartan (COZAAR) 100 MG tablet Take 100 mg by mouth daily    Historical Provider, MD   aspirin 81 MG tablet Take 81 mg by mouth daily    Historical Provider, MD   Bismuth Subsalicylate (PEPTO-BISMOL PO) Take 1 tablet by mouth as needed     Historical Provider, MD        CURRENT Medications:  0.9 % sodium chloride bolus, Once  pravastatin (PRAVACHOL) tablet 80 mg, Nightly  lactated ringers infusion, Continuous  sodium chloride flush 0.9 % injection 10 mL, 2 times per day  sodium chloride flush 0.9 % injection 10 mL, PRN  potassium chloride 20 mEq/50 mL IVPB (Central Line), PRN  magnesium sulfate 2 g in 50 mL IVPB premix, PRN  calcium chloride 1 g in sodium chloride 0.9 % 100 mL IVPB, PRN  calcium chloride 2 g in sodium chloride 0.9 % 100 mL IVPB, PRN  ceFAZolin (ANCEF) 3 g in dextrose 5 % 100 mL IVPB, Q8H  vancomycin (VANCOCIN) 2,000 mg in dextrose 5 % 500 mL IVPB, Q12H  acetaminophen (TYLENOL) tablet 650 mg, Q6H  oxyCODONE (ROXICODONE) immediate release tablet 5 mg, Q4H PRN    Or  oxyCODONE (ROXICODONE) immediate release tablet 10 mg, Q4H PRN  morphine (PF) injection 2 mg, Q2H PRN    Or  morphine (PF) injection 4 mg, Q2H PRN  ketorolac (TORADOL) injection 15 mg, Q6H  midazolam (VERSED) injection 1 mg, Q1H PRN  diphenhydrAMINE (BENADRYL) tablet 25 mg, Nightly PRN  polyethylene glycol (GLYCOLAX) packet 17 g, Daily  bisacodyl (DULCOLAX) EC tablet 5 mg, Daily  bisacodyl (DULCOLAX) suppository 10 mg, Daily PRN  ondansetron (ZOFRAN) injection 4 mg, Q8H PRN  metoprolol tartrate (LOPRESSOR) tablet 12.5 mg, BID  [Held by provider] losartan (COZAAR) tablet 25 mg, Daily  chlorhexidine (PERIDEX) 0.12 % solution 15 mL, BID  digoxin (LANOXIN) tablet 125 mcg, Daily  [Held by provider] furosemide (LASIX) injection 40 mg, BID  magnesium oxide (MAG-OX) tablet 400 mg, Daily  mupirocin (BACTROBAN) 2 % ointment, BID  nitroGLYCERIN (NITRODUR) 0.2 MG/HR 1 patch, Daily  [Held by provider] potassium chloride (KLOR-CON M) extended release tablet 10 mEq, TID WC  fondaparinux (ARIXTRA) injection 2.5 mg, Daily  aspirin EC tablet 81 mg, Daily  aspirin suppository 300 mg, Once  albuterol sulfate  (90 Base) MCG/ACT inhaler 2 puff, Q6H PRN  albumin human 5 % IV solution 25 g, PRN  lactated ringers bolus, Continuous PRN  DOBUTamine (DOBUTREX) 500 mg in dextrose 5 % 250 mL infusion, Continuous PRN  phenylephrine (PRADIP-SYNEPHRINE) 50 mg in dextrose 5 % 250 mL infusion, Continuous PRN  milrinone (PRIMACOR) 20 mg in dextrose 5 % 100 mL infusion, Continuous  norepinephrine (LEVOPHED) 16 mg in dextrose 5 % 250 mL infusion, Continuous PRN  EPINEPHrine (EPINEPHrine HCL) 5 mg in dextrose 5 % 250 mL infusion, Continuous PRN  nitroGLYCERIN 50 mg in dextrose 5% 250 mL infusion, Continuous PRN  insulin regular (HUMULIN R;NOVOLIN R) 100 Units in sodium chloride 0.9 % 100 mL infusion, Titrated  insulin glargine (LANTUS) injection vial 18 Units, Nightly  [START ON 9/20/2020] insulin lispro (HUMALOG) injection vial 0-6 Units, TID WC  [START ON 9/20/2020] insulin lispro (HUMALOG) injection vial 0-3 Units, Nightly  glucose (GLUTOSE) 40 % oral gel 15 g, PRN  dextrose 50 % IV solution, PRN  glucagon (rDNA) injection 1 mg, PRN  dextrose 5 % solution, PRN  famotidine (PEPCID) tablet 20 mg, BID  vasopressin 20 Units in dextrose 5 % 100 mL infusion, Continuous  dexmedetomidine (PRECEDEX) 400 mcg in sodium chloride 0.9 % 100 mL infusion, Titrated  melatonin tablet 5 mg, Nightly PRN  fluticasone (FLONASE) 50 MCG/ACT nasal spray 1 spray, Daily  tamsulosin (FLOMAX) capsule 0.4 mg, Daily        Allergies:  Brilinta [ticagrelor]; Crestor [rosuvastatin calcium]; Penicillins; Lipitor; Plavix [clopidogrel bisulfate]; and Victoza [liraglutide]     Review of Systems:   A 14 point review of symptoms completed. Pertinent positives identified in the HPI, all other review of symptoms negative. Objective:     Vitals:    09/19/20 0900 09/19/20 1000 09/19/20 1200 09/19/20 1300   BP: (!) 87/56 (!) 101/56 (!) 96/45 (!) 85/40   Pulse: 96 96 92 91   Resp: (!) 31  14    Temp:   98.8 °F (37.1 °C)    TempSrc:   Core    SpO2: 96% 94% 93% 94%   Weight:       Height:          Weight: 296 lb 8.3 oz (134.5 kg)       PHYSICAL EXAM:    General:  Alert, cooperative, no distress, appears stated age   Head:  Normocephalic, atraumatic   Eyes:  Conjunctiva/corneas clear, anicteric sclerae    Nose: Nares normal, no drainage or sinus tenderness   Throat: No abnormalities of the lips, oral mucosa or tongue. Neck: Trachea midline. Neck supple with no lymphadenopathy, thyroid not enlarged, symmetric, no tenderness/mass/nodules, no Jugular venous pressure elevation    Lungs:   Clear to auscultation bilaterally, no wheezes, no rales, no respiratory distress   Chest Wall:  No deformity or tenderness to palpation   Heart:  Regular rate and rhythm, normal S1, normal S2, no murmur, no rub, no S3/S4, PMI non-displaced.     Abdomen:  CAD (coronary artery disease)    Hyperlipidemia    DM2 (diabetes mellitus, type 2) (HCC)    Acute chest pain    Obesity    Hydronephrosis with renal and ureteral calculous obstruction    NSTEMI (non-ST elevated myocardial infarction) ([facility]    CAD in native artery    Anxiety and depression    Dyslipidemia    S/P CABG x 4           I will address the patient's cardiac risk factors and adjusted pharmacologic treatment as needed. In addition, I have reinforced the need for patient directed risk factor modification. All questions and concerns were addressed to the patient/family. Alternatives to my treatment were discussed. Thank you for allowing us to participate in the care of Roberto Herrera. Please call me with any questions [phone]. Critically ill patient with high level medical complexity decision making. >30 min spent in chart review and face to face discussion regarding evaluation/treatment for his underlying conditions.      Den Hopkins MD   Cardiovascular Disease  [facility]  [phone] [facility]  [phone] [address] [place] [address]  9/19/2020 2:06 PM

## 2020-09-19 NOTE — OP NOTE
[facility]                 Edvin Vogt                                OPERATIVE REPORT    PATIENT NAME: Georgia Selby                      :        1950  MED REC NO:   [id number]                          ROOM:       2182  ACCOUNT NO:   [de-identified]                           ADMIT DATE: 2020  PROVIDER:     Jed Foster. Noreen Moreno MD    DATE OF PROCEDURE:  2020    PREOPERATIVE DIAGNOSES:  1. Coronary artery disease, unstable angina. 2.  Non ST-segment elevation MI.  3.  Hypertension. 4.  Type 2 diabetes. 5.  Dyslipidemia. 6.  History of PCI with drug-eluting stents on Effient anticoagulation  bridged with Integrilin. 7.  Morbid obesity. POSTOPERATIVE DIAGNOSES:  1. Coronary artery disease, unstable angina. 2.  Non ST-segment elevation MI.  3.  Hypertension. 4.  Type 2 diabetes. 5.  Dyslipidemia. 6.  History of PCI with drug-eluting stents on Effient anticoagulation  bridged with Integrilin. 7.  Morbid Obesity. OPERATION PERFORMED:  1. Urgent coronary artery bypass grafting surgery x4 with single  greater saphenous vein graft to the posterior descending branch of the  right coronary artery. 2.  Separate sequential greater saphenous vein graft to the first OM  onto the second obtuse marginal branch of the circumflex. 3.  Pedicled left internal artery to the LAD. 4.  [place]-Alicia catheter placement. 5.  Cardiopulmonary bypass. 6.  Endoscopic vein harvest of the right greater saphenous vein. 7.  Transesophageal echo. 8.  Epiaortic ultrasound. 9.  Doppler verification of grafts. 10.  Bilateral five-level intercostal nerve block with Exparel. 11.  Platelet gel application. 12.  Sternal plating. SURGEON:  Jed Foster. Noreen Moreno MD    ASSISTANT:  Corinne Motes, MD, (Resident); Laura Napanoch, SA; [employer], [place].    ANESTHESIA:  General endotracheal anesthesia per Dr. Arina Medeiros.     INDICATIONS AND CONSENT: The patient is a 43-year-old gentleman with  known history of coronary artery disease having undergone multiple PCI  events with drug-eluting stent in the past.  He has been on Effient. He  presented with exertional angina that was progressive in nature,  presented for evaluation to the emergency room, found to have EKG and  enzyme changes consistent with non-ST segment elevation MI. He was  taken to the cath lab and he was found to have progression of his  coronary artery disease with in-stent stenosis of his LAD, total  occlusion of the right coronary artery stents. He was not a candidate  for further percutaneous interventional therapy because of bifurcational  circumflex disease and he is referred for surgical evaluation. I saw  the patient in consultation, examined the patient and recommended  coronary bypass grafting surgery after reviewing his imaging studies. I  discussed the surgery with the patient. He has no family. His wife  recently passed away. I discussed the risks, benefits, and alternatives  of surgery, including risk of infection, bleeding, stroke, MI,  arrhythmias, operative mortality risk in the 1 to 2% range based on the  STS cardiac surgical risk profile. Questions were answered and consent  was obtained to proceed with surgery. INTRAOPERATIVE FINDINGS:  Transesophageal echo placed by Anesthesia also  reviewed by myself preprocedurally showed trivial mitral regurge, no  aortic insufficiency, trivial tricuspid insufficiency, no interatrial  septal defect, no clot in the left atrial appendage. Postprocedural DIONI  showed the same findings, preserved global systolic function with  ejection fraction of 55 to 60% post revascularization. Epiaortic  ultrasound, which I independently performed, interpreted based on  surgical plans on showed no atherosclerotic disease in the ascending  aorta. Pump time was 153 minutes. Cross-clamp time 122 minutes.   Coldest core  temperature was 34.2 degrees centigrade. Ancef and vancomycin were both  administered prior to incision. No IV Tylenol was given. OPERATIVE PROCEDURE:  The patient was brought to the operating room,  placed on the operating table in supine position. A right brachial  arterial monitoring line was placed, general endotracheal anesthesia  accomplished, Lynch catheter placed, and a right internal jugular  [place]-Alicia catheter was successfully positioned. The chest, abdomen,  groin, and legs were all prepped with DuraPrep and draped in the usual  sterile fashion for open heart surgery. A time-out process carried out  appropriately identifying the patient and procedure. The heart was exposed through median sternotomy incision, while the  portion of the right greater saphenous vein was harvested using  endoscopic vein harvest technique. This was accomplished successfully. A [place]-Medley drain was placed and the leg closed. Simultaneously,  the left internal mammary artery was taken down from its position  underneath the sternum with electrocautery. Systemic heparin was  administered. Distal end of the JUSTYN clipped with surgical clips,  divided, stump oversewn with Ethibond suture, and the pedicle placed in  the left upper chest until needed for bypass. The pericardium was  opened longitudinally, tacked up laterally creating a pericardial  cradle. The ascending aorta was inspected with epiaortic ultrasound,  which I independently performed and interpreted based on surgical plans  on. I see no atherosclerotic disease. The ascending aorta was  cannulated for arterial return from the pump. Retrograde cardioplegia  line was placed transatrially and a two-stage single venous cannula was  placed through the right atrial appendage into the inferior vena cava. The patient was placed on bypass. A needle vent was placed in the  ascending aorta and secured with pledgeted pursestring.     The method of myocardial protection employed was cold blood  cardioplegia, given antegrade to achieve cardiac standstill for  retrograde cardioplegia. In a 13- to 20-minute intervals or between  this anastomosis, retrograde cardioplegia administered along with vein  graft cardioplegia, checked the anastomosis for hemostasis. Antegrade  cardioplegia was administered to distend the aorta for vein graft  measurements. At the conclusion of all the grafting, a hotshot dose of  cardioplegia was administered, 500 mL over three minutes, half  retrograde, the rest antegrade. Cross-clamp applied, cardioplegia administered, rapid cardiac standstill  achieved. First grafting done was the posterior descending branch of  the right coronary artery. The vessel was dissected out beyond the  bifurcation from the main right coronary. The vessel was opened  longitudinally. The vein prepared and two vessels joined end-to-side  with 7-0 Prolene suture in a running fashion. The anastomosis was  checked for hemostasis and found to be satisfactory. The vein was  brought to the right side of the aorta, which was distended with  cardioplegia. The aortotomy was created with a 4.4 mm aortic punch. The vein graft trimmed for length and the proximal anastomosis was  completed with a 6-0 Prolene suture in a running fashion. Retrograde  cardioplegia administered. Second grafting to be done was the second obtuse marginal branch of the  circumflex. Separate vein and coronary prepared. Two vessels joined  end-to-side with 7-0 Prolene suture in a running fashion. The  anastomosis was checked for hemostasis by giving retrograde and vein  graft cardioplegia. With satisfactory hemostasis, the vein was brought  in a sequential fashion to the first OM, which was dissected and opened  longitudinally. The vein graft was opened longitudinally and two  vessels joined side-to-side transversely with 7-0 Prolene suture in a  running fashion.   The anastomosis was checked for hemostasis by giving  retrograde and vein graft cardioplegia. With satisfactory hemostasis,  the JUSTYN was brought into the field through a pericardial window behind  the left lateral thymic fat pad. The LAD was dissected and opened  longitudinally. JUSTYN trimmed for length and it was spatulated. Distal  end of the JUSTYN joined to the LAD end-to-side with 7-0 Prolene suture in  a running fashion. Bulldog was removed from the pedicle, checked the  anastomosis for hemostasis, and found to be satisfactory and bulldog  reapplied. Side branches on the pedicle were clipped and the pedicle  was secured to the myocardium with 5-0 Prolene suture to prevent  twisting and torquing at the anastomotic site. During this timeframe, the patient was systemically rewarmed while  remaining proximal anastomosis was completed in the ascending aorta  end-to-side after trimming the vein for appropriate length which was  distended with cardioplegia. The aortotomy was created with 4.4 mm  aortic punch. The proximal anastomosis was completed with 6-0 Prolene  suture in a running fashion. The patient was placed in Trendelenburg  position and a hotshot dose of cardioplegia administered, 500 mL over  three minutes, half retrograde, the rest antegrade. Cross-clamp  removed. Sinus rhythm did return spontaneously. ST segments were all  normalized. No cardioversion required. Temporary epicardial pacing wires were placed, two on the surface of the  right ventricle, two at the anterior groove, brought out through the  skin and secured with Ethibond suture. The retrograde cardioplegia line  was removed and the pursestring suture securing it was tied and  reinforced with a 3-0 Prolene suture in a figure-of-eight fashion. At  this point, with the patient fully rewarmed, he was ventilated. He was  then  from bypass with low dose inotropic support.   With the  patient stable off bypass, the venous cannula was removed and the  pursestring suture securing it was tied and reinforced with 3-0 Prolene  suture in a figure-of-eight fashion. The needle vent was removed from  the ascending aorta and the pursestring suture securing it was tied. The heparin effect was then reversed with protamine, returning ACT  towards its baseline. As I did that, the aortic cannula was removed and  the pursestring suture securing it was tied x2 and reinforced with a  pledgeted 3-0 Prolene suture, placed in a horizontal mattress fashion. At this point, I continued to work on hemostasis. He had lot of oozing  at the beginning of the case during takedown of the JUSTYN. This was all  coagulopathy related to his recent Effient and Integrilin bridging. Finally, he got hemostasis controlled. Two 32-Kazakh chest tubes were  placed, one in the left pleural cavity and the other in the anterior  pericardium overlying the right ventricle and aorta, both secured with  Ethibond suture and connected to suction drainage device. The  pericardium was reapproximated over the ascending aorta as well as over  the base of the diaphragm. Platelet-poor gel was placed inside the  pericardium as well as underneath the left chest wall. Platelet-rich  gel between the sternum as it was closed. I also placed Surgicel  between the sternum as it was closed. Prior to closing the pericardium, Doppler was used to check each of the  grafts, all found to have satisfactory systolic and diastolic signals. The sternum was reapproximated with two stainless steel wires and then  four plates. Three eight hole plates, one in the manubrium and two in  the midbody of the sternum and then a 4-hole plate down and above the  xiphoid process. All plates with appropriate size screws. Sternum was  irrigated with copious amounts of warm saline and then closed in  appropriate layers with Vicryl suture including a subcuticular stitch in  the skin. ESTIMATED BLOOD LOSS:  200 mL.     REPLACEMENTS:  One unit of packed red blood cells, one unit of FFP, two  pools of platelets and five units of cryoprecipitate. BARRETT Dykes MD    D: 09/18/2020 15:23:55       T: 09/18/2020 21:33:48     [id number]  Job#: [id number]     Doc#: [id number]    CC:  Sydnee Strickland.  Ryan Mike MD       Primary Care Physician       Zamzam Conn MD

## 2020-09-20 ENCOUNTER — APPOINTMENT (OUTPATIENT)
Dept: GENERAL RADIOLOGY | Age: 70
DRG: 232 | End: 2020-09-20
Payer: MEDICARE

## 2020-09-20 LAB
ANION GAP SERPL CALCULATED.3IONS-SCNC: 11 MMOL/L (ref 3–16)
BUN BLDV-MCNC: 24 MG/DL (ref 7–20)
CALCIUM SERPL-MCNC: 8 MG/DL (ref 8.3–10.6)
CHLORIDE BLD-SCNC: 103 MMOL/L (ref 99–110)
CO2: 22 MMOL/L (ref 21–32)
CREAT SERPL-MCNC: 1.6 MG/DL (ref 0.8–1.3)
ESTIMATED AVERAGE GLUCOSE: 142.7 MG/DL
GFR AFRICAN AMERICAN: 52
GFR NON-AFRICAN AMERICAN: 43
GLUCOSE BLD-MCNC: 107 MG/DL (ref 70–99)
GLUCOSE BLD-MCNC: 164 MG/DL (ref 70–99)
GLUCOSE BLD-MCNC: 193 MG/DL (ref 70–99)
GLUCOSE BLD-MCNC: 205 MG/DL (ref 70–99)
GLUCOSE BLD-MCNC: 255 MG/DL (ref 70–99)
HBA1C MFR BLD: 6.6 %
HCT VFR BLD CALC: 24.5 % (ref 40.5–52.5)
HEMOGLOBIN: 8.1 G/DL (ref 13.5–17.5)
MCH RBC QN AUTO: 29.3 PG (ref 26–34)
MCHC RBC AUTO-ENTMCNC: 33.3 G/DL (ref 31–36)
MCV RBC AUTO: 88 FL (ref 80–100)
PDW BLD-RTO: 15 % (ref 12.4–15.4)
PERFORMED ON: ABNORMAL
PLATELET # BLD: 115 K/UL (ref 135–450)
PMV BLD AUTO: 8.7 FL (ref 5–10.5)
POTASSIUM SERPL-SCNC: 4.9 MMOL/L (ref 3.5–5.1)
RBC # BLD: 2.78 M/UL (ref 4.2–5.9)
SODIUM BLD-SCNC: 136 MMOL/L (ref 136–145)
WBC # BLD: 10.1 K/UL (ref 4–11)

## 2020-09-20 PROCEDURE — 80048 BASIC METABOLIC PNL TOTAL CA: CPT

## 2020-09-20 PROCEDURE — 6370000000 HC RX 637 (ALT 250 FOR IP): Performed by: STUDENT IN AN ORGANIZED HEALTH CARE EDUCATION/TRAINING PROGRAM

## 2020-09-20 PROCEDURE — 6370000000 HC RX 637 (ALT 250 FOR IP): Performed by: THORACIC SURGERY (CARDIOTHORACIC VASCULAR SURGERY)

## 2020-09-20 PROCEDURE — 97530 THERAPEUTIC ACTIVITIES: CPT

## 2020-09-20 PROCEDURE — 85027 COMPLETE CBC AUTOMATED: CPT

## 2020-09-20 PROCEDURE — 99024 POSTOP FOLLOW-UP VISIT: CPT | Performed by: THORACIC SURGERY (CARDIOTHORACIC VASCULAR SURGERY)

## 2020-09-20 PROCEDURE — 6360000002 HC RX W HCPCS: Performed by: THORACIC SURGERY (CARDIOTHORACIC VASCULAR SURGERY)

## 2020-09-20 PROCEDURE — 71045 X-RAY EXAM CHEST 1 VIEW: CPT

## 2020-09-20 PROCEDURE — 99233 SBSQ HOSP IP/OBS HIGH 50: CPT | Performed by: INTERNAL MEDICINE

## 2020-09-20 PROCEDURE — P9045 ALBUMIN (HUMAN), 5%, 250 ML: HCPCS | Performed by: THORACIC SURGERY (CARDIOTHORACIC VASCULAR SURGERY)

## 2020-09-20 PROCEDURE — 2580000003 HC RX 258: Performed by: THORACIC SURGERY (CARDIOTHORACIC VASCULAR SURGERY)

## 2020-09-20 PROCEDURE — 2000000000 HC ICU R&B

## 2020-09-20 PROCEDURE — 97110 THERAPEUTIC EXERCISES: CPT

## 2020-09-20 PROCEDURE — 97116 GAIT TRAINING THERAPY: CPT

## 2020-09-20 RX ORDER — FERROUS SULFATE 325(65) MG
325 TABLET ORAL 2 TIMES DAILY WITH MEALS
Status: DISCONTINUED | OUTPATIENT
Start: 2020-09-20 | End: 2020-09-26 | Stop reason: HOSPADM

## 2020-09-20 RX ADMIN — METOPROLOL TARTRATE 25 MG: 25 TABLET, FILM COATED ORAL at 21:11

## 2020-09-20 RX ADMIN — PRAVASTATIN SODIUM 80 MG: 40 TABLET ORAL at 21:12

## 2020-09-20 RX ADMIN — MAGNESIUM GLUCONATE 500 MG ORAL TABLET 400 MG: 500 TABLET ORAL at 08:32

## 2020-09-20 RX ADMIN — FAMOTIDINE 20 MG: 20 TABLET, FILM COATED ORAL at 08:33

## 2020-09-20 RX ADMIN — SODIUM CHLORIDE, PRESERVATIVE FREE 10 ML: 5 INJECTION INTRAVENOUS at 08:34

## 2020-09-20 RX ADMIN — METOPROLOL TARTRATE 25 MG: 25 TABLET, FILM COATED ORAL at 08:32

## 2020-09-20 RX ADMIN — ALBUMIN (HUMAN) 25 G: 12.5 INJECTION, SOLUTION INTRAVENOUS at 22:42

## 2020-09-20 RX ADMIN — OXYCODONE 10 MG: 5 TABLET ORAL at 16:49

## 2020-09-20 RX ADMIN — FAMOTIDINE 20 MG: 20 TABLET, FILM COATED ORAL at 21:11

## 2020-09-20 RX ADMIN — TAMSULOSIN HYDROCHLORIDE 0.4 MG: 0.4 CAPSULE ORAL at 08:33

## 2020-09-20 RX ADMIN — FONDAPARINUX SODIUM 2.5 MG: 2.5 INJECTION, SOLUTION SUBCUTANEOUS at 08:32

## 2020-09-20 RX ADMIN — INSULIN LISPRO 1 UNITS: 100 INJECTION, SOLUTION INTRAVENOUS; SUBCUTANEOUS at 08:39

## 2020-09-20 RX ADMIN — MUPIROCIN: 20 OINTMENT TOPICAL at 21:12

## 2020-09-20 RX ADMIN — MUPIROCIN: 20 OINTMENT TOPICAL at 08:34

## 2020-09-20 RX ADMIN — Medication 15 ML: at 21:12

## 2020-09-20 RX ADMIN — MELATONIN TAB 5 MG 5 MG: 5 TAB at 21:11

## 2020-09-20 RX ADMIN — Medication 15 ML: at 08:34

## 2020-09-20 RX ADMIN — POLYETHYLENE GLYCOL 3350 17 G: 17 POWDER, FOR SOLUTION ORAL at 08:32

## 2020-09-20 RX ADMIN — POTASSIUM CHLORIDE 10 MEQ: 750 TABLET, EXTENDED RELEASE ORAL at 16:49

## 2020-09-20 RX ADMIN — INSULIN GLARGINE 18 UNITS: 100 INJECTION, SOLUTION SUBCUTANEOUS at 21:13

## 2020-09-20 RX ADMIN — DIGOXIN 125 MCG: 125 TABLET ORAL at 08:32

## 2020-09-20 RX ADMIN — ACETAMINOPHEN 650 MG: 325 TABLET, FILM COATED ORAL at 08:32

## 2020-09-20 RX ADMIN — POTASSIUM CHLORIDE 10 MEQ: 750 TABLET, EXTENDED RELEASE ORAL at 12:35

## 2020-09-20 RX ADMIN — KETOROLAC TROMETHAMINE 15 MG: 30 INJECTION, SOLUTION INTRAMUSCULAR at 04:29

## 2020-09-20 RX ADMIN — FERROUS SULFATE TAB 325 MG (65 MG ELEMENTAL FE) 325 MG: 325 (65 FE) TAB at 08:39

## 2020-09-20 RX ADMIN — KETOROLAC TROMETHAMINE 15 MG: 30 INJECTION, SOLUTION INTRAMUSCULAR at 08:33

## 2020-09-20 RX ADMIN — INSULIN LISPRO 3 UNITS: 100 INJECTION, SOLUTION INTRAVENOUS; SUBCUTANEOUS at 12:35

## 2020-09-20 RX ADMIN — INSULIN LISPRO 1 UNITS: 100 INJECTION, SOLUTION INTRAVENOUS; SUBCUTANEOUS at 21:12

## 2020-09-20 RX ADMIN — FUROSEMIDE 40 MG: 10 INJECTION, SOLUTION INTRAMUSCULAR; INTRAVENOUS at 18:58

## 2020-09-20 RX ADMIN — INSULIN LISPRO 2 UNITS: 100 INJECTION, SOLUTION INTRAVENOUS; SUBCUTANEOUS at 16:53

## 2020-09-20 RX ADMIN — ACETAMINOPHEN 650 MG: 325 TABLET, FILM COATED ORAL at 16:50

## 2020-09-20 RX ADMIN — FERROUS SULFATE TAB 325 MG (65 MG ELEMENTAL FE) 325 MG: 325 (65 FE) TAB at 16:49

## 2020-09-20 RX ADMIN — Medication 3 G: at 03:00

## 2020-09-20 RX ADMIN — SODIUM CHLORIDE, PRESERVATIVE FREE 10 ML: 5 INJECTION INTRAVENOUS at 21:00

## 2020-09-20 RX ADMIN — ACETAMINOPHEN 650 MG: 325 TABLET, FILM COATED ORAL at 04:29

## 2020-09-20 RX ADMIN — OXYCODONE 10 MG: 5 TABLET ORAL at 08:32

## 2020-09-20 RX ADMIN — BISACODYL 5 MG: 5 TABLET, COATED ORAL at 08:32

## 2020-09-20 RX ADMIN — FUROSEMIDE 40 MG: 10 INJECTION, SOLUTION INTRAMUSCULAR; INTRAVENOUS at 08:34

## 2020-09-20 RX ADMIN — ASPIRIN 81 MG: 81 TABLET ORAL at 08:32

## 2020-09-20 RX ADMIN — ACETAMINOPHEN 650 MG: 325 TABLET, FILM COATED ORAL at 21:14

## 2020-09-20 ASSESSMENT — PAIN DESCRIPTION - PAIN TYPE
TYPE: CHRONIC PAIN;SURGICAL PAIN
TYPE: CHRONIC PAIN;SURGICAL PAIN

## 2020-09-20 ASSESSMENT — PAIN DESCRIPTION - FREQUENCY
FREQUENCY: CONTINUOUS
FREQUENCY: CONTINUOUS

## 2020-09-20 ASSESSMENT — PAIN SCALES - GENERAL
PAINLEVEL_OUTOF10: 8
PAINLEVEL_OUTOF10: 2
PAINLEVEL_OUTOF10: 8
PAINLEVEL_OUTOF10: 8
PAINLEVEL_OUTOF10: 3

## 2020-09-20 ASSESSMENT — PAIN DESCRIPTION - DESCRIPTORS: DESCRIPTORS: ACHING;CONSTANT;DISCOMFORT

## 2020-09-20 ASSESSMENT — PAIN DESCRIPTION - ONSET
ONSET: ON-GOING
ONSET: ON-GOING

## 2020-09-20 ASSESSMENT — PAIN DESCRIPTION - LOCATION: LOCATION: BACK;MEDIASTINUM

## 2020-09-20 NOTE — PROGRESS NOTES
4 Eyes Skin Assessment     The patient is being assess for   Shift Handoff    I agree that 2 RN's have performed a thorough Head to Toe Skin Assessment on the patient. ALL assessment sites listed below have been assessed. Areas assessed by both nurses:   [x]   Head, Face, and Ears   [x]   Shoulders, Back, and Chest, Abdomen  [x]   Arms, Elbows, and Hands   [x]   Coccyx, Sacrum, and Ischium  [x]   Legs, Feet, and Heels        Sacral heart in place. **SHARE this note so that the co-signing nurse is able to place an eSignature**    Co-signer eSignature: Electronically signed by Philip Sosa RN on 9/20/20 at 11:10 PM EDT    Does the Patient have Skin Breakdown?   No          Junaid Prevention initiated:  Yes   Wound Care Orders initiated:  No      [facility] nurse consulted for Pressure Injury (Stage 3,4, Unstageable, DTI, NWPT, Complex wounds)and New or Established Ostomies:  NA      Primary Nurse eSignature: Electronically signed by Kevan Porras RN on 9/20/20 at 7:01 PM EDT

## 2020-09-20 NOTE — PROGRESS NOTES
CARDIOLOGY PROGRESS NOTE      Patient Name: Oniel Woods  Date of admission: 9/13/2020 11:14 AM  Admission Dx: NSTEMI (non-ST elevated myocardial infarction) [facility]) [I21.4]  NSTEMI (non-ST elevated myocardial infarction) ([facility] [I21.4]  Reason for Consult: Chest pain  Requesting Physician: Breann Irwin MD  Primary Care physician: Beau Hale MD    Subjective:     Oniel Woods is a 79 y.o. patient with prior history of coronary disease with PCI to the LAD, OM 3 and RCA earlier this year as well as hypertension, hyperlipidemia, and diabetes. Of note, in February he was treated for unstable angina, found at that time to have 3v-cad with LAD 70% instent, Lcx 50%, OM3 99% RCA 80% instent. PCI of the LAD and OM3 was done 2/24 and of the RCA on 3/3/20. The patient presented this admission with complaints of burning chest pain radiating to the axilla. He was noted to have NSTEMI this admission. Found to have multivessel disease with recurrent stenosis. He had successful atherectomy to the RCA, but due to history of recurrent stenoses CT surgery was consulted. Ultimately was decided that bypass was in the patient's best interest, he was taken 9/18/2020 to the OR with Dr. Gabriel Brothkiet for for four-vessel bypass (SVG to PDA, sequential SVG to OM1/OM2, pedicled LIMA to LAD). The patient was successfully extubated overnight, weaned from epi and milrinone and remains on low-dose pressors. Significant positive fluid balance yesterday 6.5L. Lasix 40mg IV BID scheduled. -2.3 L thus far today. He worked with PT, fatigued and breathless but performed well. Getting in/out of bed is a chore and he feels dyspneic on lying flat. R leg little tender s/p vein harvest. Incision c/d/i. Chest tubes and pacing wires pulled. Home Medications:  Were reviewed and are listed in nursing record and/or below  Prior to Admission medications    Medication Sig Start Date End Date Taking?  Authorizing Provider omeprazole (PRILOSEC) 20 MG delayed release capsule Take 1 capsule by mouth every morning (before breakfast) 9/5/20   Renae Barber MD   prasugrel (EFFIENT) 10 MG TABS Take 1 tablet by mouth daily 4/13/20   Staci Valentino MD   sitaGLIPtan-metformin (JANUMET)  MG per tablet Take 1 tablet by mouth 2 times daily (with meals) 3/5/20   CURTIS Cleveland CNP   carvedilol (COREG) 6.25 MG tablet Take 1 tablet by mouth 2 times daily (with meals)  Patient taking differently: Take 3.125 mg by mouth 2 times daily (with meals)  3/4/20   CURTIS Cleveland CNP   glipiZIDE (GLUCOTROL) 5 MG tablet Take 5 mg by mouth daily     Historical Provider, MD   tamsulosin (FLOMAX) 0.4 MG capsule Take 0.4 mg by mouth 6/29/19   Historical Provider, MD   HYDROcodone-acetaminophen ([address] [place] [address]) 5-325 MG per tablet  6/26/19   Historical Provider, MD   pioglitazone (ACTOS) 30 MG tablet Take 30 mg by mouth daily  11/11/18   Historical Provider, MD   pravastatin (PRAVACHOL) 80 MG tablet Take 80 mg by mouth daily    Historical Provider, MD   losartan (COZAAR) 100 MG tablet Take 100 mg by mouth daily    Historical Provider, MD   aspirin 81 MG tablet Take 81 mg by mouth daily    Historical Provider, MD   Bismuth Subsalicylate (PEPTO-BISMOL PO) Take 1 tablet by mouth as needed     Historical Provider, MD        CURRENT Medications:  ferrous sulfate (IRON 325) tablet 325 mg, BID WC  metoprolol tartrate (LOPRESSOR) tablet 25 mg, BID  pravastatin (PRAVACHOL) tablet 80 mg, Nightly  sodium chloride flush 0.9 % injection 10 mL, 2 times per day  sodium chloride flush 0.9 % injection 10 mL, PRN  potassium chloride 20 mEq/50 mL IVPB (Central Line), PRN  acetaminophen (TYLENOL) tablet 650 mg, Q6H  oxyCODONE (ROXICODONE) immediate release tablet 5 mg, Q4H PRN    Or  oxyCODONE (ROXICODONE) immediate release tablet 10 mg, Q4H PRN  morphine (PF) injection 2 mg, Q2H PRN    Or  morphine (PF) injection 4 mg, Q2H PRN  midazolam (VERSED) injection 1 mg, Q1H PRN  diphenhydrAMINE (BENADRYL) tablet 25 mg, Nightly PRN  polyethylene glycol (GLYCOLAX) packet 17 g, Daily  bisacodyl (DULCOLAX) EC tablet 5 mg, Daily  bisacodyl (DULCOLAX) suppository 10 mg, Daily PRN  ondansetron (ZOFRAN) injection 4 mg, Q8H PRN  [Held by provider] losartan (COZAAR) tablet 25 mg, Daily  chlorhexidine (PERIDEX) 0.12 % solution 15 mL, BID  digoxin (LANOXIN) tablet 125 mcg, Daily  furosemide (LASIX) injection 40 mg, BID  magnesium oxide (MAG-OX) tablet 400 mg, Daily  mupirocin (BACTROBAN) 2 % ointment, BID  nitroGLYCERIN (NITRODUR) 0.2 MG/HR 1 patch, Daily  potassium chloride (KLOR-CON M) extended release tablet 10 mEq, TID WC  fondaparinux (ARIXTRA) injection 2.5 mg, Daily  aspirin EC tablet 81 mg, Daily  albuterol sulfate  (90 Base) MCG/ACT inhaler 2 puff, Q6H PRN  albumin human 5 % IV solution 25 g, PRN  insulin glargine (LANTUS) injection vial 18 Units, Nightly  insulin lispro (HUMALOG) injection vial 0-6 Units, TID WC  insulin lispro (HUMALOG) injection vial 0-3 Units, Nightly  glucose (GLUTOSE) 40 % oral gel 15 g, PRN  dextrose 50 % IV solution, PRN  glucagon (rDNA) injection 1 mg, PRN  dextrose 5 % solution, PRN  famotidine (PEPCID) tablet 20 mg, BID  melatonin tablet 5 mg, Nightly PRN  fluticasone (FLONASE) 50 MCG/ACT nasal spray 1 spray, Daily  tamsulosin (FLOMAX) capsule 0.4 mg, Daily        Allergies:  Brilinta [ticagrelor]; Crestor [rosuvastatin calcium]; Penicillins; Lipitor; Plavix [clopidogrel bisulfate]; and Victoza [liraglutide]     Review of Systems:   A 14 point review of symptoms completed. Pertinent positives identified in the HPI, all other review of symptoms negative.        Objective:     Vitals:    09/20/20 0832 09/20/20 0930 09/20/20 0935 09/20/20 0945   BP: 124/61 (!) 148/74 (!) 146/68 117/65   Pulse: 98 101 94 92   Resp:       Temp:       TempSrc:       SpO2:  95% 92% 91%   Weight:       Height:          Weight: 297 lb 4.8 oz (134.9 kg)       PHYSICAL EXAM: General:  Alert, cooperative, no distress, appears stated age   Head:  Normocephalic, atraumatic   Eyes:  Conjunctiva/corneas clear, anicteric sclerae    Nose: Nares normal, no drainage or sinus tenderness   Throat: No abnormalities of the lips, oral mucosa or tongue. Neck: Trachea midline. Neck supple with no lymphadenopathy, thyroid not enlarged, symmetric, no tenderness/mass/nodules, no Jugular venous pressure elevation    Lungs:   Clear to auscultation bilaterally, no wheezes, no rales, no respiratory distress   Chest Wall:  No deformity or tenderness to palpation   Heart:  Regular rate and rhythm, normal S1, normal S2, no murmur, no rub, no S3/S4, PMI non-displaced. Abdomen:   Soft, non-tender, with normoactive bowel sounds. No masses, no hepatosplenomegaly   Extremities: No cyanosis, clubbing or pitting edema. Vascular: 2+ radial, brachial, femoral, dorsalis pedis and posterior tibial pulses bilaterally. Brisk carotid upstrokes without carotid bruit. Skin: Skin color, texture, turgor are normal with no rashes or ulceration. Pysch: Euthymic mood, appropriate affect   Neurologic: Oriented to person, place and time. No slurred speech or facial asymmetry. No motor or sensory deficits on gross examination.         Labs:   CBC:   Lab Results   Component Value Date    WBC 10.1 09/20/2020    RBC 2.78 09/20/2020    HGB 8.1 09/20/2020    HCT 24.5 09/20/2020    MCV 88.0 09/20/2020    RDW 15.0 09/20/2020     09/20/2020     CMP:  Lab Results   Component Value Date     09/20/2020    K 4.9 09/20/2020    K 4.5 09/04/2020     09/20/2020    CO2 22 09/20/2020    BUN 24 09/20/2020    CREATININE 1.6 09/20/2020    GFRAA 52 09/20/2020    AGRATIO 1.7 09/13/2020    LABGLOM 43 09/20/2020    GLUCOSE 164 09/20/2020    PROT 6.0 09/18/2020    PROT 6.8 09/26/2011    CALCIUM 8.0 09/20/2020    BILITOT 1.0 09/18/2020    ALKPHOS 65 09/18/2020    AST 21 09/18/2020    ALT 37 09/18/2020     PT/INR:  No results found for: PTINR  HgBA1c:  Lab Results   Component Value Date    LABA1C 6.6 09/19/2020     Lab Results   Component Value Date    TROPONINI 0.33 (H) 09/13/2020         Interval Testing/Data:     [facility] 9/14/20  LVGRAM     LVEDP  18   GRADIENT ACROSS AORTIC VALVE  none   LV FUNCTION EF 40 %   WALL MOTION  base to mid inferior hypokinesis   MITRAL REGURGITATION  mild         CORONARY ARTERIES     LM Less than 10% iprveddk-ztb-qajike stenosis            LAD Proximal-mid stents are noted with 50% in-stent restenosis. Distal vessel has 50 to 60% stenosis. There are well-developed left-to-right collaterals.     D1 is a small to medium sized vessel with ostial/proximal/mid 60 to 70% stenosis.       LCX  Large vessel, proximal and mid 90% stenosis. The distal vessel into OM 2 is stented and the stent is widely patent with less than 10% stenosis.       RI Tortuous, small vessel,Less than 10% chaoypyq-cca-ozoyee stenosis            RCA Dominant, proximal 10 to 20% stenosis, the proximal and mid vessel are extensively stented and there is a mid-distal 100% occlusion with well-developed left to right collaterals. Successful atherectomy of RCA  We will consider staged PCI of circumflex  We will also consult with CT surgery as patient has had several PCI procedures and continues to have refractory CAD with ASHD progression. Telemetry personally reviewed. Impression and Plan    Mr. Jim Maza is a 79year old pleasant man with prior history detailed above. Status post successfully RCA atherectomy with deferred stenting 9/14. Given multiple layers stent, multiple PCI with restenosis, decision made to take for bypass. He is now POD#1 s/p CABG and successfully weaned from vent and multiple inotropes/pressors. Progressing well. Pacing wires dc'd today by cardiac surgery. 1.  Coronary artery disease status post multiple prior PCI, now status post four-vessel bypass 9/18/2020  2. Hypertension  3. Hyperlipidemia  4. Diabetes  5. Obesity  6. Prior CVA  7. Anemia - required transfusion, 1 unit this AM    Plan:  1. Continues on aspirin, statin  2. Remains on BB, digoxin 0.125 daily  3. Holding losartan. Scr stable 1.6. (baseline 1.0-1.2). Restart at discretion of cardiac surgery. 4. Lasix 40mg IV BID scheduled. Urinated well first shift. Due for second dose this PM.   5. Wires and chest tubes discontinued. Patient Active Problem List   Diagnosis    CVA [facility]cerebral vascular accident) [facility]    Bell's palsy    HTN (hypertension)    CAD (coronary artery disease)    Hyperlipidemia    DM2 (diabetes mellitus, type 2) ([facility]    Acute chest pain    Obesity    Hydronephrosis with renal and ureteral calculous obstruction    NSTEMI (non-ST elevated myocardial infarction) ([facility]    CAD in native artery    Anxiety and depression    Dyslipidemia    S/P CABG x 4           I will address the patient's cardiac risk factors and adjusted pharmacologic treatment as needed. In addition, I have reinforced the need for patient directed risk factor modification. All questions and concerns were addressed to the patient/family. Alternatives to my treatment were discussed. Thank you for allowing us to participate in the care of Gloria Zaragoza. Please call me with any questions [phone]. Critically ill patient with high level medical complexity decision making. >30 min spent in chart review and face to face discussion regarding evaluation/treatment for his underlying conditions.      Chinmay Khanna MD   Cardiovascular Disease  [facility]  [phone] [facility]  [phone] [address] [place]  9/20/2020 3:10 PM

## 2020-09-20 NOTE — PROGRESS NOTES
Pt ambulating for the third time today. Able to do stairs twice with great encouragement. C/O pain in left side post chest tube removal an 8 on a 0-10 scale. Chest xray ordered to check left lung. Pt back in room and in bedside chair much to patient bernadette. Pt wanting to return to bed however reminded that he was in bed for greater than 4 hours post wire removal. Very fixated on mucus in lungs and throat. Able to cough and clear with very scant amount of clear sputum produced. Pt able to spit into kleenex and carry on with daily activities. Pt spo2 98 on room air and blood pressure 111/82. Pt able to speak on cellular device with multiple people with no obvious perspiration, shortness of breath, long pauses in speech. Continues to report mucus in left lung. Will continue to monitor and call light in reach. Pt watching football on television while speaking with friends and family.

## 2020-09-20 NOTE — PLAN OF CARE
Problem: Falls - Risk of:  Goal: Will remain free from falls  Description: Will remain free from falls  Outcome: Met This Shift  Goal: Absence of physical injury  Description: Absence of physical injury  Outcome: Met This Shift     Problem: Pain:  Goal: Pain level will decrease  Description: Pain level will decrease  Outcome: Ongoing  Goal: Control of acute pain  Description: Control of acute pain  Outcome: Ongoing  Goal: Control of chronic pain  Description: Control of chronic pain  Outcome: Ongoing     Problem: Discharge Planning:  Goal: Participates in care planning  Description: Participates in care planning  Outcome: Met This Shift  Goal: Discharged to appropriate level of care  Description: Discharged to appropriate level of care  Outcome: Ongoing     Problem: Pain:  Goal: Pain level will decrease  Description: Pain level will decrease  Outcome: Ongoing  Goal: Recognizes and communicates pain  Description: Recognizes and communicates pain  Outcome: Ongoing  Goal: Control of acute pain  Description: Control of acute pain  Outcome: Ongoing  Goal: Control of chronic pain  Description: Control of chronic pain  Outcome: Ongoing     Problem: Serum Glucose Level - Abnormal:  Goal: Ability to maintain appropriate glucose levels will improve to within specified parameters  Description: Ability to maintain appropriate glucose levels will improve to within specified parameters  Outcome: Ongoing     Problem: Nutrition  Goal: Optimal nutrition therapy  Description: Nutrition Problem #1: Inadequate oral intake  Intervention: Food and/or Nutrient Delivery: Continue NPO, Start Oral Diet(when medically appropriate)  Nutritional Goals:  Tolerate oral diet progression post op and conume greater than 50% of meals this admission      Outcome: Ongoing

## 2020-09-20 NOTE — PROGRESS NOTES
Physical Therapy  Facility/Department: [facility] B2 - ICU  Daily Treatment Note  NAME: Imelda Bautista  : 1950  MRN: [id number]    Date of Service: 2020    Discharge Recommendations:  IP Rehab   PT Equipment Recommendations  Equipment Needed: No  Other: defer to patient's facility. Barriers to home discharge:    [x] Reported available assist at home upon discharge limited: Patient lives alone and he currently requires maximum assistance x 2 in order to perform bed mobility with sternal precautions. If pt is unable to be seen after this session, please let this note serve as discharge summary. Please see case management note for discharge disposition. Thank you. Assessment   Body structures, Functions, Activity limitations: Decreased functional mobility ; Decreased ADL status; Decreased balance;Decreased strength;Decreased endurance  Assessment: Patient is a 79year old male who was admitted to [facility] on 20. Patient received CABG x 4 on 20. Patient is making good progress with some of his therapy goals. For example the patient is now able to ambulate up to 50 feet at a time without an assistive device with CGA. He was also able to ascend stairs with CGA. However due to patient's weak core musculature and sternal precautions, the patient today required maximum assistance x 2 for bed mobility. This patient lives alone. Now anticipate that this patient may not become with independent with bed mobility prior to discharge from acute care. Patient presented with therapy deficits listed above. Physical therapy now recommends that this patient receive skilled PT in the acute inpatient rehabilitation setting, when medically stable, in order to address his therapy deficits and to help him return to his prior independent level of function. PT to continue to follow. Treatment Diagnosis: decreased indepenence with functional mobility.   Specific instructions for Next Treatment: progress mobility as tolerated. Prognosis: Good  Decision Making: Medium Complexity  PT Education: Goals; General Safety;Gait Training;PT Role;Disease Specific Education;Precautions;Transfer Training;Plan of Care;Home Exercise Program;Pressure Relief; Injury Prevention  Patient Education: Patient educated on his sternal precautions. Patient verbalized understanding but he will benefit from reinforcement. Barriers to Learning: none  REQUIRES PT FOLLOW UP: Yes  Activity Tolerance  Activity Tolerance: Patient Tolerated treatment well  Activity Tolerance: Vitals: 139/69 98 BPM 95% room air. Patient Diagnosis(es): The primary encounter diagnosis was Acute chest pain. Diagnoses of Shortness of breath and Elevated troponin were also pertinent to this visit. has a past medical history of CAD (coronary artery disease), Diabetes mellitus ([address] Hyperlipidemia, Hypertension, and Kidney stones. has a past surgical history that includes cervical fusion (1995); Appendectomy (1970); hernia repair ([id number]); Colonoscopy (1987&2002); Upper gastrointestinal endoscopy (2002); Coronary angioplasty with stent (2004); Coronary angioplasty with stent (2008); Upper gastrointestinal endoscopy (08/28/2008); Cystoscopy (Left, 6/27/2019); Endoscopy, colon, diagnostic; and CYSTOSCOPY INSERTION / REMOVAL STENT / STONE (Left, 7/11/2019). Restrictions  Restrictions/Precautions  Restrictions/Precautions: General Precautions, Fall Risk  Position Activity Restriction  Sternal Precautions: No Pushing, No Pulling, 5# Lifting Restrictions  Sternal Precautions: No lifting greater than 5 lbs. Other position/activity restrictions: Progressive ambulation. , FLOR, mik  Subjective   General  Chart Reviewed: Yes  Additional Pertinent Hx: CABG x 4 on 9/18  Response To Previous Treatment: Not applicable  Referring Practitioner: Dereje Lechuga MD  Subjective  Subjective: Patient agreed to participate.   General Comment  Comments: Patient cleared for PT treatment session by RN. Patient in supine position. Pain Screening  Patient Currently in Pain: No  Vital Signs  Patient Currently in Pain: No       Orientation  Orientation  Overall Orientation Status: Within Functional Limits  Cognition   Cognition  Overall Cognitive Status: WFL  Objective   Bed mobility  Rolling to Left: Moderate assistance  Rolling to Right: Moderate assistance  Supine to Sit: Maximum assistance  Sit to Supine: Maximum assistance;2 Person assistance  Scooting: Maximal assistance;2 Person assistance(to scoot up in the bed)  Comment: patient required constant cueing in order to maintain his sternal precautions. Transfers  Sit to Stand: Contact guard assistance  Stand to sit: Contact guard assistance  Bed to Chair: Contact guard assistance  Comment: cueing for patient to adhere to sternal precautions. Ambulation  Ambulation?: Yes  More Ambulation?: Yes  Ambulation 1  Surface: level tile  Device: Rollator  Assistance: Contact guard assistance  Quality of Gait: increased sway to left and right but no overt loss of balance. decreased bilateral step height and step length. Gait Deviations: Decreased step height;Decreased step length; Slow Maria Esther  Distance: 50 feet x 2. Comments: No complaints of shortness of breath, dizziness or chest pain  Ambulation 2  Surface - 2: level tile  Device 2: No device  Assistance 2: Contact guard assistance  Quality of Gait 2: wide base of support. bilateral sway but still no overt loss of balance. Gait Deviations: Slow Maria Esther;Decreased step length;Decreased step height; Increased ESTEVAN  Distance: 50 feet x 2  Comments: No complaints of shortness of breath, dizziness or chest pain  Stairs/Curb  Stairs?: Yes  Stairs  # Steps : 4  Stairs Height: 6\"  Rails: Bilateral  Device: No Device  Assistance: Contact guard assistance  Comment: Cueing for patient to not bear weight through his bilateral upper extremities. No overt loss of balance. non-reciprocal step pattern. Balance  Posture: Fair  Sitting - Static: Fair  Sitting - Dynamic: Poor  Standing - Static: Fair  Standing - Dynamic: Fair  Exercises  Hip Flexion: 1 x 10  Knee Long Arc Quad: 1 x 10  Ankle Pumps: 1 x 10  Comments: cueing for sequencing and technique. Other exercises  Other exercises?: No                        AM-PAC Score     AM-PAC Inpatient Mobility without Stair Climbing Raw Score : 13 (09/20/20 1643)  AM-PAC Inpatient without Stair Climbing T-Scale Score : 38.96 (09/20/20 1643)  Mobility Inpatient CMS 0-100% Score: 58.44 (09/20/20 1643)  Mobility Inpatient without Stair CMS G-Code Modifier : CK (09/20/20 1643)       Goals  Short term goals  Time Frame for Short term goals: 1 week 9/26/20  Short term goal 1: Supine <> sit with mod I. 9/20 max assist x 2 for sit to supine transfer. Short term goal 2: Sit <> stand with mod I. 9/20 CGA  Short term goal 3: Bed <> chair with LRAD and mod I. 9/20 CGA  Short term goal 4: Ambulate 150 feet with LRAD and mod I. 9/20 50 feet with rollator and CGA  Short term goal 5: Patient to ascend 4 steps with rail and supervision. 9/20 4 steps with bilateral rail and CGA  Patient Goals   Patient goals : To improve his strength. Plan    Plan  Times per week: 5-7 times/week  Plan weeks: 1 week 9/26/20  Specific instructions for Next Treatment: progress mobility as tolerated. Current Treatment Recommendations: Strengthening, Home Exercise Program, Safety Education & Training, Balance Training, Endurance Training, Patient/Caregiver Education & Training, Functional Mobility Training, Equipment Evaluation, Education, & procurement, Transfer Training, Gait Training, ADL/Self-care Training, Stair training  Safety Devices  Type of devices:  All fall risk precautions in place, Nurse notified, Gait belt, Patient at risk for falls, Call light within reach, Bed alarm in place, Left in bed(patient's RN in the room at the end of the PT treatment session)     Therapy Time   Individual Concurrent Group Co-treatment   Time In 1411         Time Out 1449         Minutes 38         Timed Code Treatment Minutes: [id number] BABAK Burgos. Manuela Keller, PT

## 2020-09-20 NOTE — PROGRESS NOTES
CVTS Thoracic Progress Note:          CC:  Post op follow up    Subj: Patient feels well, endorses moderate pain, conversant this morning. Doing well with IS. Extubated overnight to 4L(9/18 2040 pm), weaning O2  All drips off    Obj:    Blood pressure 114/62, pulse 104, temperature 98.8 °F (37.1 °C), temperature source Oral, resp. rate 14, height 5' 9\" (1.753 m), weight 297 lb 4.8 oz (134.9 kg), SpO2 97 %. Pre op wt  120.5 kg  9/19  134.5 kg  9/20  134.9 kg   Lungs 2L NC, normal work of breathing, chest tubes 350 cc SS output   Abdomen soft, non distended, no BM or flatus   Incision with Primapore dressing in place   Chest tube #1with 150-50-40 ml/shift drainage in last 24 hours, no air leak; Chest tube #2 with 75- -40 ml/shift past 24 hours with no air leak    Diagnostics:   Recent Labs     09/18/20  1435  09/19/20  0450 09/19/20  1930 09/20/20  0350   WBC 15.6*  --  11.3*  --  10.1   HGB 7.3*   < > 7.6* 7.9* 8.1*   HCT 22.3*   < > 22.5* 23.6* 24.5*     --  132*  --  115*    < > = values in this interval not displayed. Recent Labs     09/19/20  0450 09/19/20  1401 09/20/20  0350   * 134* 136   K 5.1 4.2 4.9   CL 98* 104 103   CO2 23 20* 22   BUN 22* 21* 24*   CREATININE 1.6* 1.4* 1.6*   GLUCOSE 123* 105* 164*            Recent Labs     09/18/20  1435   MG 3.30*      No results for input(s): TROPONINI in the last 72 hours.   Recent Labs     09/18/20  0343 09/18/20  1435 09/19/20  0450   INR 1.09 1.38* 1.29*       CXR:   Normal post operative changes, no pneumothorax, chest tubes in place, PAC in place     Extubated 9/18 2040 PM  Transitioned 9/19 0945 AM    Assess/Plan:   Neuro:  Pain control  - Toradol, APAP, oxycodone  CV: NSTEMI s/p 4v CABG  - BB  - ASA 81 and statin  - Digoxin 125 mcg daily  -  DC pacing wires  Pulm: extubated overnight, now on 2L NC  - IS, OOB, cough/deep breath  FEN/GI: advance diet as tolerated, cardiac 1500 ml fluid restriction  Renal/: Cr 1.6 today, near baseline, adequate UOP satisfactory  - DC houser  ID: Periop antibiotics  Heme: 2 units PRBC yesterday evening, 1 unit PRBC this morning for Hgb 7.6  - DC chest tubes 350 cc output  Endo: DM2  - Wean insulin drip off today (9/19)  - Lantus, SSI  - Hold home glipizide, pioglitazone, and janumet  PPX: Arixtra, NIGHAT hose  Dispo:  ICU level care, transition once off pressors      Alex Hess MD  9/20/2020  8:06 AM

## 2020-09-21 LAB
ANION GAP SERPL CALCULATED.3IONS-SCNC: 10 MMOL/L (ref 3–16)
BUN BLDV-MCNC: 27 MG/DL (ref 7–20)
CALCIUM SERPL-MCNC: 8.3 MG/DL (ref 8.3–10.6)
CHLORIDE BLD-SCNC: 103 MMOL/L (ref 99–110)
CO2: 25 MMOL/L (ref 21–32)
CREAT SERPL-MCNC: 1.4 MG/DL (ref 0.8–1.3)
EKG ATRIAL RATE: 300 BPM
EKG DIAGNOSIS: NORMAL
EKG Q-T INTERVAL: 402 MS
EKG QRS DURATION: 136 MS
EKG QTC CALCULATION (BAZETT): 507 MS
EKG R AXIS: 18 DEGREES
EKG T AXIS: 16 DEGREES
EKG VENTRICULAR RATE: 96 BPM
GFR AFRICAN AMERICAN: >60
GFR NON-AFRICAN AMERICAN: 50
GLUCOSE BLD-MCNC: 149 MG/DL (ref 70–99)
GLUCOSE BLD-MCNC: 168 MG/DL (ref 70–99)
GLUCOSE BLD-MCNC: 252 MG/DL (ref 70–99)
HCT VFR BLD CALC: 24.9 % (ref 40.5–52.5)
HEMOGLOBIN: 8.4 G/DL (ref 13.5–17.5)
MAGNESIUM: 2.2 MG/DL (ref 1.8–2.4)
MCH RBC QN AUTO: 29.3 PG (ref 26–34)
MCHC RBC AUTO-ENTMCNC: 33.7 G/DL (ref 31–36)
MCV RBC AUTO: 87.1 FL (ref 80–100)
PDW BLD-RTO: 14.6 % (ref 12.4–15.4)
PERFORMED ON: ABNORMAL
PERFORMED ON: ABNORMAL
PLATELET # BLD: 134 K/UL (ref 135–450)
PMV BLD AUTO: 8.4 FL (ref 5–10.5)
POTASSIUM SERPL-SCNC: 3.8 MMOL/L (ref 3.5–5.1)
POTASSIUM SERPL-SCNC: 3.9 MMOL/L (ref 3.5–5.1)
RBC # BLD: 2.86 M/UL (ref 4.2–5.9)
SODIUM BLD-SCNC: 138 MMOL/L (ref 136–145)
WBC # BLD: 9.1 K/UL (ref 4–11)

## 2020-09-21 PROCEDURE — 2500000003 HC RX 250 WO HCPCS: Performed by: STUDENT IN AN ORGANIZED HEALTH CARE EDUCATION/TRAINING PROGRAM

## 2020-09-21 PROCEDURE — 97530 THERAPEUTIC ACTIVITIES: CPT

## 2020-09-21 PROCEDURE — 97116 GAIT TRAINING THERAPY: CPT

## 2020-09-21 PROCEDURE — 99024 POSTOP FOLLOW-UP VISIT: CPT | Performed by: THORACIC SURGERY (CARDIOTHORACIC VASCULAR SURGERY)

## 2020-09-21 PROCEDURE — 83735 ASSAY OF MAGNESIUM: CPT

## 2020-09-21 PROCEDURE — 2580000003 HC RX 258: Performed by: THORACIC SURGERY (CARDIOTHORACIC VASCULAR SURGERY)

## 2020-09-21 PROCEDURE — 93010 ELECTROCARDIOGRAM REPORT: CPT | Performed by: INTERNAL MEDICINE

## 2020-09-21 PROCEDURE — 84132 ASSAY OF SERUM POTASSIUM: CPT

## 2020-09-21 PROCEDURE — 2000000000 HC ICU R&B

## 2020-09-21 PROCEDURE — 6370000000 HC RX 637 (ALT 250 FOR IP): Performed by: THORACIC SURGERY (CARDIOTHORACIC VASCULAR SURGERY)

## 2020-09-21 PROCEDURE — 6360000002 HC RX W HCPCS: Performed by: NURSE PRACTITIONER

## 2020-09-21 PROCEDURE — 6370000000 HC RX 637 (ALT 250 FOR IP): Performed by: STUDENT IN AN ORGANIZED HEALTH CARE EDUCATION/TRAINING PROGRAM

## 2020-09-21 PROCEDURE — 97110 THERAPEUTIC EXERCISES: CPT

## 2020-09-21 PROCEDURE — 6360000002 HC RX W HCPCS: Performed by: THORACIC SURGERY (CARDIOTHORACIC VASCULAR SURGERY)

## 2020-09-21 PROCEDURE — 93005 ELECTROCARDIOGRAM TRACING: CPT | Performed by: THORACIC SURGERY (CARDIOTHORACIC VASCULAR SURGERY)

## 2020-09-21 PROCEDURE — 2580000003 HC RX 258: Performed by: STUDENT IN AN ORGANIZED HEALTH CARE EDUCATION/TRAINING PROGRAM

## 2020-09-21 PROCEDURE — 99232 SBSQ HOSP IP/OBS MODERATE 35: CPT | Performed by: NURSE PRACTITIONER

## 2020-09-21 PROCEDURE — 80048 BASIC METABOLIC PNL TOTAL CA: CPT

## 2020-09-21 PROCEDURE — 85027 COMPLETE CBC AUTOMATED: CPT

## 2020-09-21 RX ORDER — PRASUGREL 10 MG/1
10 TABLET, FILM COATED ORAL DAILY
Status: DISCONTINUED | OUTPATIENT
Start: 2020-09-21 | End: 2020-09-26 | Stop reason: HOSPADM

## 2020-09-21 RX ORDER — HEPARIN SODIUM 5000 [USP'U]/ML
5000 INJECTION, SOLUTION INTRAVENOUS; SUBCUTANEOUS EVERY 8 HOURS SCHEDULED
Status: DISCONTINUED | OUTPATIENT
Start: 2020-09-21 | End: 2020-09-26 | Stop reason: HOSPADM

## 2020-09-21 RX ADMIN — DIPHENHYDRAMINE HCL 25 MG: 25 TABLET ORAL at 21:52

## 2020-09-21 RX ADMIN — METOPROLOL TARTRATE 12.5 MG: 25 TABLET, FILM COATED ORAL at 15:31

## 2020-09-21 RX ADMIN — SODIUM CHLORIDE, PRESERVATIVE FREE 10 ML: 5 INJECTION INTRAVENOUS at 21:52

## 2020-09-21 RX ADMIN — ASPIRIN 81 MG: 81 TABLET ORAL at 09:02

## 2020-09-21 RX ADMIN — MUPIROCIN: 20 OINTMENT TOPICAL at 09:00

## 2020-09-21 RX ADMIN — POTASSIUM CHLORIDE 20 MEQ: 400 INJECTION, SOLUTION INTRAVENOUS at 18:06

## 2020-09-21 RX ADMIN — FAMOTIDINE 20 MG: 20 TABLET, FILM COATED ORAL at 21:52

## 2020-09-21 RX ADMIN — FUROSEMIDE 40 MG: 10 INJECTION, SOLUTION INTRAMUSCULAR; INTRAVENOUS at 09:02

## 2020-09-21 RX ADMIN — TAMSULOSIN HYDROCHLORIDE 0.4 MG: 0.4 CAPSULE ORAL at 09:05

## 2020-09-21 RX ADMIN — POTASSIUM CHLORIDE 10 MEQ: 750 TABLET, EXTENDED RELEASE ORAL at 09:02

## 2020-09-21 RX ADMIN — DEXTROSE MONOHYDRATE 2 MCG/MIN: 50 INJECTION, SOLUTION INTRAVENOUS at 02:35

## 2020-09-21 RX ADMIN — POTASSIUM CHLORIDE 10 MEQ: 750 TABLET, EXTENDED RELEASE ORAL at 11:34

## 2020-09-21 RX ADMIN — FERROUS SULFATE TAB 325 MG (65 MG ELEMENTAL FE) 325 MG: 325 (65 FE) TAB at 18:06

## 2020-09-21 RX ADMIN — OXYCODONE 5 MG: 5 TABLET ORAL at 17:54

## 2020-09-21 RX ADMIN — FERROUS SULFATE TAB 325 MG (65 MG ELEMENTAL FE) 325 MG: 325 (65 FE) TAB at 09:03

## 2020-09-21 RX ADMIN — Medication 15 ML: at 21:55

## 2020-09-21 RX ADMIN — HEPARIN SODIUM 5000 UNITS: 5000 INJECTION INTRAVENOUS; SUBCUTANEOUS at 21:55

## 2020-09-21 RX ADMIN — INSULIN LISPRO 1 UNITS: 100 INJECTION, SOLUTION INTRAVENOUS; SUBCUTANEOUS at 18:12

## 2020-09-21 RX ADMIN — Medication 15 ML: at 09:00

## 2020-09-21 RX ADMIN — FUROSEMIDE 40 MG: 10 INJECTION, SOLUTION INTRAMUSCULAR; INTRAVENOUS at 17:54

## 2020-09-21 RX ADMIN — METOPROLOL TARTRATE 12.5 MG: 25 TABLET, FILM COATED ORAL at 09:02

## 2020-09-21 RX ADMIN — PRAVASTATIN SODIUM 80 MG: 40 TABLET ORAL at 21:52

## 2020-09-21 RX ADMIN — PRASUGREL 10 MG: 10 TABLET, FILM COATED ORAL at 09:02

## 2020-09-21 RX ADMIN — OXYCODONE 5 MG: 5 TABLET ORAL at 21:54

## 2020-09-21 RX ADMIN — POTASSIUM CHLORIDE 10 MEQ: 750 TABLET, EXTENDED RELEASE ORAL at 17:54

## 2020-09-21 RX ADMIN — ACETAMINOPHEN 650 MG: 325 TABLET, FILM COATED ORAL at 04:01

## 2020-09-21 RX ADMIN — INSULIN LISPRO 3 UNITS: 100 INJECTION, SOLUTION INTRAVENOUS; SUBCUTANEOUS at 11:18

## 2020-09-21 RX ADMIN — POTASSIUM CHLORIDE 20 MEQ: 400 INJECTION, SOLUTION INTRAVENOUS at 11:29

## 2020-09-21 RX ADMIN — MUPIROCIN: 20 OINTMENT TOPICAL at 21:55

## 2020-09-21 RX ADMIN — POTASSIUM CHLORIDE 20 MEQ: 400 INJECTION, SOLUTION INTRAVENOUS at 04:33

## 2020-09-21 RX ADMIN — METOPROLOL TARTRATE 12.5 MG: 25 TABLET, FILM COATED ORAL at 21:52

## 2020-09-21 RX ADMIN — SODIUM CHLORIDE, PRESERVATIVE FREE 10 ML: 5 INJECTION INTRAVENOUS at 09:05

## 2020-09-21 RX ADMIN — FAMOTIDINE 20 MG: 20 TABLET, FILM COATED ORAL at 09:02

## 2020-09-21 RX ADMIN — HEPARIN SODIUM 5000 UNITS: 5000 INJECTION INTRAVENOUS; SUBCUTANEOUS at 16:43

## 2020-09-21 ASSESSMENT — PAIN DESCRIPTION - LOCATION
LOCATION: CHEST;STERNUM
LOCATION: CHEST

## 2020-09-21 ASSESSMENT — PAIN - FUNCTIONAL ASSESSMENT: PAIN_FUNCTIONAL_ASSESSMENT: PREVENTS OR INTERFERES WITH MANY ACTIVE NOT PASSIVE ACTIVITIES

## 2020-09-21 ASSESSMENT — PAIN DESCRIPTION - FREQUENCY: FREQUENCY: CONTINUOUS

## 2020-09-21 ASSESSMENT — PAIN SCALES - GENERAL
PAINLEVEL_OUTOF10: 2
PAINLEVEL_OUTOF10: 3
PAINLEVEL_OUTOF10: 4
PAINLEVEL_OUTOF10: 5
PAINLEVEL_OUTOF10: 2

## 2020-09-21 ASSESSMENT — PAIN DESCRIPTION - PAIN TYPE
TYPE: ACUTE PAIN;SURGICAL PAIN
TYPE: SURGICAL PAIN

## 2020-09-21 ASSESSMENT — PAIN DESCRIPTION - DESCRIPTORS: DESCRIPTORS: ACHING;SORE;DISCOMFORT

## 2020-09-21 ASSESSMENT — ENCOUNTER SYMPTOMS: SHORTNESS OF BREATH: 1

## 2020-09-21 ASSESSMENT — PAIN DESCRIPTION - ORIENTATION
ORIENTATION: MID
ORIENTATION: LEFT;ANTERIOR

## 2020-09-21 NOTE — CARE COORDINATION
Writer received notification from Syeda Hannon with [facility]. They are able to accept however do not feel his insurance will approve. Writer spoke to Dk Blair NP and will see how pt does with therapy tomorrow and re-evaluate for possible return home with Gloria Cintron.   Eusebio Ruiz RN

## 2020-09-21 NOTE — PROGRESS NOTES
CVTS Thoracic Progress Note:          CC:  Post op follow up    Subj: Patient feels well, up in chair, appetite improving. Doing well with IS. Extubated overnight to 4L(9/18 2040 pm), weaning O2    GTTS: was started on norepinephrine overnight 9/20 for hypotension    Obj:    Blood pressure 116/76, pulse 83, temperature 97.9 °F (36.6 °C), temperature source Oral, resp. rate 18, height 5' 9\" (1.753 m), weight 282 lb 10.1 oz (128.2 kg), SpO2 100 %. Pre op wt  120.5 kg  9/19  134.5 kg  9/20  134.9 kg  9/21  128.2 kg   Lungs 2L NC, normal work of breathing, chest tubes removed 9/20. Abdomen soft, non distended, no BM, +flatus   Incision with Primapore dressing in place    Diagnostics:   Recent Labs     09/19/20  0450 09/19/20  1930 09/20/20  0350 09/21/20  0345   WBC 11.3*  --  10.1 9.1   HGB 7.6* 7.9* 8.1* 8.4*   HCT 22.5* 23.6* 24.5* 24.9*   *  --  115* 134*                                                                  Recent Labs     09/19/20  1401 09/20/20  0350 09/21/20  0345   * 136 138   K 4.2 4.9 3.8    103 103   CO2 20* 22 25   BUN 21* 24* 27*   CREATININE 1.4* 1.6* 1.4*   GLUCOSE 105* 164* 168*            Recent Labs     09/18/20  1435 09/21/20  0345   MG 3.30* 2.20      No results for input(s): TROPONINI in the last 72 hours. Recent Labs     09/18/20  1435 09/19/20  0450   INR 1.38* 1.29*       CXR:   Normal post operative changes, no pneumothorax, chest tubes in place, PAC in place     Extubated 9/18 2040 PM  Transitioned 9/19 0945 AM    Assess/Plan:   Neuro:  Pain control  - Toradol, APAP, oxycodone  CV: NSTEMI s/p 4v CABG  - norepinephrine drip- will wean as tolerated. - Currently A-fib, will resume his home Effient  - BB - decreased to 12.5 yesterday, will make that TID today  - ASA 81 and statin  - Digoxin 125 mcg daily  - TPW out 9/20  Pulm: extubated overnight, now on 2L NC  - IS, OOB, cough/deep breath. Wean oxygen as tolerated.    FEN/GI: advance diet as tolerated, cardiac 1500 ml fluid restriction  Renal/: Cr 1.4 today, near baseline, adequate UOP satisfactory  ID: Periop antibiotics  Heme: 2 units PRBC 9/19 evening, 1 unit PRBC 9/20 in AM. H&H stable. Endo: DM2  - Lantus, SSI  - Hold home glipizide, pioglitazone, and janumet  PPX: Arixtra, NIGHAT hose  Dispo:  ICU level care, DCP following    CURTIS Garsia-BEBA   9/21/2020  11:59 AM   Note reviewed, events of last 24 hours reviewed along with vital signs and I/Os and patient examined. Assessment and plans discussed and are as outlined above.      Tori Horne MD, FACS, [address] [facility], FACCP, [place]

## 2020-09-21 NOTE — PROGRESS NOTES
4 Eyes Skin Assessment     The patient is being assess for   Shift Handoff    I agree that 2 RN's have performed a thorough Head to Toe Skin Assessment on the patient. ALL assessment sites listed below have been assessed. Areas assessed by both nurses:   [x]   Head, Face, and Ears   [x]   Shoulders, Back, and Chest, Abdomen  [x]   Arms, Elbows, and Hands   [x]   Coccyx, Sacrum, and Ischium  [x]   Legs, Feet, and Heels            **SHARE this note so that the co-signing nurse is able to place an eSignature**    Co-signer eSignature: {Esignature:[id number]}    Does the Patient have Skin Breakdown?   No          Junaid Prevention initiated:  Yes   Wound Care Orders initiated:  NA      WOC nurse consulted for Pressure Injury (Stage 3,4, Unstageable, DTI, NWPT, Complex wounds)and New or Established Ostomies:  NA      Primary Nurse eSignature: Electronically signed by Shellie Angelucci, RN on 9/21/20 at 2:58 AM EDT

## 2020-09-21 NOTE — PROGRESS NOTES
Physical Therapy  Facility/Department: [facility] B2 - ICU  Daily Treatment Note  NAME: Carter Alonzo  : 1950  MRN: [id number]    Date of Service: 2020    Discharge Recommendations:  IP Rehab   PT Equipment Recommendations  Equipment Needed: No  Other: defer to patient's facility. Assessment   Body structures, Functions, Activity limitations: Decreased functional mobility ; Decreased balance;Decreased strength;Decreased endurance  Assessment: Pt participated well with PT today despite continued deficits in endurance and activity tolerance post-CABG. Pt able to perform sit<>stand transfers and amb without AD at CGA/SBA level, although fatigues very quickly and easily with standing activity. Continue to recommend IP rehab at D/C given pt's deficits in the above areas, with the goal of improving overall strength, stamina, and (I) with higher level functional mobility tasks. Treatment Diagnosis: Decreased endurance and (I) with functional mobility  Specific instructions for Next Treatment: Progress ther ex and mobility as tolerated  Prognosis: Good  Decision Making: Medium Complexity  PT Education: Goals; General Safety;Gait Training;PT Role;Disease Specific Education;Precautions;Transfer Training;Plan of Care;Home Exercise Program;Pressure Relief; Injury Prevention  Patient Education: Disease-specific education: Pt educated on his sternal precautions; pt verbalized understanding. REQUIRES PT FOLLOW UP: Yes  Activity Tolerance: Patient Tolerated treatment well;Patient limited by endurance  Activity Tolerance: VSS on room air during therapy session. Immediately post-amb: HR = 98 bpm, O2 sat = %, BP = 122/78. Frequent standing rest breaks needed every ~25-30 feet while amb with pt moderately SOB. Patient Diagnosis(es): The primary encounter diagnosis was Acute chest pain. Diagnoses of Shortness of breath and Elevated troponin were also pertinent to this visit.      has a past medical history of CAD (coronary artery disease), Diabetes mellitus ([facility] [address] Hyperlipidemia, Hypertension, and Kidney stones. has a past surgical history that includes cervical fusion (1995); Appendectomy (1970); hernia repair (3012&3511); Colonoscopy (1987&2002); Upper gastrointestinal endoscopy (2002); Coronary angioplasty with stent (2004); Coronary angioplasty with stent (2008); Upper gastrointestinal endoscopy (08/28/2008); Cystoscopy (Left, 6/27/2019); Endoscopy, colon, diagnostic; and CYSTOSCOPY INSERTION / REMOVAL STENT / STONE (Left, 7/11/2019). Restrictions  Restrictions/Precautions  Restrictions/Precautions: General Precautions, Fall Risk, Cardiac  Position Activity Restriction  Sternal Precautions: No Pushing, No Pulling, 5# Lifting Restrictions  Sternal Precautions: No lifting greater than 5 lbs. Other position/activity restrictions: Progressive ambulation, IJ, ICU monitoring  Subjective   General  Chart Reviewed: Yes  Additional Pertinent Hx: CABG x 4 on 9/18  Response To Previous Treatment: Patient reporting fatigue but able to participate. Family / Caregiver Present: No  Referring Practitioner: Lizzie Mccloud. Sydnee Frias MD  Subjective  Subjective: Pt agreeable to work with PT this afternoon, pleasant and cooperative despite increased c/o fatigue and weakness today. \"I can't wait to get up and walk. I really want to push myself today. \"  General Comment  Comments: Pt sitting up in chair upon entry of PT  Pain Screening  Patient Currently in Pain: Yes  Pain Assessment  Pain Assessment: 0-10  Pain Level: 5(5/10 while amb, 3/10 at rest (quickly increases to 7/10 when coughing but dissipates to 5/10 once coughing stops))  Pain Type: Surgical pain  Pain Location: Chest;Sternum  Pain Orientation: Mid  Pain Descriptors: Aching;Sore;Discomfort  Pain Frequency: Continuous  Non-Pharmaceutical Pain Intervention(s): Ambulation/Increased Activity;Repositioned; Emotional support       Orientation  Orientation  Overall Orientation Status: goal 1: Supine <> sit with mod I - not yet met (maxA x 2 as of 9/20/20)  Short term goal 2: Sit <> stand with mod I - MET 9/21/20  Short term goal 3: Bed <> chair with LRAD and mod I - not yet met (SBA for chair<>chair as of 9/21/20)  Short term goal 4: Ambulate 150 feet with LRAD and mod I - not yet met (CGA x 1 to amb x 150 feet as of 9/21/20)  Short term goal 5: Patient to ascend 4 steps with rail and supervision - not yet met (CGA x 1 to amb up/down 4 steps as of 9/21/20)  Patient Goals   Patient goals : \"To get stronger\"    Plan    Times per week: 5-7x/week in acute care  Times per day: Daily  Plan weeks: 1 week, 9/26/20  Specific instructions for Next Treatment: Progress ther ex and mobility as tolerated  Current Treatment Recommendations: Strengthening, Home Exercise Program, Safety Education & Training, Balance Training, Endurance Training, Patient/Caregiver Education & Training, Functional Mobility Training, Equipment Evaluation, Education, & procurement, Transfer Training, Gait Training, ADL/Self-care Training, Stair training  Safety Devices: All fall risk precautions in place, Nurse notified, Gait belt, Patient at risk for falls, Call light within reach, Left in chair(chair alarm not needed per ICU RN)     Therapy Time   Individual Concurrent Group Co-treatment   Time In 9981         Time Out 1520         Minutes 43         Timed Code Treatment Minutes: [address] #[id number]    If pt is unable to be seen after this session, please let this note serve as discharge summary. Please see case management note for discharge disposition. Thank you.

## 2020-09-21 NOTE — PROGRESS NOTES
Patient has been forcefully coughing the past two days, states that he doesn't want to get the mucus build up in his throat like he had when the tube came out of his throat. This writer explained to patient that that is not uncommon to have that mucus when extubated because the tube is in there. Patient stated I know. Patient is now complaining that his throat is sore. This writer educated patient that we would discuss with Dr. Davonte Figueredo in the am and see if a throat spray would be appropriate, or what his suggestions would be. Patient feels it has to do with his chest feeling jolted while ambulating earlier in the day and needing a chest x ray.

## 2020-09-21 NOTE — PROGRESS NOTES
[address]  Cardiology  Progress Note    Admission date:  2020    Reason for follow up visit: CAD    HPI/CC: Adal Jason is a 79 y.o. male who was admitted 2020 for chest pain and elevated troponin. [address] [place] [address] 2020 showed 100% RCA treated with successful balloon atherectomy RCA, CT surgery referral due to progressive CAD. Echo showed EF 55-60%. On 2020 he had CABG x4. Rhythm has been sinus. Subjective: Sternal discomfort and lung pain, frustrated at perceived lack of progress    Vitals:  Blood pressure 110/69, pulse 88, temperature 97.9 °F (36.6 °C), temperature source Oral, resp. rate 20, height 5' 9\" (1.753 m), weight 282 lb 10.1 oz (128.2 kg), SpO2 94 %.   Temp  Av.9 °F (36.6 °C)  Min: 97.7 °F (36.5 °C)  Max: 98 °F (36.7 °C)  Pulse  Av.1  Min: 5  Max: 114  BP  Min: 60/34  Max: 148/82  SpO2  Av.2 %  Min: 93 %  Max: 100 %    24 hour I/O    Intake/Output Summary (Last 24 hours) at 2020 1346  Last data filed at 2020 1312  Gross per 24 hour   Intake 480 ml   Output 7075 ml   Net -6595 ml     Current Facility-Administered Medications   Medication Dose Route Frequency Provider Last Rate Last Dose    norepinephrine (LEVOPHED) 16 mg in dextrose 5 % 250 mL infusion  2 mcg/min Intravenous Continuous Susy Cisneros MD   Stopped at 20 09    metoprolol tartrate (LOPRESSOR) tablet 12.5 mg  12.5 mg Oral TID Antolin Castano MD   12.5 mg at 20 09    prasugrel (EFFIENT) tablet 10 mg  10 mg Oral Daily Antolin Castano MD   10 mg at 20    ferrous sulfate (IRON 325) tablet 325 mg  325 mg Oral BID WC Antolin Castano MD   325 mg at 20 1265    pravastatin (PRAVACHOL) tablet 80 mg  80 mg Oral Nightly Susy Cisneros MD   80 mg at 20    sodium chloride flush 0.9 % injection 10 mL  10 mL Intravenous 2 times per day Antolin Castano MD   10 mL at 20 0905    sodium chloride flush 0.9 % injection 10 mL  10 mL Intravenous PRN Robert Estevez MD        potassium chloride 20 mEq/50 mL IVPB (Central Line)  20 mEq Intravenous PRN Robert Estevez MD 50 mL/hr at 09/21/20 1129 20 mEq at 09/21/20 1129    oxyCODONE (ROXICODONE) immediate release tablet 5 mg  5 mg Oral Q4H PRN Robert Estevez MD   5 mg at 09/19/20 1437    Or    oxyCODONE (ROXICODONE) immediate release tablet 10 mg  10 mg Oral Q4H PRN Robert Estevez MD   10 mg at 09/20/20 1649    morphine (PF) injection 2 mg  2 mg Intravenous Q2H PRN Robert Estevez MD   2 mg at 09/19/20 1647    Or    morphine (PF) injection 4 mg  4 mg Intravenous Q2H PRN Robert Estevez MD        midazolam (VERSED) injection 1 mg  1 mg Intravenous Q1H PRN Robert Estevez MD        diphenhydrAMINE (BENADRYL) tablet 25 mg  25 mg Oral Nightly PRN Robert Estevez MD        polyethylene glycol [facility]) packet 17 g  17 g Oral Daily Robert Estevez MD   17 g at 09/20/20 0382    bisacodyl (DULCOLAX) EC tablet 5 mg  5 mg Oral Daily Robert Estevez MD   5 mg at 09/20/20 9400    bisacodyl (DULCOLAX) suppository 10 mg  10 mg Rectal Daily PRN Robert Estevez MD        ondansetron [facility]) injection 4 mg  4 mg Intravenous Q8H PRN Robert Estevez MD        [Held by provider] losartan (COZAAR) tablet 25 mg  25 mg Oral Daily Robert Estevez MD        chlorhexidine (PERIDEX) 0.12 % solution 15 mL  15 mL Mouth/Throat BID Robert Estevez MD   15 mL at 09/21/20 0900    furosemide (LASIX) injection 40 mg  40 mg Intravenous BID Robert Estevez MD   40 mg at 09/21/20 0902    magnesium oxide (MAG-OX) tablet 400 mg  400 mg Oral Daily Robert Estevez MD   400 mg at 09/20/20 4296    mupirocin (BACTROBAN) 2 % ointment   Nasal BID Robert Estevez MD        nitroGLYCERIN (NITRODUR) 0.2 MG/HR 1 patch  1 patch Transdermal Daily Robert Estevez MD   1 patch at 09/21/20 1123    potassium chloride (KLOR-CON M) extended release tablet 10 mEq  10 mEq Oral TID  Robert Estevez MD   10 mEq at 09/21/20 1134    fondaparinux (ARIXTRA) injection 2.5 mg  2.5 mg Subcutaneous Daily Teresa Grayson MD   2.5 mg at 09/20/20 1929    aspirin EC tablet 81 mg  81 mg Oral Daily Teresa Grayson MD   81 mg at 09/21/20 0902    albuterol sulfate  (90 Base) MCG/ACT inhaler 2 puff  2 puff Inhalation Q6H PRN Teresa Grayson MD        albumin human 5 % IV solution 25 g  25 g Intravenous PRN Teresa Grayson MD   Stopped at 09/21/20 0358    insulin glargine (LANTUS) injection vial 18 Units  0.15 Units/kg Subcutaneous Nightly Teresa Grayson MD   18 Units at 09/20/20 2113    insulin lispro (HUMALOG) injection vial 0-6 Units  0-6 Units Subcutaneous TID WC Teresa Grayson MD   3 Units at 09/21/20 1118    insulin lispro (HUMALOG) injection vial 0-3 Units  0-3 Units Subcutaneous Nightly Teresa Grayson MD   1 Units at 09/20/20 2112    glucose (GLUTOSE) 40 % oral gel 15 g  15 g Oral PRN Teresa Grayson MD        dextrose 50 % IV solution  12.5 g Intravenous PRN Teresa Grayson MD        glucagon (rDNA) injection 1 mg  1 mg Intramuscular PRN Teresa Grayson MD        dextrose 5 % solution  100 mL/hr Intravenous PRN Teresa Grayson MD        famotidine (PEPCID) tablet 20 mg  20 mg Oral BID Teresa Grayson MD   20 mg at 09/21/20 0902    melatonin tablet 5 mg  5 mg Oral Nightly PRN Teresa Grayson MD   5 mg at 09/20/20 2111    fluticasone (FLONASE) 50 MCG/ACT nasal spray 1 spray  1 spray Each Nostril Daily Teresa Grayson MD   1 spray at 09/16/20 1437    tamsulosin (FLOMAX) capsule 0.4 mg  0.4 mg Oral Daily Teresa Grayson MD   0.4 mg at 09/21/20 4022     Review of Systems   Constitutional: Positive for fatigue. Respiratory: Positive for shortness of breath. Cardiovascular: Positive for chest pain. Neurological: Negative.       Objective:     Telemetry monitor: SR    Physical Exam:  Constitutional:  Comfortable and alert, NAD, appears stated age, obese  Eyes: PERRL, sclera nonicteric  Neck:  Supple, no masses, no thyroidmegaly, no JVD  Skin:  Warm and dry; no rash or lesions; +sternal incision  Heart: Regular, normal apex, S1 and S2 normal, no M/G/R  Lungs:  Normal respiratory effort; clear; no wheezing/rhonchi/rales  Abdomen: soft, non tender, + bowel sounds  Extremities:  No edema or cyanosis; no clubbing  Neuro: alert and oriented, moves legs and arms equally, normal mood and affect    Data Reviewed:    CABG 9/18/2020:  Urgent CABG X 4, with pedicled LIMA to LAD, sequential Greater Saphenous VG to OM 1 then on to OM 2, separate single Greater SVG to PDA of RCA, SGC, CPB, EVH Right Greater Saphenous vein, DIONI, Epiaortic ultrasound, Doppler verification of grafts, Bilateral 5 level intercostal nerve block(Exparel), Platelet gel application. Sternal plating. Echo 9/17/2020:  Normal left ventricular systolic function with ejection fraction of 55-60%. No regional wall motion abnormalites are seen. Mild concentric left ventricular hypertrophy. Grade I diastolic dysfunction with normal filling pressure. Compared to previous study from 2- no changes noted in left   ventricular function. No significant valvular heart disease. Definity echo contrast was injected. Coronary angiogram 9/14/2020:  Non-STEMI  PROCEDURES PERFORMED    Left heart catheterization  LVgram  Coronary angiogam  Coronary cath  Atherectomy of RCA  IVUS of RCA  PROCEDURE DESCRIPTION   Risks/benefits/alternatives/outcomes were discussed with patient and/or family and informed consent was obtained. Using the [facility] scale, the patient's right radial artery was found to be a level B. Patient was prepped draped in the usual sterile fashion. Local anaesthetic was applied over puncture site. Using a front wall technique, a 4/5 Chinese Terumo sheath was inserted into right radial artery. Verapamil, nitroglycerin were administered through the sheath. Heparin was administered.   Diagnostic [address] [place] [address] pigtail, ultra, Hunter catheters were used for diagnostic angiograms. Pigtail was used for LV gram, ultra was taken and was used to cannulate the RCA but left main could not be cannulated with this and ultimately was cannulated with a Hunter catheter. Attention turned towards coronary intervention as noted below. At the conclusion of the procedure, a TR band was placed over the puncture site and hemostasis was obtained. There were no immediate complications. I supervised sedation with versed 3 mg/fentanyl 150 Mcg during the procedure. 300 cc contrast was utilized. <20cc EBL.   LVEDP  18   GRADIENT ACROSS AORTIC VALVE  none   LV FUNCTION EF 40 %   WALL MOTION  base to mid inferior hypokinesis   MITRAL REGURGITATION  mild     LM Less than 10% cloawnlc-rzd-gsfjoi stenosis            LAD Proximal-mid stents are noted with 50% in-stent restenosis. Distal vessel has 50 to 60% stenosis. There are well-developed left-to-right collaterals.     D1 is a small to medium sized vessel with ostial/proximal/mid 60 to 70% stenosis.       LCX  Large vessel, proximal and mid 90% stenosis. The distal vessel into OM 2 is stented and the stent is widely patent with less than 10% stenosis.       RI Tortuous, small vessel,Less than 10% ptrsxeul-dio-aaygpf stenosis            RCA Dominant, proximal 10 to 20% stenosis, the proximal and mid vessel are extensively stented and there is a mid-distal 100% occlusion with well-developed left to right collaterals. PERCUTANEOUS INTERVENTION DESCRIPTION    Heparin was used for anticoagulation, Integrilin was added during the procedure. The initial 4/5 Burkinan sheath was upsized to aTerumo slender 6/7 sheath for intervention. A 7 Burkinan AL 0.75 guiding catheter was taken and was used to intubate the RCA. A choice floppy wire was then used initially to cross the lesion and angioplasty was then performed with a 3 mm balloon.   Additional guide support was felt to be needed and a run-through body wire was placed into the RCA and the choice floppy wire was removed. Then a 7 [place] guide cell extension catheter was advanced into the RCA. Additional angioplasty was performed with 3 and 3.5 mm noncompliant balloons. Cutting Balloon atherectomy was then performed of the distal RCA as well as the proximal and mid RCA with 3 and 3.5 mm cutting balloons. 4 mm noncompliant balloon was used as well. COLE was performed which showed good stent expansion. Minimal luminal diameter was felt to be between 3.5 and 4 mm in the proximal and mid segments and 3 mm in the distal segments. Remaining disease and PDA was felt to be best treated medically and procedure was felt to be completed and it was felt stenting should be deferred as patient has had several stenting procedures already and IVUS did reveal multiple layers of stents. There was 0% residual stenosis. There was MOISÉS 3 flow before and after PCI. CONCLUSIONS:    Successful atherectomy of RCA  We will consider staged PCI of circumflex  We will also consult with CT surgery as patient has had several PCI procedures and continues to have refractory CAD with ASHD progression.     Coronary angiogram 2/2020:  LM <20%  LAD 30% prox, 70% mid in stent              D2 50% prox  Cx 40-50%              D3 99% prox  RCA 80% mid in stent  LVEF 60%  PTCA LAD              3.0 x 15 angiosculpt cutting balloon to 3.5  PTCA OM3              2.75 x 15 FARHAN  DAPT, statin, BBlk  Home in am  PTCA RCA next week     Coronary angiogram 3/3/2020:  Selective tyler RCA for staged PCI  RCA mid in stent 80% to 40%              3.5 x 15 cutting balloon              4.0 x 20 NC balloon to 24 katiuska  Consider laser atherectomy +/- brachytherapy if recurrent angina/restenosis    Lab Reviewed:     Renal Profile:  Lab Results   Component Value Date    CREATININE 1.4 09/21/2020    BUN 27 09/21/2020     09/21/2020    K 3.8 09/21/2020    K 4.5 09/04/2020     09/21/2020    CO2 25 09/21/2020     CBC:    Lab Results   Component Value Date    WBC 9.1 09/21/2020    RBC 2.86 09/21/2020    HGB 8.4 09/21/2020    HCT 24.9 09/21/2020    MCV 87.1 09/21/2020    RDW 14.6 09/21/2020     09/21/2020     BNP:    Lab Results   Component Value Date    PROBNP 453 09/13/2020     Fasting Lipid Panel:    Lab Results   Component Value Date    CHOL 132 09/18/2020    HDL 32 09/18/2020    HDL 35 09/26/2011    TRIG 154 09/18/2020     Cardiac Enzymes:  CK/MbTroponin  Lab Results   Component Value Date    TROPONINI 0.33 09/13/2020     PT/ INR   Lab Results   Component Value Date    INR 1.29 09/19/2020    INR 1.38 09/18/2020    INR 1.09 09/18/2020    PROTIME 15.0 09/19/2020    PROTIME 16.1 09/18/2020    PROTIME 12.6 09/18/2020     PTT No results found for: PTT   Lab Results   Component Value Date    MG 2.20 09/21/2020    No results found for: TSH    All labs and imaging reviewed today    Assessment:  NSTEMI/CAD: s/p CABG x4 9/18/2020; s/p PCI LAD and OM 3 2/2020 and PCI RCA 3/2020  HTN: stable  HLD: controlled, LDL 69, statin  Anemia  DM: hgb a1c 6.6  Obesity  History of CVA    Plan:   1. Continued diuresis  2. Continue aspirin, lopressor, effient, statin  3. Restart losartan when able  4. Cardiology will follow peripherally. Follow up has been scheduled.      Juan Woods, CURTIS-CNP  [address]  [phone]

## 2020-09-21 NOTE — PROGRESS NOTES
When trying to educate patient on safety and rest, patient abruptly interrupts me saying, \"I know, I know. \" If you put patient in bed he wants in the chair, if you have him in the chair he wants in the bed. Patient will not stay in the bed gets up without using call light. This writer tried to explain to patient that he needs his rest, patient states, \"I know\" before I can finish my sentence. Patient is in the chair at this time refused to have legs up. Now wants legs up now wants legs down. Patient is calling friends stating we are making him lay flat on his back, I did interrupt his conversation and informed him he was sitting up in the bed. Patient appears to have control issues and increased anxiety when he does not feel in control of a situation.

## 2020-09-21 NOTE — PROGRESS NOTES
Dr. Mao Brizuela updated on patient's vital signs. Patient's SBP 60's after given metoprolol. Patient voided 1500 out from [id number]. Patient is not symptomatic at this time. Order to give 500 of albumin. Will continue to monitor and assess patient per open heart protocol.

## 2020-09-21 NOTE — PROGRESS NOTES
Spoke with Dr. Roberto Lawelr in regards to patient's low blood pressure. Positive response from albumin lasted 30-45 minutes. Order to start levo was given.

## 2020-09-21 NOTE — PROGRESS NOTES
Occupational Therapy  Facility/Department: [facility] B2 - ICU  Daily Treatment Note  NAME: Karen Tobar  : 1950  MRN: [id number]    Date of Service: 2020    Discharge Recommendations:  24 hour supervision or assist, Home with Home health OT       Assessment      Activity Tolerance  Activity Tolerance: Patient Tolerated treatment well  Safety Devices  Safety Devices in place: Yes  Type of devices: Call light within reach; Left in chair;Nurse notified         Patient Diagnosis(es): The primary encounter diagnosis was Acute chest pain. Diagnoses of Shortness of breath and Elevated troponin were also pertinent to this visit. has a past medical history of CAD (coronary artery disease), Diabetes mellitus ([facility] [address] Hyperlipidemia, Hypertension, and Kidney stones. has a past surgical history that includes cervical fusion (); Appendectomy (); hernia repair (1840&2164); Colonoscopy (&); Upper gastrointestinal endoscopy (); Coronary angioplasty with stent (); Coronary angioplasty with stent (); Upper gastrointestinal endoscopy (2008); Cystoscopy (Left, 2019); Endoscopy, colon, diagnostic; and CYSTOSCOPY INSERTION / REMOVAL STENT / STONE (Left, 2019). Restrictions  Restrictions/Precautions  Restrictions/Precautions: General Precautions, Fall Risk  Position Activity Restriction  Sternal Precautions: No Pushing, No Pulling, 5# Lifting Restrictions  Sternal Precautions: No lifting greater than 5 lbs.   Other position/activity restrictions: Progressive ambulation. , IJ,  Subjective   General  Chart Reviewed: Yes  Patient assessed for rehabilitation services?: Yes  Family / Caregiver Present: No  Referring Practitioner: Kit Burdick MD  Diagnosis: CABG x4  Subjective  Subjective: Pt agreeable to therapy  General Comment  Comments: per RN chair/bed exercises only; pt up in chair, pt agreeable to OT  Pain Assessment  Pain Assessment: 0-10  Pain Level: 2  Pain Type: Acute pain;Surgical pain  Pain Location: Chest  Pain Orientation: Left; Anterior  Functional Pain Assessment: Prevents or interferes with many active not passive activities  Non-Pharmaceutical Pain Intervention(s): Therapeutic presence(RN aware)  Pre Treatment Pain Screening  Intervention List: Patient able to continue with treatment  Vital Signs  Patient Currently in Pain: Yes   Orientation  Orientation  Overall Orientation Status: Within Functional Limits  Objective    ADL  Feeding: Setup  Grooming: Setup(for washing face & hands in chair)        Cognition  Overall Cognitive Status: WFL        Type of ROM/Therapeutic Exercise: AROM  Comment: BUE seated  Hand flex/ext: x  15  Reps  Wrist flex/ext:  X 15  Reps  Elbow flex/ext:  x 15   Reps  Forearm sup/pron:  x 15   Reps  Shld flex/ext:  x  15  Reps to 90*  Shld abd/add:  x  15  Reps to 90*     Plan   Plan  Times per week: 4-5x/wk  Current Treatment Recommendations: Functional Mobility Training, Endurance Training, Safety Education & Training, Self-Care / ADL    AM-PAC Score        AM-PAC Inpatient Daily Activity Raw Score: 14 (09/21/20 0907)  AM-PAC Inpatient ADL T-Scale Score : 33.39 (09/21/20 0907)  ADL Inpatient CMS 0-100% Score: 59.67 (09/21/20 0907)  ADL Inpatient CMS G-Code Modifier : CK (09/21/20 0907)    Goals  Short term goals  Time Frame for Short term goals: 1 week (9/26/20)  Short term goal 1: Pt will complete LB dressing with min A. Short term goal 2: Pt will complete toilet transfer with CGA. Short term goal 3: Pt will complete ~15 reps of modified BUE exercises for increased endurance. (9/22/20); 9/21 progressing, pt tolerating 10-15 reps BUE exercises  Short term goal 4: Pt will complete ~5 minutes of dynamic standing for adls with CGA.   Patient Goals   Patient goals : Luanne Stage get better and go home\"       Therapy Time   Individual Concurrent Group Co-treatment   Time In [facility] [address]         Time Out 0828         Minutes [address] [place] [address]

## 2020-09-22 LAB
ANION GAP SERPL CALCULATED.3IONS-SCNC: 11 MMOL/L (ref 3–16)
BUN BLDV-MCNC: 27 MG/DL (ref 7–20)
CALCIUM SERPL-MCNC: 8.4 MG/DL (ref 8.3–10.6)
CHLORIDE BLD-SCNC: 101 MMOL/L (ref 99–110)
CO2: 27 MMOL/L (ref 21–32)
CREAT SERPL-MCNC: 1.4 MG/DL (ref 0.8–1.3)
DLCO %PRED: 97 %
DLCO PRED: NORMAL
DLCO/VA %PRED: NORMAL
DLCO/VA PRED: NORMAL
DLCO/VA: NORMAL
DLCO: NORMAL
EXPIRATORY TIME-POST: NORMAL
EXPIRATORY TIME: NORMAL
FEF 25-75% %CHNG: NORMAL
FEF 25-75% %PRED-POST: NORMAL
FEF 25-75% %PRED-PRE: NORMAL
FEF 25-75% PRED: NORMAL
FEF 25-75%-POST: NORMAL
FEF 25-75%-PRE: NORMAL
FEV1 %PRED-POST: 97 %
FEV1 %PRED-PRE: 97 %
FEV1 PRED: NORMAL
FEV1-POST: NORMAL
FEV1-PRE: NORMAL
FEV1/FVC %PRED-POST: NORMAL
FEV1/FVC %PRED-PRE: NORMAL
FEV1/FVC PRED: NORMAL
FEV1/FVC-POST: 84 %
FEV1/FVC-PRE: 80 %
FVC %PRED-POST: 85 L
FVC %PRED-PRE: 90 %
FVC PRED: NORMAL
FVC-POST: NORMAL
FVC-PRE: NORMAL
GAW %PRED: NORMAL
GAW PRED: NORMAL
GAW: NORMAL
GFR AFRICAN AMERICAN: >60
GFR NON-AFRICAN AMERICAN: 50
GLUCOSE BLD-MCNC: 125 MG/DL (ref 70–99)
GLUCOSE BLD-MCNC: 149 MG/DL (ref 70–99)
GLUCOSE BLD-MCNC: 151 MG/DL (ref 70–99)
GLUCOSE BLD-MCNC: 168 MG/DL (ref 70–99)
GLUCOSE BLD-MCNC: 198 MG/DL (ref 70–99)
GLUCOSE BLD-MCNC: 202 MG/DL (ref 70–99)
HCT VFR BLD CALC: 26.8 % (ref 40.5–52.5)
HEMOGLOBIN: 9 G/DL (ref 13.5–17.5)
IC %PRED: NORMAL
IC PRED: NORMAL
IC: NORMAL
MAGNESIUM: 2 MG/DL (ref 1.8–2.4)
MCH RBC QN AUTO: 29.2 PG (ref 26–34)
MCHC RBC AUTO-ENTMCNC: 33.6 G/DL (ref 31–36)
MCV RBC AUTO: 87 FL (ref 80–100)
MEP: NORMAL
MIP: NORMAL
MVV %PRED-PRE: NORMAL
MVV PRED: NORMAL
MVV-PRE: NORMAL
PDW BLD-RTO: 14.3 % (ref 12.4–15.4)
PEF %PRED-POST: NORMAL
PEF %PRED-PRE: NORMAL
PEF PRED: NORMAL
PEF%CHNG: NORMAL
PEF-POST: NORMAL
PEF-PRE: NORMAL
PERFORMED ON: ABNORMAL
PLATELET # BLD: 191 K/UL (ref 135–450)
PMV BLD AUTO: 8.4 FL (ref 5–10.5)
POTASSIUM SERPL-SCNC: 3.8 MMOL/L (ref 3.5–5.1)
POTASSIUM SERPL-SCNC: 4.8 MMOL/L (ref 3.5–5.1)
RAW %PRED: NORMAL
RAW PRED: NORMAL
RAW: NORMAL
RBC # BLD: 3.09 M/UL (ref 4.2–5.9)
RV %PRED: NORMAL
RV PRED: NORMAL
RV: NORMAL
SODIUM BLD-SCNC: 139 MMOL/L (ref 136–145)
SVC %PRED: NORMAL
SVC PRED: NORMAL
SVC: NORMAL
TLC %PRED: 66 %
TLC PRED: NORMAL
TLC: NORMAL
VA %PRED: NORMAL
VA PRED: NORMAL
VA: NORMAL
VTG %PRED: NORMAL
VTG PRED: NORMAL
VTG: NORMAL
WBC # BLD: 9.3 K/UL (ref 4–11)

## 2020-09-22 PROCEDURE — 6360000002 HC RX W HCPCS: Performed by: THORACIC SURGERY (CARDIOTHORACIC VASCULAR SURGERY)

## 2020-09-22 PROCEDURE — 80048 BASIC METABOLIC PNL TOTAL CA: CPT

## 2020-09-22 PROCEDURE — 6360000002 HC RX W HCPCS: Performed by: NURSE PRACTITIONER

## 2020-09-22 PROCEDURE — 6370000000 HC RX 637 (ALT 250 FOR IP): Performed by: NURSE PRACTITIONER

## 2020-09-22 PROCEDURE — 6370000000 HC RX 637 (ALT 250 FOR IP): Performed by: THORACIC SURGERY (CARDIOTHORACIC VASCULAR SURGERY)

## 2020-09-22 PROCEDURE — 2580000003 HC RX 258: Performed by: THORACIC SURGERY (CARDIOTHORACIC VASCULAR SURGERY)

## 2020-09-22 PROCEDURE — 99024 POSTOP FOLLOW-UP VISIT: CPT | Performed by: THORACIC SURGERY (CARDIOTHORACIC VASCULAR SURGERY)

## 2020-09-22 PROCEDURE — 84132 ASSAY OF SERUM POTASSIUM: CPT

## 2020-09-22 PROCEDURE — 85027 COMPLETE CBC AUTOMATED: CPT

## 2020-09-22 PROCEDURE — 97110 THERAPEUTIC EXERCISES: CPT

## 2020-09-22 PROCEDURE — 83735 ASSAY OF MAGNESIUM: CPT

## 2020-09-22 PROCEDURE — 97530 THERAPEUTIC ACTIVITIES: CPT

## 2020-09-22 PROCEDURE — 2000000000 HC ICU R&B

## 2020-09-22 PROCEDURE — 6370000000 HC RX 637 (ALT 250 FOR IP): Performed by: STUDENT IN AN ORGANIZED HEALTH CARE EDUCATION/TRAINING PROGRAM

## 2020-09-22 RX ORDER — AMIODARONE HYDROCHLORIDE 200 MG/1
400 TABLET ORAL 2 TIMES DAILY
Status: DISCONTINUED | OUTPATIENT
Start: 2020-09-22 | End: 2020-09-23

## 2020-09-22 RX ADMIN — INSULIN LISPRO 1 UNITS: 100 INJECTION, SOLUTION INTRAVENOUS; SUBCUTANEOUS at 20:21

## 2020-09-22 RX ADMIN — MUPIROCIN: 20 OINTMENT TOPICAL at 10:58

## 2020-09-22 RX ADMIN — MAGNESIUM GLUCONATE 500 MG ORAL TABLET 400 MG: 500 TABLET ORAL at 10:52

## 2020-09-22 RX ADMIN — HEPARIN SODIUM 5000 UNITS: 5000 INJECTION INTRAVENOUS; SUBCUTANEOUS at 16:26

## 2020-09-22 RX ADMIN — MELATONIN TAB 5 MG 5 MG: 5 TAB at 20:15

## 2020-09-22 RX ADMIN — INSULIN GLARGINE 18 UNITS: 100 INJECTION, SOLUTION SUBCUTANEOUS at 01:24

## 2020-09-22 RX ADMIN — MUPIROCIN: 20 OINTMENT TOPICAL at 20:22

## 2020-09-22 RX ADMIN — FAMOTIDINE 20 MG: 20 TABLET, FILM COATED ORAL at 08:38

## 2020-09-22 RX ADMIN — POTASSIUM CHLORIDE 20 MEQ: 400 INJECTION, SOLUTION INTRAVENOUS at 06:22

## 2020-09-22 RX ADMIN — OXYCODONE 5 MG: 5 TABLET ORAL at 20:33

## 2020-09-22 RX ADMIN — FAMOTIDINE 20 MG: 20 TABLET, FILM COATED ORAL at 20:15

## 2020-09-22 RX ADMIN — METOPROLOL TARTRATE 12.5 MG: 25 TABLET, FILM COATED ORAL at 20:15

## 2020-09-22 RX ADMIN — BISACODYL 5 MG: 5 TABLET, COATED ORAL at 08:38

## 2020-09-22 RX ADMIN — FUROSEMIDE 40 MG: 10 INJECTION, SOLUTION INTRAMUSCULAR; INTRAVENOUS at 10:53

## 2020-09-22 RX ADMIN — SODIUM CHLORIDE, PRESERVATIVE FREE 10 ML: 5 INJECTION INTRAVENOUS at 10:59

## 2020-09-22 RX ADMIN — POTASSIUM CHLORIDE 10 MEQ: 750 TABLET, EXTENDED RELEASE ORAL at 08:00

## 2020-09-22 RX ADMIN — Medication 15 ML: at 20:14

## 2020-09-22 RX ADMIN — METOPROLOL TARTRATE 25 MG: 25 TABLET, FILM COATED ORAL at 08:30

## 2020-09-22 RX ADMIN — HEPARIN SODIUM 5000 UNITS: 5000 INJECTION INTRAVENOUS; SUBCUTANEOUS at 06:22

## 2020-09-22 RX ADMIN — Medication 15 ML: at 10:58

## 2020-09-22 RX ADMIN — POLYETHYLENE GLYCOL 3350 17 G: 17 POWDER, FOR SOLUTION ORAL at 10:52

## 2020-09-22 RX ADMIN — HEPARIN SODIUM 5000 UNITS: 5000 INJECTION INTRAVENOUS; SUBCUTANEOUS at 20:15

## 2020-09-22 RX ADMIN — SODIUM CHLORIDE, PRESERVATIVE FREE 10 ML: 5 INJECTION INTRAVENOUS at 20:15

## 2020-09-22 RX ADMIN — FERROUS SULFATE TAB 325 MG (65 MG ELEMENTAL FE) 325 MG: 325 (65 FE) TAB at 08:38

## 2020-09-22 RX ADMIN — OXYCODONE 10 MG: 5 TABLET ORAL at 06:26

## 2020-09-22 RX ADMIN — INSULIN GLARGINE 18 UNITS: 100 INJECTION, SOLUTION SUBCUTANEOUS at 20:15

## 2020-09-22 RX ADMIN — FUROSEMIDE 40 MG: 10 INJECTION, SOLUTION INTRAMUSCULAR; INTRAVENOUS at 18:25

## 2020-09-22 RX ADMIN — POTASSIUM CHLORIDE 20 MEQ: 400 INJECTION, SOLUTION INTRAVENOUS at 10:52

## 2020-09-22 RX ADMIN — FERROUS SULFATE TAB 325 MG (65 MG ELEMENTAL FE) 325 MG: 325 (65 FE) TAB at 18:25

## 2020-09-22 RX ADMIN — AMIODARONE HYDROCHLORIDE 400 MG: 200 TABLET ORAL at 20:15

## 2020-09-22 RX ADMIN — POTASSIUM CHLORIDE 10 MEQ: 750 TABLET, EXTENDED RELEASE ORAL at 10:53

## 2020-09-22 RX ADMIN — ASPIRIN 81 MG: 81 TABLET ORAL at 08:30

## 2020-09-22 RX ADMIN — INSULIN LISPRO 1 UNITS: 100 INJECTION, SOLUTION INTRAVENOUS; SUBCUTANEOUS at 18:37

## 2020-09-22 RX ADMIN — AMIODARONE HYDROCHLORIDE 400 MG: 200 TABLET ORAL at 08:36

## 2020-09-22 RX ADMIN — INSULIN LISPRO 2 UNITS: 100 INJECTION, SOLUTION INTRAVENOUS; SUBCUTANEOUS at 08:46

## 2020-09-22 RX ADMIN — TAMSULOSIN HYDROCHLORIDE 0.4 MG: 0.4 CAPSULE ORAL at 08:38

## 2020-09-22 RX ADMIN — INSULIN LISPRO 1 UNITS: 100 INJECTION, SOLUTION INTRAVENOUS; SUBCUTANEOUS at 13:20

## 2020-09-22 RX ADMIN — PRAVASTATIN SODIUM 80 MG: 40 TABLET ORAL at 20:15

## 2020-09-22 RX ADMIN — PRASUGREL 10 MG: 10 TABLET, FILM COATED ORAL at 10:52

## 2020-09-22 ASSESSMENT — PULMONARY FUNCTION TESTS
FEV1/FVC_POST: 84
FVC_PERCENT_PREDICTED_PRE: 90
FEV1_PERCENT_PREDICTED_POST: 97
FEV1/FVC_PRE: 80
FVC_PERCENT_PREDICTED_POST: 85
FEV1_PERCENT_PREDICTED_PRE: 97

## 2020-09-22 ASSESSMENT — PAIN DESCRIPTION - DESCRIPTORS: DESCRIPTORS: ACHING

## 2020-09-22 ASSESSMENT — PAIN DESCRIPTION - LOCATION: LOCATION: CHEST

## 2020-09-22 ASSESSMENT — PAIN DESCRIPTION - PAIN TYPE: TYPE: SURGICAL PAIN

## 2020-09-22 ASSESSMENT — PAIN DESCRIPTION - PROGRESSION: CLINICAL_PROGRESSION: GRADUALLY IMPROVING

## 2020-09-22 ASSESSMENT — PAIN SCALES - GENERAL
PAINLEVEL_OUTOF10: 0
PAINLEVEL_OUTOF10: 7
PAINLEVEL_OUTOF10: 4

## 2020-09-22 ASSESSMENT — PAIN - FUNCTIONAL ASSESSMENT: PAIN_FUNCTIONAL_ASSESSMENT: PREVENTS OR INTERFERES SOME ACTIVE ACTIVITIES AND ADLS

## 2020-09-22 ASSESSMENT — PAIN DESCRIPTION - ORIENTATION: ORIENTATION: MID

## 2020-09-22 ASSESSMENT — PAIN DESCRIPTION - FREQUENCY: FREQUENCY: CONTINUOUS

## 2020-09-22 ASSESSMENT — PAIN DESCRIPTION - ONSET: ONSET: ON-GOING

## 2020-09-22 NOTE — PLAN OF CARE
Nutrition Problem #1: Inadequate oral intake  Intervention: Food and/or Nutrient Delivery: Continue Current Diet  Nutritional Goals:  Tolerate oral diet progression post op and conume greater than 50% of meals this admission

## 2020-09-22 NOTE — CARE COORDINATION
Writer met with patient today to discuss ARU vs HHC. Pt stated that his sister Cyril Hinson was able to stay with him for about 10 days at discharge. He does not have a preference on [facility]. Writer made a referral to [facility]. Also placed call to sister Cyril Hinson per patient request.  [phone] and left message. Awaiting returned call.  Gustavo Green RN

## 2020-09-22 NOTE — PROGRESS NOTES
Occupational Therapy    Chart review, attempted to see pt for follow up treatment this date; per RN hold this am due to increased 's. Ok to check on pt later.     Ferny Hutton

## 2020-09-22 NOTE — PROGRESS NOTES
Occupational Therapy  Facility/Department: [facility] B2 - ICU  Daily Treatment Note  NAME: Wilberto Arora  : 1950  MRN: [id number]    Date of Service: 2020    Discharge Recommendations:  24 hour supervision or assist, Home with Home health OT       Assessment   Performance deficits / Impairments: Decreased functional mobility ; Decreased endurance;Decreased ADL status; Decreased balance;Decreased high-level IADLs  Assessment: pt pleasant, cooperative, able to make needs & problems/symptoms known with good awareness of sternal & safety precautions; pt limited by obesity &  fatigues easily; continues to benefit from skilled OT services  OT Education: OT Role;Plan of Care;Precautions; Home Exercise Program;Transfer Training  Patient Education: disease specific: sternal precuations  REQUIRES OT FOLLOW UP: Yes  Activity Tolerance  Activity Tolerance: Patient limited by fatigue;Treatment limited secondary to medical complications (free text)  Activity Tolerance: increased dizziness with mobility  Safety Devices  Safety Devices in place: Yes  Type of devices: Call light within reach; Left in chair;Nurse notified         Patient Diagnosis(es): The primary encounter diagnosis was Acute chest pain. Diagnoses of Shortness of breath and Elevated troponin were also pertinent to this visit. has a past medical history of CAD (coronary artery disease), Diabetes mellitus ([address] Hyperlipidemia, Hypertension, and Kidney stones. has a past surgical history that includes cervical fusion (); Appendectomy (); hernia repair (&); Colonoscopy (&); Upper gastrointestinal endoscopy (); Coronary angioplasty with stent (); Coronary angioplasty with stent (); Upper gastrointestinal endoscopy (2008); Cystoscopy (Left, 2019);  Endoscopy, colon, diagnostic; CYSTOSCOPY INSERTION / REMOVAL STENT / STONE (Left, 2019); and Coronary artery bypass graft (N/A, (09/22/20 1722)  AM-PAC Inpatient ADL T-Scale Score : 38.66 (09/22/20 1722)  ADL Inpatient CMS 0-100% Score: 46.65 (09/22/20 1722)  ADL Inpatient CMS G-Code Modifier : CK (09/22/20 1722)    Goals  Short term goals  Time Frame for Short term goals: 1 week (9/26/20)  Short term goal 1: Pt will complete LB dressing with min A. Short term goal 2: Pt will complete toilet transfer with CGA. Short term goal 3: Pt will complete ~15 reps of modified BUE exercises for increased endurance. (9/22/20); 9/21 progressing, pt tolerating 10-15 reps BUE exercises  Short term goal 4: Pt will complete ~5 minutes of dynamic standing for adls with CGA.   Patient Goals   Patient goals : Jensen Mukherjee get better and go home\"       Therapy Time   Individual Concurrent Group Co-treatment   Time In 9244 [facility]         Time Out 1027 [facility]         Minutes [address] [place] [address]

## 2020-09-22 NOTE — PROGRESS NOTES
CVTS Thoracic Progress Note:          CC:  Post op follow up    Subj: Patient up in chair, reports feeling better today. Remains A-fib    Obj:    Blood pressure 137/63, pulse 88, temperature 98 °F (36.7 °C), resp. rate 18, height 5' 9\" (1.753 m), weight 277 lb 12.8 oz (126 kg), SpO2 95 %. Pre op wt  120.5 kg  9/19  134.5 kg  9/20  134.9 kg  9/21  128.2 kg  9/22  126 kg      Lungs 95% on RA, normal work of breathing, chest tubes removed 9/20. Abdomen soft, non distended, +BM    Incision with Primapore dressing in place    Diagnostics:   Recent Labs     09/20/20  0350 09/21/20  0345 09/22/20  0328   WBC 10.1 9.1 9.3   HGB 8.1* 8.4* 9.0*   HCT 24.5* 24.9* 26.8*   * 134* 191                                                                  Recent Labs     09/20/20  0350 09/21/20  0345 09/21/20  1730 09/22/20  0328    138  --  139   K 4.9 3.8 3.9 3.8    103  --  101   CO2 22 25  --  27   BUN 24* 27*  --  27*   CREATININE 1.6* 1.4*  --  1.4*   GLUCOSE 164* 168*  --  149*            Recent Labs     09/21/20  0345 09/22/20  0328   MG 2.20 2.00      CXR:   Normal post operative changes, no pneumothorax, chest tubes in place, PAC in place     Extubated 9/18 2040 PM  Transitioned 9/19 0945 AM    Assess/Plan:   Neuro:  Pain control  - Toradol, APAP, oxycodone  CV: NSTEMI s/p 4v CABG  - norepinephrine drip- weaned off 9/21.  - Remains A-fib, RVR at times. Will start PO amiodarone, increase his BB and continue Effient  - BB - increased to 25 mg BID.   - ASA 81 and statin  - Digoxin 125 mcg daily  - TPW out 9/20  Pulm: saturating mid to upper 90's on RA.  - IS, OOB, cough/deep breath. PT/OT   FEN/GI: Cardiac 1500 ml fluid restriction. +BM, continue bowel regimen   Renal/: Cr remains 1.4 today, continue to hold ARB  UOP much improved; 5600 ml in 24 hrs. ID: Periop antibiotics  Heme: H&H stable.   Endo: DM2  - Lantus, SSI  - Hold home glipizide, pioglitazone, and janumet  PPX: Heparin, NIGHAT hose  Dispo:  ICU level care, DCP following      Margarito Patricia, CURTIS-CNP   9/22/2020  9:58 AM   Note reviewed, events of last 24 hours reviewed along with vital signs and I/Os and patient examined. Assessment and plans discussed and are as outlined above.      Ember Gale MD, FACS, [facility], FACCP, [place]

## 2020-09-22 NOTE — PROGRESS NOTES
Upon entering the Patient's room the Patient states he is feeling dizzy while sitting in the chair. BP assessed twice, reading 75/42 (50). The Patient was restarted on Levophed immediately and transferred back to bed. Silvestre Smyth NP is rounding on the unit and notified of the drop in BP.   Heart rate remains 95 bpm.

## 2020-09-22 NOTE — CARE COORDINATION
Writer received return call from sister Raman Sánchez and she is able to stay with him for a about 10 days post discharge. Aware of potential discharge needs.  Kei Dye RN

## 2020-09-22 NOTE — PROGRESS NOTES
Because the Patient is requiring levophed at this time a verbal order was given by YOLA Gordon NP to decrease the Patient's scheduled metoprolol dose from 25mg BID to 12. 5.mg BID.

## 2020-09-22 NOTE — PROGRESS NOTES
Physical Therapy    Attempted to see pt for PT follow up this afternoon. RN requests holding therapy due to increased HR, and most recently, hypotension after getting up with OT earlier today. Will re-attempt on 9/23/20. Thank you.     Radha Guillen  PT, DPT #[id number]

## 2020-09-22 NOTE — CONSULTS
Patient: Domingo Alvarez  [id number]  Date: 9/22/2020      Chief Complaint: CAD    History of Present Illness/Hospital Course:  Mr. Kulwinder Ibrahim is a 79year old male with h/o CAD and prevoius PCI who was admitted 9/13/2020 with progressively worsening exertional angina. Evaluation revealed NSTEMI, with in stent stenosis in his LAD and total occlusion of his right coronary artery stents. CT surgery was consulted, and he underwent 4 vessel CABG by Dr. Hima Yee. Tachycardic this AM. Discussed rehab with patient; he would prefer ARU to SNF, but home to ARU if possible.      has a past medical history of CAD (coronary artery disease), Diabetes mellitus ([address] Hyperlipidemia, Hypertension, and Kidney stones. has a past surgical history that includes cervical fusion (1995); Appendectomy (1970); hernia repair (9475&1449); Colonoscopy (1987&2002); Upper gastrointestinal endoscopy (2002); Coronary angioplasty with stent (2004); Coronary angioplasty with stent (2008); Upper gastrointestinal endoscopy (08/28/2008); Cystoscopy (Left, 6/27/2019); Endoscopy, colon, diagnostic; and CYSTOSCOPY INSERTION / REMOVAL STENT / STONE (Left, 7/11/2019). reports that he has never smoked. He has never used smokeless tobacco. He reports that he does not drink alcohol or use drugs. family history includes Breast Cancer in his mother; Breast Cancer (age of onset: 61) in his sister; Heart Disease in his brother. REVIEW OF SYSTEMS:   CONSTITUTIONAL: negative for fevers, chills, diaphoresis, activity change, appetite change, fatigue, night sweats and unexpected weight change.    EYES: negative for blurred vision, eye discharge, visual disturbance and icterus  HEENT: negative for hearing loss, tinnitus, ear drainage, sinus pressure, nasal congestion, epistaxis and snoring  RESPIRATORY: Negative for hemoptysis, cough, sputum production  CARDIOVASCULAR: negative for chest pain, palpitations, exertional chest pressure/discomfort, edema, syncope  GASTROINTESTINAL: negative for nausea, vomiting, diarrhea, constipation, blood in stool and abdominal pain  GENITOURINARY: negative for frequency, dysuria, urinary incontinence, decreased urine volume, and hematuria  HEMATOLOGIC/LYMPHATIC: negative for easy bruising, bleeding and lymphadenopathy  ALLERGIC/IMMUNOLOGIC: negative for recurrent infections, angioedema, anaphylaxis and drug reactions  ENDOCRINE: negative for weight changes and diabetic symptoms including polyuria, polydipsia and polyphagia  MUSCULOSKELETAL: negative for pain, joint swelling, decreased range of motion and muscle weakness  NEUROLOGICAL: negative for headaches, slurred speech, unilateral weakness  PSYCHIATRIC/BEHAVIORAL: negative for hallucinations, behavioral problems, confusion and agitation.      Physical Examination:  Vitals:   Patient Vitals for the past 24 hrs:   BP Temp Temp src Pulse Resp SpO2 Weight   09/22/20 0600 137/63 -- -- 88 -- 95 % 277 lb 12.8 oz (126 kg)   09/22/20 0500 127/74 -- -- 82 18 95 % --   09/22/20 0400 126/69 98 °F (36.7 °C) -- 86 20 95 % --   09/22/20 0300 131/68 -- -- 95 20 -- --   09/22/20 0200 102/65 -- -- 87 18 96 % --   09/22/20 0100 129/69 -- -- 84 20 98 % --   09/22/20 0000 106/63 -- -- 87 18 92 % --   09/21/20 2300 130/87 -- -- 76 18 99 % --   09/21/20 2200 128/80 -- -- 90 18 98 % --   09/21/20 2100 115/67 -- -- 97 18 95 % --   09/21/20 2000 -- -- -- 121 -- -- --   09/21/20 1900 112/71 98.3 °F (36.8 °C) Oral 96 18 96 % --   09/21/20 1802 118/77 97.9 °F (36.6 °C) Oral 85 18 97 % --   09/21/20 1702 122/70 97.9 °F (36.6 °C) Oral 80 18 97 % --   09/21/20 1602 105/64 -- -- 92 -- 98 % --   09/21/20 1515 122/78 -- -- 98 -- 100 % --   09/21/20 1500 -- -- -- 120 -- 99 % --   09/21/20 1402 111/64 -- -- 92 -- 97 % --   09/21/20 1305 110/69 97.9 °F (36.6 °C) Oral 88 -- 94 % --   09/21/20 1202 118/78 97.9 °F (36.6 °C) Oral 94 20 97 % --   09/21/20 1123 116/76 -- -- 83 -- 100 % --   09/21/20 1122 116/76 -- -- 84 -- 96 % --   09/21/20 1117 99/85 -- -- 83 -- 100 % --   09/21/20 1102 118/62 97.9 °F (36.6 °C) Oral 87 18 98 % --   09/21/20 1047 112/67 -- -- 96 -- 96 % --   09/21/20 1032 126/68 -- -- 84 -- 96 % --   09/21/20 1017 121/74 -- -- 102 -- 94 % --   09/21/20 1002 (!) 116/56 -- -- 114 -- 96 % --     Mood: Stable  Const: No distress  ENT: Oral mucosa moist  Eyes: No discharge or injection  CV: Tachycardic  Resp: Lungs clear to auscultation bilaterally, no rales wheezes or ronchi  GI: Soft, nontender, nondistended. Normal bowel sounds. Neuro: Alert, oriented, appropriate. No cranial nerve deficits appreciated. Skin: No lesions or rashes noted. MSK: No joint abnormalities noted. Lab Results   Component Value Date    WBC 9.3 09/22/2020    HGB 9.0 (L) 09/22/2020    HCT 26.8 (L) 09/22/2020    MCV 87.0 09/22/2020     09/22/2020     Lab Results   Component Value Date    INR 1.29 (H) 09/19/2020    INR 1.38 (H) 09/18/2020    INR 1.09 09/18/2020    PROTIME 15.0 (H) 09/19/2020    PROTIME 16.1 (H) 09/18/2020    PROTIME 12.6 09/18/2020     Lab Results   Component Value Date    CREATININE 1.4 (H) 09/22/2020    BUN 27 (H) 09/22/2020     09/22/2020    K 3.8 09/22/2020     09/22/2020    CO2 27 09/22/2020     Lab Results   Component Value Date    ALT 37 09/18/2020    AST 21 09/18/2020    ALKPHOS 65 09/18/2020    BILITOT 1.0 09/18/2020      Conclusions      Summary   Normal left ventricular systolic function with ejection fraction of 55-60%. No regional wall motion abnormalites are seen. Mild concentric left ventricular hypertrophy. Grade I diastolic dysfunction with normal filling pressure. Compared to previous study from 2- no changes noted in left   ventricular function. No significant valvular heart disease. Definity echo contrast was injected.       Signature      ------------------------------------------------------------------   Electronically signed by Erasmo Mehta MD, [facility] (Interpreting   physician) on 09/17/2020 at 08:56 AM    Assessment:  1. CAD s/p CABG   2. Hyperlipidemia   3. H/o CVA      Recommendations:  Patient with new functional deficits and ongoing medical complexity. Demonstrates ability to tolerate 3 hours therapy/day. He is a good candidate for acute inpatient rehab when medically appropriate and approved by insurance. Thank you for this consult. Please contact me with any questions or concerns. Tha Burgess MD 9/22/2020, 9:56 AM

## 2020-09-22 NOTE — PROGRESS NOTES
Nutrition Assessment     Type and Reason for Visit: Reassess    Nutrition Recommendations/Plan:   1. Continue carb controlled, low Na, Cardiac diet with FR per MD   2. Education initiated, monitor further needs p/t d/c   3. Add low volume ONS BID  4. Monitor nutrition adequacy, pertinent labs, bowel habits, wt changes, and clinical progress       Nutrition Assessment:  Follow up: Pt upgraded to Cardiac/ carb controlled diet with 1500 mL FR. Pt seen in room and in good spirits. Reported decreased appetite. Consuming 50% or less of meals this admission. He is favorable to trial low volume ONS. Education provided on cardiac/ low Na diet and FR d/t fluid accumulation. Will continue to [facility]. Malnutrition Assessment:  Malnutrition Status: At risk for malnutrition (Comment)    Estimated Daily Nutrient Needs:  Energy (kcal): 7278-7156; Weight Used for Energy Requirements:  Ideal(73 kg)     Protein (g):  g; Weight Used for Protein Requirements:  Ideal(1.2-2)          Nutrition Related Findings: Chest tubes out. Current Nutrition Therapies:    DIET CARDIAC; Carb Control: 4 carb choices (60 gms)/meal; Daily Fluid Restriction: 1500 ml; No Caffeine    Anthropometric Measures:  · Height: 5' 9\" (175.3 cm)  · Current Body Wt: 277 lb (125.6 kg)   · BMI: 40.9    Nutrition Diagnosis:   · Inadequate oral intake related to acute injury/trauma as evidenced by intake 26-50%      Nutrition Interventions:   Food and/or Nutrient Delivery:  Continue Current Diet  Nutrition Education/Counseling:  Education needed   Coordination of Nutrition Care:  Continued Inpatient Monitoring    Goals:   Tolerate oral diet progression post op and conume greater than 50% of meals this admission       Nutrition Monitoring and Evaluation:     Food/Nutrient Intake Outcomes:  Food and Nutrient Intake, Diet Advancement/Tolerance  Physical Signs/Symptoms Outcomes:  Biochemical Data, Weight     Discharge Planning:    Continue current diet Electronically signed by Flavia Chaudhry RD, NATASHA on 9/22/20 at 10:45 AM EDT    Contact: [phone]

## 2020-09-23 LAB
ANION GAP SERPL CALCULATED.3IONS-SCNC: 12 MMOL/L (ref 3–16)
BUN BLDV-MCNC: 30 MG/DL (ref 7–20)
CALCIUM SERPL-MCNC: 9.2 MG/DL (ref 8.3–10.6)
CHLORIDE BLD-SCNC: 101 MMOL/L (ref 99–110)
CO2: 27 MMOL/L (ref 21–32)
CREAT SERPL-MCNC: 1.5 MG/DL (ref 0.8–1.3)
GFR AFRICAN AMERICAN: 56
GFR NON-AFRICAN AMERICAN: 46
GLUCOSE BLD-MCNC: 125 MG/DL (ref 70–99)
GLUCOSE BLD-MCNC: 133 MG/DL (ref 70–99)
GLUCOSE BLD-MCNC: 135 MG/DL (ref 70–99)
GLUCOSE BLD-MCNC: 158 MG/DL (ref 70–99)
GLUCOSE BLD-MCNC: 186 MG/DL (ref 70–99)
HCT VFR BLD CALC: 28.9 % (ref 40.5–52.5)
HEMOGLOBIN: 9.5 G/DL (ref 13.5–17.5)
MAGNESIUM: 2.2 MG/DL (ref 1.8–2.4)
MCH RBC QN AUTO: 28.9 PG (ref 26–34)
MCHC RBC AUTO-ENTMCNC: 33 G/DL (ref 31–36)
MCV RBC AUTO: 87.6 FL (ref 80–100)
PDW BLD-RTO: 14.3 % (ref 12.4–15.4)
PERFORMED ON: ABNORMAL
PLATELET # BLD: 241 K/UL (ref 135–450)
PMV BLD AUTO: 7.9 FL (ref 5–10.5)
POTASSIUM SERPL-SCNC: 4 MMOL/L (ref 3.5–5.1)
RBC # BLD: 3.3 M/UL (ref 4.2–5.9)
SODIUM BLD-SCNC: 140 MMOL/L (ref 136–145)
WBC # BLD: 9.5 K/UL (ref 4–11)

## 2020-09-23 PROCEDURE — 2000000000 HC ICU R&B

## 2020-09-23 PROCEDURE — 99232 SBSQ HOSP IP/OBS MODERATE 35: CPT | Performed by: NURSE PRACTITIONER

## 2020-09-23 PROCEDURE — 83735 ASSAY OF MAGNESIUM: CPT

## 2020-09-23 PROCEDURE — 2580000003 HC RX 258: Performed by: THORACIC SURGERY (CARDIOTHORACIC VASCULAR SURGERY)

## 2020-09-23 PROCEDURE — 6370000000 HC RX 637 (ALT 250 FOR IP): Performed by: THORACIC SURGERY (CARDIOTHORACIC VASCULAR SURGERY)

## 2020-09-23 PROCEDURE — 6360000002 HC RX W HCPCS: Performed by: NURSE PRACTITIONER

## 2020-09-23 PROCEDURE — 85027 COMPLETE CBC AUTOMATED: CPT

## 2020-09-23 PROCEDURE — 97530 THERAPEUTIC ACTIVITIES: CPT

## 2020-09-23 PROCEDURE — 97116 GAIT TRAINING THERAPY: CPT

## 2020-09-23 PROCEDURE — 80048 BASIC METABOLIC PNL TOTAL CA: CPT

## 2020-09-23 PROCEDURE — 99024 POSTOP FOLLOW-UP VISIT: CPT | Performed by: THORACIC SURGERY (CARDIOTHORACIC VASCULAR SURGERY)

## 2020-09-23 PROCEDURE — 97110 THERAPEUTIC EXERCISES: CPT

## 2020-09-23 PROCEDURE — 6370000000 HC RX 637 (ALT 250 FOR IP): Performed by: STUDENT IN AN ORGANIZED HEALTH CARE EDUCATION/TRAINING PROGRAM

## 2020-09-23 RX ORDER — AMIODARONE HYDROCHLORIDE 200 MG/1
200 TABLET ORAL 2 TIMES DAILY
Status: DISCONTINUED | OUTPATIENT
Start: 2020-09-23 | End: 2020-09-26 | Stop reason: HOSPADM

## 2020-09-23 RX ORDER — FUROSEMIDE 40 MG/1
40 TABLET ORAL 2 TIMES DAILY
Status: DISCONTINUED | OUTPATIENT
Start: 2020-09-23 | End: 2020-09-24

## 2020-09-23 RX ADMIN — MAGNESIUM GLUCONATE 500 MG ORAL TABLET 400 MG: 500 TABLET ORAL at 09:34

## 2020-09-23 RX ADMIN — Medication 15 ML: at 21:11

## 2020-09-23 RX ADMIN — POTASSIUM CHLORIDE 10 MEQ: 750 TABLET, EXTENDED RELEASE ORAL at 12:23

## 2020-09-23 RX ADMIN — OXYCODONE 5 MG: 5 TABLET ORAL at 21:10

## 2020-09-23 RX ADMIN — AMIODARONE HYDROCHLORIDE 200 MG: 200 TABLET ORAL at 09:34

## 2020-09-23 RX ADMIN — FUROSEMIDE 40 MG: 40 TABLET ORAL at 07:52

## 2020-09-23 RX ADMIN — OXYCODONE 5 MG: 5 TABLET ORAL at 05:45

## 2020-09-23 RX ADMIN — SODIUM CHLORIDE, PRESERVATIVE FREE 10 ML: 5 INJECTION INTRAVENOUS at 09:38

## 2020-09-23 RX ADMIN — SODIUM CHLORIDE, PRESERVATIVE FREE 10 ML: 5 INJECTION INTRAVENOUS at 21:12

## 2020-09-23 RX ADMIN — INSULIN GLARGINE 18 UNITS: 100 INJECTION, SOLUTION SUBCUTANEOUS at 21:11

## 2020-09-23 RX ADMIN — INSULIN LISPRO 1 UNITS: 100 INJECTION, SOLUTION INTRAVENOUS; SUBCUTANEOUS at 21:12

## 2020-09-23 RX ADMIN — METOPROLOL TARTRATE 25 MG: 25 TABLET, FILM COATED ORAL at 09:34

## 2020-09-23 RX ADMIN — AMIODARONE HYDROCHLORIDE 200 MG: 200 TABLET ORAL at 21:10

## 2020-09-23 RX ADMIN — METOPROLOL TARTRATE 25 MG: 25 TABLET, FILM COATED ORAL at 21:11

## 2020-09-23 RX ADMIN — HEPARIN SODIUM 5000 UNITS: 5000 INJECTION INTRAVENOUS; SUBCUTANEOUS at 05:45

## 2020-09-23 RX ADMIN — INSULIN LISPRO 1 UNITS: 100 INJECTION, SOLUTION INTRAVENOUS; SUBCUTANEOUS at 12:15

## 2020-09-23 RX ADMIN — BISACODYL 5 MG: 5 TABLET, COATED ORAL at 09:34

## 2020-09-23 RX ADMIN — POTASSIUM CHLORIDE 10 MEQ: 750 TABLET, EXTENDED RELEASE ORAL at 07:52

## 2020-09-23 RX ADMIN — FERROUS SULFATE TAB 325 MG (65 MG ELEMENTAL FE) 325 MG: 325 (65 FE) TAB at 07:52

## 2020-09-23 RX ADMIN — PRAVASTATIN SODIUM 80 MG: 40 TABLET ORAL at 21:11

## 2020-09-23 RX ADMIN — FUROSEMIDE 40 MG: 40 TABLET ORAL at 16:58

## 2020-09-23 RX ADMIN — POLYETHYLENE GLYCOL 3350 17 G: 17 POWDER, FOR SOLUTION ORAL at 09:33

## 2020-09-23 RX ADMIN — PRASUGREL 10 MG: 10 TABLET, FILM COATED ORAL at 09:34

## 2020-09-23 RX ADMIN — ASPIRIN 81 MG: 81 TABLET ORAL at 09:34

## 2020-09-23 RX ADMIN — Medication 15 ML: at 09:37

## 2020-09-23 RX ADMIN — MUPIROCIN: 20 OINTMENT TOPICAL at 09:36

## 2020-09-23 RX ADMIN — HEPARIN SODIUM 5000 UNITS: 5000 INJECTION INTRAVENOUS; SUBCUTANEOUS at 21:11

## 2020-09-23 RX ADMIN — FERROUS SULFATE TAB 325 MG (65 MG ELEMENTAL FE) 325 MG: 325 (65 FE) TAB at 16:57

## 2020-09-23 RX ADMIN — FLUTICASONE PROPIONATE 1 SPRAY: 50 SPRAY, METERED NASAL at 09:38

## 2020-09-23 RX ADMIN — FAMOTIDINE 20 MG: 20 TABLET, FILM COATED ORAL at 09:33

## 2020-09-23 RX ADMIN — POTASSIUM CHLORIDE 10 MEQ: 750 TABLET, EXTENDED RELEASE ORAL at 16:57

## 2020-09-23 RX ADMIN — HEPARIN SODIUM 5000 UNITS: 5000 INJECTION INTRAVENOUS; SUBCUTANEOUS at 14:04

## 2020-09-23 RX ADMIN — FAMOTIDINE 20 MG: 20 TABLET, FILM COATED ORAL at 21:11

## 2020-09-23 RX ADMIN — TAMSULOSIN HYDROCHLORIDE 0.4 MG: 0.4 CAPSULE ORAL at 09:35

## 2020-09-23 RX ADMIN — MELATONIN TAB 5 MG 5 MG: 5 TAB at 21:11

## 2020-09-23 ASSESSMENT — PAIN DESCRIPTION - PROGRESSION
CLINICAL_PROGRESSION: GRADUALLY IMPROVING

## 2020-09-23 ASSESSMENT — PAIN DESCRIPTION - PAIN TYPE
TYPE: SURGICAL PAIN
TYPE: SURGICAL PAIN
TYPE: ACUTE PAIN;SURGICAL PAIN
TYPE: SURGICAL PAIN

## 2020-09-23 ASSESSMENT — PAIN - FUNCTIONAL ASSESSMENT
PAIN_FUNCTIONAL_ASSESSMENT: PREVENTS OR INTERFERES SOME ACTIVE ACTIVITIES AND ADLS
PAIN_FUNCTIONAL_ASSESSMENT: PREVENTS OR INTERFERES SOME ACTIVE ACTIVITIES AND ADLS
PAIN_FUNCTIONAL_ASSESSMENT: ACTIVITIES ARE NOT PREVENTED

## 2020-09-23 ASSESSMENT — PAIN DESCRIPTION - LOCATION
LOCATION: CHEST

## 2020-09-23 ASSESSMENT — PAIN DESCRIPTION - ONSET
ONSET: ON-GOING

## 2020-09-23 ASSESSMENT — PAIN SCALES - GENERAL
PAINLEVEL_OUTOF10: 2
PAINLEVEL_OUTOF10: 0
PAINLEVEL_OUTOF10: 1
PAINLEVEL_OUTOF10: 4
PAINLEVEL_OUTOF10: 2
PAINLEVEL_OUTOF10: 0
PAINLEVEL_OUTOF10: 6
PAINLEVEL_OUTOF10: 0
PAINLEVEL_OUTOF10: 0

## 2020-09-23 ASSESSMENT — PAIN DESCRIPTION - FREQUENCY
FREQUENCY: CONTINUOUS

## 2020-09-23 ASSESSMENT — PAIN DESCRIPTION - DESCRIPTORS
DESCRIPTORS: ACHING

## 2020-09-23 ASSESSMENT — ENCOUNTER SYMPTOMS: SHORTNESS OF BREATH: 1

## 2020-09-23 ASSESSMENT — PAIN DESCRIPTION - ORIENTATION
ORIENTATION: MID

## 2020-09-23 NOTE — PROGRESS NOTES
Occupational Therapy  Facility/Department: [facility] B2 - ICU  Daily Treatment Note  NAME: Chaya Jimenez  : 1950  MRN: [id number]    Date of Service: 2020    Discharge Recommendations:  24 hour supervision or assist, Home with Home health OT       Assessment   Performance deficits / Impairments: Decreased functional mobility ; Decreased endurance;Decreased ADL status; Decreased balance;Decreased high-level IADLs  Assessment: Pt pleasant and agreeable to therapy. Pt limited at this time due to generalized fatigue. Pt able to complete activities at a seated level with set up. Pt educated on BUE exercises for increased endurance. Pt continues to fatigue quickly but encouraged to continue to complete modified exercises throughout the day. Cont with POC. OT Education: OT Role;Plan of Care;Precautions; Home Exercise Program;Transfer Training  Patient Education: disease specific: sternal precuations  REQUIRES OT FOLLOW UP: Yes  Activity Tolerance  Activity Tolerance: Patient Tolerated treatment well;Patient limited by fatigue  Safety Devices  Safety Devices in place: Yes  Type of devices: Call light within reach; Left in chair;Nurse notified         Patient Diagnosis(es): The primary encounter diagnosis was Acute chest pain. Diagnoses of Shortness of breath and Elevated troponin were also pertinent to this visit. has a past medical history of CAD (coronary artery disease), Diabetes mellitus ([address] Hyperlipidemia, Hypertension, and Kidney stones. has a past surgical history that includes cervical fusion (); Appendectomy (); hernia repair (9651&5664); Colonoscopy (&); Upper gastrointestinal endoscopy (); Coronary angioplasty with stent (); Coronary angioplasty with stent (); Upper gastrointestinal endoscopy (2008); Cystoscopy (Left, 2019);  Endoscopy, colon, diagnostic; CYSTOSCOPY INSERTION / REMOVAL STENT / STONE (Left, 2019); and Coronary artery bypass graft (N/A, 9/18/2020). Restrictions  Restrictions/Precautions  Restrictions/Precautions: General Precautions, Fall Risk, Cardiac  Position Activity Restriction  Sternal Precautions: No Pushing, No Pulling, 5# Lifting Restrictions  Sternal Precautions: No lifting greater than 5 lbs.   Other position/activity restrictions: Progressive ambulation, IJ, ICU monitoring     Subjective   General  Chart Reviewed: Yes  Patient assessed for rehabilitation services?: Yes  Family / Caregiver Present: No  Referring Practitioner: Fabian Cisneros MD  Diagnosis: CABG x4  Subjective  Subjective: pt pleasant and agreeable to therapy but reporting increased fatigue at this time  General Comment  Comments: RN approved therapy  Pain Assessment  Pain Assessment: 0-10  Pain Level: 2  Pain Type: Surgical pain  Pain Location: Chest  Non-Pharmaceutical Pain Intervention(s): Repositioned  Vital Signs  Patient Currently in Pain: Yes     Orientation  Orientation  Overall Orientation Status: Within Functional Limits     Objective    ADL  Grooming: Setup(seated for washing face, combing hair)        Balance  Sitting Balance: Independent  Standing Balance: Contact guard assistance(4WW)  Standing Balance  Time: 1 minutes x 1  Activity: functional mobility in hallway  Functional Mobility  Functional - Mobility Device: 4-Wheeled Walker  Activity: Other  Assist Level: Contact guard assistance  Bed mobility  Supine to Sit: Unable to assess  Sit to Supine: Unable to assess  Comment: up in chair at start/end of session  Transfers  Sit to stand: Supervision  Stand to sit: Supervision  Transfer Comments: min cues for sternal precautions                       Cognition  Overall Cognitive Status: WFL                    Type of ROM/Therapeutic Exercise  Type of ROM/Therapeutic Exercise: AROM  Comment: BUE seated  Exercises  Shoulder Elevation: x15  Shoulder Flexion: x15 to 90 degrees  Elbow Flexion: x15  Elbow Extension: x15  Supination: x15  Pronation: x15  Wrist Flexion: x15  Wrist Extension: x15  Grasp/Release: x15                    Plan   Plan  Times per week: 4-5x/wk  Current Treatment Recommendations: Functional Mobility Training, Endurance Training, Safety Education & Training, Self-Care / ADL    AM-PAC Score        AM-PAC Inpatient Daily Activity Raw Score: 18 (09/23/20 0819)  AM-PAC Inpatient ADL T-Scale Score : 38.66 (09/23/20 0819)  ADL Inpatient CMS 0-100% Score: 46.65 (09/23/20 0819)  ADL Inpatient CMS G-Code Modifier : CK (09/23/20 0819)    Goals  Short term goals  Time Frame for Short term goals: 1 week (9/26/20)  Short term goal 1: Pt will complete LB dressing with min A. Short term goal 2: Pt will complete toilet transfer with CGA. Short term goal 3: Pt will complete ~15 reps of modified BUE exercises for increased endurance. (9/22/20); 9/21 progressing, pt tolerating 10-15 reps BUE exercises  Short term goal 4: Pt will complete ~5 minutes of dynamic standing for adls with CGA. Patient Goals   Patient goals : Julissa Alvarez get better and go home\"       Therapy Time   Individual Concurrent Group Co-treatment   Time In [id number]         Time Out 0975         Minutes 43         Timed Code Treatment Minutes: Tay Perez 27, OTR/L    If pt is unable to be seen after this session, please let this note serve as discharge summary. Please see case management note for discharge disposition. Thank you.

## 2020-09-23 NOTE — PROGRESS NOTES
Pt walked all 3 lengths of the hallways and completed stairs. Back to room hypotensive. Back to bed and pt rebounded to SBPs 90s. Continue to monitor.  Urszula Bray

## 2020-09-23 NOTE — CARE COORDINATION
Writer received a call from [place] with [facility] and they are unable to staff to see patient. Spoke to Bernetta Boxer with [address] [place], [place] [address]ry carlos enrique and they are not in network. Call placed to [facility]. Spoke to Reba Chew, referral sent.    Jaimie Pope RN

## 2020-09-23 NOTE — PROGRESS NOTES
CVTS Thoracic Progress Note:          CC:  Post op follow up    Hospital course:   9/22 Patient up in chair, reports feeling better today. Remains A-fib  9/23 Up in chair, has been in [employer] since yesterday afternoon. Obj:    Blood pressure 131/68, pulse 81, temperature 97.8 °F (36.6 °C), temperature source Axillary, resp. rate 20, height 5' 9\" (1.753 m), weight 271 lb 14.4 oz (123.3 kg), SpO2 93 %. Pre op wt  120.5 kg  9/19  134.5 kg  9/20  134.9 kg  9/21  128.2 kg  9/22  126 kg   9/23  123.3 kg     Lungs 93% on RA, normal work of breathing, chest tubes removed 9/20. Abdomen soft, non distended, +BM    Incision CDI    Diagnostics:   Recent Labs     09/21/20 0345 09/22/20  0328 09/23/20  0530   WBC 9.1 9.3 9.5   HGB 8.4* 9.0* 9.5*   HCT 24.9* 26.8* 28.9*   * 191 241                                                                  Recent Labs     09/21/20  0345  09/22/20  0328 09/22/20  1430 09/23/20  0530     --  139  --  140   K 3.8   < > 3.8 4.8 4.0     --  101  --  101   CO2 25  --  27  --  27   BUN 27*  --  27*  --  30*   CREATININE 1.4*  --  1.4*  --  1.5*   GLUCOSE 168*  --  149*  --  125*    < > = values in this interval not displayed. Recent Labs     09/21/20 0345 09/22/20  0328 09/23/20  0530   MG 2.20 2.00 2.20      CXR:   Normal post operative changes, no pneumothorax, chest tubes in place, PAC in place     Extubated 9/18 2040 PM  Transitioned 9/19 0945 AM    Assess/Plan:   Neuro:  Pain control  - Toradol, APAP, oxycodone  CV: NSTEMI s/p 4v CABG  - Was A-fib w/RVR 9/22, amiodarone started. Pt became hypotensive, which has improved today as he is SR. Will continue amiodarone at a lower dose, increase his BB and continue Effient  - BB - increased to 25 mg BID.   - ASA 81 and statin  - Digoxin 125 mcg daily  - TPW out 9/20  Pulm: saturating mid to upper 90's on RA.  - IS, OOB, cough/deep breath. PT/OT   FEN/GI: Cardiac 1500 ml fluid restriction.  +BM, continue bowel regimen Renal/: Cr up to 1.5 today, continue to hold ARB (baseline 1.1-1.2)  -UOP much improved; 2650 ml in 24 hrs. Changing lasix to 40 mg PO BID. ID: Periop antibiotics completed. Heme: H&H stable. Endo: DM2  - Lantus, SSI. Will discuss resuming his home DM meds with team (glipizide, pioglitazone, and janumet)  PPX: Heparin, NIGHAT hose  Dispo:  ICU level care, DCP following. Pt plans to go home with [facility] and his sister is coming to help for 10 days. Carlos Lloyd, APRN-CNP   9/23/2020  9:19 AM   Note reviewed, events of last 24 hours reviewed along with vital signs and I/Os and patient examined. Assessment and plans discussed and are as outlined above.      Julieta Leventhal, MD, FACS, [facility], FACKARL, [place]

## 2020-09-23 NOTE — PROCEDURES
[facility], [place] [address]                               PULMONARY FUNCTION    PATIENT NAME: Lexus Askew                      :        1950  MED REC NO:   [id number]                          ROOM:       3563  ACCOUNT NO:   [de-identified]                           ADMIT DATE: 2020  PROVIDER:     Anabela Nicole MD    DATE OF PROCEDURE:  2020    PFT    Spirometry showed FVC 3.81 L, 90% predicted, FEV1 3.05 L, 97% predicted,  with FEV1/FVC ratio of 80%. There was no response to  bronchodilator. Lung volumes showed TLC of 66% predicted, DLCO 97%  predicted. IMPRESSION:  PFT shows a mild restrictive defect, but is otherwise  normal.  The etiology of the restrictive defect is unclear, it could be  related to body habitus. Clinical correlation is recommended.         Ros Shah MD    D: 2020 11:05:37       T: 2020 16:10:06     [id number]  Job#: [id number]     Doc#: [id number]    CC:  Onelia Leblanc Md

## 2020-09-23 NOTE — PLAN OF CARE
Problem: Falls - Risk of:  Goal: Will remain free from falls  Description: Will remain free from falls  Outcome: Ongoing  Goal: Absence of physical injury  Description: Absence of physical injury  Outcome: Ongoing     Problem: Pain:  Goal: Pain level will decrease  Description: Pain level will decrease  Outcome: Ongoing  Goal: Control of acute pain  Description: Control of acute pain  Outcome: Ongoing  Goal: Control of chronic pain  Description: Control of chronic pain  Outcome: Ongoing     Problem: Discharge Planning:  Goal: Participates in care planning  Description: Participates in care planning  Outcome: Ongoing  Goal: Discharged to appropriate level of care  Description: Discharged to appropriate level of care  Outcome: Ongoing     Problem: Pain:  Goal: Pain level will decrease  Description: Pain level will decrease  Outcome: Ongoing  Goal: Recognizes and communicates pain  Description: Recognizes and communicates pain  Outcome: Ongoing  Goal: Control of acute pain  Description: Control of acute pain  Outcome: Ongoing  Goal: Control of chronic pain  Description: Control of chronic pain  Outcome: Ongoing     Problem: Serum Glucose Level - Abnormal:  Goal: Ability to maintain appropriate glucose levels will improve to within specified parameters  Description: Ability to maintain appropriate glucose levels will improve to within specified parameters  Outcome: Ongoing     Problem: Nutrition  Goal: Optimal nutrition therapy  Description: Nutrition Problem #1: Inadequate oral intake  Intervention: Food and/or Nutrient Delivery: Continue NPO, Start Oral Diet(when medically appropriate)  Nutritional Goals:  Tolerate oral diet progression post op and conume greater than 50% of meals this admission      Outcome: Ongoing

## 2020-09-23 NOTE — PROGRESS NOTES
[facility]  Cardiology  Progress Note    Admission date:  2020    Reason for follow up visit: CAD    HPI/CC: Julio César Gutierrez is a 79 y.o. male who was admitted 2020 for chest pain and elevated troponin. [facility] 2020 showed 100% RCA treated with successful balloon atherectomy RCA, CT surgery referral due to progressive CAD. Echo showed EF 55-60%. On 2020 he had CABG x4. Developed PAF post op. Subjective: Sternal discomfort and lung pain. Vitals:  Blood pressure 95/61, pulse 86, temperature 98 °F (36.7 °C), temperature source Oral, resp. rate 20, height 5' 9\" (1.753 m), weight 271 lb 14.4 oz (123.3 kg), SpO2 94 %.   Temp  Av.1 °F (36.7 °C)  Min: 97.6 °F (36.4 °C)  Max: 98.5 °F (36.9 °C)  Pulse  Av.9  Min: 72  Max: 120  BP  Min: 74/55  Max: 156/87  SpO2  Av.5 %  Min: 92 %  Max: 100 %    24 hour I/O    Intake/Output Summary (Last 24 hours) at 2020 1312  Last data filed at 2020 1250  Gross per 24 hour   Intake 1072.18 ml   Output 2245 ml   Net -1172.82 ml     Current Facility-Administered Medications   Medication Dose Route Frequency Provider Last Rate Last Dose    metoprolol tartrate (LOPRESSOR) tablet 25 mg  25 mg Oral BID Susan Velasco MD   25 mg at 20 1601    amiodarone (CORDARONE) tablet 200 mg  200 mg Oral BID Susan Velasco MD   200 mg at 20 7283    furosemide (LASIX) tablet 40 mg  40 mg Oral BID Susan Velasco MD   40 mg at 20 9159    prasugrel (EFFIENT) tablet 10 mg  10 mg Oral Daily Susan Velasco MD   10 mg at 20 0934    heparin (porcine) injection 5,000 Units  5,000 Units Subcutaneous 3 times per day CURTIS Mcdonald - CNP   5,000 Units at 20 0545    ferrous sulfate (IRON 325) tablet 325 mg  325 mg Oral BID  Susan Velasco MD   325 mg at 20 0752    pravastatin (PRAVACHOL) tablet 80 mg  80 mg Oral Nightly Arnold Bangura MD   80 mg at 20    sodium chloride flush 0.9 % injection 10 mL  10 mL Intravenous 2 times per day Susan Velasco MD   10 mL at 09/23/20 0938    sodium chloride flush 0.9 % injection 10 mL  10 mL Intravenous PRN Susan Velasco MD        potassium chloride 20 mEq/50 mL IVPB (Central Line)  20 mEq Intravenous PRN Susan Velasco MD   Stopped at 09/22/20 1152    oxyCODONE (ROXICODONE) immediate release tablet 5 mg  5 mg Oral Q4H PRN Susan Velasco MD   5 mg at 09/23/20 0545    Or    oxyCODONE (ROXICODONE) immediate release tablet 10 mg  10 mg Oral Q4H PRN Susan Velasco MD   10 mg at 09/22/20 0626    morphine (PF) injection 2 mg  2 mg Intravenous Q2H PRN Susan Velasco MD   2 mg at 09/19/20 1647    Or    morphine (PF) injection 4 mg  4 mg Intravenous Q2H PRN Susan Velasco MD        polyethylene glycol [facility]) packet 17 g  17 g Oral Daily Susan Velasco MD   17 g at 09/23/20 0933    bisacodyl (DULCOLAX) EC tablet 5 mg  5 mg Oral Daily Susan Velasco MD   5 mg at 09/23/20 5196    bisacodyl (DULCOLAX) suppository 10 mg  10 mg Rectal Daily PRN Susan Velacso MD        ondansetron [facility]) injection 4 mg  4 mg Intravenous Q8H PRN Susan Velasco MD        [Held by provider] losartan (COZAAR) tablet 25 mg  25 mg Oral Daily Susan Velasco MD        chlorhexidine (PERIDEX) 0.12 % solution 15 mL  15 mL Mouth/Throat BID Susan Velasco MD   15 mL at 09/23/20 5301    magnesium oxide (MAG-OX) tablet 400 mg  400 mg Oral Daily Susan Velasco MD   400 mg at 09/23/20 0934    potassium chloride (KLOR-CON M) extended release tablet 10 mEq  10 mEq Oral TID WC Susan Velasco MD   10 mEq at 09/23/20 1223    aspirin EC tablet 81 mg  81 mg Oral Daily Susan Velasco MD   81 mg at 09/23/20 0934    albuterol sulfate  (90 Base) MCG/ACT inhaler 2 puff  2 puff Inhalation Q6H PRN Susan Velasco MD        insulin glargine (LANTUS) injection vial 18 Units  0.15 Units/kg Subcutaneous Nightly Susan Velasco MD   18 Units at 09/22/20 2015    insulin lispro (HUMALOG) injection vial 0-6 Units  0-6 Units Subcutaneous TID WC Anali Painter MD   1 Units at 09/23/20 1215    insulin lispro (HUMALOG) injection vial 0-3 Units  0-3 Units Subcutaneous Nightly Anali Painter MD   1 Units at 09/22/20 2021    glucose (GLUTOSE) 40 % oral gel 15 g  15 g Oral PRN Anali Painter MD        dextrose 50 % IV solution  12.5 g Intravenous PRN Anali Painter MD        glucagon (rDNA) injection 1 mg  1 mg Intramuscular PRN Anali Painter MD        dextrose 5 % solution  100 mL/hr Intravenous PRN Anali Painter MD        famotidine (PEPCID) tablet 20 mg  20 mg Oral BID Anali Painter MD   20 mg at 09/23/20 0933    melatonin tablet 5 mg  5 mg Oral Nightly PRN Anali Painter MD   5 mg at 09/22/20 2015    fluticasone (FLONASE) 50 MCG/ACT nasal spray 1 spray  1 spray Each Nostril Daily Anali Painter MD   1 spray at 09/23/20 0938    tamsulosin (FLOMAX) capsule 0.4 mg  0.4 mg Oral Daily Anali Painter MD   0.4 mg at 09/23/20 0935     Review of Systems   Constitutional: Positive for fatigue. Respiratory: Positive for shortness of breath. Cardiovascular: Positive for chest pain. Neurological: Negative.       Objective:     Telemetry monitor: SR    Physical Exam:  Constitutional:  Comfortable and alert, NAD, appears stated age, obese  Eyes: PERRL, sclera nonicteric  Neck:  Supple, no masses, no thyroidmegaly, no JVD  Skin:  Warm and dry; no rash or lesions; +sternal incision  Heart: Regular, normal apex, S1 and S2 normal, no M/G/R  Lungs:  Normal respiratory effort; clear; no wheezing/rhonchi/rales  Abdomen: soft, non tender, + bowel sounds  Extremities:  No edema or cyanosis; no clubbing  Neuro: alert and oriented, moves legs and arms equally, normal mood and affect    Data Reviewed:    CABG 9/18/2020:  Urgent CABG X 4, with pedicled LIMA to LAD, sequential Greater Saphenous VG to OM 1 then on to OM 2, separate single EBL.   LVEDP  18   GRADIENT ACROSS AORTIC VALVE  none   LV FUNCTION EF 40 %   WALL MOTION  base to mid inferior hypokinesis   MITRAL REGURGITATION  mild     LM Less than 10% hyhehzlg-fre-rvqqih stenosis            LAD Proximal-mid stents are noted with 50% in-stent restenosis. Distal vessel has 50 to 60% stenosis. There are well-developed left-to-right collaterals.     D1 is a small to medium sized vessel with ostial/proximal/mid 60 to 70% stenosis.       LCX  Large vessel, proximal and mid 90% stenosis. The distal vessel into OM 2 is stented and the stent is widely patent with less than 10% stenosis.       RI Tortuous, small vessel,Less than 10% nrrbanae-fuf-dlrvqm stenosis            RCA Dominant, proximal 10 to 20% stenosis, the proximal and mid vessel are extensively stented and there is a mid-distal 100% occlusion with well-developed left to right collaterals. PERCUTANEOUS INTERVENTION DESCRIPTION    Heparin was used for anticoagulation, Integrilin was added during the procedure. The initial 4/5 Hungarian sheath was upsized to aTerumo slender 6/7 sheath for intervention. A 7 Hungarian AL 0.75 guiding catheter was taken and was used to intubate the RCA. A choice floppy wire was then used initially to cross the lesion and angioplasty was then performed with a 3 mm balloon. Additional guide support was felt to be needed and a run-through body wire was placed into the RCA and the choice floppy wire was removed. Then a 7 [place]ai guide cell extension catheter was advanced into the RCA. Additional angioplasty was performed with 3 and 3.5 mm noncompliant balloons. Cutting Balloon atherectomy was then performed of the distal RCA as well as the proximal and mid RCA with 3 and 3.5 mm cutting balloons. 4 mm noncompliant balloon was used as well. COLE was performed which showed good stent expansion.   Minimal luminal diameter was felt to be between 3.5 and 4 mm in the proximal and mid segments and 3 mm in the distal segments. Remaining disease and PDA was felt to be best treated medically and procedure was felt to be completed and it was felt stenting should be deferred as patient has had several stenting procedures already and IVUS did reveal multiple layers of stents. There was 0% residual stenosis. There was MOISÉS 3 flow before and after PCI. CONCLUSIONS:    Successful atherectomy of RCA  We will consider staged PCI of circumflex  We will also consult with CT surgery as patient has had several PCI procedures and continues to have refractory CAD with ASHD progression.     Coronary angiogram 2/2020:  LM <20%  LAD 30% prox, 70% mid in stent              D2 50% prox  Cx 40-50%              D3 99% prox  RCA 80% mid in stent  LVEF 60%  PTCA LAD              3.0 x 15 angiosculpt cutting balloon to 3.5  PTCA OM3              2.75 x 15 FARHAN  DAPT, statin, BBlk  Home in am  PTCA RCA next week     Coronary angiogram 3/3/2020:  Selective tyler RCA for staged PCI  RCA mid in stent 80% to 40%              3.5 x 15 cutting balloon              4.0 x 20 NC balloon to 24 katiuska  Consider laser atherectomy +/- brachytherapy if recurrent angina/restenosis    Lab Reviewed:     Renal Profile:  Lab Results   Component Value Date    CREATININE 1.5 09/23/2020    BUN 30 09/23/2020     09/23/2020    K 4.0 09/23/2020    K 4.5 09/04/2020     09/23/2020    CO2 27 09/23/2020     CBC:    Lab Results   Component Value Date    WBC 9.5 09/23/2020    RBC 3.30 09/23/2020    HGB 9.5 09/23/2020    HCT 28.9 09/23/2020    MCV 87.6 09/23/2020    RDW 14.3 09/23/2020     09/23/2020     BNP:    Lab Results   Component Value Date    PROBNP 453 09/13/2020     Fasting Lipid Panel:    Lab Results   Component Value Date    CHOL 132 09/18/2020    HDL 32 09/18/2020    HDL 35 09/26/2011    TRIG 154 09/18/2020     Cardiac Enzymes:  CK/MbTroponin  Lab Results   Component Value Date    TROPONINI 0.33 09/13/2020     PT/ INR   Lab Results   Component Value Date    INR 1.29 09/19/2020    INR 1.38 09/18/2020    INR 1.09 09/18/2020    PROTIME 15.0 09/19/2020    PROTIME 16.1 09/18/2020    PROTIME 12.6 09/18/2020     PTT No results found for: PTT   Lab Results   Component Value Date    MG 2.20 09/23/2020    No results found for: TSH    All labs and imaging reviewed today    Assessment:  NSTEMI/CAD: s/p CABG x4 9/18/2020; s/p PCI LAD and OM 3 2/2020 and PCI RCA 3/2020  Paroxysmal atrial fibrillation: stable, new diagnosis, noted post CABG   - [id number] score 6   - amiodarone started 9/22/2020  HTN: stable  HLD: controlled, LDL 69, statin  Anemia  DM: hgb a1c 6.6  Obesity  History of CVA    Plan:   1. Continue aspirin, lopressor, effient, statin  2. Anticoagulation per CT surgery  3. Holding losartan, restart as able  4.  Follow up 10/27/2020    CURTIS Nino-CNP  [facility]  [phone]

## 2020-09-23 NOTE — PROGRESS NOTES
Physical Therapy  Facility/Department: [facility] B2 - ICU  Daily Treatment Note  NAME: Antoine Franco  : 1950  MRN: [id number]    Date of Service: 2020    Discharge Recommendations:  24 hour supervision or assist        Assessment   Body structures, Functions, Activity limitations: Decreased functional mobility ; Decreased balance;Decreased strength;Decreased endurance  Assessment: Pt has progressed well, ambulated 290 ft with 4WW and negotiated 4 stairs with SBA with only one rest break today, demonstrating improved enduranceand tolerance for mobility. Treatment Diagnosis: Decreased endurance and (I) with functional mobility  Specific instructions for Next Treatment: Progress ther ex and mobility as tolerated  Prognosis: Good  Decision Making: Medium Complexity  PT Education: Goals; General Safety;Gait Training;PT Role;Disease Specific Education;Precautions;Transfer Training;Plan of Care;Home Exercise Program;Pressure Relief; Injury Prevention  Patient Education: Disease-specific education: Pt educated on his sternal precautions; pt verbalized understanding. Barriers to Learning: none  REQUIRES PT FOLLOW UP: Yes  Activity Tolerance  Activity Tolerance: vital signs stable     Patient Diagnosis(es): The primary encounter diagnosis was Acute chest pain. Diagnoses of Shortness of breath and Elevated troponin were also pertinent to this visit. has a past medical history of CAD (coronary artery disease), Diabetes mellitus ([address] Hyperlipidemia, Hypertension, and Kidney stones. has a past surgical history that includes cervical fusion (); Appendectomy (); hernia repair ([id number]); Colonoscopy (&); Upper gastrointestinal endoscopy (); Coronary angioplasty with stent (); Coronary angioplasty with stent (); Upper gastrointestinal endoscopy (2008); Cystoscopy (Left, 2019);  Endoscopy, colon, diagnostic; CYSTOSCOPY INSERTION / REMOVAL STENT / STONE (Left, 2019); and Good  Sitting - Dynamic: Good;-  Standing - Static: Fair;+  Standing - Dynamic: Fair;+  Exercises  Quad Sets: x 15 B  Gluteal Sets: x 15 bilat  Hip Flexion: seated marching x 15 BLE  Hip Abduction: x 15 clamshells  Knee Long Arc Quad: x 15 BLE  Ankle Pumps: x 15 BLE                                                                                  AM-PAC Score  AM-PAC Inpatient Mobility Raw Score : 17 (09/23/20 1625)  AM-PAC Inpatient T-Scale Score : 42.13 (09/23/20 1625)  Mobility Inpatient CMS 0-100% Score: 50.57 (09/23/20 1625)  Mobility Inpatient CMS G-Code Modifier : CK (09/23/20 1625)          Goals  Short term goals  Time Frame for Short term goals: 1 week, 9/26/20 (unless otherwise specified)  Short term goal 1: Supine <> sit with mod I - not yet met (maxA x 2 as of 9/20/20); 9/23 mod A for sit to supine  Short term goal 2: Sit <> stand with mod I - MET 9/21/20; 9/23 Supervision  Short term goal 3: Bed <> chair with LRAD and mod I - not yet met (SBA for chair to bed as of 9/23/20)  Short term goal 4: Ambulate 150 feet with LRAD and mod I - not yet met (SBA x 1 to amb x 240 feet as of 9/23/20)  Short term goal 5: Patient to ascend 4 steps with rail and supervision - not yet met (SBA x 1 to amb up/down 4 steps as of 9/23/20)  Patient Goals   Patient goals : \"To get stronger\"    Plan    Plan  Times per week: 5-7x/week in acute care  Times per day: Daily  Plan weeks: 1 week, 9/26/20  Specific instructions for Next Treatment: Progress ther ex and mobility as tolerated  Current Treatment Recommendations: Strengthening, Home Exercise Program, Safety Education & Training, Balance Training, Endurance Training, Patient/Caregiver Education & Training, Functional Mobility Training, Equipment Evaluation, Education, & procurement, Transfer Training, Gait Training, ADL/Self-care Training, Stair training  Safety Devices  Type of devices:  All fall risk precautions in place, Nurse notified, Gait belt, Patient at risk for falls, Call light within reach, Bed alarm in place, Left in bed     Therapy Time   Individual Concurrent Group Co-treatment   Time In 1128         Time Out 1211         Minutes 43         Timed Code Treatment Minutes: 37 Minutes    If pt is discharged prior to next therapy session, this note will serve as discharge summary.     Ko Zhou, PT

## 2020-09-24 LAB
ANION GAP SERPL CALCULATED.3IONS-SCNC: 11 MMOL/L (ref 3–16)
BUN BLDV-MCNC: 31 MG/DL (ref 7–20)
CALCIUM SERPL-MCNC: 8.5 MG/DL (ref 8.3–10.6)
CHLORIDE BLD-SCNC: 99 MMOL/L (ref 99–110)
CO2: 28 MMOL/L (ref 21–32)
CREAT SERPL-MCNC: 1.5 MG/DL (ref 0.8–1.3)
GFR AFRICAN AMERICAN: 56
GFR NON-AFRICAN AMERICAN: 46
GLUCOSE BLD-MCNC: 119 MG/DL (ref 70–99)
GLUCOSE BLD-MCNC: 133 MG/DL (ref 70–99)
GLUCOSE BLD-MCNC: 150 MG/DL (ref 70–99)
GLUCOSE BLD-MCNC: 153 MG/DL (ref 70–99)
GLUCOSE BLD-MCNC: 159 MG/DL (ref 70–99)
HCT VFR BLD CALC: 27.5 % (ref 40.5–52.5)
HEMOGLOBIN: 9.3 G/DL (ref 13.5–17.5)
MAGNESIUM: 2.2 MG/DL (ref 1.8–2.4)
MCH RBC QN AUTO: 29.4 PG (ref 26–34)
MCHC RBC AUTO-ENTMCNC: 33.8 G/DL (ref 31–36)
MCV RBC AUTO: 87.1 FL (ref 80–100)
PDW BLD-RTO: 14.3 % (ref 12.4–15.4)
PERFORMED ON: ABNORMAL
PLATELET # BLD: 257 K/UL (ref 135–450)
PMV BLD AUTO: 8 FL (ref 5–10.5)
POTASSIUM SERPL-SCNC: 3.6 MMOL/L (ref 3.5–5.1)
RBC # BLD: 3.16 M/UL (ref 4.2–5.9)
SODIUM BLD-SCNC: 138 MMOL/L (ref 136–145)
WBC # BLD: 8.1 K/UL (ref 4–11)

## 2020-09-24 PROCEDURE — 6370000000 HC RX 637 (ALT 250 FOR IP): Performed by: NURSE PRACTITIONER

## 2020-09-24 PROCEDURE — 6370000000 HC RX 637 (ALT 250 FOR IP): Performed by: THORACIC SURGERY (CARDIOTHORACIC VASCULAR SURGERY)

## 2020-09-24 PROCEDURE — 83735 ASSAY OF MAGNESIUM: CPT

## 2020-09-24 PROCEDURE — 2000000000 HC ICU R&B

## 2020-09-24 PROCEDURE — 2580000003 HC RX 258: Performed by: THORACIC SURGERY (CARDIOTHORACIC VASCULAR SURGERY)

## 2020-09-24 PROCEDURE — 99024 POSTOP FOLLOW-UP VISIT: CPT | Performed by: THORACIC SURGERY (CARDIOTHORACIC VASCULAR SURGERY)

## 2020-09-24 PROCEDURE — 99231 SBSQ HOSP IP/OBS SF/LOW 25: CPT | Performed by: NURSE PRACTITIONER

## 2020-09-24 PROCEDURE — 97530 THERAPEUTIC ACTIVITIES: CPT

## 2020-09-24 PROCEDURE — 80048 BASIC METABOLIC PNL TOTAL CA: CPT

## 2020-09-24 PROCEDURE — 6370000000 HC RX 637 (ALT 250 FOR IP): Performed by: STUDENT IN AN ORGANIZED HEALTH CARE EDUCATION/TRAINING PROGRAM

## 2020-09-24 PROCEDURE — 85027 COMPLETE CBC AUTOMATED: CPT

## 2020-09-24 PROCEDURE — 6360000002 HC RX W HCPCS: Performed by: NURSE PRACTITIONER

## 2020-09-24 PROCEDURE — 97110 THERAPEUTIC EXERCISES: CPT

## 2020-09-24 RX ORDER — AMIODARONE HYDROCHLORIDE 200 MG/1
200 TABLET ORAL DAILY
Qty: 25 TABLET | Refills: 0 | Status: SHIPPED | OUTPATIENT
Start: 2020-09-29 | End: 2021-01-13 | Stop reason: ALTCHOICE

## 2020-09-24 RX ORDER — AMIODARONE HYDROCHLORIDE 200 MG/1
200 TABLET ORAL 2 TIMES DAILY
Qty: 9 TABLET | Refills: 0 | Status: SHIPPED | OUTPATIENT
Start: 2020-09-24 | End: 2020-09-29 | Stop reason: ALTCHOICE

## 2020-09-24 RX ORDER — OXYCODONE HYDROCHLORIDE 5 MG/1
5 TABLET ORAL EVERY 6 HOURS PRN
Qty: 28 TABLET | Refills: 0 | Status: SHIPPED | OUTPATIENT
Start: 2020-09-24 | End: 2020-10-01

## 2020-09-24 RX ORDER — FUROSEMIDE 40 MG/1
40 TABLET ORAL DAILY
Status: DISCONTINUED | OUTPATIENT
Start: 2020-09-25 | End: 2020-09-26 | Stop reason: HOSPADM

## 2020-09-24 RX ORDER — FUROSEMIDE 40 MG/1
40 TABLET ORAL DAILY
Qty: 10 TABLET | Refills: 0 | Status: SHIPPED | OUTPATIENT
Start: 2020-09-25 | End: 2020-09-29 | Stop reason: SINTOL

## 2020-09-24 RX ORDER — AMIODARONE HYDROCHLORIDE 200 MG/1
200 TABLET ORAL DAILY
Status: DISCONTINUED | OUTPATIENT
Start: 2020-09-29 | End: 2020-09-26 | Stop reason: HOSPADM

## 2020-09-24 RX ORDER — POTASSIUM CHLORIDE 750 MG/1
10 TABLET, EXTENDED RELEASE ORAL 2 TIMES DAILY WITH MEALS
Qty: 20 TABLET | Refills: 0 | Status: SHIPPED | OUTPATIENT
Start: 2020-09-24 | End: 2020-09-29 | Stop reason: SINTOL

## 2020-09-24 RX ORDER — FAMOTIDINE 20 MG/1
20 TABLET, FILM COATED ORAL DAILY
Qty: 30 TABLET | Refills: 0 | Status: SHIPPED | OUTPATIENT
Start: 2020-09-24 | End: 2020-10-27 | Stop reason: ALTCHOICE

## 2020-09-24 RX ADMIN — FERROUS SULFATE TAB 325 MG (65 MG ELEMENTAL FE) 325 MG: 325 (65 FE) TAB at 16:05

## 2020-09-24 RX ADMIN — INSULIN LISPRO 1 UNITS: 100 INJECTION, SOLUTION INTRAVENOUS; SUBCUTANEOUS at 12:20

## 2020-09-24 RX ADMIN — Medication 15 ML: at 09:35

## 2020-09-24 RX ADMIN — HEPARIN SODIUM 5000 UNITS: 5000 INJECTION INTRAVENOUS; SUBCUTANEOUS at 06:29

## 2020-09-24 RX ADMIN — FAMOTIDINE 20 MG: 20 TABLET, FILM COATED ORAL at 09:31

## 2020-09-24 RX ADMIN — BISACODYL 5 MG: 5 TABLET, COATED ORAL at 09:29

## 2020-09-24 RX ADMIN — ASPIRIN 81 MG: 81 TABLET ORAL at 09:31

## 2020-09-24 RX ADMIN — OXYCODONE 5 MG: 5 TABLET ORAL at 12:28

## 2020-09-24 RX ADMIN — AMIODARONE HYDROCHLORIDE 200 MG: 200 TABLET ORAL at 09:31

## 2020-09-24 RX ADMIN — AMIODARONE HYDROCHLORIDE 200 MG: 200 TABLET ORAL at 21:13

## 2020-09-24 RX ADMIN — FERROUS SULFATE TAB 325 MG (65 MG ELEMENTAL FE) 325 MG: 325 (65 FE) TAB at 09:29

## 2020-09-24 RX ADMIN — POTASSIUM CHLORIDE 10 MEQ: 750 TABLET, EXTENDED RELEASE ORAL at 09:29

## 2020-09-24 RX ADMIN — INSULIN LISPRO 1 UNITS: 100 INJECTION, SOLUTION INTRAVENOUS; SUBCUTANEOUS at 15:57

## 2020-09-24 RX ADMIN — OXYCODONE 5 MG: 5 TABLET ORAL at 00:22

## 2020-09-24 RX ADMIN — FAMOTIDINE 20 MG: 20 TABLET, FILM COATED ORAL at 20:14

## 2020-09-24 RX ADMIN — INSULIN GLARGINE 18 UNITS: 100 INJECTION, SOLUTION SUBCUTANEOUS at 21:11

## 2020-09-24 RX ADMIN — OXYCODONE 5 MG: 5 TABLET ORAL at 06:31

## 2020-09-24 RX ADMIN — SODIUM CHLORIDE, PRESERVATIVE FREE 10 ML: 5 INJECTION INTRAVENOUS at 09:33

## 2020-09-24 RX ADMIN — MAGNESIUM GLUCONATE 500 MG ORAL TABLET 400 MG: 500 TABLET ORAL at 09:02

## 2020-09-24 RX ADMIN — HEPARIN SODIUM 5000 UNITS: 5000 INJECTION INTRAVENOUS; SUBCUTANEOUS at 14:19

## 2020-09-24 RX ADMIN — METOPROLOL TARTRATE 25 MG: 25 TABLET, FILM COATED ORAL at 09:31

## 2020-09-24 RX ADMIN — MELATONIN TAB 5 MG 5 MG: 5 TAB at 21:13

## 2020-09-24 RX ADMIN — POTASSIUM CHLORIDE 10 MEQ: 750 TABLET, EXTENDED RELEASE ORAL at 12:28

## 2020-09-24 RX ADMIN — HEPARIN SODIUM 5000 UNITS: 5000 INJECTION INTRAVENOUS; SUBCUTANEOUS at 20:15

## 2020-09-24 RX ADMIN — TAMSULOSIN HYDROCHLORIDE 0.4 MG: 0.4 CAPSULE ORAL at 09:31

## 2020-09-24 RX ADMIN — PRAVASTATIN SODIUM 80 MG: 40 TABLET ORAL at 20:14

## 2020-09-24 RX ADMIN — POTASSIUM CHLORIDE 10 MEQ: 750 TABLET, EXTENDED RELEASE ORAL at 16:04

## 2020-09-24 RX ADMIN — INSULIN LISPRO 1 UNITS: 100 INJECTION, SOLUTION INTRAVENOUS; SUBCUTANEOUS at 20:15

## 2020-09-24 RX ADMIN — METOPROLOL TARTRATE 12.5 MG: 25 TABLET, FILM COATED ORAL at 20:14

## 2020-09-24 RX ADMIN — PRASUGREL 10 MG: 10 TABLET, FILM COATED ORAL at 09:31

## 2020-09-24 ASSESSMENT — PAIN DESCRIPTION - ORIENTATION
ORIENTATION: MID
ORIENTATION: MID
ORIENTATION: RIGHT
ORIENTATION: LEFT

## 2020-09-24 ASSESSMENT — PAIN DESCRIPTION - ONSET
ONSET: ON-GOING
ONSET: ON-GOING

## 2020-09-24 ASSESSMENT — PAIN DESCRIPTION - PAIN TYPE
TYPE: SURGICAL PAIN
TYPE: ACUTE PAIN
TYPE: ACUTE PAIN
TYPE: SURGICAL PAIN
TYPE: SURGICAL PAIN

## 2020-09-24 ASSESSMENT — PAIN DESCRIPTION - PROGRESSION
CLINICAL_PROGRESSION: NOT CHANGED
CLINICAL_PROGRESSION: GRADUALLY IMPROVING

## 2020-09-24 ASSESSMENT — PAIN SCALES - GENERAL
PAINLEVEL_OUTOF10: 0
PAINLEVEL_OUTOF10: 0
PAINLEVEL_OUTOF10: 5
PAINLEVEL_OUTOF10: 0
PAINLEVEL_OUTOF10: 2
PAINLEVEL_OUTOF10: 8
PAINLEVEL_OUTOF10: 0
PAINLEVEL_OUTOF10: 5
PAINLEVEL_OUTOF10: 5
PAINLEVEL_OUTOF10: 0

## 2020-09-24 ASSESSMENT — PAIN DESCRIPTION - FREQUENCY
FREQUENCY: CONTINUOUS
FREQUENCY: CONTINUOUS

## 2020-09-24 ASSESSMENT — PAIN DESCRIPTION - DESCRIPTORS
DESCRIPTORS: ACHING
DESCRIPTORS: ACHING

## 2020-09-24 ASSESSMENT — PAIN DESCRIPTION - LOCATION
LOCATION: CHEST
LOCATION: CHEST
LOCATION: BREAST
LOCATION: CHEST
LOCATION: CHEST

## 2020-09-24 ASSESSMENT — PAIN - FUNCTIONAL ASSESSMENT
PAIN_FUNCTIONAL_ASSESSMENT: PREVENTS OR INTERFERES SOME ACTIVE ACTIVITIES AND ADLS
PAIN_FUNCTIONAL_ASSESSMENT: PREVENTS OR INTERFERES SOME ACTIVE ACTIVITIES AND ADLS

## 2020-09-24 ASSESSMENT — ENCOUNTER SYMPTOMS: SHORTNESS OF BREATH: 1

## 2020-09-24 ASSESSMENT — PAIN SCALES - WONG BAKER: WONGBAKER_NUMERICALRESPONSE: 4

## 2020-09-24 NOTE — PROGRESS NOTES
1. 1-1.2)  -UOP much improved; 2290 ml in 24 hrs. Continue lasix to 40 mg PO daily. ID: Periop antibiotics completed. Heme: H&H stable. Endo: DM2  - d/c Lantus, SSI. Will resuming home DM meds   PPX: Heparin, NIGHAT hose  Dispo:  DCP following. D/c home today. Pt plans to go home with Gloria Cintron and his sister is coming to help for 10 days. Chace Macias, CURTIS-CNP   9/24/2020  10:17 AM   Will hold discharge for now since developed hypotension with meds this am.  Note reviewed, events of last 24 hours reviewed along with vital signs and I/Os and patient examined. Assessment and plans discussed and are as outlined above.      Elisa Mckeon MD, FACS, [facility], FACCP, [place]

## 2020-09-24 NOTE — DISCHARGE SUMMARY
intercostal nerve block with Exparel. Platelet gel application. Sternal plating. History:  The patient is a 79 y.o. male with significant past medical history of CAD (2 stents placed in 02/2020, with 3rd one placed in March), T2DM, HTN, and HLD who presented to [facility] ED on 9/13 with c/o chest pain. He describes it as a burning sensation that radiates to bilateral armpits. He reports this pain as similar to the CP he had months ago when his wife succumbed to pancreatic cancer and that he He had multiple episodes of it since that time. Cardiology was consulted and performed a cardiac catheterization that revealed multivessel CAD not amenable to PCI. We have been consulted for surgical revascularization. Hospital Course: The post operative period for this patient was complicated by him converting into atrial fibrillation. After receiving amiodarone he converted back into sinus rhythm and has remained in it for over 48 hours prior to going home. The patient was discharged on 9/24/20. He will follow up in the office with Dr. Freddy Mills on 9/29/20. Discharged Condition: stable    Disposition:  Home with [facility] and his sister staying with him to help for 10 days. Medications:  Aspirin:start  ADP Inhibitor (plavix/brillinta/effient):continue  ACE/ARB:discontinue   Amiodarone:start  Betablocker:start  Statin:continue    Discharge Medications:   Claudmaylin Bonds   Home Medication Instructions VGY:[id number]    Printed on:09/24/20 1048   Medication Information                      amiodarone (CORDARONE) 200 MG tablet  Take 1 tablet by mouth 2 times daily for 5 days The end date for 2 tabs daily is 9/28/20. On 9/29/20 Pt is to take one tablet daily for 25 days.              amiodarone (CORDARONE) 200 MG tablet  Take 1 tablet by mouth daily for 25 days Starting date is 9/29/20.             aspirin 81 MG tablet  Take 81 mg by mouth daily             bisacodyl (DULCOLAX) 5 MG EC tablet  Take 1 tablet by mouth daily for

## 2020-09-24 NOTE — PROGRESS NOTES
Patient c/o sharp pain to left side just below breast area, pain 5/10 and with coughing pain increases to 8/10. Pilar Klein NP notified. Patient resting in bed. HOB elevated. Coughing at times, thick clear sputum.  Doron Raza

## 2020-09-24 NOTE — CARE COORDINATION
Writer was notified earlier today by NP of discharge order to be placed. When went to bedside to discuss discharge with patient he informed writer discharge was cancelled due to a \"rattle\" in his side. Spoke to Naomi Pritchett and Martin METZGER discharge order being removed and pt to stay again today. [facility] to follow when medically ready.  Renae Barrera RN

## 2020-09-24 NOTE — PROGRESS NOTES
Occupational Therapy  Facility/Department: [facility] B2 - ICU  Daily Treatment Note  NAME: Domingo Alvarez  : 1950  MRN: [id number]    Date of Service: 2020    Discharge Recommendations:  24 hour supervision or assist, Home with Home health OT       Assessment   Performance deficits / Impairments: Decreased functional mobility ; Decreased endurance;Decreased ADL status; Decreased balance;Decreased high-level IADLs  Assessment: Pt pleasant and agreeable to therapy. Pt reports he will be discharging home soon. Pt educated on BUE exercises to improve endurance as tolerated at home. Pt educated on increased safety awareness with bathing/dressing for home. Pt educated on importance of task modification and energy conservation for home. pt reports he has a shower chair in the basement he can use for bathing. Pt would benefit from continued therapy while in acute care. Prognosis: Good  Decision Making: Medium Complexity  OT Education: OT Role;Plan of Care;Precautions; Home Exercise Program;Transfer Training  Patient Education: disease specific: sternal precuations  REQUIRES OT FOLLOW UP: Yes  Activity Tolerance  Activity Tolerance: Patient Tolerated treatment well  Activity Tolerance: vitals stable throughout session  Safety Devices  Safety Devices in place: Yes  Type of devices: Call light within reach; Left in chair;Nurse notified         Patient Diagnosis(es): The primary encounter diagnosis was Acute chest pain. Diagnoses of Shortness of breath, Elevated troponin, and Acute post-operative pain were also pertinent to this visit. has a past medical history of CAD (coronary artery disease), Diabetes mellitus ([address] Hyperlipidemia, Hypertension, and Kidney stones. has a past surgical history that includes cervical fusion (); Appendectomy (); hernia repair ([id number]); Colonoscopy (&); Upper gastrointestinal endoscopy (); Coronary angioplasty with stent ();  Coronary angioplasty with stent (2008); Upper gastrointestinal endoscopy (08/28/2008); Cystoscopy (Left, 6/27/2019); Endoscopy, colon, diagnostic; CYSTOSCOPY INSERTION / REMOVAL STENT / STONE (Left, 7/11/2019); and Coronary artery bypass graft (N/A, 9/18/2020). Restrictions  Restrictions/Precautions  Restrictions/Precautions: General Precautions, Fall Risk, Cardiac  Position Activity Restriction  Sternal Precautions: No Pushing, No Pulling, 5# Lifting Restrictions  Sternal Precautions: No lifting greater than 5 lbs.   Other position/activity restrictions: Progressive ambulation, IJ, ICU monitoring     Subjective   General  Chart Reviewed: Yes  Patient assessed for rehabilitation services?: Yes  Family / Caregiver Present: No  Referring Practitioner: Robert Estevez MD  Diagnosis: CABG x4  Subjective  Subjective: pt pleasant and agreeable to therapy but reporting increased fatigue at this time  General Comment  Comments: RN approved therapy  Pain Assessment  Pain Assessment: Faces  Casanova-Baker Pain Rating: Hurts little more  Pain Type: Acute pain  Pain Location: Chest  Non-Pharmaceutical Pain Intervention(s): Repositioned  Vital Signs  Patient Currently in Pain: Yes     Orientation   WFL    Objective             Balance  Sitting Balance: Independent  Bed mobility  Supine to Sit: Unable to assess  Sit to Supine: Unable to assess  Comment: up in chair at start/end of session                          Cognition  Overall Cognitive Status: WFL                    Type of ROM/Therapeutic Exercise  Type of ROM/Therapeutic Exercise: AROM  Comment: BUE seated  Exercises  Shoulder Elevation: x15  Shoulder Flexion: x15 to 90 degrees  Elbow Flexion: x15  Elbow Extension: x15  Supination: x15  Pronation: x15  Wrist Flexion: x15  Wrist Extension: x15  Grasp/Release: x15                    Plan   Plan  Times per week: 4-5x/wk  Current Treatment Recommendations: Functional Mobility Training, Endurance Training, Safety Education & Training, Self-Care / ADL    AM-PAC Score        AM-PAC Inpatient Daily Activity Raw Score: 18 (09/24/20 1255)  AM-PAC Inpatient ADL T-Scale Score : 38.66 (09/24/20 1255)  ADL Inpatient CMS 0-100% Score: 46.65 (09/24/20 1255)  ADL Inpatient CMS G-Code Modifier : CK (09/24/20 1255)    Goals  Short term goals  Time Frame for Short term goals: 1 week (9/26/20)  Short term goal 1: Pt will complete LB dressing with min A. Short term goal 2: Pt will complete toilet transfer with CGA. Short term goal 3: Pt will complete ~15 reps of modified BUE exercises for increased endurance. (9/22/20); 9/21 progressing, pt tolerating 10-15 reps BUE exercises  Short term goal 4: Pt will complete ~5 minutes of dynamic standing for adls with CGA. Patient Goals   Patient goals : \"to get better and go home\"       Therapy Time   Individual Concurrent Group Co-treatment   Time In 1022         Time Out 1045         Minutes 23         Timed Code Treatment Minutes: 23 Minutes       Aissatou Up OTR/L    If pt is unable to be seen after this session, please let this note serve as discharge summary. Please see case management note for discharge disposition. Thank you.

## 2020-09-24 NOTE — PLAN OF CARE
Problem: Falls - Risk of:  Goal: Will remain free from falls  Description: Will remain free from falls  9/23/2020 2200 by Doni Rocha RN  Outcome: Ongoing  9/23/2020 1258 by Elton Galeazzi, RN  Outcome: Ongoing  Goal: Absence of physical injury  Description: Absence of physical injury  9/23/2020 2200 by Doni Rocha RN  Outcome: Ongoing  9/23/2020 1258 by Elton Galeazzi, RN  Outcome: Ongoing     Problem: Pain:  Goal: Pain level will decrease  Description: Pain level will decrease  9/23/2020 2200 by Doni Rocha RN  Outcome: Ongoing  9/23/2020 1258 by Elton Galeazzi, RN  Outcome: Ongoing  Goal: Control of acute pain  Description: Control of acute pain  9/23/2020 2200 by Doni Rocha RN  Outcome: Ongoing  9/23/2020 1258 by Elton Galeazzi, RN  Outcome: Ongoing  Goal: Control of chronic pain  Description: Control of chronic pain  9/23/2020 2200 by Doni Rocha RN  Outcome: Ongoing  9/23/2020 1258 by Elton Galeazzi, RN  Outcome: Ongoing     Problem: Discharge Planning:  Goal: Participates in care planning  Description: Participates in care planning  9/23/2020 2200 by Doni Rocha RN  Outcome: Ongoing  9/23/2020 1258 by Elton Galeazzi, RN  Outcome: Ongoing  Goal: Discharged to appropriate level of care  Description: Discharged to appropriate level of care  9/23/2020 2200 by Doni Rocha RN  Outcome: Ongoing  9/23/2020 1258 by Elton Galeazzi, RN  Outcome: Ongoing     Problem: Pain:  Goal: Pain level will decrease  Description: Pain level will decrease  9/23/2020 2200 by Doni Rocha RN  Outcome: Ongoing  9/23/2020 1258 by Elton Galeazzi, RN  Outcome: Ongoing  Goal: Recognizes and communicates pain  Description: Recognizes and communicates pain  9/23/2020 2200 by Doni Rocha RN  Outcome: Ongoing  9/23/2020 1258 by Elton Galeazzi, RN  Outcome: Ongoing  Goal: Control of acute pain  Description: Control of acute pain  9/23/2020 2200 by Doni Rocha RN  Outcome: Ongoing  9/23/2020 1258 by Nai Cortez RN  Outcome: Ongoing  Goal: Control of chronic pain  Description: Control of chronic pain  9/23/2020 2200 by Taylor Lazo RN  Outcome: Ongoing  9/23/2020 1258 by Nai Cortez RN  Outcome: Ongoing     Problem: Serum Glucose Level - Abnormal:  Goal: Ability to maintain appropriate glucose levels will improve to within specified parameters  Description: Ability to maintain appropriate glucose levels will improve to within specified parameters  9/23/2020 2200 by Taylor Lazo RN  Outcome: Ongoing  9/23/2020 1258 by Nai Cortez RN  Outcome: Ongoing     Problem: Nutrition  Goal: Optimal nutrition therapy  Description: Nutrition Problem #1: Inadequate oral intake  Intervention: Food and/or Nutrient Delivery: Continue NPO, Start Oral Diet(when medically appropriate)  Nutritional Goals:  Tolerate oral diet progression post op and conume greater than 50% of meals this admission      9/23/2020 2200 by Taylor Lazo RN  Outcome: Ongoing  9/23/2020 1258 by Nai Cortez RN  Outcome: Ongoing

## 2020-09-24 NOTE — PROGRESS NOTES
[facility]  Cardiology  Progress Note    Admission date:  2020    Reason for follow up visit: CAD    HPI/CC: Antoine Franco is a 79 y.o. male who was admitted 2020 for chest pain and elevated troponin. [facility] 2020 showed 100% RCA treated with successful balloon atherectomy RCA, CT surgery referral due to progressive CAD. Echo showed EF 55-60%. On 2020 he had CABG x4. Developed PAF post op. Subjective: Some shortness of breath and sternal discomfort. Discouraged, discharge cancelled due to hypotension. Vitals:  Blood pressure 99/60, pulse 79, temperature 98.7 °F (37.1 °C), temperature source Tympanic, resp. rate 18, height 5' 9\" (1.753 m), weight 269 lb 9.6 oz (122.3 kg), SpO2 94 %.   Temp  Av.2 °F (36.8 °C)  Min: 98 °F (36.7 °C)  Max: 98.7 °F (37.1 °C)  Pulse  Av.8  Min: 56  Max: 113  BP  Min: 64/34  Max: 126/70  SpO2  Av.2 %  Min: 92 %  Max: 98 %    24 hour I/O    Intake/Output Summary (Last 24 hours) at 2020 1226  Last data filed at 2020 0933  Gross per 24 hour   Intake 20 ml   Output 2170 ml   Net -2150 ml     Current Facility-Administered Medications   Medication Dose Route Frequency Provider Last Rate Last Dose    [START ON 2020] amiodarone (CORDARONE) tablet 200 mg  200 mg Oral Daily CURTIS Cain CNP        [START ON 2020] furosemide (LASIX) tablet 40 mg  40 mg Oral Daily CURTIS Cain CNP        metoprolol tartrate (LOPRESSOR) tablet 12.5 mg  12.5 mg Oral BID CURTIS Cain CNP        amiodarone (CORDARONE) tablet 200 mg  200 mg Oral BID CURTIS Cain CNP   200 mg at 20 1885    prasugrel (EFFIENT) tablet 10 mg  10 mg Oral Daily Tatum Deleon MD   10 mg at 20 0931    heparin (porcine) injection 5,000 Units  5,000 Units Subcutaneous 3 times per day CURTIS Cain CNP   5,000 Units at 20 1463    ferrous sulfate (IRON 325) tablet 325 mg  325 mg Oral BID LAKIA Deleon MD 325 mg at 09/24/20 0929    pravastatin (PRAVACHOL) tablet 80 mg  80 mg Oral Nightly Annabel Wolf MD   80 mg at 09/23/20 2111    sodium chloride flush 0.9 % injection 10 mL  10 mL Intravenous 2 times per day Jade Burgess MD   10 mL at 09/24/20 0933    sodium chloride flush 0.9 % injection 10 mL  10 mL Intravenous PRN Jade Burgess MD        potassium chloride 20 mEq/50 mL IVPB (Central Line)  20 mEq Intravenous PRN Jade Burgess MD   Stopped at 09/22/20 1152    oxyCODONE (ROXICODONE) immediate release tablet 5 mg  5 mg Oral Q4H PRN Jade Burgess MD   5 mg at 09/24/20 0631    Or    oxyCODONE (ROXICODONE) immediate release tablet 10 mg  10 mg Oral Q4H PRN Jade Burgess MD   10 mg at 09/22/20 0626    morphine (PF) injection 2 mg  2 mg Intravenous Q2H PRN Jade Burgess MD   2 mg at 09/19/20 1647    Or    morphine (PF) injection 4 mg  4 mg Intravenous Q2H PRN Jade Burgess MD        polyethylene glycol [facility]) packet 17 g  17 g Oral Daily Jade Burgess MD   Stopped at 09/24/20 0920    bisacodyl (DULCOLAX) EC tablet 5 mg  5 mg Oral Daily Jade Burgess MD   5 mg at 09/24/20 3647    bisacodyl (DULCOLAX) suppository 10 mg  10 mg Rectal Daily PRN Jade Burgess MD        ondansetron [facility] injection 4 mg  4 mg Intravenous Q8H PRN Jade Burgess MD        [Held by provider] losartan (COZAAR) tablet 25 mg  25 mg Oral Daily Jade Burgess MD        chlorhexidine (PERIDEX) 0.12 % solution 15 mL  15 mL Mouth/Throat BID Jade Burgess MD   15 mL at 09/24/20 0935    magnesium oxide (MAG-OX) tablet 400 mg  400 mg Oral Daily Jade Burgess MD   400 mg at 09/24/20 0902    potassium chloride (KLOR-CON M) extended release tablet 10 mEq  10 mEq Oral TID WC Jade Burgess MD   10 mEq at 09/24/20 0929    aspirin EC tablet 81 mg  81 mg Oral Daily Jade Burgess MD   81 mg at 09/24/20 0931    albuterol sulfate  (90 Base) MCG/ACT inhaler 2 puff  2 puff Inhalation Q6H PRN Breann Irwin MD        insulin glargine (LANTUS) injection vial 18 Units  0.15 Units/kg Subcutaneous Nightly Breann Irwin MD   18 Units at 09/23/20 2111    insulin lispro (HUMALOG) injection vial 0-6 Units  0-6 Units Subcutaneous TID WC Breann Irwin MD   1 Units at 09/24/20 1220    insulin lispro (HUMALOG) injection vial 0-3 Units  0-3 Units Subcutaneous Nightly Breann Irwin MD   1 Units at 09/23/20 2112    glucose (GLUTOSE) 40 % oral gel 15 g  15 g Oral PRN Breann Irwin MD        dextrose 50 % IV solution  12.5 g Intravenous PRN Breann Irwin MD        glucagon (rDNA) injection 1 mg  1 mg Intramuscular PRN Breann Irwin MD        dextrose 5 % solution  100 mL/hr Intravenous PRN Breann Irwin MD        famotidine (PEPCID) tablet 20 mg  20 mg Oral BID Breann Irwin MD   20 mg at 09/24/20 0931    melatonin tablet 5 mg  5 mg Oral Nightly PRN Breann Irwin MD   5 mg at 09/23/20 2111    fluticasone (FLONASE) 50 MCG/ACT nasal spray 1 spray  1 spray Each Nostril Daily Breann Irwin MD   Stopped at 09/24/20 0919    tamsulosin (FLOMAX) capsule 0.4 mg  0.4 mg Oral Daily Breann Irwin MD   0.4 mg at 09/24/20 5091     Review of Systems   Constitutional: Positive for fatigue. Respiratory: Positive for shortness of breath. Cardiovascular: Positive for chest pain. Neurological: Negative.       Objective:     Telemetry monitor: SR    Physical Exam:  Constitutional:  Comfortable and alert, NAD, appears stated age, obese  Eyes: PERRL, sclera nonicteric  Neck:  Supple, no masses, no thyroidmegaly, no JVD  Skin:  Warm and dry; no rash or lesions; +sternal incision  Heart: Regular, normal apex, S1 and S2 normal, no M/G/R  Lungs:  Normal respiratory effort; clear; no wheezing/rhonchi/rales  Abdomen: soft, non tender, + bowel sounds  Extremities:  No edema or cyanosis; no clubbing  Neuro: alert and oriented, moves legs and arms equally, normal mood and affect    Data Reviewed:    CABG 9/18/2020:  Urgent CABG X 4, with pedicled LIMA to LAD, sequential Greater Saphenous VG to OM 1 then on to OM 2, separate single Greater SVG to PDA of RCA, SGC, CPB, EVH Right Greater Saphenous vein, DIONI, Epiaortic ultrasound, Doppler verification of grafts, Bilateral 5 level intercostal nerve block(Exparel), Platelet gel application. Sternal plating. Echo 9/17/2020:  Normal left ventricular systolic function with ejection fraction of 55-60%. No regional wall motion abnormalites are seen. Mild concentric left ventricular hypertrophy. Grade I diastolic dysfunction with normal filling pressure. Compared to previous study from 2- no changes noted in left   ventricular function. No significant valvular heart disease. Definity echo contrast was injected. Coronary angiogram 9/14/2020:  Non-STEMI  PROCEDURES PERFORMED    Left heart catheterization  LVgram  Coronary angiogam  Coronary cath  Atherectomy of RCA  IVUS of RCA  PROCEDURE DESCRIPTION   Risks/benefits/alternatives/outcomes were discussed with patient and/or family and informed consent was obtained. Using the [facility] scale, the patient's right radial artery was found to be a level B. Patient was prepped draped in the usual sterile fashion. Local anaesthetic was applied over puncture site. Using a front wall technique, a 4/5 Bulgarian Terumo sheath was inserted into right radial artery. Verapamil, nitroglycerin were administered through the sheath. Heparin was administered. Diagnostic 5 Bulgarian pigtail, ultra, Hunter catheters were used for diagnostic angiograms. Pigtail was used for LV gram, ultra was taken and was used to cannulate the RCA but left main could not be cannulated with this and ultimately was cannulated with a Hunter catheter. Attention turned towards coronary intervention as noted below.   At the conclusion of the procedure, a TR band was placed over the puncture site and hemostasis was obtained. There were no immediate complications. I supervised sedation with versed 3 mg/fentanyl 150 Mcg during the procedure. 300 cc contrast was utilized. <20cc EBL.   LVEDP  18   GRADIENT ACROSS AORTIC VALVE  none   LV FUNCTION EF 40 %   WALL MOTION  base to mid inferior hypokinesis   MITRAL REGURGITATION  mild     LM Less than 10% [id number]-vso-lvrcgs stenosis            LAD Proximal-mid stents are noted with 50% in-stent restenosis. Distal vessel has 50 to 60% stenosis. There are well-developed left-to-right collaterals.     D1 is a small to medium sized vessel with ostial/proximal/mid 60 to 70% stenosis.       LCX  Large vessel, proximal and mid 90% stenosis. The distal vessel into OM 2 is stented and the stent is widely patent with less than 10% stenosis.       RI Tortuous, small vessel,Less than 10% azdwzdfx-qqp-fnrdxh stenosis            RCA Dominant, proximal 10 to 20% stenosis, the proximal and mid vessel are extensively stented and there is a mid-distal 100% occlusion with well-developed left to right collaterals. PERCUTANEOUS INTERVENTION DESCRIPTION    Heparin was used for anticoagulation, Integrilin was added during the procedure. The initial 4/5 Slovak sheath was upsized to aTerumo slender 6/7 sheath for intervention. A 7 Slovak AL 0.75 guiding catheter was taken and was used to intubate the RCA. A choice floppy wire was then used initially to cross the lesion and angioplasty was then performed with a 3 mm balloon. Additional guide support was felt to be needed and a run-through body wire was placed into the RCA and the choice floppy wire was removed. Then a 7 Western Sarai guide cell extension catheter was advanced into the RCA. Additional angioplasty was performed with 3 and 3.5 mm noncompliant balloons. Cutting Balloon atherectomy was then performed of the distal RCA as well as the proximal and mid RCA with 3 and 3.5 mm cutting balloons. 4 mm noncompliant balloon was used as well. COLE was performed which showed good stent expansion. Minimal luminal diameter was felt to be between 3.5 and 4 mm in the proximal and mid segments and 3 mm in the distal segments. Remaining disease and PDA was felt to be best treated medically and procedure was felt to be completed and it was felt stenting should be deferred as patient has had several stenting procedures already and IVUS did reveal multiple layers of stents. There was 0% residual stenosis. There was MOISÉS 3 flow before and after PCI. CONCLUSIONS:    Successful atherectomy of RCA  We will consider staged PCI of circumflex  We will also consult with CT surgery as patient has had several PCI procedures and continues to have refractory CAD with ASHD progression.     Coronary angiogram 2/2020:  LM <20%  LAD 30% prox, 70% mid in stent              D2 50% prox  Cx 40-50%              D3 99% prox  RCA 80% mid in stent  LVEF 60%  PTCA LAD              3.0 x 15 angiosculpt cutting balloon to 3.5  PTCA OM3              2.75 x 15 FARHAN  DAPT, statin, BBlk  Home in am  PTCA RCA next week     Coronary angiogram 3/3/2020:  Selective tyler RCA for staged PCI  RCA mid in stent 80% to 40%              3.5 x 15 cutting balloon              4.0 x 20 NC balloon to 24 katiuska  Consider laser atherectomy +/- brachytherapy if recurrent angina/restenosis    Lab Reviewed:     Renal Profile:  Lab Results   Component Value Date    CREATININE 1.5 09/24/2020    BUN 31 09/24/2020     09/24/2020    K 3.6 09/24/2020    K 4.5 09/04/2020    CL 99 09/24/2020    CO2 28 09/24/2020     CBC:    Lab Results   Component Value Date    WBC 8.1 09/24/2020    RBC 3.16 09/24/2020    HGB 9.3 09/24/2020    HCT 27.5 09/24/2020    MCV 87.1 09/24/2020    RDW 14.3 09/24/2020     09/24/2020     BNP:    Lab Results   Component Value Date    PROBNP 453 09/13/2020     Fasting Lipid Panel:    Lab Results   Component Value Date    CHOL 132 09/18/2020    HDL 32 09/18/2020    HDL 35 09/26/2011 TRIG 154 09/18/2020     Cardiac Enzymes:  CK/MbTroponin  Lab Results   Component Value Date    TROPONINI 0.33 09/13/2020     PT/ INR   Lab Results   Component Value Date    INR 1.29 09/19/2020    INR 1.38 09/18/2020    INR 1.09 09/18/2020    PROTIME 15.0 09/19/2020    PROTIME 16.1 09/18/2020    PROTIME 12.6 09/18/2020     PTT No results found for: PTT   Lab Results   Component Value Date    MG 2.20 09/24/2020    No results found for: TSH    All labs and imaging reviewed today    Assessment:  NSTEMI/CAD: s/p CABG x4 9/18/2020; s/p PCI LAD and OM 3 2/2020 and PCI RCA 3/2020  Paroxysmal atrial fibrillation: stable, new diagnosis, noted post CABG   - [id number] score 6   - amiodarone started 9/22/2020  HTN: stable, hypotension noted  HLD: controlled, LDL 69, statin  Anemia  DM: hgb a1c 6.6  Obesity  History of CVA    Plan:   1. Continue aspirin, lopressor, effient, statin  2. Anticoagulation per CT surgery  3. Off losartan due to hypotension  4. Follow up 10/27/2020  5.  Cardiology will sign off, please call with questions    CURTIS Pepe-BEBA  [address]  [phone]

## 2020-09-24 NOTE — DISCHARGE INSTR - COC
[id number]    bilateral inguinal    UPPER GASTROINTESTINAL ENDOSCOPY  2002    errosive esophagitis    UPPER GASTROINTESTINAL ENDOSCOPY  08/28/2008    GERD       Immunization History:   Immunization History   Administered Date(s) Administered    Tdap (Boostrix, Adacel) 01/21/2019       Active Problems:  Patient Active Problem List   Diagnosis Code    CVA (cerebral vascular accident) ([facility] I63.9    Bell's palsy G51.0    HTN (hypertension) I10    CAD (coronary artery disease) I25.10    Hyperlipidemia E78.5    DM2 (diabetes mellitus, type 2) ([facility] E11.9    Acute chest pain R07.9    Obesity E66.9    Hydronephrosis with renal and ureteral calculous obstruction N13.2    NSTEMI (non-ST elevated myocardial infarction) ([facility] I21.4    CAD in native artery I25.10    Anxiety and depression F41.9, F32.9    Dyslipidemia E78.5    S/P CABG x 4 Z95.1       Isolation/Infection:   Isolation            No Isolation          Patient Infection Status       None to display            Nurse Assessment:  Last Vital Signs: BP (!) 106/57   Pulse 95   Temp 98.7 °F (37.1 °C) (Tympanic)   Resp 18   Ht 5' 9\" (1.753 m)   Wt 269 lb 9.6 oz (122.3 kg)   SpO2 95%   BMI 39.81 kg/m²     Last documented pain score (0-10 scale): Pain Level: 2  Last Weight:   Wt Readings from Last 1 Encounters:   09/24/20 269 lb 9.6 oz (122.3 kg)     Mental Status:  {IP PT MENTAL STATUS:20030:::0}    IV Access:  { HEIDI IV ACCESS:084400442:::0}    Nursing Mobility/ADLs:  Walking   {CHP DME ADLs:700639923:::0}  Transfer  {CHP DME ADLs:536068053:::0}  Bathing  {CHP DME ADLs:192659403:::0}  Dressing  {CHP DME ADLs:658161566:::0}  Toileting  {CHP DME ADLs:855583741:::0}  Feeding  {CHP DME ADLs:473959030:::0}  Med Admin  {CHP DME ADLs:515704184:::0}  Med Delivery   {MH HEIDI MED Delivery:334494196:::0}    Wound Care Documentation and Therapy:        Elimination:  Continence:   · Bowel: {YES / :73905}  · Bladder: {YES / :16800}  Urinary Catheter: {Urinary Catheter:528654427:::0}   Colostomy/Ileostomy/Ileal Conduit: {YES / ML:}       Date of Last BM: ***    Intake/Output Summary (Last 24 hours) at 2020 0948  Last data filed at 2020 0933  Gross per 24 hour   Intake 20 ml   Output 2170 ml   Net -2150 ml     I/O last 3 completed shifts:   In: 18 [P.O.:480]  Out: 2290 [Urine:2290]    Safety Concerns:     [employer] Safety Concerns:891308769:::0}    Impairments/Disabilities:      [employer] Impairments/Disabilities:756767314:::0}    Nutrition Therapy:  Current Nutrition Therapy:   [employer] Diet List:563134666:::0}    Routes of Feeding: {CHP DME Other Feedings:281497088:::0}  Liquids: {Slp liquid thickness:49325}  Daily Fluid Restriction: {CHP DME Yes amt example:355805577:::0}  Last Modified Barium Swallow with Video (Video Swallowing Test): {Done Not Done IIED:511175832:::5}    Treatments at the Time of Hospital Discharge:   Respiratory Treatments: ***  Oxygen Therapy:  {Therapy; copd oxygen:86612:::0}  Ventilator:    {[facility] Vent List:860910046:::0}    Rehab Therapies: {THERAPEUTIC INTERVENTION:2327982574}  Weight Bearing Status/Restrictions: {[facility] Weight Bearin:::0}  Other Medical Equipment (for information only, NOT a DME order):  {EQUIPMENT:535840674}  Other Treatments: ***    Patient's personal belongings (please select all that are sent with patient):  {CHP DME Belongings:064938865:::0}    RN SIGNATURE:  {Esignature:691928542:::0}    CASE MANAGEMENT/SOCIAL WORK SECTION    Inpatient Status Date: 20    Readmission Risk Assessment Score:  Readmission Risk              Risk of Unplanned Readmission:        23           Discharging to Facility/ Agency   · Name: [facility]  Address:  · Phone: [phone]  · Fax:    Dialysis Facility (if applicable)   · Name:  · Address:  · Dialysis Schedule:  · Phone:  · Fax:    / signature: Electronically signed by Jim Vargas RN on 20 at 10:00 AM EDT    PHYSICIAN SECTION    Prognosis: Good    Condition at Discharge: Stable    Rehab Potential (if transferring to Rehab): Good    Recommended Labs or Other Treatments After Discharge: ***    Physician Certification: I certify the above information and transfer of [facility]  is necessary for the continuing treatment of the diagnosis listed and that he requires [address] for less 30 days.      Update Admission H&P: No change in H&P    PHYSICIAN SIGNATURE:  Electronically signed by Anali Painter MD on 9/24/20 at 9:48 AM EDT

## 2020-09-24 NOTE — PROGRESS NOTES
Physical Therapy    Attempted to see pt for PT follow up this date, although pt currently working with OT. Will follow up with pt as schedule permits. Thank you.     Pramod Mancilla  PT, DPT #[id number]

## 2020-09-25 ENCOUNTER — APPOINTMENT (OUTPATIENT)
Dept: GENERAL RADIOLOGY | Age: 70
DRG: 232 | End: 2020-09-25
Payer: MEDICARE

## 2020-09-25 LAB
ANION GAP SERPL CALCULATED.3IONS-SCNC: 11 MMOL/L (ref 3–16)
BILIRUBIN URINE: NEGATIVE
BLOOD, URINE: ABNORMAL
BUN BLDV-MCNC: 32 MG/DL (ref 7–20)
CALCIUM SERPL-MCNC: 8.7 MG/DL (ref 8.3–10.6)
CHLORIDE BLD-SCNC: 102 MMOL/L (ref 99–110)
CLARITY: CLEAR
CO2: 25 MMOL/L (ref 21–32)
COLOR: YELLOW
CREAT SERPL-MCNC: 1.5 MG/DL (ref 0.8–1.3)
EPITHELIAL CELLS, UA: ABNORMAL /HPF (ref 0–5)
GFR AFRICAN AMERICAN: 56
GFR NON-AFRICAN AMERICAN: 46
GLUCOSE BLD-MCNC: 111 MG/DL (ref 70–99)
GLUCOSE BLD-MCNC: 132 MG/DL (ref 70–99)
GLUCOSE BLD-MCNC: 141 MG/DL (ref 70–99)
GLUCOSE BLD-MCNC: 143 MG/DL (ref 70–99)
GLUCOSE BLD-MCNC: 160 MG/DL (ref 70–99)
GLUCOSE URINE: NEGATIVE MG/DL
HCT VFR BLD CALC: 27.6 % (ref 40.5–52.5)
HEMOGLOBIN: 9.2 G/DL (ref 13.5–17.5)
KETONES, URINE: NEGATIVE MG/DL
LEUKOCYTE ESTERASE, URINE: NEGATIVE
MAGNESIUM: 2.1 MG/DL (ref 1.8–2.4)
MCH RBC QN AUTO: 28.9 PG (ref 26–34)
MCHC RBC AUTO-ENTMCNC: 33.1 G/DL (ref 31–36)
MCV RBC AUTO: 87.3 FL (ref 80–100)
MICROSCOPIC EXAMINATION: YES
MUCUS: ABNORMAL /LPF
NITRITE, URINE: NEGATIVE
PDW BLD-RTO: 14.4 % (ref 12.4–15.4)
PERFORMED ON: ABNORMAL
PH UA: 6.5 (ref 5–8)
PLATELET # BLD: 290 K/UL (ref 135–450)
PMV BLD AUTO: 7.4 FL (ref 5–10.5)
POTASSIUM SERPL-SCNC: 4 MMOL/L (ref 3.5–5.1)
PROTEIN UA: NEGATIVE MG/DL
RBC # BLD: 3.17 M/UL (ref 4.2–5.9)
RBC UA: ABNORMAL /HPF (ref 0–4)
SODIUM BLD-SCNC: 138 MMOL/L (ref 136–145)
SPECIFIC GRAVITY UA: 1.01 (ref 1–1.03)
URINE TYPE: ABNORMAL
UROBILINOGEN, URINE: 0.2 E.U./DL
WBC # BLD: 11.4 K/UL (ref 4–11)
WBC UA: ABNORMAL /HPF (ref 0–5)

## 2020-09-25 PROCEDURE — 81001 URINALYSIS AUTO W/SCOPE: CPT

## 2020-09-25 PROCEDURE — 85027 COMPLETE CBC AUTOMATED: CPT

## 2020-09-25 PROCEDURE — 2000000000 HC ICU R&B

## 2020-09-25 PROCEDURE — 2580000003 HC RX 258: Performed by: THORACIC SURGERY (CARDIOTHORACIC VASCULAR SURGERY)

## 2020-09-25 PROCEDURE — 83735 ASSAY OF MAGNESIUM: CPT

## 2020-09-25 PROCEDURE — 6370000000 HC RX 637 (ALT 250 FOR IP): Performed by: STUDENT IN AN ORGANIZED HEALTH CARE EDUCATION/TRAINING PROGRAM

## 2020-09-25 PROCEDURE — 71045 X-RAY EXAM CHEST 1 VIEW: CPT

## 2020-09-25 PROCEDURE — 6370000000 HC RX 637 (ALT 250 FOR IP): Performed by: THORACIC SURGERY (CARDIOTHORACIC VASCULAR SURGERY)

## 2020-09-25 PROCEDURE — 99024 POSTOP FOLLOW-UP VISIT: CPT | Performed by: THORACIC SURGERY (CARDIOTHORACIC VASCULAR SURGERY)

## 2020-09-25 PROCEDURE — 97116 GAIT TRAINING THERAPY: CPT

## 2020-09-25 PROCEDURE — 97110 THERAPEUTIC EXERCISES: CPT

## 2020-09-25 PROCEDURE — 6360000002 HC RX W HCPCS: Performed by: NURSE PRACTITIONER

## 2020-09-25 PROCEDURE — 6370000000 HC RX 637 (ALT 250 FOR IP): Performed by: NURSE PRACTITIONER

## 2020-09-25 PROCEDURE — 80048 BASIC METABOLIC PNL TOTAL CA: CPT

## 2020-09-25 RX ADMIN — BISACODYL 5 MG: 5 TABLET, COATED ORAL at 09:50

## 2020-09-25 RX ADMIN — PRASUGREL 10 MG: 10 TABLET, FILM COATED ORAL at 09:50

## 2020-09-25 RX ADMIN — HEPARIN SODIUM 5000 UNITS: 5000 INJECTION INTRAVENOUS; SUBCUTANEOUS at 06:33

## 2020-09-25 RX ADMIN — INSULIN GLARGINE 18 UNITS: 100 INJECTION, SOLUTION SUBCUTANEOUS at 21:43

## 2020-09-25 RX ADMIN — FAMOTIDINE 20 MG: 20 TABLET, FILM COATED ORAL at 21:43

## 2020-09-25 RX ADMIN — OXYCODONE 5 MG: 5 TABLET ORAL at 21:57

## 2020-09-25 RX ADMIN — TAMSULOSIN HYDROCHLORIDE 0.4 MG: 0.4 CAPSULE ORAL at 10:03

## 2020-09-25 RX ADMIN — FERROUS SULFATE TAB 325 MG (65 MG ELEMENTAL FE) 325 MG: 325 (65 FE) TAB at 09:50

## 2020-09-25 RX ADMIN — HEPARIN SODIUM 5000 UNITS: 5000 INJECTION INTRAVENOUS; SUBCUTANEOUS at 13:06

## 2020-09-25 RX ADMIN — Medication 15 ML: at 09:53

## 2020-09-25 RX ADMIN — Medication 15 ML: at 21:43

## 2020-09-25 RX ADMIN — ASPIRIN 81 MG: 81 TABLET ORAL at 08:07

## 2020-09-25 RX ADMIN — POTASSIUM CHLORIDE 10 MEQ: 750 TABLET, EXTENDED RELEASE ORAL at 13:03

## 2020-09-25 RX ADMIN — SODIUM CHLORIDE, PRESERVATIVE FREE 10 ML: 5 INJECTION INTRAVENOUS at 09:54

## 2020-09-25 RX ADMIN — OXYCODONE 5 MG: 5 TABLET ORAL at 17:19

## 2020-09-25 RX ADMIN — INSULIN LISPRO 1 UNITS: 100 INJECTION, SOLUTION INTRAVENOUS; SUBCUTANEOUS at 13:05

## 2020-09-25 RX ADMIN — PRAVASTATIN SODIUM 80 MG: 40 TABLET ORAL at 21:42

## 2020-09-25 RX ADMIN — OXYCODONE 5 MG: 5 TABLET ORAL at 00:13

## 2020-09-25 RX ADMIN — OXYCODONE 5 MG: 5 TABLET ORAL at 06:34

## 2020-09-25 RX ADMIN — HEPARIN SODIUM 5000 UNITS: 5000 INJECTION INTRAVENOUS; SUBCUTANEOUS at 21:45

## 2020-09-25 RX ADMIN — AMIODARONE HYDROCHLORIDE 200 MG: 200 TABLET ORAL at 21:30

## 2020-09-25 RX ADMIN — INSULIN LISPRO 1 UNITS: 100 INJECTION, SOLUTION INTRAVENOUS; SUBCUTANEOUS at 08:08

## 2020-09-25 RX ADMIN — FERROUS SULFATE TAB 325 MG (65 MG ELEMENTAL FE) 325 MG: 325 (65 FE) TAB at 17:14

## 2020-09-25 RX ADMIN — SODIUM CHLORIDE, PRESERVATIVE FREE 10 ML: 5 INJECTION INTRAVENOUS at 21:44

## 2020-09-25 RX ADMIN — METOPROLOL TARTRATE 12.5 MG: 25 TABLET, FILM COATED ORAL at 21:42

## 2020-09-25 RX ADMIN — MELATONIN TAB 5 MG 5 MG: 5 TAB at 21:57

## 2020-09-25 RX ADMIN — MAGNESIUM GLUCONATE 500 MG ORAL TABLET 400 MG: 500 TABLET ORAL at 09:50

## 2020-09-25 RX ADMIN — INSULIN LISPRO 1 UNITS: 100 INJECTION, SOLUTION INTRAVENOUS; SUBCUTANEOUS at 21:43

## 2020-09-25 RX ADMIN — POTASSIUM CHLORIDE 10 MEQ: 750 TABLET, EXTENDED RELEASE ORAL at 09:50

## 2020-09-25 RX ADMIN — FAMOTIDINE 20 MG: 20 TABLET, FILM COATED ORAL at 08:07

## 2020-09-25 RX ADMIN — POLYETHYLENE GLYCOL 3350 17 G: 17 POWDER, FOR SOLUTION ORAL at 09:53

## 2020-09-25 RX ADMIN — AMIODARONE HYDROCHLORIDE 200 MG: 200 TABLET ORAL at 08:07

## 2020-09-25 ASSESSMENT — PAIN SCALES - WONG BAKER
WONGBAKER_NUMERICALRESPONSE: 4

## 2020-09-25 ASSESSMENT — PAIN DESCRIPTION - PAIN TYPE
TYPE: ACUTE PAIN
TYPE: ACUTE PAIN

## 2020-09-25 ASSESSMENT — PAIN SCALES - GENERAL
PAINLEVEL_OUTOF10: 5
PAINLEVEL_OUTOF10: 4
PAINLEVEL_OUTOF10: 2
PAINLEVEL_OUTOF10: 4
PAINLEVEL_OUTOF10: 2
PAINLEVEL_OUTOF10: 6

## 2020-09-25 ASSESSMENT — PAIN DESCRIPTION - LOCATION
LOCATION: FLANK
LOCATION: CHEST;FLANK

## 2020-09-25 ASSESSMENT — PAIN DESCRIPTION - PROGRESSION
CLINICAL_PROGRESSION: NOT CHANGED

## 2020-09-25 ASSESSMENT — PAIN DESCRIPTION - ORIENTATION
ORIENTATION: LEFT
ORIENTATION: MID;LEFT

## 2020-09-25 NOTE — CARE COORDINATION
Writer asked by Colby Manning NP to look at [facility] for patient. Dr Oleksandr Rashid feels strongly this is the safest and best discharge plan for patient. Writer did discuss insurance barriers as discussed before with Colby Manning and was asked to move forward with referral process as patient is medically ready to discharge.       Writer spoke to [facility] with ARU and she is aware of the above request from Dr Oleksandr Rashid and we have together reached out to therapy for updated notes so that precert can be initiated.       Writer spoke to patient at bedside and he is also in agreement and aware of the above plan. Will continue to follow for needs.   Erica Chaves RN

## 2020-09-25 NOTE — PROGRESS NOTES
CVTS Thoracic Progress Note:          CC:  Post op follow up    Hospital course:   9/22 Patient up in chair, reports feeling better today. Remains A-fib  9/23 Up in chair, has been in [employer] since yesterday afternoon. 9/24 sitting up in chair, reports feeling well, in NAD. Remains SR.  9/25 Up in chair, looks comfortable, reports that he thinks he has a kidney stone (has had \"hundreds\"). Obj:    Blood pressure 96/61, pulse 94, temperature 98.7 °F (37.1 °C), temperature source Oral, resp. rate 18, height 5' 9\" (1.753 m), weight 264 lb 14.4 oz (120.2 kg), SpO2 95 %. Pre op wt  120.5 kg  9/19  134.5 kg  9/24  122.3 kg  9/25  120.2 kg      Lungs 95% on RA, normal work of breathing, chest tubes removed 9/20. Abdomen soft, non distended, +BM's    Incision CDI    Diagnostics:   Recent Labs     09/23/20  0530 09/24/20  0530 09/25/20  0500   WBC 9.5 8.1 11.4*   HGB 9.5* 9.3* 9.2*   HCT 28.9* 27.5* 27.6*    257 290                                                                  Recent Labs     09/23/20  0530 09/24/20  0530 09/25/20  0500    138 138   K 4.0 3.6 4.0    99 102   CO2 27 28 25   BUN 30* 31* 32*   CREATININE 1.5* 1.5* 1.5*   GLUCOSE 125* 119* 132*            Recent Labs     09/23/20  0530 09/24/20  0530 09/25/20  0500   MG 2.20 2.20 2.10      CXR:   Normal post operative changes, no pneumothorax, chest tubes in place, PAC in place     Extubated 9/18 2040 PM  Transitioned 9/19 0945 AM    Assess/Plan:   Neuro:  Pain control  - Toradol, APAP, oxycodone  CV: NSTEMI s/p 4v CABG  - Was A-fib w/RVR 9/22, amiodarone started. Pt converted to SR. Continue amiodarone, BB and Effient  - BB - had hypotension 9/24. Decreased BB to 12.5 mg BID.  - ASA 81 and statin  - TPW out 9/20  Pulm: saturating upper 90's on RA.  - IS, OOB, cough/deep breath. PT/OT   FEN/GI: Cardiac 1500 ml fluid restriction.  +BM, continue bowel regimen   Renal/: Cr remains 1.5, continue to hold ARB (baseline 1.1-1.2)  -UOP much improved; 1975 ml in 24 hrs. Continue lasix 40 mg PO daily. ID: Periop antibiotics completed. Heme: H&H stable. Endo: DM2  - continue Lantus, SSI. Will resume home DM meds upon d/c. PPX: Heparin, NIGHAT hose  Dispo:  DCP following. D/c cancelled 2/2 hypotension, which has resolved. Will start ARU pre-cert. Lorraine Sanchez, CURTIS-CNP   9/25/2020  9:31 AM   Note reviewed, events of last 24 hours reviewed along with vital signs and I/Os and patient examined. Assessment and plans discussed and are as outlined above.      Aayush Hampton MD, FACS, [facility], FACCP, [place]

## 2020-09-25 NOTE — PROGRESS NOTES
Patient up in chair at bedside during shift handoff. Dr Johnny Rivera NP rounded & RN updated.  Racquel Macario

## 2020-09-25 NOTE — PLAN OF CARE
[address] REHAB PHASE I  CABG x4    Hermila Ramos   1950  09/25/20    Previous cardiac rehab notes: ambulate 75*ft\" increments     Activity limitations:  shortness of breath  increased pain with ambulation  fatigue    Patient and family's stated goal of care prior to discharge:  Patient goal(s): reduce shortness of breath  increase activity tolerance  complete cardiac rehabilitation program  ambulate independently without any assist device      History:  Past Medical History:   Diagnosis Date    CAD (coronary artery disease) 204    Diabetes mellitus ([facility] [address] 12/23/2011    A1c 15    Hyperlipidemia 1992    Hypertension     Kidney stones 1987         ACTIVITY TOLERANCE    Patient's baseline reported from RN prior to activity:  RPE: 6/20   RPD: 0/10   O2: 96% on RA   HR:  106   BP:  122/76       Patient's tolerance during activity:  Stage 3  Ambulate:   Patient assistance level: 1 x assist   Assist Devices: four-wheel walker       RPE: 13/20   RPD: 3/10   Rest breaks 3 standing rest breaks   Distance 50+50+50   HR 99   O2 93% on RA      Time: 35 Minutes    Discussed with RN to see patient. Per RN-okay to work with pt. Assisted pt from sitting to standing position from chair. Pt ambulated in hallway with 1x assist and 4ww assistive device. Assisted pt back to bed and placed phone and call light within reach. Pt has no needs at this time. Touched base with RN after pt's session, discussed pt toleration. Will continue to follow up during the duration of the CR order.      FIOR De Luna 09/25/20 2:00 PM    Exercise Physiologist    Phone: [phone]

## 2020-09-25 NOTE — PROGRESS NOTES
standing rest break required this date, endurance improving  Gait Deviations: Decreased step height;Decreased step length; Slow Maria Esther  Distance: 275 ft  Comments: mild SOB, O2 sats at 95% after ambulation  Stairs/Curb  Stairs?: No(pt reporting more fatigue today versus 2 days ago when this therapist saw him last, declining stairs)     Balance  Posture: Fair  Sitting - Static: Good  Sitting - Dynamic: Good  Standing - Static: Good;-  Standing - Dynamic: Good;-  Exercises  Quad Sets: x 15 B  Gluteal Sets: x 15 bilat  Hip Flexion: seated marching x 15 BLE  Hip Abduction: x 15 clamshells with pillow squeeze  Knee Long Arc Quad: x 15 BLE  Ankle Pumps: x 15 BLE                                                                                  AM-PAC Score  AM-PAC Inpatient Mobility Raw Score : 18 (09/25/20 1030)  AM-PAC Inpatient T-Scale Score : 43.63 (09/25/20 1030)  Mobility Inpatient CMS 0-100% Score: 46.58 (09/25/20 1030)  Mobility Inpatient CMS G-Code Modifier : CK (09/25/20 1030)          Goals  Short term goals  Time Frame for Short term goals: 1 week, 9/26/20 (unless otherwise specified)  Short term goal 1: Supine <> sit with mod I - not yet met (maxA x 2 as of 9/20/20); 9/23 mod A for sit to supine; 9/25 NT  Short term goal 2: Sit <> stand with mod I - MET 9/21/20; 9/25 Supervision  Short term goal 3: Bed <> chair with LRAD and mod I - not yet met (SBA for chair to bed as of 9/23/20); 9/25 NT  Short term goal 4: Ambulate 150 feet with LRAD and mod I - not yet met (Sup  to amb x 275 feet as of 9/25/20)  Short term goal 5: Patient to ascend 4 steps with rail and supervision - not yet met (SBA x 1 to amb up/down 4 steps as of 9/23/20)  Patient Goals   Patient goals :  \"To get stronger\"    Plan    Plan  Times per week: 5-7x/week in acute care  Times per day: Daily  Plan weeks: 1 week, 9/26/20  Specific instructions for Next Treatment: Progress ther ex and mobility as tolerated  Current Treatment Recommendations: Strengthening, Home Exercise Program, Safety Education & Training, Balance Training, Endurance Training, Patient/Caregiver Education & Training, Functional Mobility Training, Equipment Evaluation, Education, & procurement, Transfer Training, Gait Training, ADL/Self-care Training, Stair training  Safety Devices  Type of devices: All fall risk precautions in place, Nurse notified, Gait belt, Patient at risk for falls, Call light within reach, Left in chair     Therapy Time   Individual Concurrent Group Co-treatment   Time In 0947         Time Out 1012         Minutes 25         Timed Code Treatment Minutes: 25 Minutes    If pt is discharged prior to next therapy session, this note will serve as discharge summary.     Cheryl Sandhu, PT

## 2020-09-25 NOTE — PROGRESS NOTES
Ambulates well with rolling walker & assist 1, has walked 4 times today. continues to c/o left flank pain and is concerned he has another kidney stone. CTS aware and urine specimen sent for culture. Anxious at times. Sister at bedside this afternoon. Medicated with oxycodone per prn order for left flank pain and patient states some relief. Will continue to monitor.  Blas Earl

## 2020-09-25 NOTE — PROGRESS NOTES
Pt calling multiple times for assistance. Pt refuses to hold urinal or assist with mobility. This writer has directed and educated pt multiple times regarding need to hold own urinal and assist with moving and with ADLs. Pt verbalizes understand but continues to require education and encouragement.

## 2020-09-25 NOTE — CARE COORDINATION
Precert initiated with [facility] for ARU admission. Ref#: [id number] . Will notify DCP with further updates.  Thank you for the referral. Electronically signed by Mekhi Monteiro RN on 9/25/2020 at 11:57 AM

## 2020-09-25 NOTE — PLAN OF CARE
Problem: Falls - Risk of:  Goal: Will remain free from falls  Description: Will remain free from falls  Outcome: Ongoing  Goal: Absence of physical injury  Description: Absence of physical injury  Outcome: Ongoing     Problem: Pain:  Goal: Pain level will decrease  Description: Pain level will decrease  Outcome: Ongoing  Goal: Control of acute pain  Description: Control of acute pain  Outcome: Ongoing     Problem: Discharge Planning:  Goal: Participates in care planning  Description: Participates in care planning  Outcome: Ongoing     Problem: Pain:  Goal: Pain level will decrease  Description: Pain level will decrease  Outcome: Ongoing     Problem: Serum Glucose Level - Abnormal:  Goal: Ability to maintain appropriate glucose levels will improve to within specified parameters  Description: Ability to maintain appropriate glucose levels will improve to within specified parameters  Outcome: Ongoing     Problem: Nutrition  Goal: Optimal nutrition therapy  Description: Nutrition Problem #1: Inadequate oral intake  Intervention: Food and/or Nutrient Delivery: Continue NPO, Start Oral Diet(when medically appropriate)  Nutritional Goals:  Tolerate oral diet progression post op and conume greater than 50% of meals this admission      Outcome: Ongoing

## 2020-09-26 VITALS
OXYGEN SATURATION: 95 % | BODY MASS INDEX: 39.74 KG/M2 | HEIGHT: 69 IN | SYSTOLIC BLOOD PRESSURE: 146 MMHG | HEART RATE: 99 BPM | RESPIRATION RATE: 18 BRPM | TEMPERATURE: 98.4 F | WEIGHT: 268.3 LBS | DIASTOLIC BLOOD PRESSURE: 63 MMHG

## 2020-09-26 LAB
ANION GAP SERPL CALCULATED.3IONS-SCNC: 11 MMOL/L (ref 3–16)
BUN BLDV-MCNC: 27 MG/DL (ref 7–20)
CALCIUM SERPL-MCNC: 8.4 MG/DL (ref 8.3–10.6)
CHLORIDE BLD-SCNC: 101 MMOL/L (ref 99–110)
CO2: 25 MMOL/L (ref 21–32)
CREAT SERPL-MCNC: 1.4 MG/DL (ref 0.8–1.3)
GFR AFRICAN AMERICAN: >60
GFR NON-AFRICAN AMERICAN: 50
GLUCOSE BLD-MCNC: 129 MG/DL (ref 70–99)
GLUCOSE BLD-MCNC: 142 MG/DL (ref 70–99)
GLUCOSE BLD-MCNC: 169 MG/DL (ref 70–99)
HCT VFR BLD CALC: 27 % (ref 40.5–52.5)
HEMOGLOBIN: 9 G/DL (ref 13.5–17.5)
MAGNESIUM: 2.2 MG/DL (ref 1.8–2.4)
MCH RBC QN AUTO: 29.4 PG (ref 26–34)
MCHC RBC AUTO-ENTMCNC: 33.3 G/DL (ref 31–36)
MCV RBC AUTO: 88 FL (ref 80–100)
PDW BLD-RTO: 14.7 % (ref 12.4–15.4)
PERFORMED ON: ABNORMAL
PERFORMED ON: ABNORMAL
PLATELET # BLD: 302 K/UL (ref 135–450)
PMV BLD AUTO: 7.3 FL (ref 5–10.5)
POTASSIUM SERPL-SCNC: 4.2 MMOL/L (ref 3.5–5.1)
RBC # BLD: 3.07 M/UL (ref 4.2–5.9)
SODIUM BLD-SCNC: 137 MMOL/L (ref 136–145)
WBC # BLD: 9.8 K/UL (ref 4–11)

## 2020-09-26 PROCEDURE — 83735 ASSAY OF MAGNESIUM: CPT

## 2020-09-26 PROCEDURE — 6370000000 HC RX 637 (ALT 250 FOR IP): Performed by: NURSE PRACTITIONER

## 2020-09-26 PROCEDURE — 99024 POSTOP FOLLOW-UP VISIT: CPT | Performed by: THORACIC SURGERY (CARDIOTHORACIC VASCULAR SURGERY)

## 2020-09-26 PROCEDURE — 85027 COMPLETE CBC AUTOMATED: CPT

## 2020-09-26 PROCEDURE — 2580000003 HC RX 258: Performed by: THORACIC SURGERY (CARDIOTHORACIC VASCULAR SURGERY)

## 2020-09-26 PROCEDURE — 6370000000 HC RX 637 (ALT 250 FOR IP): Performed by: THORACIC SURGERY (CARDIOTHORACIC VASCULAR SURGERY)

## 2020-09-26 PROCEDURE — 80048 BASIC METABOLIC PNL TOTAL CA: CPT

## 2020-09-26 RX ADMIN — METOPROLOL TARTRATE 12.5 MG: 25 TABLET, FILM COATED ORAL at 09:31

## 2020-09-26 RX ADMIN — PRASUGREL 10 MG: 10 TABLET, FILM COATED ORAL at 09:31

## 2020-09-26 RX ADMIN — HEPARIN SODIUM 5000 UNITS: 5000 INJECTION INTRAVENOUS; SUBCUTANEOUS at 06:52

## 2020-09-26 RX ADMIN — INSULIN LISPRO 1 UNITS: 100 INJECTION, SOLUTION INTRAVENOUS; SUBCUTANEOUS at 11:18

## 2020-09-26 RX ADMIN — SODIUM CHLORIDE, PRESERVATIVE FREE 10 ML: 5 INJECTION INTRAVENOUS at 09:35

## 2020-09-26 RX ADMIN — POLYETHYLENE GLYCOL 3350 17 G: 17 POWDER, FOR SOLUTION ORAL at 09:32

## 2020-09-26 RX ADMIN — MAGNESIUM GLUCONATE 500 MG ORAL TABLET 400 MG: 500 TABLET ORAL at 09:33

## 2020-09-26 RX ADMIN — FUROSEMIDE 40 MG: 40 TABLET ORAL at 09:33

## 2020-09-26 RX ADMIN — AMIODARONE HYDROCHLORIDE 200 MG: 200 TABLET ORAL at 09:34

## 2020-09-26 RX ADMIN — POTASSIUM CHLORIDE 10 MEQ: 750 TABLET, EXTENDED RELEASE ORAL at 12:36

## 2020-09-26 RX ADMIN — FERROUS SULFATE TAB 325 MG (65 MG ELEMENTAL FE) 325 MG: 325 (65 FE) TAB at 09:34

## 2020-09-26 RX ADMIN — BISACODYL 5 MG: 5 TABLET, COATED ORAL at 09:34

## 2020-09-26 RX ADMIN — TAMSULOSIN HYDROCHLORIDE 0.4 MG: 0.4 CAPSULE ORAL at 09:32

## 2020-09-26 RX ADMIN — INSULIN LISPRO 1 UNITS: 100 INJECTION, SOLUTION INTRAVENOUS; SUBCUTANEOUS at 09:27

## 2020-09-26 RX ADMIN — ASPIRIN 81 MG: 81 TABLET ORAL at 09:34

## 2020-09-26 RX ADMIN — FAMOTIDINE 20 MG: 20 TABLET, FILM COATED ORAL at 09:33

## 2020-09-26 RX ADMIN — Medication 15 ML: at 09:34

## 2020-09-26 ASSESSMENT — PAIN SCALES - WONG BAKER: WONGBAKER_NUMERICALRESPONSE: 4

## 2020-09-26 ASSESSMENT — PAIN DESCRIPTION - PROGRESSION
CLINICAL_PROGRESSION: NOT CHANGED
CLINICAL_PROGRESSION: NOT CHANGED

## 2020-09-26 NOTE — PLAN OF CARE
Problem: Falls - Risk of:  Goal: Will remain free from falls  Description: Will remain free from falls  Outcome: Ongoing  Note: Pt. Calls out for assistance out of bed. Problem: Pain:  Goal: Pain level will decrease  Description: Pain level will decrease  Outcome: Ongoing  Note: Pt. Uses pain scale. Pain medications available as needed. Problem: Pain:  Goal: Pain level will decrease  Description: Pain level will decrease  Outcome: Ongoing  Note: Pt. Uses pain scale. Pain medications available as needed. Problem: Serum Glucose Level - Abnormal:  Goal: Ability to maintain appropriate glucose levels will improve to within specified parameters  Description: Ability to maintain appropriate glucose levels will improve to within specified parameters  Outcome: Ongoing  Note: Blood glucose is monitored before meals and at bedtime. Medications available as needed.

## 2020-09-26 NOTE — PROGRESS NOTES
Instructed pt's sister on how to assist pt. At home. Demonstrated understanding. Gave pt. Dc instructions and medications from out pt. Pharmacy. Stated understanding. Left floor via wheelchair to private vehicle. No distress noted.

## 2020-09-26 NOTE — CARE COORDINATION
Aware of d/c order. Pt was cert pending to [facility] ARU- called for status report. Spoke to Ronn, [employer] and [employer] and she states pt is now discharging home. Order for [address] placed and pt sister coming to bedside to review d/c instructions/pt status for d/c. Referral previously placed for [facility] and will send at d/c today if this remains d/c plan. Addendum: Cert remains pending to [facility] ARU. Will send [address] orders at d/c if pt able to d/c home today.

## 2020-09-26 NOTE — CONSULTS
Vasquez Conteh  9/26/2020  [id number]    Chief Complaint: CAD  Subjective:   No new issues; planning now to d/c home    ROS: no n/v, cp, sob, f/c  Objective:  Patient Vitals for the past 24 hrs:   BP Temp Temp src Pulse Resp SpO2 Weight   09/26/20 0600 -- -- -- 93 -- -- 268 lb 4.8 oz (121.7 kg)   09/26/20 0500 -- -- -- 88 -- -- --   09/26/20 0400 -- -- -- 83 -- -- --   09/26/20 0100 -- -- -- 81 -- -- --   09/26/20 0000 -- -- -- 70 -- -- --   09/25/20 2300 -- -- -- 78 -- -- --   09/25/20 2200 (!) 122/109 -- -- 99 -- -- --   09/25/20 2100 (!) 122/95 -- -- 99 -- -- --   09/25/20 2000 101/65 98 °F (36.7 °C) Oral 90 -- -- --   09/25/20 1900 110/63 -- -- 100 -- -- --   09/25/20 1800 111/64 -- -- 105 -- -- --   09/25/20 1710 -- -- -- 116 -- -- --   09/25/20 1700 114/66 -- -- 86 -- 98 % --   09/25/20 1600 (!) 108/91 98.1 °F (36.7 °C) Oral 92 18 94 % --   09/25/20 1510 -- -- -- 94 -- -- --   09/25/20 1500 -- -- -- 89 -- -- --   09/25/20 1400 -- -- -- 86 -- 97 % --   09/25/20 1300 -- -- -- 90 -- -- --   09/25/20 1245 -- -- -- 104 -- -- --   09/25/20 1200 -- 97.9 °F (36.6 °C) Oral 106 18 -- --   09/25/20 1100 -- -- -- 88 -- -- --   09/25/20 1000 -- -- -- 96 -- -- --   09/25/20 0951 (!) 95/53 -- -- 100 -- -- --     Gen: No distress, pleasant. HEENT: Normocephalic, atraumatic. CV: Regular rate and rhythm. Resp: No respiratory distress. Abd: Soft, nontender   Ext: No edema. Neuro: Alert, oriented, appropriately interactive.    Wt Readings from Last 3 Encounters:   09/26/20 268 lb 4.8 oz (121.7 kg)   09/08/20 271 lb (122.9 kg)   09/05/20 277 lb 2 oz (125.7 kg)       Laboratory data:   Lab Results   Component Value Date    WBC 9.8 09/26/2020    HGB 9.0 (L) 09/26/2020    HCT 27.0 (L) 09/26/2020    MCV 88.0 09/26/2020     09/26/2020       Lab Results   Component Value Date     09/26/2020    K 4.2 09/26/2020    K 4.5 09/04/2020     09/26/2020    CO2 25 09/26/2020    BUN 27 09/26/2020    CREATININE 1.4 09/26/2020    GLUCOSE 129 09/26/2020    CALCIUM 8.4 09/26/2020        Therapy progress:  PT  Position Activity Restriction  Sternal Precautions: No Pushing, No Pulling, 5# Lifting Restrictions  Sternal Precautions: No lifting greater than 5 lbs. Other position/activity restrictions: Progressive ambulation, IJ, ICU monitoring  Objective     Sit to Stand: Supervision  Stand to sit: Supervision  Bed to Chair: Stand by assistance(chair to sitting EOB)  Device: Rollator  Assistance: Supervision  Distance: 275 ft  OT  PT Equipment Recommendations  Equipment Needed: (CTA for need for RW at DC)  Other: defer to patient's facility. Assessment        SLP                Body mass index is 39.62 kg/m². Assessment:  1. CAD s/p CABG   2. Hyperlipidemia   3. H/o CVA       Recommendations:  Agree w/ plans to dc home. Tha Rea MD 9/26/2020, 9:37 AM

## 2020-09-26 NOTE — PROGRESS NOTES
CVTS Thoracic Progress Note:          CC:  Post op follow up    Hospital course:   9/22 Patient up in chair, reports feeling better today. Remains A-fib  9/23 Up in chair, has been in [employer] since yesterday afternoon. 9/24 sitting up in chair, reports feeling well, in NAD. Remains SR.  9/25 Up in chair, looks comfortable, reports that he thinks he has a kidney stone (has had \"hundreds\"). 9/26 up in the chair walking feeling comfortable  Spoke with the sister and she is more than happy to take him home and support him for a week  Obj:    Blood pressure (!) 122/109, pulse 93, temperature 98 °F (36.7 °C), temperature source Oral, resp. rate 18, height 5' 9\" (1.753 m), weight 268 lb 4.8 oz (121.7 kg), SpO2 98 %. Pre op wt  120.5 kg  9/19  134.5 kg  9/24  122.3 kg  9/25  120.2 kg      Lungs 95% on RA, normal work of breathing, chest tubes removed 9/20.     Abdomen soft, non distended, +BM's    Incision CDI    Diagnostics:   Recent Labs     09/24/20  0530 09/25/20  0500 09/26/20  0540   WBC 8.1 11.4* 9.8   HGB 9.3* 9.2* 9.0*   HCT 27.5* 27.6* 27.0*    290 302                                                                  Recent Labs     09/24/20  0530 09/25/20  0500 09/26/20  0540    138 137   K 3.6 4.0 4.2   CL 99 102 101   CO2 28 25 25   BUN 31* 32* 27*   CREATININE 1.5* 1.5* 1.4*   GLUCOSE 119* 132* 129*            Recent Labs     09/24/20  0530 09/25/20  0500 09/26/20  0540   MG 2.20 2.10 2.20      CXR:   Normal post operative changes, no pneumothorax, chest tubes in place, PAC in place     Extubated 9/18 2040 PM  Transitioned 9/19 0945 AM    Assess/Plan:     Patient is doing well pending ARDS start/DC home with sister help

## 2020-09-28 ENCOUNTER — TELEPHONE (OUTPATIENT)
Dept: CARDIOTHORACIC SURGERY | Age: 70
End: 2020-09-28

## 2020-09-28 NOTE — TELEPHONE ENCOUNTER
Mr. Cydney Zapata called, He is having problems with his BP. He was told in the hospital to hold the metoprolo if SBP <100/, this morning he took the metoprolol because his BP was 101/76, within 30-45 minutes his BP was 70/60 HR  65 he was very dizzy and lightheaded felt like he was going to pass out. He said that this has been happening since discharge but today was the worst.  He said he BP did get as long as 70/60 and lower. He states that he is eating + drinking, he does have a small amount of edema in the VG leg. His weight is about the same running 270 lbs. The current dose of the metoprolol is 25 mg, instructed him to decrease the dose to 1/2 pill or 12.5 mg. If his SBP < 105/ to hold his metoprolol, if > than 1-5 to take 12.5 mg of metoprolol. He has on office visit with us tomorrow.

## 2020-09-29 ENCOUNTER — OFFICE VISIT (OUTPATIENT)
Dept: CARDIOTHORACIC SURGERY | Age: 70
End: 2020-09-29

## 2020-09-29 VITALS
TEMPERATURE: 97.6 F | HEART RATE: 80 BPM | OXYGEN SATURATION: 95 % | WEIGHT: 271.4 LBS | SYSTOLIC BLOOD PRESSURE: 86 MMHG | DIASTOLIC BLOOD PRESSURE: 60 MMHG | BODY MASS INDEX: 40.2 KG/M2 | HEIGHT: 69 IN

## 2020-09-29 PROCEDURE — 99024 POSTOP FOLLOW-UP VISIT: CPT | Performed by: THORACIC SURGERY (CARDIOTHORACIC VASCULAR SURGERY)

## 2020-09-29 NOTE — LETTER
09/29/20      Anali Painter M.D., [facility] [place] [address] [facility] Thor[facility] Sur[facility]  [address] [id number]        RE:    [facility],   1950    Dear Shine Hernandez MD  [address] [place] [address] was seen today for routine follow-up after his recent CABG surgery. Attached is the postop note.         Sincerely,     Nikki Ba MD    CC: Thea Jones MD

## 2020-09-29 NOTE — PROGRESS NOTES
Progress Note    CC:  Postoperative follow-up    S/P    CABG surgery. Subjective:    His eating and sleeping habits are slowly improving. When he gets up from sitting he gets a little lightheaded and his blood pressure is running little on the low side today. He has minimal aches and pains postop. Vital Signs:                                                 BP 86/60 (Site: Left Upper Arm, Position: Sitting, Cuff Size: Medium Adult)   Pulse 80   Temp 97.6 °F (36.4 °C) (Temporal)   Ht 5' 9\" (1.753 m)   Wt 271 lb 6.4 oz (123.1 kg)   SpO2 95%   BMI 40.08 kg/m²        CV:   Regular rate and rhythm with no rubs or murmurs. Pulm: Clear lung fields with no rales or rhonchi. Incisions:    Sternal incision is clean, dry and intact. Sternum is stable. Abd:  Soft  Ext: Leg incision is clean, dry and intact. No peripheral edema. Assessment/Plan:  Overall doing very well post CABG surgery. I stopped his Lasix and will hold his beta-blocker for the next 1-2 days. I discussed secondary risk prevention for cardiovascular disease with the patient. The patient has received a copy of my protocol for the secondary risk prevention of cardiovascular disease. The patient may increase activities as he feels comfortable doing so. Follow-up with his PCP and Dr. Terri Gallardo as prescribed. Follow-up with me in 2 weeks.     Corazon Dillard MD  9/29/2020  11:46 AM

## 2020-09-30 ENCOUNTER — TELEPHONE (OUTPATIENT)
Dept: CARDIOTHORACIC SURGERY | Age: 70
End: 2020-09-30

## 2020-10-02 ENCOUNTER — TELEPHONE (OUTPATIENT)
Dept: CARDIOTHORACIC SURGERY | Age: 70
End: 2020-10-02

## 2020-10-02 NOTE — TELEPHONE ENCOUNTER
Pt called earlier this afternoon reporting that his BP this am was 117/71 HR 85. Later the BP was 107/52 HR 95. Stated he had bowel urgency and had 3-4 small, dark grey to black, pasty stools. Has not been taking Pepto Bismol. No associated diaphoresis, chest pain. States had been on iron in the hospital and had only 2-3 small BM's since discharge. He is on Eliquis. Continue to monitor for the rest of the afternoon. Pt also reported that he was having issues with food tasting different. Stated that the sweet foods were really sweet. Potential for thrush. Encouraged him to perform warm salt water rinses 4 times daily and may also rinse with 1/2 strength H2O2 with small amount of mouthwash to help as well. Voiced understanding. Reached out to pt at end of day. No recurrence of BM. Will continue to monitor. However, should bowel elimination resemble the previous output and be dark and tarry then he is to call MD on call.  Voiced understanding.  Veverly Crape

## 2020-10-05 ENCOUNTER — TELEPHONE (OUTPATIENT)
Dept: CARDIOLOGY CLINIC | Age: 70
End: 2020-10-05

## 2020-10-13 ENCOUNTER — OFFICE VISIT (OUTPATIENT)
Dept: CARDIOTHORACIC SURGERY | Age: 70
End: 2020-10-13

## 2020-10-13 VITALS
HEART RATE: 100 BPM | BODY MASS INDEX: 39.99 KG/M2 | HEIGHT: 69 IN | TEMPERATURE: 98.3 F | SYSTOLIC BLOOD PRESSURE: 118 MMHG | WEIGHT: 270 LBS | OXYGEN SATURATION: 96 % | DIASTOLIC BLOOD PRESSURE: 70 MMHG

## 2020-10-13 PROCEDURE — 99024 POSTOP FOLLOW-UP VISIT: CPT | Performed by: THORACIC SURGERY (CARDIOTHORACIC VASCULAR SURGERY)

## 2020-10-13 RX ORDER — OXYCODONE HYDROCHLORIDE 5 MG/1
5 TABLET ORAL EVERY 8 HOURS PRN
Qty: 21 TABLET | Refills: 0 | Status: SHIPPED | OUTPATIENT
Start: 2020-10-13 | End: 2020-10-20

## 2020-10-13 RX ORDER — FINASTERIDE 5 MG/1
5 TABLET, FILM COATED ORAL DAILY
COMMUNITY

## 2020-10-13 NOTE — LETTER
10/13/20      Nelly Jean Baptiste M.D., Domenico Hope, [facility] [place], [facility] Cardiovascular and Thoracic Surgeons  [address] [place] [address]        RE:    Kobi Jane,   1950    Dear Barb Mchugh MD  [address] [place] [address], [address] [place] [address],    Kobi Jane was seen today for routine follow-up after his recent CABG surgery. Attached is the postop note.         Sincerely,     Hoang Mullins MD    CC: Eugena Schlatter, MD

## 2020-10-13 NOTE — PROGRESS NOTES
Progress Note    CC:  Postoperative follow-up    S/P    CABG surgery. Subjective:    He still has multiple aches and pains, including across his chest his neck and low back. He also has some neuropathic pain down his right lateral thigh. His eating and sleeping habits are improving appropriately as is his activity levels. Vital Signs:                                                 /70 (Site: Left Upper Arm, Position: Sitting, Cuff Size: Medium Adult)   Pulse 100   Temp 98.3 °F (36.8 °C) (Temporal)   Ht 5' 9\" (1.753 m)   Wt 270 lb (122.5 kg)   SpO2 96%   BMI 39.87 kg/m²        CV:   Regular rate and rhythm with no rubs or murmurs. Pulm: Clear lung fields with no rales or rhonchi. Incisions:    Sternal incision is clean, dry and intact. Sternum is stable. Abd:  Soft  Ext: Leg incision is clean, dry and intact. 1+ peripheral edema. Assessment/Plan:  Overall doing very well post CABG surgery. I discussed secondary risk prevention for cardiovascular disease with the patient. The patient has received a copy of my protocol for the secondary risk prevention of cardiovascular disease. The patient may increase activities as he feels comfortable doing so. Follow-up with his PCP, Dr. Yvette Tamayo from neurosurgery and Dr. Aleksandra Martinez as prescribed. Follow-up with me as needed. I gave him 1 final refill on his oxycodone 1 tablet every 8 hours as needed #21 no refills.     Fiorella Medina MD  10/13/2020  12:16 PM

## 2020-10-20 NOTE — TELEPHONE ENCOUNTER
Spoke with pt, he states [facility] turned this RX away that they do not provide it. Rigo said it will be $40 an injection, which is expensive. Pt sattes he recently had bypass and is on all kinds on meds now. He is having leg pain side effects and is not sure which medication is causing it. He does not want to start Repatha right now until he figures out what meds are causing side effects. He sees [facility] on 10/27 and will discuss in detail at that visit. [place] [facility] [place].

## 2020-10-27 ENCOUNTER — OFFICE VISIT (OUTPATIENT)
Dept: CARDIOLOGY CLINIC | Age: 70
End: 2020-10-27
Payer: MEDICARE

## 2020-10-27 VITALS
OXYGEN SATURATION: 98 % | BODY MASS INDEX: 40.29 KG/M2 | WEIGHT: 272 LBS | HEIGHT: 69 IN | DIASTOLIC BLOOD PRESSURE: 74 MMHG | HEART RATE: 97 BPM | SYSTOLIC BLOOD PRESSURE: 122 MMHG

## 2020-10-27 PROCEDURE — 93000 ELECTROCARDIOGRAM COMPLETE: CPT | Performed by: NURSE PRACTITIONER

## 2020-10-27 PROCEDURE — 99215 OFFICE O/P EST HI 40 MIN: CPT | Performed by: NURSE PRACTITIONER

## 2020-10-27 RX ORDER — PANTOPRAZOLE SODIUM 40 MG/1
40 TABLET, DELAYED RELEASE ORAL DAILY
COMMUNITY

## 2020-10-27 RX ORDER — CARVEDILOL 3.12 MG/1
3.12 TABLET ORAL 2 TIMES DAILY WITH MEALS
Qty: 30 TABLET | Refills: 5
Start: 2020-10-27 | End: 2020-10-29 | Stop reason: SDUPTHER

## 2020-10-27 ASSESSMENT — ENCOUNTER SYMPTOMS: RESPIRATORY NEGATIVE: 1

## 2020-10-27 NOTE — PATIENT INSTRUCTIONS
Heart monitor  Blood work  Cardiac rehab referral  Restart carvedilol 3.125 mg twice a day with meals  Follow up with PCP  Follow up with Dr. Teena Felix in 2 months

## 2020-10-27 NOTE — LETTER
[address]   Cardiology Note              Date:  October 27, 2020  Patientname: Juan C Cardozo  YOB: 1950    Primary Care physician: Nella Flynn MD    HISTORY OF PRESENT ILLNESS: Juan C Cardozo is a 79 y.o. male with a history of CAD, PAD, HTN, HLD, DM, CVA. He had PCI 2006. He had PCI RCA in 2016. He had PCI RCA, LAD, OM 3 3/2020. Echo showed EF 55-60%. He was admitted 9/2020 for chest pain/elevated troponin and LHC showed progressive CAD. On 9/18/2020 he had CABG x4. He had atrial fibrillation post op. Today he presents for hospital follow up for CAD s/p CABG. EKG shows NSR RBBB rate 89. His main complaint is right hip and thigh pain, worse at night. He has no chest pain, shortness of breath, edema. BP controlled at home. He is trying to increase activity, he is walking about 1/2 mile daily. Weight today 10/27/2020: 272 lbs  Discharge weight 9/24/2020: 268 lbs    Past Medical History:   has a past medical history of CAD (coronary artery disease), Diabetes mellitus ([address] Hyperlipidemia, Hypertension, and Kidney stones. Past Surgical History:   has a past surgical history that includes cervical fusion (1995); Appendectomy (1970); hernia repair (9075&2355); Colonoscopy (1987&2002); Upper gastrointestinal endoscopy (2002); Coronary angioplasty with stent (2004); Coronary angioplasty with stent (2008); Upper gastrointestinal endoscopy (08/28/2008); Cystoscopy (Left, 6/27/2019); Endoscopy, colon, diagnostic; CYSTOSCOPY INSERTION / REMOVAL STENT / STONE (Left, 7/11/2019); and Coronary artery bypass graft (N/A, 9/18/2020). Home Medications:    Prior to Admission medications    Medication Sig Start Date End Date Taking?  Authorizing Provider   Coenzyme Q10 (CO Q 10) 100 MG CAPS Take 1 capsule by mouth    Historical Provider, MD   finasteride (PROSCAR) 5 MG tablet Take 5 mg by mouth daily    Historical Provider, MD Evolocumab 140 MG/ML SOAJ Inject 140 mg into the skin every 14 days 10/6/20   Ivan Boyce MD   amiodarone (CORDARONE) 200 MG tablet Take 1 tablet by mouth daily for 25 days Starting date is 9/29/20. 9/29/20 10/24/20  CURTIS Gamez CNP   metoprolol tartrate (LOPRESSOR) 25 MG tablet Take 1 tablet by mouth 2 times daily Hold this medication for pulse <72 or systolic FC<328 mmHg  Patient not taking: Reported on 10/13/2020 9/24/20   CURTIS Gamez CNP   magnesium oxide (MAG-OX) 400 (241.3 Mg) MG TABS tablet Take 1 tablet by mouth daily for 10 days 9/24/20 10/4/20  CURTIS Gamez CNP   famotidine (PEPCID) 20 MG tablet Take 1 tablet by mouth daily 9/24/20 10/24/20  CURTIS Gamez CNP   prasugrel (EFFIENT) 10 MG TABS Take 1 tablet by mouth daily 4/13/20   Ivan Boyce MD   sitaGLIPtan-metformin (JANUMET)  MG per tablet Take 1 tablet by mouth 2 times daily (with meals) 3/5/20   May CURTIS Rush CNP   glipiZIDE (GLUCOTROL) 5 MG tablet Take 5 mg by mouth daily     Historical Provider, MD   tamsulosin (FLOMAX) 0.4 MG capsule Take 0.4 mg by mouth 6/29/19   Historical Provider, MD   pioglitazone (ACTOS) 30 MG tablet Take 30 mg by mouth daily  11/11/18   Historical Provider, MD   pravastatin (PRAVACHOL) 80 MG tablet Take 80 mg by mouth daily    Historical Provider, MD   aspirin 81 MG tablet Take 81 mg by mouth daily    Historical Provider, MD   Bismuth Subsalicylate (PEPTO-BISMOL PO) Take 1 tablet by mouth as needed     Historical Provider, MD     Allergies:  Brilinta [ticagrelor]; Crestor [rosuvastatin calcium]; Penicillins; Lipitor; Plavix [clopidogrel bisulfate]; and Victoza [liraglutide]    Social History:   reports that he has never smoked. He has never used smokeless tobacco. He reports that he does not drink alcohol or use drugs.      Family History: family history includes Breast Cancer in his mother; Breast Cancer (age of onset: 61) in his sister; Heart Disease in his brother. Review of Systems   Review of Systems   Constitutional: Negative. Respiratory: Negative. Cardiovascular: Negative. Musculoskeletal: Positive for myalgias. Neurological: Negative.       OBJECTIVE:    Vital signs:    /74   Pulse 97   Ht 5' 9\" (1.753 m)   Wt 272 lb (123.4 kg)   SpO2 98%   BMI 40.17 kg/m²      Physical Exam:  Constitutional:  Comfortable and alert, NAD, appears stated age  Eyes: PERRL, sclera nonicteric  Neck:  Supple, no masses, no thyroidmegaly, no JVD  Skin:  Warm and dry; no rash or lesions; +sternal incision, no drainage, scabbing  Heart: Regular, normal apex, S1 and S2 normal, no M/G/R  Lungs:  Normal respiratory effort; clear; no wheezing/rhonchi/rales  Abdomen: soft, non tender, + bowel sounds  Extremities:  Trace BLE edema  Neuro: alert and oriented, moves legs and arms equally, normal mood and affect    Data Reviewed:      CABG 9/18/2020:  Urgent CABG X 4, with pedicled LIMA to LAD, sequential Greater Saphenous VG to OM 1 then on to OM 2, separate single Greater SVG to PDA of RCA, SGC, CPB, EVH Right Greater Saphenous vein, DIONI, Epiaortic ultrasound, Doppler verification of grafts, Bilateral 5 level intercostal nerve block(Exparel), Platelet gel application.  Sternal plating.     Echo 9/17/2020:  Normal left ventricular systolic function with ejection fraction of 55-60%.   No regional wall motion abnormalites are seen.   Mild concentric left ventricular hypertrophy.   Grade I diastolic dysfunction with normal filling pressure.   Compared to previous study from 2- no changes noted in left   ventricular function.   No significant valvular heart disease.   Definity echo contrast was injected.     Coronary angiogram 9/14/2020:  Non-STEMI  PROCEDURES PERFORMED    Left heart catheterization  LVgram  Coronary angiogam  Coronary cath  Atherectomy of RCA  IVUS of RCA  PROCEDURE DESCRIPTION Risks/benefits/alternatives/outcomes were discussed with patient and/or family and informed consent was obtained.  Using the Barbeau scale, the patient's right radial artery was found to be a level B.  Patient was prepped draped in the usual sterile fashion.  Local anaesthetic was applied over puncture site. Dony hollingsworth front wall technique, a 4/5 Slovenian Terumo sheath was inserted into right radial artery.  Verapamil, nitroglycerin were administered through the sheath.  Heparin was administered.  Diagnostic 5 Slovenian pigtail, ultra, Hunter catheters were used for diagnostic angiograms.  Pigtail was used for LV gram, ultra was taken and was used to cannulate the RCA but left main could not be cannulated with this and ultimately was cannulated with a Hunter catheter.  Attention turned towards coronary intervention as noted below.  At the conclusion of the procedure, a TR band was placed over the puncture site and hemostasis was obtained. Chidi Luke were no immediate complications. I supervised sedation with versed 3 mg/fentanyl 150 Mcg during the procedure.  300 cc contrast was utilized. <20cc EBL.   LVEDP  18   GRADIENT ACROSS AORTIC VALVE  none   LV FUNCTION EF 40 %   WALL MOTION  base to mid inferior hypokinesis   MITRAL REGURGITATION  mild      LM Less than 10% proximalmiddistal stenosis            LAD Proximalmid stents are noted with 50% in-stent restenosis.  Distal vessel has 50 to 60% stenosis.  There are well-developed left-to-right collaterals.     D1 is a small to medium sized vessel with ostial/proximal/mid 60 to 70% stenosis.       LCX  Large vessel, proximal and mid 90% stenosis.  The distal vessel into OM 2 is stented and the stent is widely patent with less than 10% stenosis.          RI Tortuous, small vessel,Less than 10% proximalmiddistal stenosis            RCA Dominant, proximal 10 to 20% stenosis, the proximal and mid vessel are extensively stented and there is a middistal 100% occlusion with well-developed left to right collaterals.      PERCUTANEOUS INTERVENTION DESCRIPTION    Heparin was used for anticoagulation, Integrilin was added during the procedure.  The initial 4/5 Turkish sheath was upsized to aTerumo slender 6/7 sheath for intervention.  A 7 Turkish AL 0.75 guiding catheter was taken and was used to intubate the RCA.  A choice floppy wire was then used initially to cross the lesion and angioplasty was then performed with a 3 mm balloon.  Additional guide support was felt to be needed and a run-through body wire was placed into the RCA and the choice floppy wire was removed.  Then a 7 Western Sarai guide cell extension catheter was advanced into the RCA.  Additional angioplasty was performed with 3 and 3.5 mm noncompliant balloons.  Cutting Balloon atherectomy was then performed of the distal RCA as well as the proximal and mid RCA with 3 and 3.5 mm cutting balloons.  4 mm noncompliant balloon was used as well.  COLE was performed which showed good stent expansion.  Minimal luminal diameter was felt to be between 3.5 and 4 mm in the proximal and mid segments and 3 mm in the distal segments.  Remaining disease and PDA was felt to be best treated medically and procedure was felt to be completed and it was felt stenting should be deferred as patient has had several stenting procedures already and IVUS did reveal multiple layers of stents.  There was 0% residual stenosis.  There was MOISÉS 3 flow before and after PCI.     CONCLUSIONS:    Successful atherectomy of RCA  We will consider staged PCI of circumflex  We will also consult with CT surgery as patient has had several PCI procedures and continues to have refractory CAD with ASHD progression.     Coronary angiogram 2/2020:  LM <20%  LAD 30% prox, 70% mid in stent              D2 50% prox  Cx 40-50%              D3 99% prox  RCA 80% mid in stent  LVEF 60%  PTCA LAD              3.0 x 15 angiosculpt cutting balloon to 3.5  PTCA OM3             2.75 x 15 FARHAN  DAPT, statin, BBlk  Home in am  PTCA RCA next week     Coronary angiogram 3/3/2020:  Selective tyler RCA for staged PCI  RCA mid in stent 80% to 40%              3.5 x 15 cutting balloon              4.0 x 20 NC balloon to 24 katiuska  Consider laser atherectomy +/- brachytherapy if recurrent angina/restenosis    Cardiology Labs Reviewed:   CBC: No results for input(s): WBC, HGB, HCT, PLT in the last 72 hours. BMP:No results for input(s): NA, K, CO2, BUN, CREATININE, LABGLOM, GLUCOSE in the last 72 hours. PT/INR: No results for input(s): PROTIME, INR in the last 72 hours. APTT:No results for input(s): APTT in the last 72 hours. FASTING LIPID PANEL:  Lab Results   Component Value Date    HDL 32 09/18/2020    HDL 35 09/26/2011    LDLCALC 69 09/18/2020    TRIG 154 09/18/2020     LIVER PROFILE:No results for input(s): AST, ALT, ALB in the last 72 hours. BNP:   Lab Results   Component Value Date    PROBNP 453 09/13/2020     Reviewed all labs and imaging today    Assessment:   CAD: s/p CABG x4 in the setting of NSTEMI 9/18/2020; s/p PCI LAD and OM 3 2/2020 and PCI RCA 3/2020; s/p PCI RCA 2016  Paroxysmal atrial fibrillation: EKG today shows sinus   - noted post CABG              - [id number] score 4-6 (HTN, possible CVA, DM, CAD, age)              - completed amiodarone post op  HTN: now stable, previously hypotension post op  HLD: controlled, LDL 71, on pravastatin (intolerant to high intensity therapy)  Anemia  DM: hgb a1c 6.6  Obesity  Reported history of CVA: unable to confirm, stable head CT/MRI    Plan:   1. Cardiac rehab referral  2. Update CBC and BMP  3. Discussed PAF and anticoagulation. He is hesitant to proceed with anticoagulation but agrees to 30 day monitor to evaluate for AF recurrence. If AF noted, would recommend anticoagulation. 4. Restart carvedilol 3.125 mg BID; stopped post op due to hypotension  5.  He is concerned about repatha cost, will hold for now given lipids at goal.  6. Continue aspirin, statin, effient  7. Check BP at home and call the office if consistently out of goal range  8. Follow up with PCP for low back/hip pain  9. Follow up with Dr. Zenaida Maria in 2 months    More than 45 minutes of time was spent in direct patient contact during this visit, more than 50% of which was spent educating regarding CAD, procedural details and lifestyle modifications.     CURTIS Elizalde-CNP  [facility]  [phone]

## 2020-10-27 NOTE — PROGRESS NOTES
[facility], [place], operated by [facility]   Cardiology Note              Date:  October 27, 2020  Patientname: Kaylee Mckeon  YOB: 1950    Primary Care physician: Vega Fournier MD    HISTORY OF PRESENT ILLNESS: Kaylee Mckeon is a 79 y.o. male with a history of CAD, PAD, HTN, HLD, DM, CVA. He had PCI 2006. He had PCI RCA in 2016. He had PCI RCA, LAD, OM 3 3/2020. Echo showed EF 55-60%. He was admitted 9/2020 for chest pain/elevated troponin and LHC showed progressive CAD. On 9/18/2020 he had CABG x4. He had atrial fibrillation post op. Today he presents for hospital follow up for CAD s/p CABG. EKG shows NSR RBBB rate 89. His main complaint is right hip and thigh pain, worse at night. He has no chest pain, shortness of breath, edema. BP controlled at home. He is trying to increase activity, he is walking about 1/2 mile daily. Weight today 10/27/2020: 272 lbs  Discharge weight 9/24/2020: 268 lbs    Past Medical History:   has a past medical history of CAD (coronary artery disease), Diabetes mellitus ([address] Hyperlipidemia, Hypertension, and Kidney stones. Past Surgical History:   has a past surgical history that includes cervical fusion (1995); Appendectomy (1970); hernia repair ([id number]); Colonoscopy (1987&2002); Upper gastrointestinal endoscopy (2002); Coronary angioplasty with stent (2004); Coronary angioplasty with stent (2008); Upper gastrointestinal endoscopy (08/28/2008); Cystoscopy (Left, 6/27/2019); Endoscopy, colon, diagnostic; CYSTOSCOPY INSERTION / REMOVAL STENT / STONE (Left, 7/11/2019); and Coronary artery bypass graft (N/A, 9/18/2020). Home Medications:    Prior to Admission medications    Medication Sig Start Date End Date Taking?  Authorizing Provider   Coenzyme Q10 (CO Q 10) 100 MG CAPS Take 1 capsule by mouth    Historical Provider, MD   finasteride (PROSCAR) 5 MG tablet Take 5 mg by mouth daily    Historical Provider, MD   Evolocumab 140 MG/ML SOAJ Inject 140 mg into the skin every 14 days 10/6/20   Ivan Boyce MD   amiodarone (CORDARONE) 200 MG tablet Take 1 tablet by mouth daily for 25 days Starting date is 9/29/20. 9/29/20 10/24/20  CURTIS Gamez CNP   metoprolol tartrate (LOPRESSOR) 25 MG tablet Take 1 tablet by mouth 2 times daily Hold this medication for pulse <03 or systolic LI<949 mmHg  Patient not taking: Reported on 10/13/2020 9/24/20   CURTIS Gamez CNP   magnesium oxide (MAG-OX) 400 (241.3 Mg) MG TABS tablet Take 1 tablet by mouth daily for 10 days 9/24/20 10/4/20  CURTIS Gamez CNP   famotidine (PEPCID) 20 MG tablet Take 1 tablet by mouth daily 9/24/20 10/24/20  CURTIS Gamez CNP   prasugrel (EFFIENT) 10 MG TABS Take 1 tablet by mouth daily 4/13/20   Ivan Boyce MD   sitaGLIPtan-metformin (JANUMET)  MG per tablet Take 1 tablet by mouth 2 times daily (with meals) 3/5/20   May CURTIS Rush CNP   glipiZIDE (GLUCOTROL) 5 MG tablet Take 5 mg by mouth daily     Historical Provider, MD   tamsulosin (FLOMAX) 0.4 MG capsule Take 0.4 mg by mouth 6/29/19   Historical Provider, MD   pioglitazone (ACTOS) 30 MG tablet Take 30 mg by mouth daily  11/11/18   Historical Provider, MD   pravastatin (PRAVACHOL) 80 MG tablet Take 80 mg by mouth daily    Historical Provider, MD   aspirin 81 MG tablet Take 81 mg by mouth daily    Historical Provider, MD   Bismuth Subsalicylate (PEPTO-BISMOL PO) Take 1 tablet by mouth as needed     Historical Provider, MD     Allergies:  Brilinta [ticagrelor]; Crestor [rosuvastatin calcium]; Penicillins; Lipitor; Plavix [clopidogrel bisulfate]; and Victoza [liraglutide]    Social History:   reports that he has never smoked. He has never used smokeless tobacco. He reports that he does not drink alcohol or use drugs. Family History: family history includes Breast Cancer in his mother; Breast Cancer (age of onset: 61) in his sister; Heart Disease in his brother.     Review of Systems   Review of Systems Constitutional: Negative. Respiratory: Negative. Cardiovascular: Negative. Musculoskeletal: Positive for myalgias. Neurological: Negative.       OBJECTIVE:    Vital signs:    /74   Pulse 97   Ht 5' 9\" (1.753 m)   Wt 272 lb (123.4 kg)   SpO2 98%   BMI 40.17 kg/m²      Physical Exam:  Constitutional:  Comfortable and alert, NAD, appears stated age  Eyes: PERRL, sclera nonicteric  Neck:  Supple, no masses, no thyroidmegaly, no JVD  Skin:  Warm and dry; no rash or lesions; +sternal incision, no drainage, scabbing  Heart: Regular, normal apex, S1 and S2 normal, no M/G/R  Lungs:  Normal respiratory effort; clear; no wheezing/rhonchi/rales  Abdomen: soft, non tender, + bowel sounds  Extremities:  Trace BLE edema  Neuro: alert and oriented, moves legs and arms equally, normal mood and affect    Data Reviewed:      CABG 9/18/2020:  Urgent CABG X 4, with pedicled LIMA to LAD, sequential Greater Saphenous VG to OM 1 then on to OM 2, separate single Greater SVG to PDA of RCA, SGC, CPB, EVH Right Greater Saphenous vein, DIONI, Epiaortic ultrasound, Doppler verification of grafts, Bilateral 5 level intercostal nerve block(Exparel), Platelet gel application.  Sternal plating.     Echo 9/17/2020:  Normal left ventricular systolic function with ejection fraction of 55-60%.   No regional wall motion abnormalites are seen.   Mild concentric left ventricular hypertrophy.   Grade I diastolic dysfunction with normal filling pressure.   Compared to previous study from 2- no changes noted in left   ventricular function.   No significant valvular heart disease.   Definity echo contrast was injected.     Coronary angiogram 9/14/2020:  Non-STEMI  PROCEDURES PERFORMED    Left heart catheterization  LVgram  Coronary angiogam  Coronary cath  Atherectomy of RCA  IVUS of RCA  PROCEDURE DESCRIPTION   Risks/benefits/alternatives/outcomes were discussed with patient and/or family and informed consent was obtained. Yaya Edwards the Barbeau scale, the patient's right radial artery was found to be a level B.  Patient was prepped draped in the usual sterile fashion.  Local anaesthetic was applied over puncture site. Hans Wiseman a front wall technique, a 4/5 Sri Lankan Terumo sheath was inserted into right radial artery.  Verapamil, nitroglycerin were administered through the sheath.  Heparin was administered.  Diagnostic 5 Sri Lankan pigtail, ultra, Hunter catheters were used for diagnostic angiograms.  Pigtail was used for LV gram, ultra was taken and was used to cannulate the RCA but left main could not be cannulated with this and ultimately was cannulated with a Hunter catheter.  Attention turned towards coronary intervention as noted below.  At the conclusion of the procedure, a TR band was placed over the puncture site and hemostasis was obtained. Mathew Pablotz were no immediate complications. I supervised sedation with versed 3 mg/fentanyl 150 Mcg during the procedure.  300 cc contrast was utilized. <20cc EBL.   LVEDP  18   GRADIENT ACROSS AORTIC VALVE  none   LV FUNCTION EF 40 %   WALL MOTION  base to mid inferior hypokinesis   MITRAL REGURGITATION  mild      LM Less than 10% [id number] stenosis            LAD Proximal-mid stents are noted with 50% in-stent restenosis.  Distal vessel has 50 to 60% stenosis.  There are well-developed left-to-right collaterals.     D1 is a small to medium sized vessel with ostial/proximal/mid 60 to 70% stenosis.       LCX  Large vessel, proximal and mid 90% stenosis.  The distal vessel into OM 2 is stented and the stent is widely patent with less than 10% stenosis.          RI Tortuous, small vessel,Less than 10% [id number] stenosis            RCA Dominant, proximal 10 to 20% stenosis, the proximal and mid vessel are extensively stented and there is a mid-distal 100% occlusion with well-developed left to right collaterals.      PERCUTANEOUS INTERVENTION DESCRIPTION    Heparin was used for anticoagulation, Integrilin was added during the procedure.  The initial 4/5 Ecuadorean sheath was upsized to aTerumo slender 6/7 sheath for intervention.  A 7 Ecuadorean AL 0.75 guiding catheter was taken and was used to intubate the RCA.  A choice floppy wire was then used initially to cross the lesion and angioplasty was then performed with a 3 mm balloon.  Additional guide support was felt to be needed and a run-through body wire was placed into the RCA and the choice floppy wire was removed.  Then a 7 Western Sarai guide cell extension catheter was advanced into the RCA.  Additional angioplasty was performed with 3 and 3.5 mm noncompliant balloons.  Cutting Balloon atherectomy was then performed of the distal RCA as well as the proximal and mid RCA with 3 and 3.5 mm cutting balloons.  4 mm noncompliant balloon was used as well.  COLE was performed which showed good stent expansion.  Minimal luminal diameter was felt to be between 3.5 and 4 mm in the proximal and mid segments and 3 mm in the distal segments.  Remaining disease and PDA was felt to be best treated medically and procedure was felt to be completed and it was felt stenting should be deferred as patient has had several stenting procedures already and IVUS did reveal multiple layers of stents.  There was 0% residual stenosis.  There was MOISÉS 3 flow before and after PCI.     CONCLUSIONS:    Successful atherectomy of RCA  We will consider staged PCI of circumflex  We will also consult with CT surgery as patient has had several PCI procedures and continues to have refractory CAD with ASHD progression.     Coronary angiogram 2/2020:  LM <20%  LAD 30% prox, 70% mid in stent              D2 50% prox  Cx 40-50%              D3 99% prox  RCA 80% mid in stent  LVEF 60%  PTCA LAD              3.0 x 15 angiosculpt cutting balloon to 3.5  PTCA OM3              2.75 x 15 FARHAN  DAPT, statin, BBlk  Home in am  PTCA RCA next week     Coronary angiogram 3/3/2020:  Selective tyler RCA for staged low back/hip pain  9. Follow up with Dr. Vania Johns in 2 months    More than 45 minutes of time was spent in direct patient contact during this visit, more than 50% of which was spent educating regarding CAD, procedural details and lifestyle modifications.     CURTIS Sun-BEBA  [facility]  [phone]

## 2020-10-28 ENCOUNTER — TELEPHONE (OUTPATIENT)
Dept: CARDIOLOGY CLINIC | Age: 70
End: 2020-10-28

## 2020-10-28 NOTE — TELEPHONE ENCOUNTER
Monitor placed by Adiel Gary, [address] [place] [address]  Length of monitor 30 days  Monitor ordered by [facility] code [id number]  Monitor number [id number]    Activation successful prior to pt leaving office?  Yes      Placed on 10/27/2020

## 2020-10-28 NOTE — TELEPHONE ENCOUNTER
Refill on carvedilol (COREG) 3.125 MG tablet     Pt requesting 90 day supply    :  [address], [address]

## 2020-10-29 RX ORDER — CARVEDILOL 3.12 MG/1
3.12 TABLET ORAL 2 TIMES DAILY WITH MEALS
Qty: 180 TABLET | Refills: 3 | Status: SHIPPED | OUTPATIENT
Start: 2020-10-29 | End: 2021-12-27

## 2020-11-16 PROBLEM — Z95.5 STENTED CORONARY ARTERY: Status: ACTIVE | Noted: 2017-02-24

## 2020-11-16 PROBLEM — Z98.61 CAD S/P PERCUTANEOUS CORONARY ANGIOPLASTY: Status: ACTIVE | Noted: 2020-03-09

## 2020-11-16 PROBLEM — Z87.442 PERSONAL HISTORY OF KIDNEY STONES: Status: ACTIVE | Noted: 2019-11-21

## 2020-11-16 PROBLEM — I25.10 CAD S/P PERCUTANEOUS CORONARY ANGIOPLASTY: Status: ACTIVE | Noted: 2020-03-09

## 2020-11-16 PROBLEM — M51.369 DDD (DEGENERATIVE DISC DISEASE), LUMBAR: Status: ACTIVE | Noted: 2018-01-23

## 2020-11-16 PROBLEM — M51.36 DDD (DEGENERATIVE DISC DISEASE), LUMBAR: Status: ACTIVE | Noted: 2018-01-23

## 2020-11-16 PROBLEM — M50.322 DEGENERATION OF INTERVERTEBRAL DISC AT C5-C6 LEVEL: Status: ACTIVE | Noted: 2017-06-09

## 2020-12-02 PROCEDURE — 93272 ECG/REVIEW INTERPRET ONLY: CPT | Performed by: INTERNAL MEDICINE

## 2020-12-04 ENCOUNTER — TELEPHONE (OUTPATIENT)
Dept: CARDIOLOGY | Age: 70
End: 2020-12-04

## 2020-12-04 NOTE — TELEPHONE ENCOUNTER
Please let him know that heart monitor did not show significant arrhythmia or atrial fibrillation. No changes, follow up as planned. Thanks!

## 2021-01-12 NOTE — PROGRESS NOTES
[address]   Cardiac Followup    Referring Provider:  Carl Canales MD     Chief Complaint   Patient presents with    Follow-up    Coronary Artery Disease    Hyperlipidemia    Hypertension    Chest Pain     a pinch when he sleeps wrong    Edema     right upper leg, was put on lasix        History of Present Illness:    Nicki Nichols is a 79 y.o. male who is here today for follow up for a history of coronary artery disease- PCI RCA in 2016. He had PCI RCA, LAD, OM 3 3/2020. Echo showed EF 55-60%, left heart cath 9/2020 showed progressive CAD, CABG surgery X4 9/18/2020, hypertension, hyperlipemia, and paroxysmal atrial fibrillation. Today he states he has been feeling well over the last month. He will be going to [place] tomorrow for 3 weeks. He has been walking daily for exercise. He has not started Repatha due to cost. He is currently taking pravastatin and tolerating this with COQ10. He has a feeling of a pinch in his chest with certain positions at night. He states he has dizziness after 3 hours after taking Coreg. Occurs with position changes. Not checking BP at home. Patient currently denies any weight gain, palpitations, shortness of breath, and syncope. Past Medical History:   has a past medical history of CAD (coronary artery disease), Degeneration of intervertebral disc at C5-C6 level, Diabetes mellitus ([address] Hyperlipidemia, Hypertension, Kidney stones, and S/P CABG x 4.    Surgical History:   has a past surgical history that includes cervical fusion (1995); Appendectomy (1970); hernia repair (9592&7196); Colonoscopy (1987&2002); Upper gastrointestinal endoscopy (2002); Coronary angioplasty with stent (2004); Coronary angioplasty with stent (2008); Upper gastrointestinal endoscopy (08/28/2008); Cystoscopy (Left, 6/27/2019); Endoscopy, colon, diagnostic; CYSTOSCOPY INSERTION / REMOVAL STENT / STONE (Left, 7/11/2019); and Coronary artery bypass graft (N/A, 9/18/2020).      Social History:   reports that he has never smoked. He has never used smokeless tobacco. He reports that he does not drink alcohol or use drugs. Family History:  family history includes Breast Cancer in his mother; Breast Cancer (age of onset: 61) in his sister; Heart Disease in his brother. Home Medications:  Prior to Admission medications    Medication Sig Start Date End Date Taking?  Authorizing Provider   Furosemide (LASIX PO) Take 10 mg by mouth   Yes Historical Provider, MD   Celecoxib (CELEBREX PO) Take by mouth   Yes Historical Provider, MD   carvedilol (COREG) 3.125 MG tablet Take 1 tablet by mouth 2 times daily (with meals) 10/29/20  Yes CURTIS Corado CNP   pantoprazole (PROTONIX) 40 MG tablet Take 40 mg by mouth daily   Yes Historical Provider, MD   Coenzyme Q10 (CO Q 10) 100 MG CAPS Take 1 capsule by mouth   Yes Historical Provider, MD   finasteride (PROSCAR) 5 MG tablet Take 5 mg by mouth daily   Yes Historical Provider, MD   prasugrel (EFFIENT) 10 MG TABS Take 1 tablet by mouth daily 4/13/20  Yes Yasmin Lambert MD   sitaGLIPtan-metformin (JANUMET)  MG per tablet Take 1 tablet by mouth 2 times daily (with meals) 3/5/20  Yes CURTIS Delgado Do, CNP   glipiZIDE (GLUCOTROL) 5 MG tablet Take 5 mg by mouth daily    Yes Historical Provider, MD   pravastatin (PRAVACHOL) 80 MG tablet Take 80 mg by mouth daily   Yes Historical Provider, MD   aspirin 81 MG tablet Take 81 mg by mouth daily   Yes Historical Provider, MD   Bismuth Subsalicylate (PEPTO-BISMOL PO) Take 1 tablet by mouth as needed    Yes Historical Provider, MD   Evolocumab 140 MG/ML SOAJ Inject 140 mg into the skin every 14 days  Patient not taking: Reported on 10/27/2020 10/6/20   Yasmin Lambert MD   amiodarone (CORDARONE) 200 MG tablet Take 1 tablet by mouth daily for 25 days Starting date is 9/29/20. 9/29/20 10/27/20  CURTIS Rodriguez CNP   magnesium oxide (MAG-OX) 400 (241.3 Mg) MG TABS tablet Take 1 tablet by mouth daily for 10 days 9/24/20 10/4/20  Vamsi Moya APRN - CNP   tamsulosin [facility]) 0.4 MG capsule Take 0.4 mg by mouth 6/29/19   Historical Provider, MD   pioglitazone (ACTOS) 30 MG tablet Take 30 mg by mouth daily  11/11/18   Historical Provider, MD        Allergies:  Brilinta [ticagrelor], Crestor [rosuvastatin calcium], Penicillins, Lipitor, Plavix [clopidogrel bisulfate], and Victoza [liraglutide]     Review of Systems:   · Constitutional: there has been no unanticipated weight loss. There's been no change in energy level, sleep pattern, or activity level. · Eyes: No visual changes or diplopia. No scleral icterus. · ENT: No Headaches, hearing loss or vertigo. No mouth sores or sore throat. · Cardiovascular: Reviewed in HPI  · Respiratory: No cough or wheezing, no sputum production. No hematemesis. · Gastrointestinal: No abdominal pain, appetite loss, blood in stools. No change in bowel or bladder habits. · Genitourinary: No dysuria, trouble voiding, or hematuria. · Musculoskeletal:  No gait disturbance, weakness or joint complaints. · Integumentary: No rash or pruritis. · Neurological: No headache, diplopia, change in muscle strength, numbness or tingling. No change in gait, balance, coordination, mood, affect, memory, mentation, behavior. · Psychiatric: No anxiety, no depression. · Endocrine: No malaise, fatigue or temperature intolerance. No excessive thirst, fluid intake, or urination. No tremor. · Hematologic/Lymphatic: No abnormal bruising or bleeding, blood clots or swollen lymph nodes. · Allergic/Immunologic: No nasal congestion or hives.     Physical Examination:    Vitals:    01/13/21 1401   BP: 134/76   Pulse: 96   SpO2: 96%        Constitutional and General Appearance: NAD   Respiratory:  · Normal excursion and expansion without use of accessory muscles  · Resp Auscultation: Normal breath sounds without dullness  Cardiovascular:  · The apical impulses not displaced  · Heart tones are crisp and normal  · Cervical veins are not engorged  · The carotid upstroke is normal in amplitude and contour without delay or bruit  · Normal S1S2, No S3, No Murmur  · Peripheral pulses are symmetrical and full  · There is no clubbing, cyanosis of the extremities. · No edema  · Femoral Arteries: 2+ and equal  · Pedal Pulses: 2+ and equal   Abdomen:  · No masses or tenderness  · Liver/Spleen: No Abnormalities Noted  Neurological/Psychiatric:  · Alert and oriented in all spheres  · Moves all extremities well  · Exhibits normal gait balance and coordination  · No abnormalities of mood, affect, memory, mentation, or behavior are noted      Echocardiogram 5/4/16  Summary   Technically limited study   Left ventricular systolic function is normal .   Ejection fraction is visually estimated to be 60-65 %. Mild concentric left ventricular hypertrophy is present. Diastolic filling parameters suggests grade I diastolic dysfunction . The aortic valve appears sclerotic but opens well. Individual aortic valve leaflets are not clearly visualized. The tricuspid valve was not well visualized. IVC not seen    Limited echocardiogram 2/22/2020  Limited echo for LV function. Normal left ventricle systolic function with an estimated ejection fraction   of 55-60%. No regional wall motion abnormalities are seen. Normal left ventricular diastolic filling pressure.      Left heart cath 2/24/2020  Procedures: LHC, LVG, CA, PTCA, sedation     LM <20%  LAD 30% prox, 70% mid in stent              D2 50% prox  Cx 40-50%              D3 99% prox  RCA 80% mid in stent  LVEF 60%  PTCA LAD              3.0 x 15 angiosculpt cutting balloon to 3.5  PTCA OM3              2.75 x 15 FARHAN     DAPT, statin, BBlk  Home in am  PTCA RCA next week      Left heart cath 3/3/2020  Procedures: Cor angio, PTCA, sedation     Selective tyler RCA for staged PCI     RCA mid in stent 80% to 40%              3.5 x 15 cutting balloon              4.0 x 20 NC balloon to 24 katiuska     Consider laser atherectomy +/- brachytherapy if recurrent angina/restenosis      Echocardiogram 9/17/2020   Summary   Normal left ventricular systolic function with ejection fraction of 55-60%. No regional wall motion abnormalites are seen. Mild concentric left ventricular hypertrophy. Grade I diastolic dysfunction with normal filling pressure. Compared to previous study from 2- no changes noted in left   ventricular function. No significant valvular heart disease. Definity echo contrast was injected     FINDINGS            LVGRAM     LVEDP  18   GRADIENT ACROSS AORTIC VALVE  none   LV FUNCTION EF 40 %   WALL MOTION  base to mid inferior hypokinesis   MITRAL REGURGITATION  mild         CORONARY ARTERIES     LM Less than 10% [id number] stenosis            LAD Proximal-mid stents are noted with 50% in-stent restenosis. Distal vessel has 50 to 60% stenosis. There are well-developed left-to-right collaterals.     D1 is a small to medium sized vessel with ostial/proximal/mid 60 to 70% stenosis.       LCX  Large vessel, proximal and mid 90% stenosis. The distal vessel into OM 2 is stented and the stent is widely patent with less than 10% stenosis.       RI Tortuous, small vessel,Less than 10% [id number]-[id number] stenosis            RCA Dominant, proximal 10 to 20% stenosis, the proximal and mid vessel are extensively stented and there is a mid-distal 100% occlusion with well-developed left to right collaterals.            PERCUTANEOUS INTERVENTION DESCRIPTION      Heparin was used for anticoagulation, Integrilin was added during the procedure. The initial 4/5 [place] sheath was upsized to aTerumo slender 6/7 sheath for intervention. A 7 [place] AL 0.75 guiding catheter was taken and was used to intubate the RCA. A choice floppy wire was then used initially to cross the lesion and angioplasty was then performed with a 3 mm balloon.   Additional guide support was felt to be needed and a run-through body wire was placed into the RCA and the choice floppy wire was removed. Then a 7 [place] guide cell extension catheter was advanced into the RCA. Additional angioplasty was performed with 3 and 3.5 mm noncompliant balloons. Cutting Balloon atherectomy was then performed of the distal RCA as well as the proximal and mid RCA with 3 and 3.5 mm cutting balloons. 4 mm noncompliant balloon was used as well. COLE was performed which showed good stent expansion. Minimal luminal diameter was felt to be between 3.5 and 4 mm in the proximal and mid segments and 3 mm in the distal segments. Remaining disease and PDA was felt to be best treated medically and procedure was felt to be completed and it was felt stenting should be deferred as patient has had several stenting procedures already and IVUS did reveal multiple layers of stents. There was 0% residual stenosis. There was MOISÉS 3 flow before and after PCI.              CONCLUSIONS:      Successful atherectomy of RCA  We will consider staged PCI of circumflex  We will also consult with CT surgery as patient has had several PCI procedures and continues to have refractory CAD with ASHD progression.        CABG X4 surgery 9/18/2020  Procedure:  Urgent CABG X 4, with pedicled LIMA to LAD, sequential Greater Saphenous VG to OM 1 then on to OM 2, separate single Greater SVG to PDA of RCA, SGC, CPB, EVH Right Greater Saphenous vein, DIONI, Epiaortic ultrasound, Doppler verification of grafts, Bilateral 5 level intercostal nerve block(Exparel), Platelet gel application. Sternal plating    FREDDIE 10/27-11/26/2020  No atrial fib     Assessment:   Chest pain - atypical, musculoskeletal based on description. stable  SOB - improved post CABG  Coronary artery disease - stable post CABG  CABG surgery 9/18/2020  Hyperlipidemia - improved. Intolerant to high potency statins. Tolerating pravastatin. Intolerant to other antilipids.  Will hold off Repatha. Hypertension - stable  Dizziness - states due to coreg  Right ankle swelling related to arthritis - stable  Post-op Afib. No recurrence. Monitor w/o AF. Declines AC. Amio stopped. Plan:  Try taking Coreg once daily in the morning and see if dizziness decreases   Continue other medications   Check blood pressure at home weekly  Regular exercise and following a healthy diet encouraged   Follow up with NP in 6 months   Recommend cardiac rehab       Thank you for allowing me to participate in the care of this individual.    The scribes documentation has been prepared under my direction and personally reviewed by me in its entirety. I confirm that the note above accurately reflects all work, treatment, procedures, and medical decision making performed by me. Dr. Philipp Askew MD      Scribe's attestation: This note was scribed in the presence of Dr. Philipp Askew M.D. By Peri Lazo.  Benedict Perry M.D., [facility]

## 2021-01-13 ENCOUNTER — OFFICE VISIT (OUTPATIENT)
Dept: CARDIOLOGY CLINIC | Age: 71
End: 2021-01-13
Payer: MEDICARE

## 2021-01-13 VITALS
SYSTOLIC BLOOD PRESSURE: 134 MMHG | DIASTOLIC BLOOD PRESSURE: 76 MMHG | HEART RATE: 96 BPM | HEIGHT: 69 IN | BODY MASS INDEX: 41.18 KG/M2 | OXYGEN SATURATION: 96 % | WEIGHT: 278 LBS

## 2021-01-13 DIAGNOSIS — I25.10 CAD IN NATIVE ARTERY: Primary | ICD-10-CM

## 2021-01-13 DIAGNOSIS — Z95.1 S/P CABG X 4: ICD-10-CM

## 2021-01-13 DIAGNOSIS — I10 ESSENTIAL HYPERTENSION: ICD-10-CM

## 2021-01-13 PROCEDURE — 99214 OFFICE O/P EST MOD 30 MIN: CPT | Performed by: INTERNAL MEDICINE

## 2021-01-13 NOTE — LETTER
[address]   Cardiac Followup    Referring Provider:  Tone Sanchez MD     Chief Complaint   Patient presents with    Follow-up    Coronary Artery Disease    Hyperlipidemia    Hypertension    Chest Pain     a pinch when he sleeps wrong    Edema     right upper leg, was put on lasix        History of Present Illness:    Osman Johnson is a 79 y.o. male who is here today for follow up for a history of coronary artery disease- PCI RCA in 2016. He had PCI RCA, LAD, OM 3 3/2020. Echo showed EF 55-60%, left heart cath 9/2020 showed progressive CAD, CABG surgery X4 9/18/2020, hypertension, hyperlipemia, and paroxysmal atrial fibrillation. Today he states he has been feeling well over the last month. He will be going to [place] tomorrow for 3 weeks. He has been walking daily for exercise. He has not started Repatha due to cost. He is currently taking pravastatin and tolerating this with COQ10. He has a feeling of a pinch in his chest with certain positions at night. He states he has dizziness after 3 hours after taking Coreg. Occurs with position changes. Not checking BP at home. Patient currently denies any weight gain, palpitations, shortness of breath, and syncope. Past Medical History:   has a past medical history of CAD (coronary artery disease), Degeneration of intervertebral disc at C5-C6 level, Diabetes mellitus ([address] Hyperlipidemia, Hypertension, Kidney stones, and S/P CABG x 4.    Surgical History:   has a past surgical history that includes cervical fusion (1995); Appendectomy (1970); hernia repair ([id number]); Colonoscopy (1987&2002); Upper gastrointestinal endoscopy (2002); Coronary angioplasty with stent (2004); Coronary angioplasty with stent (2008); Upper gastrointestinal endoscopy (08/28/2008); Cystoscopy (Left, 6/27/2019); Endoscopy, colon, diagnostic; CYSTOSCOPY INSERTION / REMOVAL STENT / STONE (Left, 7/11/2019); and Coronary artery bypass graft (N/A, 9/18/2020). magnesium oxide (MAG-OX) 400 (241.3 Mg) MG TABS tablet Take 1 tablet by mouth daily for 10 days 9/24/20 10/4/20  CURTIS Mckeon - CNP   tamsulosin [facility]) 0.4 MG capsule Take 0.4 mg by mouth 6/29/19   Historical Provider, MD   pioglitazone (ACTOS) 30 MG tablet Take 30 mg by mouth daily  11/11/18   Historical Provider, MD        Allergies:  Brilinta [ticagrelor], Crestor [rosuvastatin calcium], Penicillins, Lipitor, Plavix [clopidogrel bisulfate], and Victoza [liraglutide]     Review of Systems:   · Constitutional: there has been no unanticipated weight loss. There's been no change in energy level, sleep pattern, or activity level. · Eyes: No visual changes or diplopia. No scleral icterus. · ENT: No Headaches, hearing loss or vertigo. No mouth sores or sore throat. · Cardiovascular: Reviewed in HPI  · Respiratory: No cough or wheezing, no sputum production. No hematemesis. · Gastrointestinal: No abdominal pain, appetite loss, blood in stools. No change in bowel or bladder habits. · Genitourinary: No dysuria, trouble voiding, or hematuria. · Musculoskeletal:  No gait disturbance, weakness or joint complaints. · Integumentary: No rash or pruritis. · Neurological: No headache, diplopia, change in muscle strength, numbness or tingling. No change in gait, balance, coordination, mood, affect, memory, mentation, behavior. · Psychiatric: No anxiety, no depression. · Endocrine: No malaise, fatigue or temperature intolerance. No excessive thirst, fluid intake, or urination. No tremor. · Hematologic/Lymphatic: No abnormal bruising or bleeding, blood clots or swollen lymph nodes. · Allergic/Immunologic: No nasal congestion or hives.     Physical Examination:    Vitals:    01/13/21 1401   BP: 134/76   Pulse: 96   SpO2: 96%        Constitutional and General Appearance: NAD   Respiratory:  · Normal excursion and expansion without use of accessory muscles · Resp Auscultation: Normal breath sounds without dullness  Cardiovascular:  · The apical impulses not displaced  · Heart tones are crisp and normal  · Cervical veins are not engorged  · The carotid upstroke is normal in amplitude and contour without delay or bruit  · Normal S1S2, No S3, No Murmur  · Peripheral pulses are symmetrical and full  · There is no clubbing, cyanosis of the extremities. · No edema  · Femoral Arteries: 2+ and equal  · Pedal Pulses: 2+ and equal   Abdomen:  · No masses or tenderness  · Liver/Spleen: No Abnormalities Noted  Neurological/Psychiatric:  · Alert and oriented in all spheres  · Moves all extremities well  · Exhibits normal gait balance and coordination  · No abnormalities of mood, affect, memory, mentation, or behavior are noted      Echocardiogram 5/4/16  Summary   Technically limited study   Left ventricular systolic function is normal .   Ejection fraction is visually estimated to be 60-65 %. Mild concentric left ventricular hypertrophy is present. Diastolic filling parameters suggests grade I diastolic dysfunction . The aortic valve appears sclerotic but opens well. Individual aortic valve leaflets are not clearly visualized. The tricuspid valve was not well visualized. IVC not seen    Limited echocardiogram 2/22/2020  Limited echo for LV function. Normal left ventricle systolic function with an estimated ejection fraction   of 55-60%. No regional wall motion abnormalities are seen. Normal left ventricular diastolic filling pressure.      Left heart cath 2/24/2020  Procedures: LHC, LVG, CA, PTCA, sedation     LM <20%  LAD 30% prox, 70% mid in stent              D2 50% prox  Cx 40-50%              D3 99% prox  RCA 80% mid in stent  LVEF 60%  PTCA LAD              3.0 x 15 angiosculpt cutting balloon to 3.5  PTCA OM3              2.75 x 15 FARHAN     DAPT, statin, BBlk  Home in am  PTCA RCA next week      Left heart cath 3/3/2020 Procedures: Cor angio, PTCA, sedation     Selective tyler RCA for staged PCI     RCA mid in stent 80% to 40%              3.5 x 15 cutting balloon              4.0 x 20 NC balloon to 24 katiuska     Consider laser atherectomy +/- brachytherapy if recurrent angina/restenosis      Echocardiogram 9/17/2020   Summary   Normal left ventricular systolic function with ejection fraction of 55-60%. No regional wall motion abnormalites are seen. Mild concentric left ventricular hypertrophy. Grade I diastolic dysfunction with normal filling pressure. Compared to previous study from 2- no changes noted in left   ventricular function. No significant valvular heart disease. Definity echo contrast was injected     FINDINGS            LVGRAM     LVEDP  18   GRADIENT ACROSS AORTIC VALVE  none   LV FUNCTION EF 40 %   WALL MOTION  base to mid inferior hypokinesis   MITRAL REGURGITATION  mild         CORONARY ARTERIES     LM Less than 10% proximalmiddistal stenosis            LAD Proximalmid stents are noted with 50% in-stent restenosis. Distal vessel has 50 to 60% stenosis. There are well-developed left-to-right collaterals.     D1 is a small to medium sized vessel with ostial/proximal/mid 60 to 70% stenosis.       LCX  Large vessel, proximal and mid 90% stenosis. The distal vessel into OM 2 is stented and the stent is widely patent with less than 10% stenosis.          RI Tortuous, small vessel,Less than 10% proximalmiddistal stenosis            RCA Dominant, proximal 10 to 20% stenosis, the proximal and mid vessel are extensively stented and there is a middistal 100% occlusion with well-developed left to right collaterals.            PERCUTANEOUS INTERVENTION DESCRIPTION     Heparin was used for anticoagulation, Integrilin was added during the procedure. The initial 4/5 [place] sheath was upsized to aTerumo slender 6/7 sheath for intervention. A 7 [place] AL 0.75 guiding catheter was taken and was used to intubate the RCA. A choice floppy wire was then used initially to cross the lesion and angioplasty was then performed with a 3 mm balloon. Additional guide support was felt to be needed and a run-through body wire was placed into the RCA and the choice floppy wire was removed. Then a 7 [place] guide cell extension catheter was advanced into the RCA. Additional angioplasty was performed with 3 and 3.5 mm noncompliant balloons. Cutting Balloon atherectomy was then performed of the distal RCA as well as the proximal and mid RCA with 3 and 3.5 mm cutting balloons. 4 mm noncompliant balloon was used as well. COLE was performed which showed good stent expansion. Minimal luminal diameter was felt to be between 3.5 and 4 mm in the proximal and mid segments and 3 mm in the distal segments. Remaining disease and PDA was felt to be best treated medically and procedure was felt to be completed and it was felt stenting should be deferred as patient has had several stenting procedures already and IVUS did reveal multiple layers of stents. There was 0% residual stenosis.   There was MOISÉS 3 flow before and after PCI.              CONCLUSIONS:      Successful atherectomy of RCA  We will consider staged PCI of circumflex  We will also consult with CT surgery as patient has had several PCI procedures and continues to have refractory CAD with ASHD progression.        CABG X4 surgery 9/18/2020 Procedure:  Urgent CABG X 4, with pedicled LIMA to LAD, sequential Greater Saphenous VG to OM 1 then on to OM 2, separate single Greater SVG to PDA of RCA, SGC, CPB, EVH Right Greater Saphenous vein, DIONI, Epiaortic ultrasound, Doppler verification of grafts, Bilateral 5 level intercostal nerve block(Exparel), Platelet gel application. Sternal plating    FREDDIE 10/27-11/26/2020  No atrial fib     Assessment:   Chest pain - atypical, musculoskeletal based on description. stable  SOB - improved post CABG  Coronary artery disease - stable post CABG  CABG surgery 9/18/2020  Hyperlipidemia - improved. Intolerant to high potency statins. Tolerating pravastatin. Intolerant to other antilipids. Will hold off Repatha. Hypertension - stable  Dizziness - states due to coreg  Right ankle swelling related to arthritis - stable  Post-op Afib. No recurrence. Monitor w/o AF. Declines AC. Amio stopped. Plan:  Try taking Coreg once daily in the morning and see if dizziness decreases   Continue other medications   Check blood pressure at home weekly  Regular exercise and following a healthy diet encouraged   Follow up with NP in 6 months   Recommend cardiac rehab       Thank you for allowing me to participate in the care of this individual.    The scribes documentation has been prepared under my direction and personally reviewed by me in its entirety. I confirm that the note above accurately reflects all work, treatment, procedures, and medical decision making performed by me. Dr. Nirali Pugh MD      Scribe's attestation: This note was scribed in the presence of Dr. Nirali Pugh M.D. By Aleksey Mike.  Marizol Bowers M.D., [facility]

## 2021-01-13 NOTE — PATIENT INSTRUCTIONS
Plan:  Try taking Coreg once daily in the morning and see if dizziness decreases   Continue other medications   Check blood pressure at home weekly  Regular exercise and following a healthy diet encouraged   Follow up with NP in 6 months   Recommend cardiac rehab

## 2021-04-29 RX ORDER — PRASUGREL 10 MG/1
TABLET, FILM COATED ORAL
Qty: 90 TABLET | Refills: 3 | Status: SHIPPED | OUTPATIENT
Start: 2021-04-29 | End: 2022-04-25

## 2021-07-07 ENCOUNTER — OFFICE VISIT (OUTPATIENT)
Dept: CARDIOLOGY CLINIC | Age: 71
End: 2021-07-07
Payer: MEDICARE

## 2021-07-07 VITALS
DIASTOLIC BLOOD PRESSURE: 80 MMHG | BODY MASS INDEX: 40.58 KG/M2 | WEIGHT: 274 LBS | HEART RATE: 94 BPM | SYSTOLIC BLOOD PRESSURE: 128 MMHG | OXYGEN SATURATION: 96 % | HEIGHT: 69 IN

## 2021-07-07 DIAGNOSIS — E78.5 DYSLIPIDEMIA: ICD-10-CM

## 2021-07-07 DIAGNOSIS — I10 ESSENTIAL HYPERTENSION: ICD-10-CM

## 2021-07-07 DIAGNOSIS — I25.10 CAD IN NATIVE ARTERY: Primary | ICD-10-CM

## 2021-07-07 PROCEDURE — 99214 OFFICE O/P EST MOD 30 MIN: CPT | Performed by: NURSE PRACTITIONER

## 2021-07-07 ASSESSMENT — ENCOUNTER SYMPTOMS
RESPIRATORY NEGATIVE: 1
BACK PAIN: 1

## 2021-07-07 NOTE — PROGRESS NOTES
[facility], [place], operated by [facility]   Cardiology Note              Date:  July 7, 2021  Patientname: Sam Celaya  YOB: 1950    Primary Care physician: Giovanni Schmid MD    HISTORY OF PRESENT ILLNESS: Sam Celaya is a 70 y.o. male with a history of CAD, PAF, HTN, HLD, DM, CVA. He had PCI 2006. He had PCI RCA in 2016. He had PCI RCA, LAD, OM 3 3/2020. Echo showed EF 55-60%. He was admitted 9/2020 for chest pain/elevated troponin and C showed progressive CAD. Echo showed EF 55-60%. On 9/18/2020 he had CABG x4. He had atrial fibrillation post op. Cardiac monitor 11/2020 showed NSR, no AF. Today he presents for follow up for CAD, HTN, HLD. His main complaint is low back pain and thinks he may need back surgery soon. He has dizziness that did improve when carvedilol decreased to once daily. He has no chest pain or shortness of breath. He tries to stay active at home but is limited by back pain. He has a lot of stress, his wife passed away recently. Weight today 7/7/2021: 274 lbs  Office weight 10/27/2020: 272 lbs  Discharge weight 9/24/2020: 268 lbs    Past Medical History:   has a past medical history of CAD (coronary artery disease), Degeneration of intervertebral disc at C5-C6 level, Diabetes mellitus ([facility] [address] Hyperlipidemia, Hypertension, Kidney stones, and S/P CABG x 4. Past Surgical History:   has a past surgical history that includes cervical fusion (1995); Appendectomy (1970); hernia repair (7421&0887); Colonoscopy (1987&2002); Upper gastrointestinal endoscopy (2002); Coronary angioplasty with stent (2004); Coronary angioplasty with stent (2008); Upper gastrointestinal endoscopy (08/28/2008); Cystoscopy (Left, 6/27/2019); Endoscopy, colon, diagnostic; CYSTOSCOPY INSERTION / REMOVAL STENT / STONE (Left, 7/11/2019); and Coronary artery bypass graft (N/A, 9/18/2020). Home Medications:    Prior to Admission medications    Medication Sig Start Date End Date Taking?  Authorizing Provider   prasugrel (EFFIENT) 10 MG TABS TAKE 1 TABLET DAILY 4/29/21   Thalia Gr MD   Furosemide (LASIX PO) Take 10 mg by mouth    Historical Provider, MD   Celecoxib (CELEBREX PO) Take by mouth    Historical Provider, MD   carvedilol (COREG) 3.125 MG tablet Take 1 tablet by mouth 2 times daily (with meals) 10/29/20   CURTIS Kahn CNP   pantoprazole (PROTONIX) 40 MG tablet Take 40 mg by mouth daily    Historical Provider, MD   Coenzyme Q10 (CO Q 10) 100 MG CAPS Take 1 capsule by mouth    Historical Provider, MD   finasteride (PROSCAR) 5 MG tablet Take 5 mg by mouth daily    Historical Provider, MD   Evolocumab 140 MG/ML SOAJ Inject 140 mg into the skin every 14 days  Patient not taking: Reported on 10/27/2020 10/6/20   Thalia Gr MD   magnesium oxide (MAG-OX) 400 (241.3 Mg) MG TABS tablet Take 1 tablet by mouth daily for 10 days 9/24/20 10/4/20  CURTIS Michel CNP   sitaGLIPtan-metformin (JANUMET)  MG per tablet Take 1 tablet by mouth 2 times daily (with meals) 3/5/20   CURTIS Chu CNP   glipiZIDE (GLUCOTROL) 5 MG tablet Take 5 mg by mouth daily     Historical Provider, MD   tamsulosin (FLOMAX) 0.4 MG capsule Take 0.4 mg by mouth 6/29/19   Historical Provider, MD   pioglitazone (ACTOS) 30 MG tablet Take 30 mg by mouth daily  11/11/18   Historical Provider, MD   pravastatin (PRAVACHOL) 80 MG tablet Take 80 mg by mouth daily    Historical Provider, MD   aspirin 81 MG tablet Take 81 mg by mouth daily    Historical Provider, MD   Bismuth Subsalicylate (PEPTO-BISMOL PO) Take 1 tablet by mouth as needed     Historical Provider, MD     Allergies:  Brilinta [ticagrelor], Crestor [rosuvastatin calcium], Penicillins, Lipitor, Plavix [clopidogrel bisulfate], and Victoza [liraglutide]    Social History:   reports that he has never smoked. He has never used smokeless tobacco. He reports that he does not drink alcohol and does not use drugs.      Family History: family history includes Breast Cancer in his mother; Breast Cancer (age of onset: 61) in his sister; Heart Disease in his brother. Review of Systems   Review of Systems   Constitutional: Negative. Respiratory: Negative. Cardiovascular: Negative. Musculoskeletal: Positive for back pain. Negative for myalgias. Neurological: Negative.       OBJECTIVE:    Vital signs:    /80   Pulse 94   Ht 5' 9\" (1.753 m)   Wt 274 lb (124.3 kg)   SpO2 96%   BMI 40.46 kg/m²      Physical Exam:  Constitutional:  Comfortable and alert, NAD, appears stated age  Eyes: PERRL, sclera nonicteric  Neck:  Supple, no masses, no thyroidmegaly, no JVD  Skin:  Warm and dry; no rash or lesions; +sternal incision, no drainage, scabbing  Heart: Regular, normal apex, S1 and S2 normal, no M/G/R  Lungs:  Normal respiratory effort; clear; no wheezing/rhonchi/rales  Abdomen: soft, non tender, + bowel sounds  Extremities:  Trace BLE edema  Neuro: alert and oriented, moves legs and arms equally, normal mood and affect    Data Reviewed:      CABG 9/18/2020:  Urgent CABG X 4, with pedicled LIMA to LAD, sequential Greater Saphenous VG to OM 1 then on to OM 2, separate single Greater SVG to PDA of RCA, SGC, CPB, EVH Right Greater Saphenous vein, DIONI, Epiaortic ultrasound, Doppler verification of grafts, Bilateral 5 level intercostal nerve block(Exparel), Platelet gel application.  Sternal plating.     Echo 9/17/2020:  Normal left ventricular systolic function with ejection fraction of 55-60%.   No regional wall motion abnormalites are seen.   Mild concentric left ventricular hypertrophy.   Grade I diastolic dysfunction with normal filling pressure.   Compared to previous study from 2- no changes noted in left   ventricular function.   No significant valvular heart disease.   Definity echo contrast was injected.     Coronary angiogram 9/14/2020:  Non-STEMI  PROCEDURES PERFORMED    Left heart catheterization  LVgram  Coronary angiogam  Coronary cath  Atherectomy of RCA  IVUS of RCA  PROCEDURE DESCRIPTION   Risks/benefits/alternatives/outcomes were discussed with patient and/or family and informed consent was obtained.  Using the Barbeau scale, the patient's right radial artery was found to be a level B.  Patient was prepped draped in the usual sterile fashion.  Local anaesthetic was applied over puncture site. Nikki hollingsworth front wall technique, a 4/5 [place] Verdean Terumo sheath was inserted into right radial artery.  Verapamil, nitroglycerin were administered through the sheath.  Heparin was administered.  Diagnostic 5 Cape Verdean pigtail, ultra, Hunter catheters were used for diagnostic angiograms.  Pigtail was used for LV gram, ultra was taken and was used to cannulate the RCA but left main could not be cannulated with this and ultimately was cannulated with a Hunter catheter.  Attention turned towards coronary intervention as noted below.  At the conclusion of the procedure, a TR band was placed over the puncture site and hemostasis was obtained. Adenike Collar were no immediate complications. I supervised sedation with versed 3 mg/fentanyl 150 Mcg during the procedure.  300 cc contrast was utilized. <20cc EBL.   LVEDP  18   GRADIENT ACROSS AORTIC VALVE  none   LV FUNCTION EF 40 %   WALL MOTION  base to mid inferior hypokinesis   MITRAL REGURGITATION  mild      LM Less than 10% apllpygs-dci-rikrip stenosis            LAD Proximal-mid stents are noted with 50% in-stent restenosis.  Distal vessel has 50 to 60% stenosis.  There are well-developed left-to-right collaterals.     D1 is a small to medium sized vessel with ostial/proximal/mid 60 to 70% stenosis.       LCX  Large vessel, proximal and mid 90% stenosis.  The distal vessel into OM 2 is stented and the stent is widely patent with less than 10% stenosis.          RI Tortuous, small vessel,Less than 10% mymhsqld-ksi-pndyne stenosis            RCA Dominant, proximal 10 to 20% stenosis, the proximal and mid vessel are extensively stented and there is a mid-distal 100% occlusion with well-developed left to right collaterals.      PERCUTANEOUS INTERVENTION DESCRIPTION    Heparin was used for anticoagulation, Integrilin was added during the procedure.  The initial 4/5 Croatian sheath was upsized to aTerumo slender 6/7 sheath for intervention.  A 7 Croatian AL 0.75 guiding catheter was taken and was used to intubate the RCA.  A choice floppy wire was then used initially to cross the lesion and angioplasty was then performed with a 3 mm balloon.  Additional guide support was felt to be needed and a run-through body wire was placed into the RCA and the choice floppy wire was removed.  Then a 7 Western Sarai guide cell extension catheter was advanced into the RCA.  Additional angioplasty was performed with 3 and 3.5 mm noncompliant balloons.  Cutting Balloon atherectomy was then performed of the distal RCA as well as the proximal and mid RCA with 3 and 3.5 mm cutting balloons.  4 mm noncompliant balloon was used as well.  COLE was performed which showed good stent expansion.  Minimal luminal diameter was felt to be between 3.5 and 4 mm in the proximal and mid segments and 3 mm in the distal segments.  Remaining disease and PDA was felt to be best treated medically and procedure was felt to be completed and it was felt stenting should be deferred as patient has had several stenting procedures already and IVUS did reveal multiple layers of stents.  There was 0% residual stenosis.  There was MOISÉS 3 flow before and after PCI.     CONCLUSIONS:    Successful atherectomy of RCA  We will consider staged PCI of circumflex  We will also consult with CT surgery as patient has had several PCI procedures and continues to have refractory CAD with ASHD progression.     Coronary angiogram 2/2020:  LM <20%  LAD 30% prox, 70% mid in stent              D2 50% prox  Cx 40-50%              D3 99% prox  RCA 80% mid in stent  LVEF 60%  PTCA LAD              3.0 x 15 angiosculpt cutting balloon to 3. 5  PTCA OM3              2.75 x 15 FARHAN  DAPT, statin, BBlk  Home in am  PTCA RCA next week     Coronary angiogram 3/3/2020:  Selective tyler RCA for staged PCI  RCA mid in stent 80% to 40%              3.5 x 15 cutting balloon              4.0 x 20 NC balloon to 24 katiuska  Consider laser atherectomy +/- brachytherapy if recurrent angina/restenosis    Cardiology Labs Reviewed:   CBC: No results for input(s): WBC, HGB, HCT, PLT in the last 72 hours. BMP:No results for input(s): NA, K, CO2, BUN, CREATININE, LABGLOM, GLUCOSE in the last 72 hours. PT/INR: No results for input(s): PROTIME, INR in the last 72 hours. APTT:No results for input(s): APTT in the last 72 hours. FASTING LIPID PANEL:  Lab Results   Component Value Date    HDL 32 09/18/2020    HDL 35 09/26/2011    LDLCALC 69 09/18/2020    TRIG 154 09/18/2020     LIVER PROFILE:No results for input(s): AST, ALT, ALB in the last 72 hours. BNP:   Lab Results   Component Value Date    PROBNP 453 09/13/2020     Reviewed all labs and imaging today    Assessment:   CAD: stable, no angina; s/p CABG x4 in the setting of NSTEMI 9/18/2020; s/p PCI LAD and OM 3 2/2020 and PCI RCA 3/2020; s/p PCI RCA 2016; s/p PCI 2006  Paroxysmal atrial fibrillation: regular today   - noted post CABG, cardiac monitor 11/2020 showed no AF              - [id number] score 4-6 (HTN, possible CVA, DM, CAD, age)  HTN: controlled  HLD: sub optimal, , on pravastatin (intolerant to high intensity therapy)  Anemia  DM: hgb a1c 6.6  Obesity  Reported history of CVA: unable to confirm, stable head CT/MRI    Plan:   1. Continue aspirin, statin, effient, carvedilol; discussed PCSK9 but he is concerned about cost  2. Would continue DAPT indefinitely as tolerated given multiple PCIs  3. Check BP at home and call the office if consistently out of goal range  4. Stay active along with a healthy diet  5. Follow up with PCP for low back/hip pain  6.  Follow up with Dr. Worth Apley in 6 months     Tolu Waterman, [address]  [phone]

## 2021-07-07 NOTE — LETTER
[facility]   Cardiology Note              Date:  July 7, 2021  Patientname: Diana Read  YOB: 1950    Primary Care physician: Leonardo Schilder, MD    HISTORY OF PRESENT ILLNESS: Diana Read is a 70 y.o. male with a history of CAD, PAF, HTN, HLD, DM, CVA. He had PCI 2006. He had PCI RCA in 2016. He had PCI RCA, LAD, OM 3 3/2020. Echo showed EF 55-60%. He was admitted 9/2020 for chest pain/elevated troponin and LHC showed progressive CAD. Echo showed EF 55-60%. On 9/18/2020 he had CABG x4. He had atrial fibrillation post op. Cardiac monitor 11/2020 showed NSR, no AF. Today he presents for follow up for CAD, HTN, HLD. His main complaint is low back pain and thinks he may need back surgery soon. He has dizziness that did improve when carvedilol decreased to once daily. He has no chest pain or shortness of breath. He tries to stay active at home but is limited by back pain. He has a lot of stress, his wife passed away recently. Weight today 7/7/2021: 274 lbs  Office weight 10/27/2020: 272 lbs  Discharge weight 9/24/2020: 268 lbs    Past Medical History:   has a past medical history of CAD (coronary artery disease), Degeneration of intervertebral disc at C5-C6 level, Diabetes mellitus ([facility] [address] Hyperlipidemia, Hypertension, Kidney stones, and S/P CABG x 4. Past Surgical History:   has a past surgical history that includes cervical fusion (1995); Appendectomy (1970); hernia repair (9724&[id number]); Colonoscopy (1987&2002); Upper gastrointestinal endoscopy (2002); Coronary angioplasty with stent (2004); Coronary angioplasty with stent (2008); Upper gastrointestinal endoscopy (08/28/2008); Cystoscopy (Left, 6/27/2019); Endoscopy, colon, diagnostic; CYSTOSCOPY INSERTION / REMOVAL STENT / STONE (Left, 7/11/2019); and Coronary artery bypass graft (N/A, 9/18/2020). Home Medications:    Prior to Admission medications    Medication Sig Start Date End Date Taking?  Authorizing Provider Breast Cancer in his mother; Breast Cancer (age of onset: 61) in his sister; Heart Disease in his brother. Review of Systems   Review of Systems   Constitutional: Negative. Respiratory: Negative. Cardiovascular: Negative. Musculoskeletal: Positive for back pain. Negative for myalgias. Neurological: Negative.       OBJECTIVE:    Vital signs:    /80   Pulse 94   Ht 5' 9\" (1.753 m)   Wt 274 lb (124.3 kg)   SpO2 96%   BMI 40.46 kg/m²      Physical Exam:  Constitutional:  Comfortable and alert, NAD, appears stated age  Eyes: PERRL, sclera nonicteric  Neck:  Supple, no masses, no thyroidmegaly, no JVD  Skin:  Warm and dry; no rash or lesions; +sternal incision, no drainage, scabbing  Heart: Regular, normal apex, S1 and S2 normal, no M/G/R  Lungs:  Normal respiratory effort; clear; no wheezing/rhonchi/rales  Abdomen: soft, non tender, + bowel sounds  Extremities:  Trace BLE edema  Neuro: alert and oriented, moves legs and arms equally, normal mood and affect    Data Reviewed:      CABG 9/18/2020:  Urgent CABG X 4, with pedicled LIMA to LAD, sequential Greater Saphenous VG to OM 1 then on to OM 2, separate single Greater SVG to PDA of RCA, SGC, CPB, EVH Right Greater Saphenous vein, DIONI, Epiaortic ultrasound, Doppler verification of grafts, Bilateral 5 level intercostal nerve block(Exparel), Platelet gel application.  Sternal plating.     Echo 9/17/2020:  Normal left ventricular systolic function with ejection fraction of 55-60%.   No regional wall motion abnormalites are seen.   Mild concentric left ventricular hypertrophy.   Grade I diastolic dysfunction with normal filling pressure.   Compared to previous study from 2- no changes noted in left   ventricular function.   No significant valvular heart disease.   Definity echo contrast was injected.     Coronary angiogram 9/14/2020:  Non-STEMI  PROCEDURES PERFORMED    Left heart catheterization  LVgram  Coronary angiogam  Coronary cath  Atherectomy of RCA  IVUS of RCA  PROCEDURE DESCRIPTION   Risks/benefits/alternatives/outcomes were discussed with patient and/or family and informed consent was obtained.  Using the Barbeau scale, the patient's right radial artery was found to be a level B.  Patient was prepped draped in the usual sterile fashion.  Local anaesthetic was applied over puncture site. Oral Thedford a front wall technique, a 4/5 [place]i Terumo sheath was inserted into right radial artery.  Verapamil, nitroglycerin were administered through the sheath.  Heparin was administered.  Diagnostic 5 Bahraini pigtail, ultra, Hunter catheters were used for diagnostic angiograms.  Pigtail was used for LV gram, ultra was taken and was used to cannulate the RCA but left main could not be cannulated with this and ultimately was cannulated with a Hunter catheter.  Attention turned towards coronary intervention as noted below.  At the conclusion of the procedure, a TR band was placed over the puncture site and hemostasis was obtained. Stephen An were no immediate complications. I supervised sedation with versed 3 mg/fentanyl 150 Mcg during the procedure.  300 cc contrast was utilized. <20cc EBL.   LVEDP  18   GRADIENT ACROSS AORTIC VALVE  none   LV FUNCTION EF 40 %   WALL MOTION  base to mid inferior hypokinesis   MITRAL REGURGITATION  mild      LM Less than 10% proximalmiddistal stenosis            LAD Proximalmid stents are noted with 50% in-stent restenosis.  Distal vessel has 50 to 60% stenosis.  There are well-developed left-to-right collaterals.     D1 is a small to medium sized vessel with ostial/proximal/mid 60 to 70% stenosis.       LCX  Large vessel, proximal and mid 90% stenosis.  The distal vessel into OM 2 is stented and the stent is widely patent with less than 10% stenosis.          RI Tortuous, small vessel,Less than 10% proximalmiddistal stenosis            RCA Dominant, proximal 10 to 20% stenosis, the proximal and mid vessel are extensively stented and there is a middistal 100% occlusion with well-developed left to right collaterals.      PERCUTANEOUS INTERVENTION DESCRIPTION    Heparin was used for anticoagulation, Integrilin was added during the procedure.  The initial 4/5 Tajik sheath was upsized to aTerumo slender 6/7 sheath for intervention.  A 7 Tajik AL 0.75 guiding catheter was taken and was used to intubate the RCA.  A choice floppy wire was then used initially to cross the lesion and angioplasty was then performed with a 3 mm balloon.  Additional guide support was felt to be needed and a run-through body wire was placed into the RCA and the choice floppy wire was removed.  Then a 7 [place] guide cell extension catheter was advanced into the RCA.  Additional angioplasty was performed with 3 and 3.5 mm noncompliant balloons.  Cutting Balloon atherectomy was then performed of the distal RCA as well as the proximal and mid RCA with 3 and 3.5 mm cutting balloons.  4 mm noncompliant balloon was used as well.  COLE was performed which showed good stent expansion.  Minimal luminal diameter was felt to be between 3.5 and 4 mm in the proximal and mid segments and 3 mm in the distal segments.  Remaining disease and PDA was felt to be best treated medically and procedure was felt to be completed and it was felt stenting should be deferred as patient has had several stenting procedures already and IVUS did reveal multiple layers of stents.  There was 0% residual stenosis.  There was MOISÉS 3 flow before and after PCI.     CONCLUSIONS:    Successful atherectomy of RCA  We will consider staged PCI of circumflex  We will also consult with CT surgery as patient has had several PCI procedures and continues to have refractory CAD with ASHD progression.     Coronary angiogram 2/2020:  LM <20%  LAD 30% prox, 70% mid in stent              D2 50% prox  Cx 40-50%              D3 99% prox  RCA 80% mid in stent  LVEF 60%  PTCA LAD              3.0 x 15 angiosculpt cutting balloon to 3.5  PTCA OM3              2.75 x 15 FARHAN  DAPT, statin, BBlk  Home in am  PTCA RCA next week     Coronary angiogram 3/3/2020:  Selective tyler RCA for staged PCI  RCA mid in stent 80% to 40%              3.5 x 15 cutting balloon              4.0 x 20 NC balloon to 24 katiuska  Consider laser atherectomy +/- brachytherapy if recurrent angina/restenosis    Cardiology Labs Reviewed:   CBC: No results for input(s): WBC, HGB, HCT, PLT in the last 72 hours. BMP:No results for input(s): NA, K, CO2, BUN, CREATININE, LABGLOM, GLUCOSE in the last 72 hours. PT/INR: No results for input(s): PROTIME, INR in the last 72 hours. APTT:No results for input(s): APTT in the last 72 hours. FASTING LIPID PANEL:  Lab Results   Component Value Date    HDL 32 09/18/2020    HDL 35 09/26/2011    LDLCALC 69 09/18/2020    TRIG 154 09/18/2020     LIVER PROFILE:No results for input(s): AST, ALT, ALB in the last 72 hours. BNP:   Lab Results   Component Value Date    PROBNP 453 09/13/2020     Reviewed all labs and imaging today    Assessment:   CAD: stable, no angina; s/p CABG x4 in the setting of NSTEMI 9/18/2020; s/p PCI LAD and OM 3 2/2020 and PCI RCA 3/2020; s/p PCI RCA 2016; s/p PCI 2006  Paroxysmal atrial fibrillation: regular today   - noted post CABG, cardiac monitor 11/2020 showed no AF              - [id number] score 4-6 (HTN, possible CVA, DM, CAD, age)  HTN: controlled  HLD: sub optimal, , on pravastatin (intolerant to high intensity therapy)  Anemia  DM: hgb a1c 6.6  Obesity  Reported history of CVA: unable to confirm, stable head CT/MRI    Plan:   1. Continue aspirin, statin, effient, carvedilol; discussed PCSK9 but he is concerned about cost  2. Would continue DAPT indefinitely as tolerated given multiple PCIs  3. Check BP at home and call the office if consistently out of goal range  4. Stay active along with a healthy diet  5. Follow up with PCP for low back/hip pain  6.  Follow up with Dr. Kenia Acevedo in 6

## 2021-08-31 ENCOUNTER — APPOINTMENT (OUTPATIENT)
Dept: CT IMAGING | Age: 71
DRG: 872 | End: 2021-08-31
Payer: MEDICARE

## 2021-08-31 ENCOUNTER — HOSPITAL ENCOUNTER (INPATIENT)
Age: 71
LOS: 1 days | Discharge: HOME | DRG: 872 | End: 2021-09-01
Attending: EMERGENCY MEDICINE | Admitting: INTERNAL MEDICINE
Payer: MEDICARE

## 2021-08-31 DIAGNOSIS — N30.91 HEMATURIA DUE TO CYSTITIS: ICD-10-CM

## 2021-08-31 DIAGNOSIS — N10 ACUTE PYELONEPHRITIS: Primary | ICD-10-CM

## 2021-08-31 PROBLEM — N39.0 UTI (URINARY TRACT INFECTION): Status: ACTIVE | Noted: 2021-08-31

## 2021-08-31 PROBLEM — R31.0 GROSS HEMATURIA: Status: ACTIVE | Noted: 2021-08-31

## 2021-08-31 PROBLEM — A41.9 SEPSIS (HCC): Status: ACTIVE | Noted: 2021-08-31

## 2021-08-31 LAB
A/G RATIO: 1.1 (ref 1.1–2.2)
ALBUMIN SERPL-MCNC: 3.9 G/DL (ref 3.4–5)
ALP BLD-CCNC: 74 U/L (ref 40–129)
ALT SERPL-CCNC: 21 U/L (ref 10–40)
ANION GAP SERPL CALCULATED.3IONS-SCNC: 14 MMOL/L (ref 3–16)
AST SERPL-CCNC: 29 U/L (ref 15–37)
BASOPHILS ABSOLUTE: 0.1 K/UL (ref 0–0.2)
BASOPHILS RELATIVE PERCENT: 0.9 %
BILIRUB SERPL-MCNC: 0.8 MG/DL (ref 0–1)
BILIRUBIN URINE: ABNORMAL
BLOOD, URINE: ABNORMAL
BUN BLDV-MCNC: 19 MG/DL (ref 7–20)
CALCIUM SERPL-MCNC: 9.1 MG/DL (ref 8.3–10.6)
CHLORIDE BLD-SCNC: 101 MMOL/L (ref 99–110)
CLARITY: ABNORMAL
CO2: 20 MMOL/L (ref 21–32)
COLOR: ABNORMAL
COMMENT UA: ABNORMAL
CREAT SERPL-MCNC: 1.3 MG/DL (ref 0.8–1.3)
EKG ATRIAL RATE: 104 BPM
EKG DIAGNOSIS: NORMAL
EKG P AXIS: 27 DEGREES
EKG P-R INTERVAL: 160 MS
EKG Q-T INTERVAL: 390 MS
EKG QRS DURATION: 140 MS
EKG QTC CALCULATION (BAZETT): 512 MS
EKG R AXIS: 33 DEGREES
EKG T AXIS: -3 DEGREES
EKG VENTRICULAR RATE: 104 BPM
EOSINOPHILS ABSOLUTE: 0 K/UL (ref 0–0.6)
EOSINOPHILS RELATIVE PERCENT: 0.3 %
GFR AFRICAN AMERICAN: >60
GFR NON-AFRICAN AMERICAN: 54
GLOBULIN: 3.7 G/DL
GLUCOSE BLD-MCNC: 182 MG/DL (ref 70–99)
GLUCOSE BLD-MCNC: 192 MG/DL (ref 70–99)
GLUCOSE BLD-MCNC: 232 MG/DL (ref 70–99)
GLUCOSE BLD-MCNC: 260 MG/DL (ref 70–99)
GLUCOSE URINE: 250 MG/DL
HCT VFR BLD CALC: 44.7 % (ref 40.5–52.5)
HEMOGLOBIN: 14.8 G/DL (ref 13.5–17.5)
KETONES, URINE: 15 MG/DL
LEUKOCYTE ESTERASE, URINE: ABNORMAL
LIPASE: 31 U/L (ref 13–60)
LYMPHOCYTES ABSOLUTE: 1.5 K/UL (ref 1–5.1)
LYMPHOCYTES RELATIVE PERCENT: 11.1 %
MCH RBC QN AUTO: 28 PG (ref 26–34)
MCHC RBC AUTO-ENTMCNC: 33.1 G/DL (ref 31–36)
MCV RBC AUTO: 84.6 FL (ref 80–100)
MICROSCOPIC EXAMINATION: YES
MONOCYTES ABSOLUTE: 1.3 K/UL (ref 0–1.3)
MONOCYTES RELATIVE PERCENT: 9.6 %
NEUTROPHILS ABSOLUTE: 10.4 K/UL (ref 1.7–7.7)
NEUTROPHILS RELATIVE PERCENT: 78.1 %
NITRITE, URINE: POSITIVE
PDW BLD-RTO: 14.5 % (ref 12.4–15.4)
PERFORMED ON: ABNORMAL
PH UA: 6.5 (ref 5–8)
PLATELET # BLD: 180 K/UL (ref 135–450)
PMV BLD AUTO: 8.1 FL (ref 5–10.5)
POTASSIUM SERPL-SCNC: 5.7 MMOL/L (ref 3.5–5.1)
PROTEIN UA: >=300 MG/DL
RBC # BLD: 5.29 M/UL (ref 4.2–5.9)
RBC UA: >100 /HPF (ref 0–4)
SODIUM BLD-SCNC: 135 MMOL/L (ref 136–145)
SPECIFIC GRAVITY UA: 1.02 (ref 1–1.03)
TOTAL PROTEIN: 7.6 G/DL (ref 6.4–8.2)
URINE REFLEX TO CULTURE: YES
URINE TYPE: ABNORMAL
UROBILINOGEN, URINE: 1 E.U./DL
WBC # BLD: 13.3 K/UL (ref 4–11)
WBC UA: >100 /HPF (ref 0–5)

## 2021-08-31 PROCEDURE — 93005 ELECTROCARDIOGRAM TRACING: CPT | Performed by: EMERGENCY MEDICINE

## 2021-08-31 PROCEDURE — 96365 THER/PROPH/DIAG IV INF INIT: CPT

## 2021-08-31 PROCEDURE — G0378 HOSPITAL OBSERVATION PER HR: HCPCS

## 2021-08-31 PROCEDURE — 96361 HYDRATE IV INFUSION ADD-ON: CPT

## 2021-08-31 PROCEDURE — 87086 URINE CULTURE/COLONY COUNT: CPT

## 2021-08-31 PROCEDURE — 87088 URINE BACTERIA CULTURE: CPT

## 2021-08-31 PROCEDURE — 1200000000 HC SEMI PRIVATE

## 2021-08-31 PROCEDURE — 93010 ELECTROCARDIOGRAM REPORT: CPT | Performed by: INTERNAL MEDICINE

## 2021-08-31 PROCEDURE — 85025 COMPLETE CBC W/AUTO DIFF WBC: CPT

## 2021-08-31 PROCEDURE — 74176 CT ABD & PELVIS W/O CONTRAST: CPT

## 2021-08-31 PROCEDURE — 99283 EMERGENCY DEPT VISIT LOW MDM: CPT

## 2021-08-31 PROCEDURE — 2580000003 HC RX 258: Performed by: EMERGENCY MEDICINE

## 2021-08-31 PROCEDURE — 81001 URINALYSIS AUTO W/SCOPE: CPT

## 2021-08-31 PROCEDURE — 6360000002 HC RX W HCPCS: Performed by: EMERGENCY MEDICINE

## 2021-08-31 PROCEDURE — 83690 ASSAY OF LIPASE: CPT

## 2021-08-31 PROCEDURE — 2580000003 HC RX 258: Performed by: INTERNAL MEDICINE

## 2021-08-31 PROCEDURE — 80053 COMPREHEN METABOLIC PANEL: CPT

## 2021-08-31 PROCEDURE — 87186 SC STD MICRODIL/AGAR DIL: CPT

## 2021-08-31 PROCEDURE — 6370000000 HC RX 637 (ALT 250 FOR IP): Performed by: INTERNAL MEDICINE

## 2021-08-31 RX ORDER — PANTOPRAZOLE SODIUM 40 MG/1
40 TABLET, DELAYED RELEASE ORAL DAILY
Status: DISCONTINUED | OUTPATIENT
Start: 2021-09-01 | End: 2021-09-01 | Stop reason: HOSPADM

## 2021-08-31 RX ORDER — 0.9 % SODIUM CHLORIDE 0.9 %
500 INTRAVENOUS SOLUTION INTRAVENOUS ONCE
Status: COMPLETED | OUTPATIENT
Start: 2021-08-31 | End: 2021-08-31

## 2021-08-31 RX ORDER — PRASUGREL 10 MG/1
10 TABLET, FILM COATED ORAL DAILY
Status: DISCONTINUED | OUTPATIENT
Start: 2021-09-01 | End: 2021-09-01 | Stop reason: HOSPADM

## 2021-08-31 RX ORDER — PRAVASTATIN SODIUM 80 MG/1
80 TABLET ORAL DAILY
Status: DISCONTINUED | OUTPATIENT
Start: 2021-09-01 | End: 2021-09-01 | Stop reason: HOSPADM

## 2021-08-31 RX ORDER — NICOTINE POLACRILEX 4 MG
15 LOZENGE BUCCAL PRN
Status: DISCONTINUED | OUTPATIENT
Start: 2021-08-31 | End: 2021-09-01 | Stop reason: HOSPADM

## 2021-08-31 RX ORDER — ASPIRIN 81 MG/1
81 TABLET ORAL DAILY
Status: DISCONTINUED | OUTPATIENT
Start: 2021-09-01 | End: 2021-09-01 | Stop reason: HOSPADM

## 2021-08-31 RX ORDER — DEXTROSE MONOHYDRATE 25 G/50ML
12.5 INJECTION, SOLUTION INTRAVENOUS PRN
Status: DISCONTINUED | OUTPATIENT
Start: 2021-08-31 | End: 2021-09-01 | Stop reason: HOSPADM

## 2021-08-31 RX ORDER — FINASTERIDE 5 MG/1
5 TABLET, FILM COATED ORAL DAILY
Status: DISCONTINUED | OUTPATIENT
Start: 2021-09-01 | End: 2021-09-01 | Stop reason: HOSPADM

## 2021-08-31 RX ORDER — DEXTROSE MONOHYDRATE 50 MG/ML
100 INJECTION, SOLUTION INTRAVENOUS PRN
Status: DISCONTINUED | OUTPATIENT
Start: 2021-08-31 | End: 2021-09-01 | Stop reason: HOSPADM

## 2021-08-31 RX ORDER — INSULIN GLARGINE 100 [IU]/ML
10 INJECTION, SOLUTION SUBCUTANEOUS NIGHTLY
Status: DISCONTINUED | OUTPATIENT
Start: 2021-08-31 | End: 2021-09-01 | Stop reason: HOSPADM

## 2021-08-31 RX ORDER — ACETAMINOPHEN 325 MG/1
650 TABLET ORAL EVERY 6 HOURS PRN
Status: DISCONTINUED | OUTPATIENT
Start: 2021-08-31 | End: 2021-09-01 | Stop reason: HOSPADM

## 2021-08-31 RX ORDER — ONDANSETRON 4 MG/1
4 TABLET, ORALLY DISINTEGRATING ORAL EVERY 8 HOURS PRN
Status: DISCONTINUED | OUTPATIENT
Start: 2021-08-31 | End: 2021-08-31

## 2021-08-31 RX ORDER — SODIUM CHLORIDE 0.9 % (FLUSH) 0.9 %
5-40 SYRINGE (ML) INJECTION EVERY 12 HOURS SCHEDULED
Status: DISCONTINUED | OUTPATIENT
Start: 2021-08-31 | End: 2021-09-01 | Stop reason: HOSPADM

## 2021-08-31 RX ORDER — ONDANSETRON 2 MG/ML
4 INJECTION INTRAMUSCULAR; INTRAVENOUS EVERY 6 HOURS PRN
Status: DISCONTINUED | OUTPATIENT
Start: 2021-08-31 | End: 2021-08-31

## 2021-08-31 RX ORDER — PIOGLITAZONEHYDROCHLORIDE 15 MG/1
30 TABLET ORAL DAILY
Status: DISCONTINUED | OUTPATIENT
Start: 2021-09-01 | End: 2021-09-01 | Stop reason: HOSPADM

## 2021-08-31 RX ORDER — POLYETHYLENE GLYCOL 3350 17 G/17G
17 POWDER, FOR SOLUTION ORAL DAILY PRN
Status: DISCONTINUED | OUTPATIENT
Start: 2021-08-31 | End: 2021-09-01 | Stop reason: HOSPADM

## 2021-08-31 RX ORDER — SODIUM CHLORIDE 0.9 % (FLUSH) 0.9 %
5-40 SYRINGE (ML) INJECTION PRN
Status: DISCONTINUED | OUTPATIENT
Start: 2021-08-31 | End: 2021-09-01 | Stop reason: HOSPADM

## 2021-08-31 RX ORDER — CARVEDILOL 3.12 MG/1
3.12 TABLET ORAL 2 TIMES DAILY WITH MEALS
Status: DISCONTINUED | OUTPATIENT
Start: 2021-08-31 | End: 2021-09-01 | Stop reason: HOSPADM

## 2021-08-31 RX ORDER — ACETAMINOPHEN 650 MG/1
650 SUPPOSITORY RECTAL EVERY 6 HOURS PRN
Status: DISCONTINUED | OUTPATIENT
Start: 2021-08-31 | End: 2021-09-01 | Stop reason: HOSPADM

## 2021-08-31 RX ORDER — SODIUM CHLORIDE 9 MG/ML
25 INJECTION, SOLUTION INTRAVENOUS PRN
Status: DISCONTINUED | OUTPATIENT
Start: 2021-08-31 | End: 2021-09-01 | Stop reason: HOSPADM

## 2021-08-31 RX ORDER — PROCHLORPERAZINE EDISYLATE 5 MG/ML
10 INJECTION INTRAMUSCULAR; INTRAVENOUS EVERY 6 HOURS PRN
Status: DISCONTINUED | OUTPATIENT
Start: 2021-08-31 | End: 2021-09-01 | Stop reason: HOSPADM

## 2021-08-31 RX ADMIN — SODIUM CHLORIDE 500 ML: 9 INJECTION, SOLUTION INTRAVENOUS at 09:19

## 2021-08-31 RX ADMIN — SODIUM CHLORIDE 500 ML: 9 INJECTION, SOLUTION INTRAVENOUS at 10:04

## 2021-08-31 RX ADMIN — INSULIN GLARGINE 10 UNITS: 100 INJECTION, SOLUTION SUBCUTANEOUS at 21:06

## 2021-08-31 RX ADMIN — CARVEDILOL 3.12 MG: 3.12 TABLET, FILM COATED ORAL at 15:32

## 2021-08-31 RX ADMIN — CEFTRIAXONE SODIUM 2000 MG: 2 INJECTION, POWDER, FOR SOLUTION INTRAMUSCULAR; INTRAVENOUS at 09:18

## 2021-08-31 RX ADMIN — ACETAMINOPHEN 650 MG: 325 TABLET ORAL at 21:06

## 2021-08-31 RX ADMIN — SODIUM CHLORIDE, PRESERVATIVE FREE 10 ML: 5 INJECTION INTRAVENOUS at 21:06

## 2021-08-31 RX ADMIN — INSULIN LISPRO 2 UNITS: 100 INJECTION, SOLUTION INTRAVENOUS; SUBCUTANEOUS at 15:25

## 2021-08-31 RX ADMIN — INSULIN LISPRO 1 UNITS: 100 INJECTION, SOLUTION INTRAVENOUS; SUBCUTANEOUS at 21:07

## 2021-08-31 ASSESSMENT — PAIN SCALES - GENERAL
PAINLEVEL_OUTOF10: 1
PAINLEVEL_OUTOF10: 2
PAINLEVEL_OUTOF10: 0

## 2021-08-31 NOTE — PROGRESS NOTES
Pt a/o. Oriented to room. Stated no nausea; no emesis. Was able to eat a sandwich. Stated no pain. Difficulty using urinal. Pt stated urine is clearing up and drinking lots of water. Steady gait. Bed locked in lowest position; side rails 2/4; call light and bedside table within reach of pt. Call light within reach; will continue to monitor.

## 2021-08-31 NOTE — H&P
Hospital Medicine History & Physical      PCP: Gera Jarquin MD    Date of Admission: 8/31/2021    Date of Service: Pt seen/examined on 8/31/2021 and Admitted to Inpatient with expected LOS greater than two midnights due to medical therapy. Chief Complaint:  Hematuria and right flank pain    History Of Present Illness:   70 y.o. male who presented to [facility] with hematuria and right flank pain. PMHx significant for CAD status post CABG, diabetes, hypertension and prior kidney stones. He presents with 1 day history of gross hematuria associated with right flank pain. He denies any fevers, chills, abdominal pain, nausea or vomiting. He initially thought he may have had another kidney stone. He presented to the ED for evaluation. Urinalysis grossly abnormal with blood possible signs of infection. CT scan abdomen pelvis did not demonstrate any stone disease but did show some right ureteral stranding. He was found to have leukocytosis along with tachycardia prompting concern for sepsis. Treated with empiric antibiotics. Urine culture is sent. At the time my evaluation the patient reports his flank pain has improved. His hematuria is already starting to resolve. He has good appetite with no nausea or vomiting.      Past Medical History:          Diagnosis Date    CAD (coronary artery disease) 46    Degeneration of intervertebral disc at C5-C6 level 6/9/2017    Diabetes mellitus ([address] 12/23/2011    A1c 15    Hyperlipidemia 1992    Hypertension     Kidney stones 1987    S/P CABG x 4 09/2020       Past Surgical History:          Procedure Laterality Date    APPENDECTOMY  1970    CERVICAL FUSION  1995    COLONOSCOPY  1987&2002    WNL    CORONARY ANGIOPLASTY WITH STENT PLACEMENT  2004    [facility] w/ stents    CORONARY ANGIOPLASTY WITH STENT PLACEMENT  2008    CORONARY ARTERY BYPASS GRAFT N/A 9/18/2020    CORONARY ARTERY BYPASS GRAFTING X4, INTERNAL MAMMARY ARTERY, SAPHENOUS VEIN GRAFT, ON PUMP, 5 LEVEL BILATERAL INTERCOSTAL NERVE BLOCK, PLATLET GEL APPLICATION, STERNAL PLATING performed by Lenor Cabot, MD at [address] [facility] Left 6/27/2019    CYSTOSCOPY, LEFT URETEROSCOPY, LEFT STONE MANIPULATION, LASER,  LEFT STENT PLACEMENT performed by Renato Quinn MD at [facility] An / Chet Zepeda Left 7/11/2019    CYSTOSCOPY, LEFT STENT REMOVAL  CPT CODE - 05268 performed by Renato Quinn MD at [facility]. [address], COLON, DIAGNOSTIC     [address] [facility]  1987&1996    bilateral inguinal    UPPER GASTROINTESTINAL ENDOSCOPY  2002    errosive esophagitis    UPPER GASTROINTESTINAL ENDOSCOPY  08/28/2008    GERD       Medications Prior to Admission:      Prior to Admission medications    Medication Sig Start Date End Date Taking?  Authorizing Provider   prasugrel (EFFIENT) 10 MG TABS TAKE 1 TABLET DAILY 4/29/21  Yes lEsie Montelongo MD   Celecoxib (CELEBREX PO) Take by mouth    Yes Historical Provider, MD   carvedilol (COREG) 3.125 MG tablet Take 1 tablet by mouth 2 times daily (with meals) 10/29/20  Yes CURTIS Hook - CNP   pantoprazole (PROTONIX) 40 MG tablet Take 40 mg by mouth daily   Yes Historical Provider, MD   Coenzyme Q10 (CO Q 10) 100 MG CAPS Take 1 capsule by mouth   Yes Historical Provider, MD   finasteride (PROSCAR) 5 MG tablet Take 5 mg by mouth daily   Yes Historical Provider, MD   sitaGLIPtan-metformin (JANUMET)  MG per tablet Take 1 tablet by mouth 2 times daily (with meals) 3/5/20  Yes CURTIS Calixto - CNP   glipiZIDE (GLUCOTROL) 5 MG tablet Take 5 mg by mouth daily    Yes Historical Provider, MD   pioglitazone (ACTOS) 30 MG tablet Take 30 mg by mouth daily  11/11/18  Yes Historical Provider, MD   pravastatin (PRAVACHOL) 80 MG tablet Take 80 mg by mouth daily   Yes Historical Provider, MD   aspirin 81 MG tablet Take 81 mg by mouth daily   Yes Historical Provider, MD   Bismuth Subsalicylate (PEPTO-BISMOL PO) Take 1 tablet by mouth as needed    Yes Historical Provider, MD   Evolocumab 140 MG/ML SOAJ Inject 140 mg into the skin every 14 days  Patient not taking: Reported on 7/7/2021 10/6/20   Thalia Gr MD   tamsulosin [facility]) 0.4 MG capsule Take 0.4 mg by mouth  Patient not taking: Reported on 7/7/2021 6/29/19   Historical Provider, MD       Allergies:  Brilinta [ticagrelor], Crestor [rosuvastatin calcium], Penicillins, Lipitor, Plavix [clopidogrel bisulfate], and Victoza [liraglutide]    Social History:    TOBACCO:   reports that he has never smoked. He has never used smokeless tobacco.  ETOH:   reports no history of alcohol use. E-Cigarettes/Vaping Use     Questions Responses    E-Cigarette/Vaping Use Never User    Start Date     Passive Exposure     Quit Date     Counseling Given     Comments             Family History:          Problem Relation Age of Onset    Breast Cancer Mother     Breast Cancer Sister 61    Heart Disease Brother        REVIEW OF SYSTEMS:   Pertinent positives as noted in the HPI. All other systems reviewed and negative. Physical Exam Performed:    BP (!) 152/96   Pulse 100   Temp 98.3 °F (36.8 °C) (Oral)   Resp 16   Ht 5' 9\" (1.753 m)   Wt 270 lb (122.5 kg)   SpO2 96%   BMI 39.87 kg/m²     General appearance: No apparent distress, appears stated age and cooperative. HEENT:  Normal cephalic, atraumatic without obvious deformity. Pupils equal, round, and reactive to light. Extra ocular muscles intact. Conjunctivae/corneas clear. Neck: Supple, no jugular venous distention. Trachea midline with full range of motion. Respiratory:  Normal respiratory effort. Clear to auscultation, bilaterally without Rales/Wheezes/Rhonchi. Cardiovascular: Regular rate and rhythm with normal S1/S2 without murmurs, rubs or gallops. Abdomen: Soft, non-tender, non-distended with normal bowel sounds. Musculoskelatal: No clubbing, cyanosis or edema bilaterally.   Full range of motion without unchanged. The liver is enlarged measuring 21 cm in length and diffusely low in attenuation consistent with hepatic steatosis. No change in the 1.9 x 2.0 cm right adrenal adenoma. There is bilateral cortical renal scarring with a left upper pole simple renal cyst.  There is no hydronephrosis or obstructive uropathy. However, there is mild right periureteral inflammatory stranding which can be seen in setting of a urinary tract infection. GI/Bowel: There is no bowel obstruction. Status post appendectomy. Diffuse diverticula involve the sigmoid colon without adjacent inflammation. Pelvis: The unenhanced pelvic viscera are within normal limits. Peritoneum/Retroperitoneum: There is no evidence of free fluid or adenopathy. Mild atherosclerosis involves the abdominal aorta and bilateral common iliac arteries. Bones/Soft Tissues: Degenerative changes involve the thoracolumbar spine, bilateral hips and sacroiliac joints. 1. Mild right periureteral inflammatory stranding can be seen in setting of urinary tract infection; correlate with urinalysis. ASSESSMENT:    Principal Problem:    Sepsis ([address]  Active Problems:    Essential hypertension    DM2 (diabetes mellitus, type 2) (HCC)    CAD in native artery    S/P CABG x 4    Personal history of kidney stones    UTI (urinary tract infection)    Gross hematuria  Resolved Problems:    * No resolved hospital problems. *      PLAN:    Possible Sepsis, acute cystitis versus recently passed kidney stone: Leukocytosis and tachycardia, present on admission. Urinalysis could be representative of infection although there is gross hematuria which can confound interpretation. The right ureteral stranding could also be seen in both infection or recently passed stone. A urine culture will be sent to help clarify. Will continue empiric antibiotics in the interim. Monitor urine output closely for any significant clotting. Hypertension, benign: Controlled.  Continue current medication regimen, monitor and adjust as needed. Diabetes Mellitus, Type 2: Controlled. We will hold home oral regimen. Continue basal-bolus Insulin regimen. Monitor blood sugar and adjust regimen as needed. Diabetic (Carb-controlled) diet. CAD S/P CABG stable. Continue medical management:     DVT Prophylaxis: SCDs  Diet: ADULT DIET;  Regular; 4 carb choices (60 gm/meal)  Code Status: Full Code    PT/OT Eval Status: NA    Dispo - 1-2 days     Tavia Aguilar MD

## 2021-08-31 NOTE — ED NOTES
Walked pt in hallway. Danielle Aparna  o2 remained 94-96%, but HR elevated from  105 sitting on side of bed to 125 bpm on 25 foot ambulation     Dontia Munguia RN  08/31/21 [id number]

## 2021-08-31 NOTE — ED PROVIDER NOTES
[facility] Emergency Department      CHIEF COMPLAINT  Hematuria (dark with clots. Nellie Dye long  hx of stones . . pain on right flank)      HISTORY OF PRESENT ILLNESS  Yvette Alexander is a 70 y.o. male with a history of coronary artery disease and CABG who is on Effient along with diabetes presents with gross hematuria. He states he has been having some right-sided flank pain and noticed dark red blood and clots in his urine starting late yesterday into early this morning. He has a long history of kidney stones so he thought maybe he had another stone. He is also having quite a bit of burning with urination and urinary urgency. He denies nausea or vomiting. .   No other complaints, modifying factors or associated symptoms. I have reviewed the following from the nursing documentation.     Past Medical History:   Diagnosis Date    CAD (coronary artery disease) 46    Degeneration of intervertebral disc at C5-C6 level 6/9/2017    Diabetes mellitus [address] [address] 12/23/2011    A1c 15    Hyperlipidemia 1992    Hypertension     Kidney stones 1987    S/P CABG x 4 09/2020     Past Surgical History:   Procedure Laterality Date    APPENDECTOMY  1970    CERVICAL FUSION  1995    COLONOSCOPY  1987&2002    WNL    CORONARY ANGIOPLASTY WITH STENT PLACEMENT  2004    [facility] w/ stents    CORONARY ANGIOPLASTY WITH STENT PLACEMENT  2008    CORONARY ARTERY BYPASS GRAFT N/A 9/18/2020    CORONARY ARTERY BYPASS GRAFTING X4, INTERNAL MAMMARY ARTERY, SAPHENOUS VEIN GRAFT, ON PUMP, 5 LEVEL BILATERAL INTERCOSTAL NERVE BLOCK, PLATLET GEL APPLICATION, STERNAL PLATING performed by Shanelle Stephen MD at [address] [facility] Left 6/27/2019    CYSTOSCOPY, LEFT URETEROSCOPY, LEFT STONE MANIPULATION, LASER,  LEFT STENT PLACEMENT performed by Brodie Nichols MD at [address] [facility] / [address] [place] [address] Rd / STONE Left 7/11/2019    CYSTOSCOPY, LEFT STENT REMOVAL  CPT CODE - 70113 performed by Brodie Nichols MD at [facility] cefUROXime (CEFTIN) 500 MG tablet Take 1 tablet by mouth 2 times daily for 5 days 10 tablet 0    prasugrel (EFFIENT) 10 MG TABS TAKE 1 TABLET DAILY 90 tablet 3    Celecoxib (CELEBREX PO) Take by mouth       carvedilol (COREG) 3.125 MG tablet Take 1 tablet by mouth 2 times daily (with meals) 180 tablet 3    pantoprazole (PROTONIX) 40 MG tablet Take 40 mg by mouth daily      Coenzyme Q10 (CO Q 10) 100 MG CAPS Take 1 capsule by mouth      finasteride (PROSCAR) 5 MG tablet Take 5 mg by mouth daily      sitaGLIPtan-metformin (JANUMET)  MG per tablet Take 1 tablet by mouth 2 times daily (with meals) 60 tablet 3    glipiZIDE (GLUCOTROL) 5 MG tablet Take 5 mg by mouth daily       pioglitazone (ACTOS) 30 MG tablet Take 30 mg by mouth daily       pravastatin (PRAVACHOL) 80 MG tablet Take 80 mg by mouth daily      aspirin 81 MG tablet Take 81 mg by mouth daily      Bismuth Subsalicylate (PEPTO-BISMOL PO) Take 1 tablet by mouth as needed       Evolocumab 140 MG/ML SOAJ Inject 140 mg into the skin every 14 days (Patient not taking: Reported on 7/7/2021) 2 pen 11    tamsulosin (FLOMAX) 0.4 MG capsule Take 0.4 mg by mouth (Patient not taking: Reported on 7/7/2021)       Allergies   Allergen Reactions    Brilinta [Ticagrelor] Shortness Of Breath    Crestor [Rosuvastatin Calcium]     Penicillins Other (See Comments)     Stomach distress    Lipitor Rash    Plavix [Clopidogrel Bisulfate] Rash    Victoza [Liraglutide] Rash       REVIEW OF SYSTEMS  10 systems reviewed, pertinent positives per HPI otherwise noted to be negative. PHYSICAL EXAM  /75   Pulse 90   Temp 97.8 °F (36.6 °C) (Oral)   Resp 16   Ht 5' 9\" (1.753 m)   Wt 270 lb (122.5 kg)   SpO2 95%   BMI 39.87 kg/m²   GENERAL APPEARANCE: Awake and alert. Cooperative. No acute distress. HEAD: Normocephalic. Atraumatic. EYES: PERRL. EOM's grossly intact. ENT: Mucous membranes are moist.   NECK: Supple, trachea midline.    HEART: Tachycardic  LUNGS: Respirations unlabored. CTAB. Good air exchange. No wheezes, rales, or rhonchi. Speaking comfortably in full sentences. ABDOMEN: Soft. Non-distended. Abdomen is nontender. No guarding or rebound. Mild right CVA tenderness. EXTREMITIES: No peripheral edema. MAEE. No acute deformities. SKIN: Warm, dry and intact. No acute rashes. NEUROLOGICAL: Alert and oriented X 3. CN II-XII grossly intact. Strength 5/5, sensation intact. Normal coordination. Steady gait. PSYCHIATRIC: Normal mood and affect. LABS  I have reviewed all labs for this visit. Results for orders placed or performed during the hospital encounter of 08/31/21   Culture, Urine    Specimen: Urine, clean catch   Result Value Ref Range    Organism Escherichia coli (A)     Urine Culture, Routine >100,000 CFU/ml        Susceptibility    Escherichia coli - BACTERIAL SUSCEPTIBILITY PANEL BY LEBRON     ampicillin <=2 Sensitive mcg/mL     ceFAZolin* <=4 Sensitive mcg/mL      * NOTE: Cefazolin should only be used for uncomplicated UTI      for E.coli or Klebsiella pneumoniae. cefepime <=0.12 Sensitive mcg/mL     cefTRIAXone <=0.25 Sensitive mcg/mL     ciprofloxacin <=0.25 Sensitive mcg/mL     ertapenem <=0.12 Sensitive mcg/mL     gentamicin <=1 Sensitive mcg/mL     levofloxacin <=0.12 Sensitive mcg/mL     nitrofurantoin <=16 Sensitive mcg/mL     piperacillin-tazobactam <=4 Sensitive mcg/mL     trimethoprim-sulfamethoxazole <=20 Sensitive mcg/mL   CBC Auto Differential   Result Value Ref Range    WBC 13.3 (H) 4.0 - 11.0 K/uL    RBC 5.29 4. 20 - 5.90 M/uL    Hemoglobin 14.8 13.5 - 17.5 g/dL    Hematocrit 44.7 40.5 - 52.5 %    MCV 84.6 80.0 - 100.0 fL    MCH 28.0 26.0 - 34.0 pg    MCHC 33.1 31.0 - 36.0 g/dL    RDW 14.5 12.4 - 15.4 %    Platelets 647 981 - 342 K/uL    MPV 8.1 5.0 - 10.5 fL    Neutrophils % 78.1 %    Lymphocytes % 11.1 %    Monocytes % 9.6 %    Eosinophils % 0.3 %    Basophils % 0.9 %    Neutrophils Absolute 10.4 (H) 1.7 - 7.7 K/uL    Lymphocytes Absolute 1.5 1.0 - 5.1 K/uL    Monocytes Absolute 1.3 0.0 - 1.3 K/uL    Eosinophils Absolute 0.0 0.0 - 0.6 K/uL    Basophils Absolute 0.1 0.0 - 0.2 K/uL   Comprehensive Metabolic Panel   Result Value Ref Range    Sodium 135 (L) 136 - 145 mmol/L    Potassium 5.7 (H) 3.5 - 5.1 mmol/L    Chloride 101 99 - 110 mmol/L    CO2 20 (L) 21 - 32 mmol/L    Anion Gap 14 3 - 16    Glucose 232 (H) 70 - 99 mg/dL    BUN 19 7 - 20 mg/dL    CREATININE 1.3 0.8 - 1.3 mg/dL    GFR Non-African American 54 (A) >60    GFR African American >60 >60    Calcium 9.1 8.3 - 10.6 mg/dL    Total Protein 7.6 6.4 - 8.2 g/dL    Albumin 3.9 3.4 - 5.0 g/dL    Albumin/Globulin Ratio 1.1 1.1 - 2.2    Total Bilirubin 0.8 0.0 - 1.0 mg/dL    Alkaline Phosphatase 74 40 - 129 U/L    ALT 21 10 - 40 U/L    AST 29 15 - 37 U/L    Globulin 3.7 g/dL   Lipase   Result Value Ref Range    Lipase 31.0 13.0 - 60.0 U/L   Urinalysis Reflex to Culture    Specimen: Urine, clean catch   Result Value Ref Range    Color, UA SILAS (A) Straw/Yellow    Clarity, UA CLOUDY (A) Clear    Glucose, Ur 250 (A) Negative mg/dL    Bilirubin Urine MODERATE (A) Negative    Ketones, Urine 15 (A) Negative mg/dL    Specific Gravity, UA 1.025 1.005 - 1.030    Blood, Urine LARGE (A) Negative    pH, UA 6.5 5.0 - 8.0    Protein, UA >=300 (A) Negative mg/dL    Urobilinogen, Urine 1.0 <2.0 E.U./dL    Nitrite, Urine POSITIVE (A) Negative    Leukocyte Esterase, Urine MODERATE (A) Negative    Microscopic Examination YES     Urine Type NotGiven     Urine Reflex to Culture Yes    Microscopic Urinalysis   Result Value Ref Range    WBC, UA >100 (A) 0 - 5 /HPF    RBC, UA >100 (A) 0 - 4 /HPF    Urinalysis Comments see below    Basic Metabolic Panel w/ Reflex to MG   Result Value Ref Range    Sodium 139 136 - 145 mmol/L    Potassium reflex Magnesium 4.6 3.5 - 5.1 mmol/L    Chloride 103 99 - 110 mmol/L    CO2 25 21 - 32 mmol/L    Anion Gap 11 3 - 16    Glucose 210 (H) 70 - 99 mg/dL BUN 18 7 - 20 mg/dL    CREATININE 1.2 0.8 - 1.3 mg/dL    GFR Non-African American 60 (A) >60    GFR African American >60 >60    Calcium 9.4 8.3 - 10.6 mg/dL   CBC   Result Value Ref Range    WBC 8.5 4.0 - 11.0 K/uL    RBC 4.99 4.20 - 5.90 M/uL    Hemoglobin 14.2 13.5 - 17.5 g/dL    Hematocrit 41.8 40.5 - 52.5 %    MCV 83.8 80.0 - 100.0 fL    MCH 28.4 26.0 - 34.0 pg    MCHC 33.9 31.0 - 36.0 g/dL    RDW 14.3 12.4 - 15.4 %    Platelets 058 642 - 177 K/uL    MPV 8.3 5.0 - 10.5 fL   POCT Glucose   Result Value Ref Range    POC Glucose 192 (H) 70 - 99 mg/dl    Performed on ACCU-CHEK    POCT Glucose   Result Value Ref Range    POC Glucose 260 (H) 70 - 99 mg/dl    Performed on ACCU-CHEK    POCT Glucose   Result Value Ref Range    POC Glucose 182 (H) 70 - 99 mg/dl    Performed on ACCU-CHEK    POCT Glucose   Result Value Ref Range    POC Glucose 208 (H) 70 - 99 mg/dl    Performed on ACCU-CHEK    POCT Glucose   Result Value Ref Range    POC Glucose 192 (H) 70 - 99 mg/dl    Performed on ACCU-CHEK    EKG 12 Lead   Result Value Ref Range    Ventricular Rate 104 BPM    Atrial Rate 104 BPM    P-R Interval 160 ms    QRS Duration 140 ms    Q-T Interval 390 ms    QTc Calculation (Bazett) 512 ms    P Axis 27 degrees    R Axis 33 degrees    T Axis -3 degrees    Diagnosis       Sinus tachycardiaRight bundle branch blockInferior infarct (cited on or before 13-SEP-2020)Abnormal ECGConfirmed by ZACARIAS Greer MD ([id number]) on 8/31/2021 1:39:48 PM       EKG  The Ekg interpreted by myself  sinus tachycardia, hdaj=020 with a rate of 104  Axis is   Normal  QTc is  normal  RBBB     No specific ST-T wave changes appreciated. No evidence of acute ischemia. No significant change from prior EKG dated 10/27/20    Cardiac Monitoring: The cardiac monitor revealed sinus tachycardia as interpreted by me. The cardiac monitor was ordered secondary to the patient's complaint of syncope and to monitor the patient for dysrhythmia.           RADIOLOGY  X-RAYS: . Plan of care discussed with patient. Patient in agreement with plan. CLINICAL IMPRESSION  1. Acute pyelonephritis    2. Hematuria due to cystitis        Blood pressure 128/75, pulse 90, temperature 97.8 °F (36.6 °C), temperature source Oral, resp. rate 16, height 5' 9\" (1.753 m), weight 270 lb (122.5 kg), SpO2 95 %. [facility] was admitted in stable condition.     (Please note this note was completed with a voice recognition program.  Efforts were made to edit the dictations but occasionally words are mis-transcribed.)       Gabrielle Medrano MD  09/02/21 [id number]

## 2021-09-01 VITALS
BODY MASS INDEX: 39.99 KG/M2 | DIASTOLIC BLOOD PRESSURE: 75 MMHG | HEIGHT: 69 IN | OXYGEN SATURATION: 95 % | RESPIRATION RATE: 16 BRPM | SYSTOLIC BLOOD PRESSURE: 128 MMHG | TEMPERATURE: 97.8 F | WEIGHT: 270 LBS | HEART RATE: 90 BPM

## 2021-09-01 LAB
ANION GAP SERPL CALCULATED.3IONS-SCNC: 11 MMOL/L (ref 3–16)
BUN BLDV-MCNC: 18 MG/DL (ref 7–20)
CALCIUM SERPL-MCNC: 9.4 MG/DL (ref 8.3–10.6)
CHLORIDE BLD-SCNC: 103 MMOL/L (ref 99–110)
CO2: 25 MMOL/L (ref 21–32)
CREAT SERPL-MCNC: 1.2 MG/DL (ref 0.8–1.3)
GFR AFRICAN AMERICAN: >60
GFR NON-AFRICAN AMERICAN: 60
GLUCOSE BLD-MCNC: 192 MG/DL (ref 70–99)
GLUCOSE BLD-MCNC: 208 MG/DL (ref 70–99)
GLUCOSE BLD-MCNC: 210 MG/DL (ref 70–99)
HCT VFR BLD CALC: 41.8 % (ref 40.5–52.5)
HEMOGLOBIN: 14.2 G/DL (ref 13.5–17.5)
MCH RBC QN AUTO: 28.4 PG (ref 26–34)
MCHC RBC AUTO-ENTMCNC: 33.9 G/DL (ref 31–36)
MCV RBC AUTO: 83.8 FL (ref 80–100)
PDW BLD-RTO: 14.3 % (ref 12.4–15.4)
PERFORMED ON: ABNORMAL
PERFORMED ON: ABNORMAL
PLATELET # BLD: 168 K/UL (ref 135–450)
PMV BLD AUTO: 8.3 FL (ref 5–10.5)
POTASSIUM REFLEX MAGNESIUM: 4.6 MMOL/L (ref 3.5–5.1)
RBC # BLD: 4.99 M/UL (ref 4.2–5.9)
SODIUM BLD-SCNC: 139 MMOL/L (ref 136–145)
WBC # BLD: 8.5 K/UL (ref 4–11)

## 2021-09-01 PROCEDURE — 2580000003 HC RX 258: Performed by: INTERNAL MEDICINE

## 2021-09-01 PROCEDURE — 6360000002 HC RX W HCPCS: Performed by: INTERNAL MEDICINE

## 2021-09-01 PROCEDURE — 36415 COLL VENOUS BLD VENIPUNCTURE: CPT

## 2021-09-01 PROCEDURE — 85027 COMPLETE CBC AUTOMATED: CPT

## 2021-09-01 PROCEDURE — 6370000000 HC RX 637 (ALT 250 FOR IP): Performed by: INTERNAL MEDICINE

## 2021-09-01 PROCEDURE — G0378 HOSPITAL OBSERVATION PER HR: HCPCS

## 2021-09-01 PROCEDURE — 96366 THER/PROPH/DIAG IV INF ADDON: CPT

## 2021-09-01 PROCEDURE — 80048 BASIC METABOLIC PNL TOTAL CA: CPT

## 2021-09-01 RX ORDER — CEFUROXIME AXETIL 500 MG/1
500 TABLET ORAL 2 TIMES DAILY
Qty: 10 TABLET | Refills: 0 | Status: SHIPPED | OUTPATIENT
Start: 2021-09-01 | End: 2021-09-06

## 2021-09-01 RX ADMIN — SODIUM CHLORIDE 25 ML: 9 INJECTION, SOLUTION INTRAVENOUS at 11:44

## 2021-09-01 RX ADMIN — CARVEDILOL 3.12 MG: 3.12 TABLET, FILM COATED ORAL at 07:48

## 2021-09-01 RX ADMIN — PANTOPRAZOLE SODIUM 40 MG: 40 TABLET, DELAYED RELEASE ORAL at 07:47

## 2021-09-01 RX ADMIN — PIOGLITAZONE 30 MG: 15 TABLET ORAL at 07:47

## 2021-09-01 RX ADMIN — INSULIN LISPRO 2 UNITS: 100 INJECTION, SOLUTION INTRAVENOUS; SUBCUTANEOUS at 12:15

## 2021-09-01 RX ADMIN — FINASTERIDE 5 MG: 5 TABLET, FILM COATED ORAL at 07:47

## 2021-09-01 RX ADMIN — CEFTRIAXONE SODIUM 1000 MG: 1 INJECTION, POWDER, FOR SOLUTION INTRAMUSCULAR; INTRAVENOUS at 11:45

## 2021-09-01 RX ADMIN — INSULIN LISPRO 4 UNITS: 100 INJECTION, SOLUTION INTRAVENOUS; SUBCUTANEOUS at 07:49

## 2021-09-01 RX ADMIN — SODIUM CHLORIDE, PRESERVATIVE FREE 10 ML: 5 INJECTION INTRAVENOUS at 07:49

## 2021-09-01 RX ADMIN — PRAVASTATIN SODIUM 80 MG: 80 TABLET ORAL at 07:48

## 2021-09-01 RX ADMIN — PRASUGREL 10 MG: 10 TABLET, FILM COATED ORAL at 07:48

## 2021-09-01 RX ADMIN — ASPIRIN 81 MG: 81 TABLET, COATED ORAL at 07:48

## 2021-09-01 NOTE — PROGRESS NOTES
Pt a/o. PIV removed. Discharge instructions given to pt and all questions answered. Pt has belongings, discharge paperwork and medication from [facility]. Pt discharged home in stable condition.

## 2021-09-01 NOTE — PROGRESS NOTES
Pt assessment completed and charted. VSS. Pt a/o x4. Pt denies nausea. Urine is now clear/very light yellow. Pt has good PO intake. Pt reporting mild headache, prn medication given per mar. Bed in lowest position and wheels locked. Call light within reach. Bedside table within reach. Non-skid footwear in place. Pt denies any other needs at this time. Pt calls out appropriately. Will continue to monitor.

## 2021-09-01 NOTE — DISCHARGE SUMMARY
START taking these medications    Details   cefUROXime (CEFTIN) 500 MG tablet Take 1 tablet by mouth 2 times daily for 5 days, Disp-10 tablet, R-0Normal           Discharge Medication List as of 9/1/2021  4:26 PM        Discharge Medication List as of 9/1/2021  4:26 PM      CONTINUE these medications which have NOT CHANGED    Details   prasugrel (EFFIENT) 10 MG TABS TAKE 1 TABLET DAILY, Disp-90 tablet, R-3Normal      Celecoxib (CELEBREX PO) Take by mouth Historical Med      carvedilol (COREG) 3.125 MG tablet Take 1 tablet by mouth 2 times daily (with meals), Disp-180 tablet,R-3Normal      pantoprazole (PROTONIX) 40 MG tablet Take 40 mg by mouth dailyHistorical Med      Coenzyme Q10 (CO Q 10) 100 MG CAPS Take 1 capsule by mouthHistorical Med      finasteride (PROSCAR) 5 MG tablet Take 5 mg by mouth dailyHistorical Med      Evolocumab 140 MG/ML SOAJ Inject 140 mg into the skin every 14 days, Disp-2 pen,R-11Normal      sitaGLIPtan-metformin (JANUMET)  MG per tablet Take 1 tablet by mouth 2 times daily (with meals), Disp-60 tablet, R-3NO PRINT      glipiZIDE (GLUCOTROL) 5 MG tablet Take 5 mg by mouth daily Historical Med      tamsulosin (FLOMAX) 0.4 MG capsule Take 0.4 mg by mouthHistorical Med      pioglitazone (ACTOS) 30 MG tablet Take 30 mg by mouth daily Historical Med      pravastatin (PRAVACHOL) 80 MG tablet Take 80 mg by mouth daily      aspirin 81 MG tablet Take 81 mg by mouth daily      Bismuth Subsalicylate (PEPTO-BISMOL PO) Take 1 tablet by mouth as needed Historical Med           Discharge Medication List as of 9/1/2021  4:26 PM            Significant Test Results    CT ABDOMEN PELVIS WO CONTRAST Additional Contrast? None    Result Date: 8/31/2021  EXAMINATION: CT OF THE ABDOMEN AND PELVIS WITHOUT CONTRAST 8/31/2021 9:07 am TECHNIQUE: CT of the abdomen and pelvis was performed without the administration of intravenous contrast. Multiplanar reformatted images are provided for review.  Dose modulation, iterative reconstruction, and/or weight based adjustment of the mA/kV was utilized to reduce the radiation dose to as low as reasonably achievable. COMPARISON: None. HISTORY: ORDERING SYSTEM PROVIDED HISTORY: R flank pain, hematuria, uti TECHNOLOGIST PROVIDED HISTORY: Additional Contrast?->None Reason for exam:->R flank pain, hematuria, uti Decision Support Exception - unselect if not a suspected or confirmed emergency medical condition->Emergency Medical Condition (MA) Reason for Exam: hematuria since last night rt flank pain x 3-4 hours, h/o stones Acuity: Acute Type of Exam: Initial Relevant Medical/Surgical History: appy, FINDINGS: Lower Chest: There is bibasilar scarring. Status post median sternotomy and CABG. Organs: The unenhanced liver, spleen, pancreas, adrenal glands and kidneys are unchanged. The liver is enlarged measuring 21 cm in length and diffusely low in attenuation consistent with hepatic steatosis. No change in the 1.9 x 2.0 cm right adrenal adenoma. There is bilateral cortical renal scarring with a left upper pole simple renal cyst.  There is no hydronephrosis or obstructive uropathy. However, there is mild right periureteral inflammatory stranding which can be seen in setting of a urinary tract infection. GI/Bowel: There is no bowel obstruction. Status post appendectomy. Diffuse diverticula involve the sigmoid colon without adjacent inflammation. Pelvis: The unenhanced pelvic viscera are within normal limits. Peritoneum/Retroperitoneum: There is no evidence of free fluid or adenopathy. Mild atherosclerosis involves the abdominal aorta and bilateral common iliac arteries. Bones/Soft Tissues: Degenerative changes involve the thoracolumbar spine, bilateral hips and sacroiliac joints. 1. Mild right periureteral inflammatory stranding can be seen in setting of urinary tract infection; correlate with urinalysis. Consults:     IP CONSULT TO HOSPITALIST    Labs:  For convenience and continuity at follow-up the following most recent labs are provided:    Lab Results   Component Value Date    WBC 8.5 09/01/2021    HGB 14.2 09/01/2021    HCT 41.8 09/01/2021    MCV 83.8 09/01/2021     09/01/2021     09/01/2021    K 4.6 09/01/2021     09/01/2021    CO2 25 09/01/2021    BUN 18 09/01/2021    CREATININE 1.2 09/01/2021    CALCIUM 9.4 09/01/2021    PHOS 2.7 05/05/2016    TROPONINI 0.33 09/13/2020    ALKPHOS 74 08/31/2021    ALT 21 08/31/2021    AST 29 08/31/2021    BILITOT 0.8 08/31/2021    BILIDIR 0.3 09/18/2020    LABALBU 3.9 08/31/2021    LDLCALC 69 09/18/2020    TRIG 154 09/18/2020    LABA1C 6.6 09/19/2020     Lab Results   Component Value Date    INR 1.29 (H) 09/19/2020    INR 1.38 (H) 09/18/2020    INR 1.09 09/18/2020         The patient was seen and examined on day of discharge and this discharge summary is in conjunction with any daily progress note from day of discharge. Time spent on discharge is more than 30 minutes in the examination, evaluation, counseling and review of medications and discharge plan. Signed:    Justina Lewis MD   9/26/2021    Thank you Leia Alfonso MD for the opportunity to be involved in this patient's care. If you have any questions or concerns please feel free to contact my office [phone].

## 2021-09-01 NOTE — PLAN OF CARE
Problem: Falls - Risk of:  Goal: Will remain free from falls  Description: Will remain free from falls  9/1/2021 1000 by Selvin Hazel RN  Outcome: Ongoing  Note: Pt remains safe. Uses call light appropriately to call out for assistance. Steady gait and independent with ADLS. Bed locked in lowest position; side rails 2/4; call light and bedside table within reach of pt.

## 2021-09-01 NOTE — CARE COORDINATION
CASE MANAGEMENT INITIAL ASSESSMENT      Reviewed chart and completed assessment with:patient  Explained Case Management role/services. Primary contact information:[facility] Decision Maker :   Primary Decision Maker: Yanique Navarrete - Brother/Sister - [phone]          Can this person be reached and be able to respond quickly, such as within a few minutes or hours? Yes    Admit date/status:8/31/21  Diagnosis:pyelo   Is this a Readmission?:  No      Insurance:[employer]et[employer]   Precert required for SNF: Yes       3 night stay required: No    Living arrangements, Adls, care needs, prior to admission:lives in 2 story home with 2 cats    Transportation:private     Durable Medical Equipment at home:  Walker__Cane__RTS__ BSC__Shower Chair__  02__ HHN__ CPAP__  BiPap__  Hospital Bed__ W/C___ Other__________    Services in the home and/or outpatient, prior to [address] (if applicable)   · Name:  · Address:  · Dialysis Schedule:  · Phone:  · Fax:    PT/OT recs:none    Hospital Exemption Notification (HEN):needed for SNF    Barriers to discharge:none    Plan/comments:spoke with patient plans on returning home at discharge. reported IPTA and drives. denied any DCP needs. Please notify should needs arise.  Cassy Shea RN       ECOC on chart for MD signature

## 2021-09-02 LAB
ORGANISM: ABNORMAL
URINE CULTURE, ROUTINE: ABNORMAL

## 2021-09-15 ENCOUNTER — TELEPHONE (OUTPATIENT)
Dept: CARDIOLOGY CLINIC | Age: 71
End: 2021-09-15

## 2021-09-15 NOTE — TELEPHONE ENCOUNTER
Patient last seen 1/13/2021, seen with Keith Allen 7/7/2021. [facility] asking fir patient to be cleared cysto and possible bladder biopsy, possible urethral dilation. Asking to stop ASA and Effient- amount of days not requested.        Fax letter [phone]

## 2021-09-15 NOTE — LETTER
[address] Cardiology - [address] [place] [address] [facility]  Phone: [phone]  Fax: [phone]    Young Goodson MD        September 15, 2021     [address] [address]  1950      To whom it may concern,             Aung Bob has no cardiac contraindications to surgery. It is recommended that he continue Aspirin. Can stop effient for 7 days. If you have any questions or concerns, please don't hesitate to call.     Sincerely,        Young Goodson MD

## 2021-09-30 PROBLEM — N39.0 UTI (URINARY TRACT INFECTION): Status: RESOLVED | Noted: 2021-08-31 | Resolved: 2021-09-30

## 2021-11-10 ENCOUNTER — TELEPHONE (OUTPATIENT)
Dept: CARDIOLOGY CLINIC | Age: 71
End: 2021-11-10

## 2021-11-10 NOTE — TELEPHONE ENCOUNTER
Patient last seen in the office by Lolis Gonzalez 7/7/2021. Patient is needing preoperative cardiac risk assessment for cysto urolift 12/7/2021 with [facility]. General anesthesia. Asking to hold Aspirin and Effient but no time frame specified.        Fax letter to [phone]

## 2021-11-10 NOTE — LETTER
[address] Cardiology - [address] [place] [address] [facility]  Phone: [phone]  Fax: [phone]    Alpesh Hendricks MD        November 10, 2021     [address] [address]  1950      To whom it may concern,          Darlyn Henry has no cardiac contraindications to procedure. It is recommended that he continue Aspirin. He can hold effient x 7 days. If you have any questions or concerns, please don't hesitate to call.     Sincerely,        Alpesh Hendricks MD

## 2021-12-27 RX ORDER — CARVEDILOL 3.12 MG/1
TABLET ORAL
Qty: 180 TABLET | Refills: 3 | Status: SHIPPED | OUTPATIENT
Start: 2021-12-27

## 2022-03-14 ENCOUNTER — TELEPHONE (OUTPATIENT)
Dept: CARDIOLOGY CLINIC | Age: 72
End: 2022-03-14

## 2022-03-14 NOTE — PROGRESS NOTES
[facility]   Cardiac Followup    Referring Provider:  Shanna Munoz MD     Chief Complaint   Patient presents with    6 Month Follow-Up    Cardiac Clearance     Neck surgery     Coronary Artery Disease    Hypertension      Danial Zimmerman   1950      History of Present Illness:    Danial Zimmerman is a 67 y.o. male who is here today for preoperative cardiac risk assessment for cervical disc surgery. He has a past medical history of coronary artery disease- PCI RCA in 2016. He had PCI RCA, LAD, OM 3 3/2020. Echo showed EF 55-60%, left heart cath 9/2020 showed progressive CAD, CABG surgery X4 9/18/2020, hypertension, hyperlipemia, and paroxysmal atrial fibrillation. At his last visit he reported he had not started Repatha due to cost. Tolerating his Statin with COQ10. He also stated he had dizziness with positions changes for about 3 hours after taking his Coreg. He was instructed to decrease Coreg to once daily in the mornings. Today he states he has been feeling well other than his neck pain. He attended PT which did not help his pain. He was actually told to decrease his activity. He states he has chest discomfort that is related to acid reflux. Not with activity. He is able to climb stairs with no chest pain or SOB. He has pain in his head when he turns his neck a certain way. He is tolerating his medications and is taking them as prescribed. Blood pressure runs 130/70's, never over 140/90. Dizziness decreased with decrease in Coreg. Patient currently denies any weight gain, edema, palpitations, chest pain, shortness of breath, and syncope. Past Medical History:   has a past medical history of CAD (coronary artery disease), Degeneration of intervertebral disc at C5-C6 level, Diabetes mellitus ([address] Hyperlipidemia, Hypertension, Kidney stones, and S/P CABG x 4.    Surgical History:   has a past surgical history that includes cervical fusion (1995);  Appendectomy (1970); hernia repair (8402&6226); Colonoscopy (1987&2002); Upper gastrointestinal endoscopy (2002); Coronary angioplasty with stent (2004); Coronary angioplasty with stent (2008); Upper gastrointestinal endoscopy (08/28/2008); Cystoscopy (Left, 6/27/2019); Endoscopy, colon, diagnostic; CYSTOSCOPY INSERTION / REMOVAL STENT / STONE (Left, 7/11/2019); and Coronary artery bypass graft (N/A, 9/18/2020). Social History:   reports that he has never smoked. He has never used smokeless tobacco. He reports that he does not drink alcohol and does not use drugs. Family History:  family history includes Breast Cancer in his mother; Breast Cancer (age of onset: 61) in his sister; Heart Disease in his brother. Home Medications:  Prior to Admission medications    Medication Sig Start Date End Date Taking?  Authorizing Provider   carvedilol (COREG) 3.125 MG tablet TAKE 1 TABLET TWICE A DAY WITH MEALS  Patient taking differently: Take 3.125 mg by mouth daily  12/27/21  Yes Court Lemon MD   prasugrel (EFFIENT) 10 MG TABS TAKE 1 TABLET DAILY 4/29/21  Yes Court Lemon MD   Celecoxib (CELEBREX PO) Take by mouth    Yes Historical Provider, MD   pantoprazole (PROTONIX) 40 MG tablet Take 40 mg by mouth daily   Yes Historical Provider, MD   finasteride (PROSCAR) 5 MG tablet Take 5 mg by mouth daily   Yes Historical Provider, MD   sitaGLIPtan-metformin (JANUMET)  MG per tablet Take 1 tablet by mouth 2 times daily (with meals) 3/5/20  Yes CURTIS Dowd - CNP   glipiZIDE (GLUCOTROL) 10 MG tablet Take 10 mg by mouth daily    Yes Historical Provider, MD   pioglitazone (ACTOS) 30 MG tablet Take 30 mg by mouth daily  11/11/18  Yes Historical Provider, MD   pravastatin (PRAVACHOL) 80 MG tablet Take 80 mg by mouth daily   Yes Historical Provider, MD   aspirin 81 MG tablet Take 81 mg by mouth daily   Yes Historical Provider, MD   Bismuth Subsalicylate (PEPTO-BISMOL PO) Take 1 tablet by mouth as needed    Yes Historical Provider, MD   Coenzyme Q10 (CO Q 10) 100 MG CAPS Take 1 capsule by mouth  Patient not taking: Reported on 3/15/2022    Historical Provider, MD   Evolocumab 140 MG/ML SOAJ Inject 140 mg into the skin every 14 days  Patient not taking: Reported on 7/7/2021 10/6/20   Ayo Jane MD   tamsulosin [facility]) 0.4 MG capsule Take 0.4 mg by mouth  Patient not taking: Reported on 7/7/2021 6/29/19   Historical Provider, MD        Allergies:  Brilinta [ticagrelor], Crestor [rosuvastatin calcium], Penicillins, Lipitor, Plavix [clopidogrel bisulfate], and Victoza [liraglutide]     Review of Systems:   · Constitutional: there has been no unanticipated weight loss. There's been no change in energy level, sleep pattern, or activity level. · Eyes: No visual changes or diplopia. No scleral icterus. · ENT: No Headaches, hearing loss or vertigo. No mouth sores or sore throat. · Cardiovascular: Reviewed in HPI  · Respiratory: No cough or wheezing, no sputum production. No hematemesis. · Gastrointestinal: No abdominal pain, appetite loss, blood in stools. No change in bowel or bladder habits. · Genitourinary: No dysuria, trouble voiding, or hematuria. · Musculoskeletal:  No gait disturbance, weakness or joint complaints. · Integumentary: No rash or pruritis. · Neurological: No headache, diplopia, change in muscle strength, numbness or tingling. No change in gait, balance, coordination, mood, affect, memory, mentation, behavior. · Psychiatric: No anxiety, no depression. · Endocrine: No malaise, fatigue or temperature intolerance. No excessive thirst, fluid intake, or urination. No tremor. · Hematologic/Lymphatic: No abnormal bruising or bleeding, blood clots or swollen lymph nodes. · Allergic/Immunologic: No nasal congestion or hives.     Physical Examination:    /78   Pulse 81   Ht 5' 9\" (1.753 m)   Wt 280 lb 8 oz (127.2 kg)   SpO2 96%   BMI 41.42 kg/m²   Vitals:    03/15/22 1255   BP: 138/78   Pulse:    SpO2: Constitutional and General Appearance: NAD   Respiratory:  · Normal excursion and expansion without use of accessory muscles  · Resp Auscultation: Normal breath sounds without dullness  Cardiovascular:  · The apical impulses not displaced  · Heart tones are crisp and normal  · Cervical veins are not engorged  · The carotid upstroke is normal in amplitude and contour without delay or bruit  · Normal S1S2, No S3, No Murmur  · Peripheral pulses are symmetrical and full  · There is no clubbing, cyanosis of the extremities. · No edema  · Femoral Arteries: 2+ and equal  · Pedal Pulses: 2+ and equal   Abdomen:  · No masses or tenderness  · Liver/Spleen: No Abnormalities Noted  Neurological/Psychiatric:  · Alert and oriented in all spheres  · Moves all extremities well  · Exhibits normal gait balance and coordination  · No abnormalities of mood, affect, memory, mentation, or behavior are noted      Echocardiogram 5/4/16  Summary   Technically limited study   Left ventricular systolic function is normal .   Ejection fraction is visually estimated to be 60-65 %. Mild concentric left ventricular hypertrophy is present. Diastolic filling parameters suggests grade I diastolic dysfunction . The aortic valve appears sclerotic but opens well. Individual aortic valve leaflets are not clearly visualized. The tricuspid valve was not well visualized. IVC not seen    Limited echocardiogram 2/22/2020  Limited echo for LV function. Normal left ventricle systolic function with an estimated ejection fraction   of 55-60%. No regional wall motion abnormalities are seen. Normal left ventricular diastolic filling pressure.      Left heart cath 2/24/2020  Procedures: LHC, LVG, CA, PTCA, sedation     LM <20%  LAD 30% prox, 70% mid in stent              D2 50% prox  Cx 40-50%              D3 99% prox  RCA 80% mid in stent  LVEF 60%  PTCA LAD              3.0 x 15 angiosculpt cutting balloon to 3.5  PTCA OM3              2.75 x 15 FARHAN     DAPT, statin, BBlk  Home in am  PTCA RCA next week      Left heart cath 3/3/2020  Procedures: Cor angio, PTCA, sedation     Selective tyler RCA for staged PCI     RCA mid in stent 80% to 40%              3.5 x 15 cutting balloon              4.0 x 20 NC balloon to 24 katiuska     Consider laser atherectomy +/- brachytherapy if recurrent angina/restenosis      Echocardiogram 9/17/2020   Summary   Normal left ventricular systolic function with ejection fraction of 55-60%. No regional wall motion abnormalites are seen. Mild concentric left ventricular hypertrophy. Grade I diastolic dysfunction with normal filling pressure. Compared to previous study from 2- no changes noted in left   ventricular function. No significant valvular heart disease. Definity echo contrast was injected     FINDINGS            LVGRAM     LVEDP  18   GRADIENT ACROSS AORTIC VALVE  none   LV FUNCTION EF 40 %   WALL MOTION  base to mid inferior hypokinesis   MITRAL REGURGITATION  mild         CORONARY ARTERIES     LM Less than 10% dzntpgdx-ycy-gxtefe stenosis            LAD Proximal-mid stents are noted with 50% in-stent restenosis. Distal vessel has 50 to 60% stenosis. There are well-developed left-to-right collaterals.     D1 is a small to medium sized vessel with ostial/proximal/mid 60 to 70% stenosis.       LCX  Large vessel, proximal and mid 90% stenosis. The distal vessel into OM 2 is stented and the stent is widely patent with less than 10% stenosis.       RI Tortuous, small vessel,Less than 10% fwunsyzo-uws-muvnzk stenosis            RCA Dominant, proximal 10 to 20% stenosis, the proximal and mid vessel are extensively stented and there is a mid-distal 100% occlusion with well-developed left to right collaterals.            PERCUTANEOUS INTERVENTION DESCRIPTION      Heparin was used for anticoagulation, Integrilin was added during the procedure.   The initial 4/5 Marshallese sheath was upsized to aTerumo slender 6/7 sheath for intervention. A 7 Arabic AL 0.75 guiding catheter was taken and was used to intubate the RCA. A choice floppy wire was then used initially to cross the lesion and angioplasty was then performed with a 3 mm balloon. Additional guide support was felt to be needed and a run-through body wire was placed into the RCA and the choice floppy wire was removed. Then a 7 Western Sarai guide cell extension catheter was advanced into the RCA. Additional angioplasty was performed with 3 and 3.5 mm noncompliant balloons. Cutting Balloon atherectomy was then performed of the distal RCA as well as the proximal and mid RCA with 3 and 3.5 mm cutting balloons. 4 mm noncompliant balloon was used as well. COLE was performed which showed good stent expansion. Minimal luminal diameter was felt to be between 3.5 and 4 mm in the proximal and mid segments and 3 mm in the distal segments. Remaining disease and PDA was felt to be best treated medically and procedure was felt to be completed and it was felt stenting should be deferred as patient has had several stenting procedures already and IVUS did reveal multiple layers of stents. There was 0% residual stenosis. There was MOISÉS 3 flow before and after PCI.              CONCLUSIONS:      Successful atherectomy of RCA  We will consider staged PCI of circumflex  We will also consult with CT surgery as patient has had several PCI procedures and continues to have refractory CAD with ASHD progression.        CABG X4 surgery 9/18/2020  Procedure:  Urgent CABG X 4, with pedicled LIMA to LAD, sequential Greater Saphenous VG to OM 1 then on to OM 2, separate single Greater SVG to PDA of RCA, SGC, CPB, EVH Right Greater Saphenous vein, DIONI, Epiaortic ultrasound, Doppler verification of grafts, Bilateral 5 level intercostal nerve block(Exparel), Platelet gel application.   Sternal plating    FREDDIE 10/27-11/26/2020  No atrial fib     Assessment:   Preoperative cardiac risk assessment for cervical disc surgery - asymptomatic, functional class 1  Chest pain - atypical, stable  Coronary artery disease - stable post CABG  CABG surgery 9/18/2020  Hyperlipidemia - improved. Intolerant to high potency statins. Tolerating pravastatin. Intolerant to other antilipids. Declines Repatha due to cost    Hypertension - stable. Gest side effects w/ any increase in BP meds   Dizziness - states due to coreg. Tolerating current dose   Right ankle swelling related to arthritis - stable  Post-op Afib. No recurrence. Monitor w/o AF. Declines AC. Amio stopped. Plan:  Alysha Doing for for surgery- you can stop Aspirin and effient for 7 days prior to surgery. Restart as soon as ok with surgeon. R/B/A/E discussed w/ patient - understands and agrees. Cardiac medications reviewed including indications and pertinent side effects    Check blood pressure and heart rate at home a few times per week- keep a log with dates and times and bring to office visit   Regular exercise and following a healthy diet encouraged   Follow up with NP in 6 months     Scribe's attestation: This note was scribed in the presence of Dr. Angelica Hanyd M.D. By Yina Bland RN    The scribes documentation has been prepared under my direction and personally reviewed by me in its entirety. I confirm that the note above accurately reflects all work, treatment, procedures, and medical decision making performed by me. Dr. Angelica Handy MD      Thank you for allowing me to participate in the care of this individual.      Maggie Luciano.  Alize Henry M.D., Erica Ontiveros

## 2022-03-15 ENCOUNTER — OFFICE VISIT (OUTPATIENT)
Dept: CARDIOLOGY CLINIC | Age: 72
End: 2022-03-15
Payer: MEDICARE

## 2022-03-15 VITALS
HEART RATE: 81 BPM | OXYGEN SATURATION: 96 % | SYSTOLIC BLOOD PRESSURE: 138 MMHG | WEIGHT: 280.5 LBS | BODY MASS INDEX: 41.54 KG/M2 | HEIGHT: 69 IN | DIASTOLIC BLOOD PRESSURE: 78 MMHG

## 2022-03-15 DIAGNOSIS — I25.10 CORONARY ARTERY DISEASE INVOLVING NATIVE CORONARY ARTERY OF NATIVE HEART WITHOUT ANGINA PECTORIS: Primary | ICD-10-CM

## 2022-03-15 PROCEDURE — 99214 OFFICE O/P EST MOD 30 MIN: CPT | Performed by: INTERNAL MEDICINE

## 2022-03-15 NOTE — LETTER
[facility] Cardiology - [address] [facility] [address] [facility]  Phone: [phone]  Fax: [phone]    Geoff Amaya MD        March 15, 2022     [address] [address]  1950  To whom it may concern,           Ti Garcia has no cardiac contraindications to surgery. He can stop Aspirin and effient for 7 days prior to surgery. Restart as soon as ok with surgeon   If you have any questions or concerns, please don't hesitate to call.     Sincerely,        Geoff Amaya MD

## 2022-03-15 NOTE — PATIENT INSTRUCTIONS
Plan:  [address] for for surgery- you can stop Aspirin and effient for 7 days prior to surgery.  Restart as soon as ok with surgeon   Cardiac medications reviewed including indications and pertinent side effects    Check blood pressure and heart rate at home a few times per week- keep a log with dates and times and bring to office visit   Regular exercise and following a healthy diet encouraged   Follow up with NP in 6 months

## 2022-04-25 RX ORDER — PRASUGREL 10 MG/1
TABLET, FILM COATED ORAL
Qty: 90 TABLET | Refills: 3 | Status: SHIPPED | OUTPATIENT
Start: 2022-04-25

## 2022-09-21 ENCOUNTER — OFFICE VISIT (OUTPATIENT)
Dept: CARDIOLOGY CLINIC | Age: 72
End: 2022-09-21
Payer: MEDICARE

## 2022-09-21 VITALS
HEART RATE: 94 BPM | BODY MASS INDEX: 41.77 KG/M2 | SYSTOLIC BLOOD PRESSURE: 128 MMHG | DIASTOLIC BLOOD PRESSURE: 74 MMHG | OXYGEN SATURATION: 96 % | WEIGHT: 282 LBS | HEIGHT: 69 IN

## 2022-09-21 DIAGNOSIS — I25.10 CORONARY ARTERY DISEASE INVOLVING NATIVE CORONARY ARTERY OF NATIVE HEART WITHOUT ANGINA PECTORIS: Primary | ICD-10-CM

## 2022-09-21 DIAGNOSIS — I10 ESSENTIAL HYPERTENSION: ICD-10-CM

## 2022-09-21 DIAGNOSIS — E78.5 DYSLIPIDEMIA: ICD-10-CM

## 2022-09-21 PROCEDURE — 1123F ACP DISCUSS/DSCN MKR DOCD: CPT | Performed by: NURSE PRACTITIONER

## 2022-09-21 PROCEDURE — 99214 OFFICE O/P EST MOD 30 MIN: CPT | Performed by: NURSE PRACTITIONER

## 2022-09-21 ASSESSMENT — ENCOUNTER SYMPTOMS
RESPIRATORY NEGATIVE: 1
BACK PAIN: 1

## 2022-09-21 NOTE — LETTER
[address]  [facility] [address]  Phone: [phone]  Fax: Hussain Bourgeois, CURTIS - CNP    September 28, 2022     [address] [place] [address]    Patient: Leah Quinteros   MR Number: [id number]   YOB: 1950   Date of Visit: 9/21/2022       Dear Elisa Junior: Thank you for referring Janet Sosa to me for evaluation/treatment. Below are the relevant portions of my assessment and plan of care. If you have questions, please do not hesitate to call me. I look forward to following Juan C Spangler along with you.     Sincerely,      CURTIS Holley - CNP

## 2022-09-21 NOTE — PATIENT INSTRUCTIONS
Everything looks great today, good job!   Continue current medications  Stay active along with a healthy diet  Follow up with Dr. Gamez Born in 6 months

## 2023-01-05 RX ORDER — CARVEDILOL 3.12 MG/1
TABLET ORAL
Qty: 180 TABLET | Refills: 0 | Status: SHIPPED | OUTPATIENT
Start: 2023-01-05

## 2023-02-28 ENCOUNTER — TELEPHONE (OUTPATIENT)
Dept: CARDIOLOGY CLINIC | Age: 73
End: 2023-02-28

## 2023-02-28 NOTE — TELEPHONE ENCOUNTER
Stoney Figueredo you saw this patient last 9/2022. [facility] is asking for patient to have colonoscopy and EGD. Not yet scheduled. Asking to hold effient for 7 days.          Fax letter to [phone]

## 2023-03-27 ENCOUNTER — OFFICE VISIT (OUTPATIENT)
Dept: CARDIOLOGY CLINIC | Age: 73
End: 2023-03-27
Payer: MEDICARE

## 2023-03-27 VITALS
HEART RATE: 85 BPM | DIASTOLIC BLOOD PRESSURE: 74 MMHG | BODY MASS INDEX: 42.21 KG/M2 | WEIGHT: 285 LBS | HEIGHT: 69 IN | SYSTOLIC BLOOD PRESSURE: 126 MMHG | OXYGEN SATURATION: 96 %

## 2023-03-27 DIAGNOSIS — I25.10 CAD IN NATIVE ARTERY: Primary | ICD-10-CM

## 2023-03-27 DIAGNOSIS — Z98.61 CAD S/P PERCUTANEOUS CORONARY ANGIOPLASTY: ICD-10-CM

## 2023-03-27 DIAGNOSIS — I25.10 CAD S/P PERCUTANEOUS CORONARY ANGIOPLASTY: ICD-10-CM

## 2023-03-27 DIAGNOSIS — Z95.1 S/P CABG X 4: ICD-10-CM

## 2023-03-27 PROCEDURE — 99214 OFFICE O/P EST MOD 30 MIN: CPT | Performed by: INTERNAL MEDICINE

## 2023-03-27 PROCEDURE — 3078F DIAST BP <80 MM HG: CPT | Performed by: INTERNAL MEDICINE

## 2023-03-27 PROCEDURE — 93000 ELECTROCARDIOGRAM COMPLETE: CPT | Performed by: INTERNAL MEDICINE

## 2023-03-27 PROCEDURE — 3074F SYST BP LT 130 MM HG: CPT | Performed by: INTERNAL MEDICINE

## 2023-03-27 PROCEDURE — 1123F ACP DISCUSS/DSCN MKR DOCD: CPT | Performed by: INTERNAL MEDICINE

## 2023-03-27 RX ORDER — PRAVASTATIN SODIUM 80 MG/1
80 TABLET ORAL DAILY
Qty: 90 TABLET | Refills: 3 | Status: SHIPPED | OUTPATIENT
Start: 2023-03-27

## 2023-03-27 RX ORDER — EZETIMIBE 10 MG/1
10 TABLET ORAL DAILY
Qty: 90 TABLET | Refills: 1 | Status: SHIPPED | OUTPATIENT
Start: 2023-03-27

## 2023-03-27 RX ORDER — PRASUGREL 10 MG/1
10 TABLET, FILM COATED ORAL DAILY
Qty: 90 TABLET | Refills: 3 | Status: SHIPPED | OUTPATIENT
Start: 2023-03-27

## 2023-03-27 RX ORDER — CARVEDILOL 3.12 MG/1
3.12 TABLET ORAL 2 TIMES DAILY WITH MEALS
Qty: 180 TABLET | Refills: 3 | Status: SHIPPED | OUTPATIENT
Start: 2023-03-27

## 2023-03-27 NOTE — PROGRESS NOTES
fusion (1995); Appendectomy (1970); hernia repair (9131&3239); Colonoscopy (1987&2002); Upper gastrointestinal endoscopy (2002); Coronary angioplasty with stent (2004); Coronary angioplasty with stent (2008); Upper gastrointestinal endoscopy (08/28/2008); Cystoscopy (Left, 6/27/2019); Endoscopy, colon, diagnostic; CYSTOSCOPY INSERTION / REMOVAL STENT / STONE (Left, 7/11/2019); and Coronary artery bypass graft (N/A, 9/18/2020). Social History:   reports that he has never smoked. He has never used smokeless tobacco. He reports that he does not drink alcohol and does not use drugs. Family History:  family history includes Breast Cancer in his mother; Breast Cancer (age of onset: 61) in his sister; Heart Disease in his brother. Home Medications:  Prior to Admission medications    Medication Sig Start Date End Date Taking?  Authorizing Provider   carvedilol (COREG) 3.125 MG tablet TAKE 1 TABLET TWICE A DAY WITH MEALS  Patient taking differently: daily 1/5/23  Yes CURTIS Estrada CNP   prasugrel (EFFIENT) 10 MG TABS TAKE 1 TABLET DAILY 4/25/22  Yes Kirti , MD   pantoprazole (PROTONIX) 40 MG tablet Take 40 mg by mouth daily   Yes Historical Provider, MD   Coenzyme Q10 (CO Q 10) 100 MG CAPS Take 1 capsule by mouth   Yes Historical Provider, MD   finasteride (PROSCAR) 5 MG tablet Take 5 mg by mouth daily   Yes Historical Provider, MD   sitaGLIPtan-metformin (JANUMET)  MG per tablet Take 1 tablet by mouth 2 times daily (with meals) 3/5/20  Yes CURTIS Boyle CNP   glipiZIDE (GLUCOTROL) 10 MG tablet Take 10 mg by mouth daily    Yes Historical Provider, MD   tamsulosin (FLOMAX) 0.4 MG capsule Take 0.4 mg by mouth as needed 6/29/19  Yes Historical Provider, MD   pioglitazone (ACTOS) 30 MG tablet Take 30 mg by mouth daily  11/11/18  Yes Historical Provider, MD   pravastatin (PRAVACHOL) 80 MG tablet Take 80 mg by mouth daily   Yes Historical Provider, MD   aspirin 81 MG tablet Take 81 mg

## 2023-03-27 NOTE — PATIENT INSTRUCTIONS
Plan:  ~Start Zetia 10 mg daily   Ok for for surgery- you can stop Aspirin and effient for 7 days prior to surgery. Restart as soon as ok with surgeon. Cardiac medications reviewed including indications and pertinent side effects. Medication list updated at this visit.    Check blood pressure and heart rate at home a few times per week- keep a log with dates and times and bring to office visit   Regular exercise and following a healthy diet encouraged   Follow up with me in 1 year   Medication refilled

## 2024-05-22 ENCOUNTER — OFFICE VISIT (OUTPATIENT)
Dept: CARDIOLOGY CLINIC | Age: 74
End: 2024-05-22
Payer: MEDICARE

## 2024-05-22 VITALS
OXYGEN SATURATION: 97 % | HEIGHT: 69 IN | DIASTOLIC BLOOD PRESSURE: 86 MMHG | BODY MASS INDEX: 40.51 KG/M2 | WEIGHT: 273.5 LBS | HEART RATE: 99 BPM | SYSTOLIC BLOOD PRESSURE: 138 MMHG

## 2024-05-22 DIAGNOSIS — I10 ESSENTIAL HYPERTENSION: ICD-10-CM

## 2024-05-22 DIAGNOSIS — E78.2 MIXED HYPERLIPIDEMIA: ICD-10-CM

## 2024-05-22 DIAGNOSIS — I25.10 CAD IN NATIVE ARTERY: Primary | ICD-10-CM

## 2024-05-22 DIAGNOSIS — Z95.1 S/P CABG X 4: ICD-10-CM

## 2024-05-22 PROCEDURE — 99214 OFFICE O/P EST MOD 30 MIN: CPT | Performed by: INTERNAL MEDICINE

## 2024-05-22 PROCEDURE — 1123F ACP DISCUSS/DSCN MKR DOCD: CPT | Performed by: INTERNAL MEDICINE

## 2024-05-22 PROCEDURE — 3079F DIAST BP 80-89 MM HG: CPT | Performed by: INTERNAL MEDICINE

## 2024-05-22 PROCEDURE — 3075F SYST BP GE 130 - 139MM HG: CPT | Performed by: INTERNAL MEDICINE

## 2024-05-22 RX ORDER — PRAVASTATIN SODIUM 80 MG/1
80 TABLET ORAL DAILY
Qty: 90 TABLET | Refills: 3 | Status: SHIPPED | OUTPATIENT
Start: 2024-05-22

## 2024-05-22 RX ORDER — CARVEDILOL 3.12 MG/1
3.12 TABLET ORAL 2 TIMES DAILY WITH MEALS
Qty: 180 TABLET | Refills: 3 | Status: SHIPPED | OUTPATIENT
Start: 2024-05-22

## 2024-05-22 NOTE — PATIENT INSTRUCTIONS
Plan:  ~Ok for back procedure   ~Try taking a second dose of Coreg prior to bedtime     Cardiac medications reviewed including indications and pertinent side effects. Medication list updated at this visit.   Patient verbalizes understanding of the need for treatment and education has been provided at today's visit. Additional education material will be provided in after visit summary.    Check blood pressure and heart rate at home a few times per week- keep a log with dates and times and bring to office visit   Regular exercise and following a healthy diet encouraged   Follow up with me in 1 year   Medication refills have been provided for one year to your preferred pharmacy

## 2024-05-22 NOTE — PROGRESS NOTES
[facility]   Cardiac Followup    Referring Provider:  Ryan Morel MD     Chief Complaint   Patient presents with    Follow-up    Hypertension    Hyperlipidemia        Alexis Hunt   1950      History of Present Illness:    Alexis Hunt is a 74 y.o. male who is here today for follow up for a past medical history of coronary artery disease, carotid artery diease, hypertension, and hyperlipidemia. He had PCI RCA, LAD, OM 3 3/2020. Echo showed EF 55-60%, left heart cath 9/2020 showed progressive CAD, CABG surgery X4 9/18/2020, hypertension, hyperlipemia, and paroxysmal atrial fibrillation.   Carotid dopplers 2020 prior to CABG surgery showed mild plaque. Today he states he has been undergoing back injections since January. States his back pain limit his activity. He has been started on Ozempic and has started to have heart burn. States his breathing is stable and his only limiting factor is his back pain. He will be undergoing a back procedure this coming Friday. Blood pressure at home is running 106-135/60's. He is only taking Coreg once per day. Some dizziness when he takes 2 in the mornings. He has muscle pain every few months and holds his pravasatin for a few days. States his muscle pain resolves when he takes CoQ10 and occasionally holds Crestor. He stopped Zetia due to side effects. He was not able to afford Repatha. Patient currently denies any weight gain, edema, palpitations, chest pain, shortness of breath, and syncope.          Past Medical History:   has a past medical history of CAD (coronary artery disease), Degeneration of intervertebral disc at C5-C6 level, Diabetes mellitus (HCC), Hyperlipidemia, Hypertension, Kidney stones, and S/P CABG x 4.    Surgical History:   has a past surgical history that includes cervical fusion (1995); Appendectomy (1970); hernia repair (1987&1996); Colonoscopy (1987&2002); Upper gastrointestinal endoscopy (2002); Coronary angioplasty with stent

## 2024-08-09 RX ORDER — PRASUGREL 10 MG/1
10 TABLET, FILM COATED ORAL DAILY
Qty: 90 TABLET | Refills: 2 | Status: SHIPPED | OUTPATIENT
Start: 2024-08-09

## 2024-08-09 NOTE — TELEPHONE ENCOUNTER
Refill  prasugrel (EFFIENT) 10 MG TABS 90 day supply  **[facility] - [facility] - [facility] - P [phone] - F [phone]     Lov 5/22/2024  Next not due till 5/2025

## 2024-08-12 RX ORDER — PRASUGREL 10 MG/1
10 TABLET, FILM COATED ORAL DAILY
Qty: 90 TABLET | Refills: 2 | Status: SHIPPED | OUTPATIENT
Start: 2024-08-12 | End: 2024-08-13 | Stop reason: SDUPTHER

## 2024-08-13 ENCOUNTER — TELEPHONE (OUTPATIENT)
Dept: CARDIOLOGY CLINIC | Age: 74
End: 2024-08-13

## 2024-08-13 RX ORDER — PRASUGREL 10 MG/1
10 TABLET, FILM COATED ORAL DAILY
Qty: 30 TABLET | Refills: 0 | Status: SHIPPED | OUTPATIENT
Start: 2024-08-13

## 2024-08-13 NOTE — TELEPHONE ENCOUNTER
Per [facility] pharmacy pt has a sensitivity/allergy to  prasugrel (EFFIENT) 10 MG TABS     When calling back please give reference #  [id number]

## 2024-08-13 NOTE — TELEPHONE ENCOUNTER
Called and spoke with pt, confirmed he is not allergic to this medications. He has been on medication and he has no issues.  Called and spoke with pharmacy, they were questioning because pt has an allergy to plavix. I explained to her that pt was taken off of plavix and started on effient, and is not having any issues.       She will have meds shipped out to pt. Sending as fyi to confirm no contraindication

## 2025-03-01 NOTE — PROGRESS NOTES
Hospitalist Progress Note      PCP: Lopez Mclaughlin MD    Date of Admission: 2/21/2020    Chief Complaint: Chest pain    Hospital Course: See H&P    Subjective:   Patient is up in bed, comfortable, not in distress. Denies any chest pain or shortness of breath. No new event overnight noted. Medications:  Reviewed    Infusion Medications    eptifibatide 15 mg/hr (02/24/20 1002)    sodium chloride 75 mL/hr at 02/24/20 6760    dextrose       Scheduled Medications    sodium chloride flush  10 mL Intravenous 2 times per day    [START ON 2/25/2020] metoprolol succinate  50 mg Oral Daily    lisinopril  10 mg Oral Daily    ticagrelor  90 mg Oral BID    pantoprazole  40 mg Oral QAM AC    metoprolol tartrate  25 mg Oral BID    aspirin  81 mg Oral Daily    losartan  100 mg Oral Daily    pravastatin  80 mg Oral Daily    tamsulosin  0.4 mg Oral Daily    insulin lispro  0-12 Units Subcutaneous TID WC    insulin lispro  0-6 Units Subcutaneous Nightly     PRN Meds: sodium chloride flush, nitroGLYCERIN, morphine, GI cocktail, acetaminophen, acetaminophen, polyethylene glycol, promethazine, ondansetron, perflutren lipid microspheres, glucose, dextrose, glucagon (rDNA), dextrose      Intake/Output Summary (Last 24 hours) at 2/24/2020 1311  Last data filed at 2/24/2020 7316  Gross per 24 hour   Intake 780 ml   Output 1150 ml   Net -370 ml       Physical Exam Performed:    BP (!) 151/95   Pulse 78   Temp 98.3 °F (36.8 °C) (Oral)   Resp 18   Ht 5' 9\" (1.753 m)   Wt 276 lb 8 oz (125.4 kg)   SpO2 93%   BMI 40.83 kg/m²     General appearance: No apparent distress, appears stated age and cooperative. Morbidly obese. HEENT: Pupils equal, round, and reactive to light. Conjunctivae/corneas clear. Neck: Supple, with full range of motion. No jugular venous distention. Trachea midline. Respiratory:  Normal respiratory effort. Clear to auscultation, bilaterally without Rales/Wheezes/Rhonchi.   Cardiovascular: Regular rate and rhythm with normal S1/S2 without murmurs, rubs or gallops. Abdomen: Soft, non-tender, non-distended with normal bowel sounds. Musculoskeletal: No clubbing, cyanosis or edema bilaterally. Full range of motion without deformity. Skin: Skin color, texture, turgor normal.  No rashes or lesions. Neurologic:  Neurovascularly intact without any focal sensory/motor deficits. Cranial nerves: II-XII intact, grossly non-focal.  Psychiatric: Alert and oriented, thought content appropriate, normal insight  Capillary Refill: Brisk,< 3 seconds   Peripheral Pulses: +2 palpable, equal bilaterally       Labs:   Recent Labs     02/21/20 1848 02/22/20  0822   WBC 8.3 8.9   HGB 14.4 13.7   HCT 44.3 42.0    201     Recent Labs     02/21/20 1848 02/22/20  0822    137   K 4.4 4.4   CL 99 103   CO2 24 24   BUN 22* 23*   CREATININE 1.2 1.0   CALCIUM 8.9 8.8     Recent Labs     02/21/20 1848   AST 13*   ALT 18   BILITOT <0.2   ALKPHOS 80     No results for input(s): INR in the last 72 hours. Recent Labs     02/22/20  0013 02/22/20 1848 02/23/20  0819   TROPONINI 0.02* <0.01 0.02*       Urinalysis:      Lab Results   Component Value Date    NITRU Negative 06/27/2019    WBCUA 3-5 06/27/2019    BACTERIA Rare 06/27/2019    RBCUA 0-2 06/27/2019    BLOODU SMALL 06/27/2019    SPECGRAV 1.020 06/27/2019    GLUCOSEU Negative 06/27/2019       Radiology:  XR CHEST STANDARD (2 VW)   Final Result   No evidence of acute cardiopulmonary disease.                  Assessment/Plan:    Active Hospital Problems    Diagnosis    NSTEMI (non-ST elevated myocardial infarction) ([facility] [I21.4]    CAD (coronary artery disease) [I25.10]    DM[facility]diabetes mellitus, type 2) [facility] [E11.9]    HTN (hypertension) [I10]    Hyperlipidemia [E78.5]    Chest pain [R07.9]     Chest pain in setting of known CAD  -atypical  -serial troponin - initial elevated 0.02 on admission  -EKG w/o ischemic changes  -GI cocktail given in ED  -Aspirin given in ED  -Nitropaste placed on patient in ED  -Metoprolol given in ED  -echo in am  -cardiology consulted in ED - appreciate recommendations in advance  -Cardiology's input noted, plan for cardiac catheter on Monday. Status post cardiac cath done on 2/24/2020-Procedures: LHC, LVG, CA, PTCA, sedation     LM <20%  LAD 30% prox, 70% mid in stent              D2 50% prox  Cx 40-50%              D3 99% prox  RCA 80% mid in stent  LVEF 60%  PTCA LAD              3.0 x 15 angiosculpt cutting balloon to 3.5  PTCA OM3              2.75 x 15 FARHAN     DAPT, statin, BBlk  Home in am  PTCA RCA next week     Essential hypertension   -continue losartan     Hyperlipidemia  -continue pravastatin     DM2, uncontrolled  -  -hemglobin a1c 0.5 on 6/27/2018, will repeat in a.m.  -hold home medications  -mdssi  -poct ac/hs  -hypoglycemia protocol  -carb control diet     Obesity  With Body mass index is 79.4 kg/m². Complicating assessment and treatment. Placing patient at risk for multiple co-morbidities as well as early death and contributing to the patient's presentation. Counseled on weight loss    DVT Prophylaxis: Lovenox  Diet: DIET CARDIAC; No Caffeine  Code Status: Full Code    PT/OT Eval Status: Ambulatory    Dispo -in a.m.     Shae Hernandez MD Yes

## 2025-04-07 ENCOUNTER — TELEPHONE (OUTPATIENT)
Dept: CARDIOLOGY CLINIC | Age: 75
End: 2025-04-07

## 2025-04-07 RX ORDER — PRASUGREL 10 MG/1
10 TABLET, FILM COATED ORAL DAILY
Qty: 90 TABLET | Refills: 3 | Status: SHIPPED | OUTPATIENT
Start: 2025-04-07 | End: 2025-04-08 | Stop reason: SDUPTHER

## 2025-04-07 NOTE — TELEPHONE ENCOUNTER
PT called requesting for a refill for prasugrel (EFFIENT) 10 MG TABS  Please send to [facility] - [facility], [place] [address] - P [phone] - F [phone] [[phone]]

## 2025-04-08 RX ORDER — PRASUGREL 10 MG/1
10 TABLET, FILM COATED ORAL DAILY
Qty: 90 TABLET | Refills: 3 | Status: SHIPPED | OUTPATIENT
Start: 2025-04-08

## 2025-04-08 NOTE — TELEPHONE ENCOUNTER
[facility] says [facility].   I sent this again to [facility]

## 2025-05-15 LAB
CHOLESTEROL, TOTAL: 177 MG/DL
CHOLESTEROL/HDL RATIO: NORMAL
HDLC SERPL-MCNC: 44 MG/DL (ref 35–70)
LDL CHOLESTEROL: 104
NONHDLC SERPL-MCNC: NORMAL MG/DL
TRIGL SERPL-MCNC: 145 MG/DL
VLDLC SERPL CALC-MCNC: NORMAL MG/DL

## 2025-06-23 ENCOUNTER — TELEPHONE (OUTPATIENT)
Dept: CARDIOLOGY CLINIC | Age: 75
End: 2025-06-23

## 2025-06-23 ENCOUNTER — OFFICE VISIT (OUTPATIENT)
Dept: CARDIOLOGY CLINIC | Age: 75
End: 2025-06-23
Payer: MEDICARE

## 2025-06-23 VITALS
SYSTOLIC BLOOD PRESSURE: 130 MMHG | OXYGEN SATURATION: 95 % | HEART RATE: 99 BPM | BODY MASS INDEX: 40.58 KG/M2 | DIASTOLIC BLOOD PRESSURE: 90 MMHG | WEIGHT: 274 LBS | HEIGHT: 69 IN

## 2025-06-23 DIAGNOSIS — R07.9 CHEST PAIN, UNSPECIFIED TYPE: ICD-10-CM

## 2025-06-23 DIAGNOSIS — I10 ESSENTIAL HYPERTENSION: Primary | ICD-10-CM

## 2025-06-23 DIAGNOSIS — R06.02 SHORTNESS OF BREATH: ICD-10-CM

## 2025-06-23 DIAGNOSIS — I25.10 CORONARY ARTERY DISEASE INVOLVING NATIVE CORONARY ARTERY OF NATIVE HEART WITHOUT ANGINA PECTORIS: ICD-10-CM

## 2025-06-23 DIAGNOSIS — E78.5 DYSLIPIDEMIA: ICD-10-CM

## 2025-06-23 DIAGNOSIS — I25.10 CAD IN NATIVE ARTERY: ICD-10-CM

## 2025-06-23 DIAGNOSIS — R94.31 ABNORMAL ELECTROCARDIOGRAPHY: ICD-10-CM

## 2025-06-23 DIAGNOSIS — Z01.810 PRE-OPERATIVE CARDIOVASCULAR EXAMINATION: ICD-10-CM

## 2025-06-23 DIAGNOSIS — I65.23 BILATERAL CAROTID ARTERY STENOSIS: ICD-10-CM

## 2025-06-23 PROCEDURE — 99214 OFFICE O/P EST MOD 30 MIN: CPT | Performed by: INTERNAL MEDICINE

## 2025-06-23 PROCEDURE — 3080F DIAST BP >= 90 MM HG: CPT | Performed by: INTERNAL MEDICINE

## 2025-06-23 PROCEDURE — 1123F ACP DISCUSS/DSCN MKR DOCD: CPT | Performed by: INTERNAL MEDICINE

## 2025-06-23 PROCEDURE — 93000 ELECTROCARDIOGRAM COMPLETE: CPT | Performed by: INTERNAL MEDICINE

## 2025-06-23 PROCEDURE — 1159F MED LIST DOCD IN RCRD: CPT | Performed by: INTERNAL MEDICINE

## 2025-06-23 PROCEDURE — 3075F SYST BP GE 130 - 139MM HG: CPT | Performed by: INTERNAL MEDICINE

## 2025-06-23 RX ORDER — CARVEDILOL 3.12 MG/1
3.12 TABLET ORAL 2 TIMES DAILY WITH MEALS
Qty: 180 TABLET | Refills: 3 | Status: SHIPPED | OUTPATIENT
Start: 2025-06-23

## 2025-06-23 RX ORDER — EVOLOCUMAB 140 MG/ML
140 INJECTION, SOLUTION SUBCUTANEOUS
Qty: 2 ADJUSTABLE DOSE PRE-FILLED PEN SYRINGE | Refills: 11 | Status: SHIPPED | OUTPATIENT
Start: 2025-06-23

## 2025-06-23 RX ORDER — PRAVASTATIN SODIUM 80 MG/1
80 TABLET ORAL DAILY
Qty: 90 TABLET | Refills: 3 | Status: SHIPPED | OUTPATIENT
Start: 2025-06-23

## 2025-06-23 RX ORDER — SEMAGLUTIDE 0.68 MG/ML
INJECTION, SOLUTION SUBCUTANEOUS
COMMUNITY
Start: 2024-05-16

## 2025-06-23 NOTE — PATIENT INSTRUCTIONS
~Echocardiogram   ~Recommend a stress test- Lexiscan Myoview. Patient is not able to walk on a treadmill due to knee pain   ~Carotid dopplers    ~Call [facility] scheduling at [phone] to schedule testing      ~We will redo prior authorization for Repatha    Cardiac medications reviewed including indications and pertinent side effects. Medication list updated at this visit.   Patient verbalizes understanding of the need for treatment and education has been provided at today's visit. Additional education material will be provided in after visit summary.    Check blood pressure and heart rate at home a few times per week- keep a log with dates and times and bring to office visit   Regular exercise and following a healthy diet encouraged   Follow up with me in 1 year

## 2025-06-23 NOTE — PROGRESS NOTES
[facility]   Cardiac Followup    Referring Provider:  Ryan Morel MD     Chief Complaint   Patient presents with    Follow-up    Hypertension    Coronary Artery Disease    Hyperlipidemia    Chest Pain    Dizziness     Thinks from coreg        Alexis Hunt   1950      History of Present Illness:    Alexis Hunt is a 75 y.o. male who is here today for follow up for a past medical history of coronary artery disease, carotid artery diease, hypertension, and hyperlipidemia. He had PCI RCA, LAD, OM 3 3/2020. Echo showed EF 55-60%, left heart cath 9/2020 showed progressive CAD, CABG surgery X4 9/18/2020, hypertension, hyperlipemia, and paroxysmal atrial fibrillation.   Carotid dopplers 2020 prior to CABG surgery showed mild plaque.    Today he states he is having back and knee pain which makes it difficult to walk. He has been told he may need bilateral knee replacements. He states his lungs, \"feel congested,\" with the heat. He states he has started to have chest pains since his Ozempic was increased. Pain resolves after he takes Pepto. O chest pain with exertion. Patient currently denies any weight gain, edema, palpitations, and syncope.        Past Medical History:   has a past medical history of CAD (coronary artery disease), Degeneration of intervertebral disc at C5-C6 level, Diabetes mellitus (HCC), Hyperlipidemia, Hypertension, Kidney stones, and S/P CABG x 4.    Surgical History:   has a past surgical history that includes cervical fusion (1995); Appendectomy (1970); hernia repair (1987&1996); Colonoscopy (1987&2002); Upper gastrointestinal endoscopy (2002); Coronary angioplasty with stent (2004); Coronary angioplasty with stent (2008); Upper gastrointestinal endoscopy (08/28/2008); Cystoscopy (Left, 6/27/2019); Endoscopy, colon, diagnostic; CYSTOSCOPY INSERTION / REMOVAL STENT / STONE (Left, 7/11/2019); and Coronary artery bypass graft (N/A, 9/18/2020).     Social History:   reports that he

## 2025-06-24 ENCOUNTER — TELEPHONE (OUTPATIENT)
Dept: CARDIOLOGY CLINIC | Age: 75
End: 2025-06-24

## 2025-06-24 NOTE — TELEPHONE ENCOUNTER
[facility] asked about Repatha Prior Auth. Pharmacist was advised per [facility] lov:      Pharmacist v/u and stated she will call back in a couple days to see where its at.

## 2025-06-30 NOTE — TELEPHONE ENCOUNTER
Submitted PA for Repatha SureClick 140MG/ML auto-injectors   Via CMM Key: [id number]  STATUS: PENDING.    Follow up done daily; if no decision with in three days we will refax.  If another three days goes by with no decision will call the insurance for status.

## 2025-06-30 NOTE — TELEPHONE ENCOUNTER
[facility] called regarding status on Repatha PA would like to see if there are any updates.  Chart doesn't show any update as of today 06/30.  Please contact pharmacy at [phone].

## 2025-06-30 NOTE — TELEPHONE ENCOUNTER
The medication repatha is APPROVED from 1/1/25 to 6/30/26.    Please notify the patient.    If this requires a response please respond to the pool ( P [facility] MEDICATION PRE-AUTH).

## 2025-07-17 ENCOUNTER — HOSPITAL ENCOUNTER (OUTPATIENT)
Dept: NUCLEAR MEDICINE | Age: 75
Discharge: HOME | End: 2025-07-17
Attending: INTERNAL MEDICINE
Payer: MEDICARE

## 2025-07-17 ENCOUNTER — HOSPITAL ENCOUNTER (OUTPATIENT)
Dept: VASCULAR LAB | Age: 75
Discharge: HOME | End: 2025-07-19
Attending: INTERNAL MEDICINE
Payer: MEDICARE

## 2025-07-17 ENCOUNTER — HOSPITAL ENCOUNTER (OUTPATIENT)
Age: 75
Discharge: HOME | End: 2025-07-19
Attending: INTERNAL MEDICINE
Payer: MEDICARE

## 2025-07-17 ENCOUNTER — RESULTS FOLLOW-UP (OUTPATIENT)
Dept: CARDIOLOGY CLINIC | Age: 75
End: 2025-07-17

## 2025-07-17 DIAGNOSIS — Z01.810 PRE-OPERATIVE CARDIOVASCULAR EXAMINATION: ICD-10-CM

## 2025-07-17 DIAGNOSIS — R06.02 SHORTNESS OF BREATH: ICD-10-CM

## 2025-07-17 DIAGNOSIS — I65.23 BILATERAL CAROTID ARTERY STENOSIS: ICD-10-CM

## 2025-07-17 DIAGNOSIS — I25.10 CORONARY ARTERY DISEASE INVOLVING NATIVE CORONARY ARTERY OF NATIVE HEART WITHOUT ANGINA PECTORIS: ICD-10-CM

## 2025-07-17 DIAGNOSIS — R94.31 ABNORMAL ELECTROCARDIOGRAPHY: ICD-10-CM

## 2025-07-17 LAB
NUC STRESS EJECTION FRACTION: 58 %
NUC STRESS LV EDV: 106 ML (ref 67–155)
NUC STRESS LV ESV: 44 ML (ref 22–58)
NUC STRESS LV MASS: 150 G
STRESS BASELINE DIAS BP: 86 MMHG
STRESS BASELINE HR: 68 BPM
STRESS BASELINE SYS BP: 146 MMHG
STRESS ESTIMATED WORKLOAD: 1 METS
STRESS PEAK DIAS BP: 86 MMHG
STRESS PEAK SYS BP: 146 MMHG
STRESS PERCENT HR ACHIEVED: 74 %
STRESS POST PEAK HR: 108 BPM
STRESS RATE PRESSURE PRODUCT: NORMAL BPM*MMHG
STRESS TARGET HR: 145 BPM
VAS LEFT ARM BP DIA: 90 MMHG
VAS LEFT ARM BP: 144 MMHG
VAS LEFT CCA DIST EDV: 23.1 CM/S
VAS LEFT CCA DIST PSV: 82.2 CM/S
VAS LEFT CCA MID EDV: 18.3 CM/S
VAS LEFT CCA MID PSV: 80.9 CM/S
VAS LEFT CCA PROX EDV: 23.8 CM/S
VAS LEFT CCA PROX PSV: 98.5 CM/S
VAS LEFT ECA EDV: 14.3 CM/S
VAS LEFT ECA PSV: 87.7 CM/S
VAS LEFT ICA DIST EDV: 22.5 CM/S
VAS LEFT ICA DIST PSV: 69.9 CM/S
VAS LEFT ICA MID EDV: 11.8 CM/S
VAS LEFT ICA MID PSV: 43.6 CM/S
VAS LEFT ICA PROX EDV: 14.3 CM/S
VAS LEFT ICA PROX PSV: 64.6 CM/S
VAS LEFT ICA/CCA PSV: 0.8
VAS LEFT SUBCLAVIAN PROX EDV: 0 CM/S
VAS LEFT SUBCLAVIAN PROX PSV: 157 CM/S
VAS LEFT VERTEBRAL EDV: 12.9 CM/S
VAS LEFT VERTEBRAL PSV: 49.7 CM/S
VAS RIGHT ARM BP DIA: 92 MMHG
VAS RIGHT ARM BP: 152 MMHG
VAS RIGHT CCA DIST EDV: 14.9 CM/S
VAS RIGHT CCA DIST PSV: 66.6 CM/S
VAS RIGHT CCA MID EDV: 14.9 CM/S
VAS RIGHT CCA MID PSV: 70 CM/S
VAS RIGHT CCA PROX EDV: 17 CM/S
VAS RIGHT CCA PROX PSV: 74.1 CM/S
VAS RIGHT ECA EDV: 14.3 CM/S
VAS RIGHT ECA PSV: 83.6 CM/S
VAS RIGHT ICA DIST EDV: 24 CM/S
VAS RIGHT ICA DIST PSV: 64.3 CM/S
VAS RIGHT ICA MID EDV: 23.1 CM/S
VAS RIGHT ICA MID PSV: 62.4 CM/S
VAS RIGHT ICA PROX EDV: 21.1 CM/S
VAS RIGHT ICA PROX PSV: 63.2 CM/S
VAS RIGHT ICA/CCA PSV: 0.9
VAS RIGHT SUBCLAVIAN PROX EDV: 0 CM/S
VAS RIGHT SUBCLAVIAN PROX PSV: 102 CM/S
VAS RIGHT VERTEBRAL EDV: 9.21 CM/S
VAS RIGHT VERTEBRAL PSV: 45.5 CM/S

## 2025-07-17 PROCEDURE — A9502 TC99M TETROFOSMIN: HCPCS | Performed by: INTERNAL MEDICINE

## 2025-07-17 PROCEDURE — 78452 HT MUSCLE IMAGE SPECT MULT: CPT

## 2025-07-17 PROCEDURE — 93880 EXTRACRANIAL BILAT STUDY: CPT

## 2025-07-17 PROCEDURE — 3430000000 HC RX DIAGNOSTIC RADIOPHARMACEUTICAL: Performed by: INTERNAL MEDICINE

## 2025-07-17 PROCEDURE — 93017 CV STRESS TEST TRACING ONLY: CPT

## 2025-07-17 PROCEDURE — 93880 EXTRACRANIAL BILAT STUDY: CPT | Performed by: SURGERY

## 2025-07-17 PROCEDURE — 6360000002 HC RX W HCPCS: Performed by: INTERNAL MEDICINE

## 2025-07-17 RX ORDER — REGADENOSON 0.08 MG/ML
0.4 INJECTION, SOLUTION INTRAVENOUS
Status: COMPLETED | OUTPATIENT
Start: 2025-07-17 | End: 2025-07-17

## 2025-07-17 RX ADMIN — REGADENOSON 0.4 MG: 0.08 INJECTION, SOLUTION INTRAVENOUS at 12:38

## 2025-07-17 RX ADMIN — TETROFOSMIN 11.9 MILLICURIE: 1.38 INJECTION, POWDER, LYOPHILIZED, FOR SOLUTION INTRAVENOUS at 11:25

## 2025-07-17 RX ADMIN — TETROFOSMIN 33.3 MILLICURIE: 1.38 INJECTION, POWDER, LYOPHILIZED, FOR SOLUTION INTRAVENOUS at 12:38

## 2025-07-25 ENCOUNTER — HOSPITAL ENCOUNTER (OUTPATIENT)
Dept: CARDIOLOGY | Age: 75
Discharge: HOME | End: 2025-07-27
Attending: INTERNAL MEDICINE
Payer: MEDICARE

## 2025-07-25 VITALS
SYSTOLIC BLOOD PRESSURE: 130 MMHG | WEIGHT: 274 LBS | BODY MASS INDEX: 40.58 KG/M2 | DIASTOLIC BLOOD PRESSURE: 90 MMHG | HEIGHT: 69 IN

## 2025-07-25 DIAGNOSIS — I25.10 CORONARY ARTERY DISEASE INVOLVING NATIVE CORONARY ARTERY OF NATIVE HEART WITHOUT ANGINA PECTORIS: ICD-10-CM

## 2025-07-25 DIAGNOSIS — R07.9 CHEST PAIN, UNSPECIFIED TYPE: ICD-10-CM

## 2025-07-25 LAB
ECHO AO ASC DIAM: 3.3 CM
ECHO AO ASCENDING AORTA INDEX: 1.4 CM/M2
ECHO AO ROOT DIAM: 3.6 CM
ECHO AO ROOT INDEX: 1.53 CM/M2
ECHO AV AREA PEAK VELOCITY: 3.3 CM2
ECHO AV AREA VTI: 3.1 CM2
ECHO AV AREA/BSA PEAK VELOCITY: 1.4 CM2/M2
ECHO AV AREA/BSA VTI: 1.3 CM2/M2
ECHO AV CUSP MM: 2 CM
ECHO AV MEAN GRADIENT: 4 MMHG
ECHO AV MEAN GRADIENT: 4 MMHG
ECHO AV MEAN VELOCITY: 1 M/S
ECHO AV PEAK GRADIENT: 8 MMHG
ECHO AV PEAK VELOCITY: 1.4 M/S
ECHO AV VELOCITY RATIO: 0.64
ECHO AV VTI: 25.9 CM
ECHO BSA: 2.46 M2
ECHO EST RA PRESSURE: 3 MMHG
ECHO LA AREA 2C: 20.1 CM2
ECHO LA AREA 4C: 21.1 CM2
ECHO LA DIAMETER INDEX: 1.65 CM/M2
ECHO LA DIAMETER: 3.9 CM
ECHO LA MAJOR AXIS: 6.8 CM
ECHO LA MINOR AXIS: 6.3 CM
ECHO LA TO AORTIC ROOT RATIO: 1.08
ECHO LA VOL BP: 55 ML (ref 18–58)
ECHO LA VOL MOD A2C: 53 ML (ref 18–58)
ECHO LA VOL MOD A4C: 53 ML (ref 18–58)
ECHO LA VOL/BSA BIPLANE: 23 ML/M2 (ref 16–34)
ECHO LA VOLUME INDEX MOD A2C: 22 ML/M2 (ref 16–34)
ECHO LA VOLUME INDEX MOD A4C: 22 ML/M2 (ref 16–34)
ECHO LV E' LATERAL VELOCITY: 11.2 CM/S
ECHO LV E' SEPTAL VELOCITY: 12.3 CM/S
ECHO LV EDV 3D: 208 ML
ECHO LV EDV INDEX 3D: 88 ML/M2
ECHO LV EF PHYSICIAN: 55 %
ECHO LV EJECTION FRACTION 3D: 55 %
ECHO LV ESV 3D: 94 ML
ECHO LV ESV INDEX 3D: 40 ML/M2
ECHO LV FRACTIONAL SHORTENING: 22 % (ref 28–44)
ECHO LV INTERNAL DIMENSION DIASTOLE INDEX: 2.08 CM/M2
ECHO LV INTERNAL DIMENSION DIASTOLIC: 4.9 CM (ref 4.2–5.9)
ECHO LV INTERNAL DIMENSION SYSTOLIC INDEX: 1.61 CM/M2
ECHO LV INTERNAL DIMENSION SYSTOLIC: 3.8 CM
ECHO LV ISOVOLUMETRIC RELAXATION TIME (IVRT): 90 MS
ECHO LV IVSD: 1.1 CM (ref 0.6–1)
ECHO LV MASS 2D: 200.5 G (ref 88–224)
ECHO LV MASS 3D INDEX: 80.5 G/M2
ECHO LV MASS 3D: 190 G
ECHO LV MASS INDEX 2D: 85 G/M2 (ref 49–115)
ECHO LV POSTERIOR WALL DIASTOLIC: 1.1 CM (ref 0.6–1)
ECHO LV RELATIVE WALL THICKNESS RATIO: 0.45
ECHO LVOT AREA: 5.3 CM2
ECHO LVOT AV VTI INDEX: 0.58
ECHO LVOT DIAM: 2.6 CM
ECHO LVOT MEAN GRADIENT: 2 MMHG
ECHO LVOT PEAK GRADIENT: 3 MMHG
ECHO LVOT PEAK VELOCITY: 0.9 M/S
ECHO LVOT STROKE VOLUME INDEX: 34 ML/M2
ECHO LVOT SV: 80.1 ML
ECHO LVOT VTI: 15.1 CM
ECHO MV A VELOCITY: 1.22 M/S
ECHO MV E DECELERATION TIME (DT): 172 MS
ECHO MV E VELOCITY: 0.77 M/S
ECHO MV E/A RATIO: 0.63
ECHO MV E/E' LATERAL: 6.88
ECHO MV E/E' RATIO (AVERAGED): 6.57
ECHO MV E/E' SEPTAL: 6.26
ECHO RA AREA 4C: 16.6 CM2
ECHO RA END SYSTOLIC VOLUME APICAL 4 CHAMBER INDEX BSA: 19 ML/M2
ECHO RA VOLUME: 45 ML
ECHO RV FREE WALL PEAK S': 10.1 CM/S
ECHO RV TAPSE: 2 CM (ref 1.7–?)

## 2025-07-25 PROCEDURE — 93306 TTE W/DOPPLER COMPLETE: CPT | Performed by: INTERNAL MEDICINE

## 2025-07-25 PROCEDURE — 93306 TTE W/DOPPLER COMPLETE: CPT

## 2025-08-11 ENCOUNTER — TELEPHONE (OUTPATIENT)
Dept: CARDIOLOGY CLINIC | Age: 75
End: 2025-08-11

## (undated) DEVICE — CLIP SM RED INTERN HMOCLP TITAN LIGATING

## (undated) DEVICE — KIT BLWR MISTER 5P 15L W/ TBNG SET IRRIG MIST TO IMPROVE

## (undated) DEVICE — LEAD PACE 2.6FR L50CM UPLR TEMP ATR NONSTEROID ELUT PIN

## (undated) DEVICE — GLOVE,SURG,SENSICARE SLT,LF,PF,6.5: Brand: MEDLINE

## (undated) DEVICE — SUTURE PROL SZ 6-0 L24IN NONABSORBABLE BLU L13MM C-1 3/8 8726H

## (undated) DEVICE — SUTURE VCRL SZ 3-0 L27IN ABSRB UD L26MM SH 1/2 CIR J416H

## (undated) DEVICE — ADHESIVE SKIN CLSR 0.7ML TOP DERMBND ADV

## (undated) DEVICE — COVER TRNSDUC W6XL96IN POLY TELESCOPICALLY FLD W/ ATTCH FRM

## (undated) DEVICE — CANNULA PERF AD 34 46FR L15IN VEN 1 2IN CONN TWO STG KINK

## (undated) DEVICE — SUTURE SZ 7 L18IN NONABSORBABLE SIL CCS L48MM 1/2 CIR STRNM M655G

## (undated) DEVICE — PUNCH AORT BLDE DIA4.4MM LNG HNDL SHRP 2 CUT EDGE FOR COR

## (undated) DEVICE — RESERVOIR,SUCTION,100CC,SILICONE: Brand: MEDLINE

## (undated) DEVICE — WAX SURG 2.5GM HEMSTAT BNE BEESWAX PARAFFIN ISO PALMITATE

## (undated) DEVICE — APPLICATOR LNG TP 12.5IN

## (undated) DEVICE — GLOVE,SURG,SENSICARE SLT,LF,PF,7.5: Brand: MEDLINE

## (undated) DEVICE — SUTURE PROL SZ 3-0 L36IN NONABSORBABLE BLU L26MM SH 1/2 CIR 8522H

## (undated) DEVICE — KIT APPL 11:1 PROC W/ FIBRIJET MED CUP APPL TIP TY

## (undated) DEVICE — DUAL LUMEN URETERAL CATHETER

## (undated) DEVICE — SUTURE ETHBND EXCEL SZ 2-0 L36IN NONABSORBABLE GRN L26MM SH X523H

## (undated) DEVICE — SOLUTION IV IRRIG POUR BRL 0.9% SODIUM CHL 2F7124

## (undated) DEVICE — CANNULA PERF 15FR L12.5IN RG STPCOCK WIREWOUND BODY

## (undated) DEVICE — DRAPE,U/ SHT,SPLIT,PLAS,STERIL: Brand: MEDLINE

## (undated) DEVICE — AGENT HEMSTAT W2XL14IN OXIDIZED REGENERATED CELOS ABSRB FOR

## (undated) DEVICE — CANNULA PERF 7FR L5.5IN AORT ROOT RADPQ STD TIP W/ VENT LN

## (undated) DEVICE — GUIDEWIRE URO L145CM DIA0.035IN TIP L3.5CM PTFE FLX AMPLATZ

## (undated) DEVICE — SUTURE ABSORBABLE BRAIDED 2-0 CT-1 27 IN UD VICRYL J259H

## (undated) DEVICE — THORACIC CATHETER, STRAIGHT, SILICONE, WITH CLOTSTOP®: Brand: AXIOM® ATRAUM™ WITH CLOTSTOP®

## (undated) DEVICE — Y-TYPE TUR/BLADDER IRRIGATION SET, REGULATING CLAMP

## (undated) DEVICE — TIP APPL TOP 2 SPRY

## (undated) DEVICE — [HIGH FLOW INSUFFLATOR,  DO NOT USE IF PACKAGE IS DAMAGED,  KEEP DRY,  KEEP AWAY FROM SUNLIGHT,  PROTECT FROM HEAT AND RADIOACTIVE SOURCES.]: Brand: PNEUMOSURE

## (undated) DEVICE — GUIDEWIRE ENDOSCP L150CM DIA0.035IN TIP 3CM PTFE NIT

## (undated) DEVICE — THORACIC CATHETER, RIGHT ANGLE, SILICONE, WITH CLOTSTOP®: Brand: AXIOM® ATRAUM™ WITH CLOTSTOP®

## (undated) DEVICE — CYSTO: Brand: MEDLINE INDUSTRIES, INC.

## (undated) DEVICE — APPLIER CLP L9.375IN APER 2.1MM CLS L3.8MM 20 SM TI CLP

## (undated) DEVICE — CONNECTOR PERF W0.5XH0.5IN BASE STR W/O LUERLOCK FOR CUST

## (undated) DEVICE — KIT,ANTI FOG,W/SPONGE & FLUID,SOFT PACK: Brand: MEDLINE

## (undated) DEVICE — PACK PROCEDURE SURG OPN HRT A BASIC

## (undated) DEVICE — SYSTEM BLD DEL

## (undated) DEVICE — SUTURE ETHBND EXCEL SZ 0 L18IN NONABSORBABLE GRN L36MM CT-1 CX21D

## (undated) DEVICE — FOGARTY - HYDRAGRIP SURGICAL - CLAMP INSERTS: Brand: FOGARTY SOFTJAW

## (undated) DEVICE — Device

## (undated) DEVICE — STERILE POLYISOPRENE POWDER-FREE SURGICAL GLOVES: Brand: PROTEXIS

## (undated) DEVICE — SOLUTION IRRIG 2000ML STRL H2O UROMATIC PLAS CONT USP

## (undated) DEVICE — SUTURE NONABSORBABLE MONOFILAMENT 7-0 BV-1 1X24 IN PROLENE 8702H

## (undated) DEVICE — SYSTEM PMP VAC SYR 10CC CONT FLO SGL ACT 1 W VLV SAPS

## (undated) DEVICE — TIP APPL 20 GAX5 CM 2 CANN MALL

## (undated) DEVICE — LEAD PACEMKR MYOCARDIAL UNIPOLAR TEMP

## (undated) DEVICE — GLOVE ORANGE PI 8 1/2   MSG9085

## (undated) DEVICE — SUTURE VCRL SZ 3-0 L27IN ABSRB UD L36MM CT-1 1/2 CIR J258H

## (undated) DEVICE — SYSTEM VES HARV ENDOSCP VASOVIEW HEMOPRO

## (undated) DEVICE — GOWN SIRUS NONREIN XL W/TWL: Brand: MEDLINE INDUSTRIES, INC.

## (undated) DEVICE — SUTURE NONABSORBABLE MONOFILAMENT 5-0 C-1 1X24 IN PROLENE 8725H

## (undated) DEVICE — GLOVE ORANGE PI 7   MSG9070

## (undated) DEVICE — BATTERY DRVR W/ SELF DRL TAP FOR THINFLAP PWR DRVR

## (undated) DEVICE — TOWEL,OR,DSP,ST,BLUE,STD,6/PK,12PK/CS: Brand: MEDLINE

## (undated) DEVICE — Device: Brand: MEDEX

## (undated) DEVICE — LIGHT HANDLE: Brand: DEVON

## (undated) DEVICE — SUTURE VCRL SZ 4-0 L18IN ABSRB UD L19MM PS-2 3/8 CIR PRIM J496H

## (undated) DEVICE — SUTURE PROL SZ 4-0 L36IN NONABSORBABLE BLU BB L17MM 3/8 CIR 8581H

## (undated) DEVICE — BENTSON WIRE GUIDE 20CM DISTAL FLEXIBILITY WITH SOFTENED TIP: Brand: BENTSON

## (undated) DEVICE — JACKSON-PRATT 100CC BULB RESERVOIR: Brand: CARDINAL HEALTH

## (undated) DEVICE — SUTURE VCRL SZ 0 L27IN ABSRB UD L36MM CT-1 1/2 CIR J260H

## (undated) DEVICE — SYRINGE, LUER LOCK, 10ML: Brand: MEDLINE

## (undated) DEVICE — BLADE RETRCT 3 SH FNGR ASST

## (undated) DEVICE — Z DUP USE 2537558 SYSTEM ENDOSCP VES HARV VASO VW HEMOPRO

## (undated) DEVICE — ZINACTIVE USE 2540325 DEVICE OCCL CLP L40MM SM FOOTPRINT 1 HND APPL SUTURELESS W/
Type: IMPLANTABLE DEVICE | Site: HEART | Status: NON-FUNCTIONAL
Removed: 2020-09-18

## (undated) DEVICE — EZ GLIDE AORTIC CANNULA: Brand: EDWARDS LIFESCIENCES EZ GLIDE AORTIC CANNULA

## (undated) DEVICE — DRAIN SURG FLAT W7MMXL20CM FULL PERF

## (undated) DEVICE — OPEN-END FLEXI-TIP URETERAL CATHETER: Brand: FLEXI-TIP

## (undated) DEVICE — SEAL ENDOSCP INSTR BX PRT FOR ACC UPTO 3FR

## (undated) DEVICE — BLADE STRNM SAW STR 10/BX